# Patient Record
Sex: MALE | Race: WHITE | Employment: OTHER | URBAN - METROPOLITAN AREA
[De-identification: names, ages, dates, MRNs, and addresses within clinical notes are randomized per-mention and may not be internally consistent; named-entity substitution may affect disease eponyms.]

---

## 2017-05-09 ENCOUNTER — HOSPITAL ENCOUNTER (OUTPATIENT)
Dept: VASCULAR SURGERY | Age: 72
Discharge: HOME | End: 2017-05-09
Attending: INTERNAL MEDICINE
Payer: MEDICARE

## 2017-05-09 DIAGNOSIS — R09.89 BRUIT OF LEFT CAROTID ARTERY: ICD-10-CM

## 2017-05-09 DIAGNOSIS — I65.23 STENOSIS OF BOTH INTERNAL CAROTID ARTERIES: ICD-10-CM

## 2017-05-09 DIAGNOSIS — I70.90 ARTERIOSCLEROTIC VASCULAR DISEASE: ICD-10-CM

## 2017-05-09 PROCEDURE — 93880 EXTRACRANIAL BILAT STUDY: CPT

## 2017-05-09 NOTE — PROGRESS NOTES
[facility]  Preliminary Report:  Carotid Duplex Scan    Right:  Mild plaque noted in the right carotid system. Right ICA velocities suggest less than 50% diameter reduction. Right vertebral artery flow is antegrade. Left:  Mild plaque noted in the left carotid system. Left ICA velocities suggest less than 50% diameter reduction. Left vertebral artery flow is antegrade. Final report to follow.

## 2017-05-09 NOTE — PROCEDURES
[facility]  *** FINAL REPORT ***    Name: Sean Kumar  MRN: [id number]    Outpatient  : 10 Nov 1945  HIS Order #: [id number]  [id number] [facility] Visit #: [id number]  Date: 09 May 2017    TYPE OF TEST: Cerebrovascular Duplex    REASON FOR TEST  Atherosclerosis, NOS    Right Carotid:-             Proximal               Mid                 Distal  cm/s  Systolic  Diastolic  Systolic  Diastolic  Systolic  Diastolic  CCA:     05.2                                      85.0  Bulb:  ECA:     86.0  ICA:     64.0                 72.0                 64.0  ICA/CCA:  0.8    ICA Stenosis: <50%    Right Vertebral:-  Finding: Antegrade  Sys:       51.0  Bernice:    Right Subclavian: Normal    Left Carotid:-            Proximal                Mid                 Distal  cm/s  Systolic  Diastolic  Systolic  Diastolic  Systolic  Diastolic  CCA:    377.4      17.0                            87.0  Bulb:  ECA:    121.0  ICA:     36.0                 57.0                 51.0  ICA/CCA:  0.4    ICA Stenosis: <50%    Left Vertebral:-  Finding: Antegrade  Sys:       42.0  Bernice:    Left Subclavian: Normal    INTERPRETATION/FINDINGS  PROCEDURE:  Carotid Duplex Examination using B-mode, color and  spectral Doppler of the extracranial cerebrovascular arteries. 1. Bilateral <50% stenosis of the internal carotid arteries. 2. No significant stenosis in the external carotid arteries  bilaterally. 3. Antegrade flow in both vertebral arteries. 4. Normal flow in both subclavian arteries. Plaque Morphology:  1. Heterogeneous plaque in the bulb and right ICA. 2. Heterogeneous plaque in the bulb and left ICA. ADDITIONAL COMMENTS    I have personally reviewed the data relevant to the interpretation of  this  study. TECHNOLOGIST: Juan Ramon [employer]. DOC Villanueva, ALBINO  Signed: 2017 03:23 PM    PHYSICIAN: Ari Jordan MD  Signed: 2017 09:44 PM

## 2017-08-01 RX ORDER — ATENOLOL 50 MG/1
TABLET ORAL
Qty: 90 TAB | Refills: 3 | Status: SHIPPED | OUTPATIENT
Start: 2017-08-01 | End: 2019-04-22 | Stop reason: ALTCHOICE

## 2017-08-02 ENCOUNTER — OFFICE VISIT (OUTPATIENT)
Dept: INTERNAL MEDICINE CLINIC | Age: 72
End: 2017-08-02

## 2017-08-02 VITALS
HEIGHT: 70 IN | RESPIRATION RATE: 18 BRPM | TEMPERATURE: 98.5 F | DIASTOLIC BLOOD PRESSURE: 84 MMHG | HEART RATE: 66 BPM | BODY MASS INDEX: 34.07 KG/M2 | SYSTOLIC BLOOD PRESSURE: 166 MMHG | WEIGHT: 238 LBS | OXYGEN SATURATION: 96 %

## 2017-08-02 DIAGNOSIS — D48.5 NEOPLASM OF UNCERTAIN BEHAVIOR OF SKIN: Primary | ICD-10-CM

## 2017-08-02 DIAGNOSIS — E11.9 TYPE 2 DIABETES MELLITUS WITHOUT COMPLICATION, WITHOUT LONG-TERM CURRENT USE OF INSULIN (HCC): ICD-10-CM

## 2017-08-02 DIAGNOSIS — I10 ESSENTIAL HYPERTENSION: ICD-10-CM

## 2017-08-02 PROBLEM — M19.90 DJD (DEGENERATIVE JOINT DISEASE): Status: ACTIVE | Noted: 2017-08-02

## 2017-08-02 PROBLEM — Z79.899 ON STATIN THERAPY: Status: ACTIVE | Noted: 2017-08-02

## 2017-08-02 PROBLEM — R09.89 LEFT CAROTID BRUIT: Status: ACTIVE | Noted: 2017-08-02

## 2017-08-02 PROBLEM — L50.9 URTICARIA: Status: ACTIVE | Noted: 2017-08-02

## 2017-08-02 PROBLEM — E78.5 HYPERLIPIDEMIA: Status: ACTIVE | Noted: 2017-08-02

## 2017-08-02 PROBLEM — I70.90 ASVD (ARTERIOSCLEROTIC VASCULAR DISEASE): Status: ACTIVE | Noted: 2017-08-02

## 2017-08-02 PROBLEM — M54.9 BACK PAIN: Status: ACTIVE | Noted: 2017-08-02

## 2017-08-02 PROBLEM — N52.9 ED (ERECTILE DYSFUNCTION): Status: ACTIVE | Noted: 2017-08-02

## 2017-08-02 PROBLEM — N40.0 BPH (BENIGN PROSTATIC HYPERPLASIA): Status: ACTIVE | Noted: 2017-08-02

## 2017-08-02 PROBLEM — G61.0 GUILLAIN-BARRE SYNDROME (HCC): Status: ACTIVE | Noted: 2017-08-02

## 2017-08-02 PROBLEM — N18.9 CKD (CHRONIC KIDNEY DISEASE): Status: ACTIVE | Noted: 2017-08-02

## 2017-08-02 PROBLEM — E66.01 MORBID OBESITY (HCC): Status: ACTIVE | Noted: 2017-08-02

## 2017-08-02 PROBLEM — I35.0 AORTIC STENOSIS: Status: ACTIVE | Noted: 2017-08-02

## 2017-08-02 RX ORDER — PRAVASTATIN SODIUM 80 MG/1
80 TABLET ORAL
COMMUNITY
End: 2017-11-10 | Stop reason: SDUPTHER

## 2017-08-02 RX ORDER — LISINOPRIL 40 MG/1
40 TABLET ORAL DAILY
COMMUNITY
End: 2018-04-09 | Stop reason: SDUPTHER

## 2017-08-02 RX ORDER — TAMSULOSIN HYDROCHLORIDE 0.4 MG/1
0.4 CAPSULE ORAL
COMMUNITY
End: 2018-05-10 | Stop reason: SDUPTHER

## 2017-08-02 RX ORDER — ASPIRIN 81 MG/1
TABLET ORAL DAILY
COMMUNITY
End: 2022-03-11

## 2017-08-02 RX ORDER — METFORMIN HYDROCHLORIDE 500 MG/1
500 TABLET ORAL 2 TIMES DAILY WITH MEALS
COMMUNITY
End: 2017-11-10 | Stop reason: SDUPTHER

## 2017-08-02 RX ORDER — LOSARTAN POTASSIUM 100 MG/1
100 TABLET ORAL DAILY
COMMUNITY
End: 2018-01-31 | Stop reason: SDUPTHER

## 2017-08-02 RX ORDER — GLIPIZIDE 10 MG/1
10 TABLET ORAL 2 TIMES DAILY
COMMUNITY
End: 2018-05-01 | Stop reason: SDUPTHER

## 2017-08-02 RX ORDER — MELOXICAM 15 MG/1
15 TABLET ORAL DAILY
COMMUNITY
End: 2017-08-02 | Stop reason: ALTCHOICE

## 2017-08-02 RX ORDER — AMLODIPINE BESYLATE 10 MG/1
TABLET ORAL DAILY
COMMUNITY
End: 2017-08-08 | Stop reason: SDUPTHER

## 2017-08-02 NOTE — MR AVS SNAPSHOT
Visit Information Date & Time Provider Department Dept. Phone Encounter #  
 8/2/2017  3:10 PM Ethel Corcoran, 1943 Kerri Brennan [id number] Your Appointments 8/25/2017 10:10 AM  
FOLLOW UP 10 with MD SKYLA MercadoGOLDIE SULLIVAN [facility] ([address]) Appt Note: 3 MO FLP; 3 mo flp (r/s from 08/10) [address] [id number] [address] [address] Upcoming Health Maintenance Date Due HEMOGLOBIN A1C Q6M 1945 LIPID PANEL Q1 1945 FOOT EXAM Q1 11/10/1953 MICROALBUMIN Q1 11/10/1953 EYE EXAM RETINAL OR DILATED Q1 11/10/1953 DTaP/Tdap/Td series (1 - Tdap) 11/10/1964 FOBT Q 1 YEAR AGE 50-75 11/10/1993 ZOSTER VACCINE AGE 60> 9/10/2003 GLAUCOMA SCREENING Q2Y 11/10/2008 MEDICARE YEARLY EXAM 11/10/2008 Pneumococcal 65+ Low/Medium Risk (2 of 2 - PPSV23) 9/16/2016 INFLUENZA AGE 9 TO ADULT 8/1/2017 Allergies as of 8/2/2017  Review Complete On: 8/2/2017 By: Ethel Corcoran MD  
  
 Severity Noted Reaction Type Reactions Adhesive Tape-silicones  84/34/5988    Unknown (comments) Chocolate Flavor  08/02/2017    Unknown (comments) Current Immunizations  Never Reviewed Name Date Influenza Vaccine 10/27/2016 Pneumococcal Conjugate (PCV-13) 9/16/2015 Not reviewed this visit You Were Diagnosed With   
  
 Codes Comments Neoplasm of uncertain behavior of skin    -  Primary ICD-10-CM: D48.5 ICD-9-CM: 238.2 Essential hypertension     ICD-10-CM: I10 
ICD-9-CM: 401.9 Type 2 diabetes mellitus without complication, without long-term current use of insulin (HCC)     ICD-10-CM: E11.9 ICD-9-CM: 250.00 Vitals BP Pulse Temp Resp Height(growth percentile) Weight(growth percentile)  186/80 (BP 1 Location: Right arm, BP Patient Position: Sitting) 66 98.5 °F (36.9 °C) (Oral) 18 5' 10\" (1.778 m) 238 lb (108 kg) SpO2 BMI Smoking Status 96% 34.15 kg/m2 Never Smoker BMI and BSA Data Body Mass Index Body Surface Area  
 34.15 kg/m 2 2.31 m 2 Preferred Pharmacy Pharmacy Name Phone [id number] [facility] [address] [phone] Your Updated Medication List  
  
   
This list is accurate as of: 8/2/17  4:25 PM.  Always use your most recent med list.  
  
  
  
  
 aspirin delayed-release 81 mg tablet Take  by mouth daily. atenolol 50 mg tablet Commonly known as:  TENORMIN  
TAKE 1 TABLET BY MOUTH EVERY DAY  
  
 FLOMAX 0.4 mg capsule Generic drug:  tamsulosin Take 0.4 mg by mouth daily. glipiZIDE 10 mg tablet Commonly known as:  [place] [employer] Take 10 mg by mouth two (2) times a day. INVOKANA 300 mg tablet Generic drug:  canagliflozin Take  by mouth Daily (before breakfast). JANUVIA 100 mg tablet Generic drug:  SITagliptin Take 100 mg by mouth daily. lisinopril 40 mg tablet Commonly known as:  Elsa Tavares Take 40 mg by mouth daily. losartan 100 mg tablet Commonly known as:  COZAAR Take 100 mg by mouth daily. metFORMIN 500 mg tablet Commonly known as:  GLUCOPHAGE Take  by mouth two (2) times daily (with meals). NORVASC 10 mg tablet Generic drug:  amLODIPine Take  by mouth daily. OMEGA 3 PO Take  by mouth.  
  
 pravastatin 80 mg tablet Commonly known as:  PRAVACHOL Take 80 mg by mouth nightly. We Performed the Following [address] 0.6-1CM TRUNK,ARM,LEG [id number] CPT(R)] PATHOLOGIST REVIEW SMEARS [EEB4535 Custom] Introducing [facility]! Alejandra Chong introduces [employer] patient portal. Now you can access parts of your medical record, email your doctor's office, and request medication refills online.    
 
1. In your internet browser, go to [email or web address] 2. Click on the First Time User? Click Here link in the Sign In box. You will see the New Member Sign Up page. 3. Enter your [employer] Access Code exactly as it appears below. You will not need to use this code after youve completed the sign-up process. If you do not sign up before the expiration date, you must request a new code. · [employer] Access Code: [email or web address] Expires: 8/7/2017 11:49 AM 
 
4. Enter the last four digits of your Social Security Number (xxxx) and Date of Birth (mm/dd/yyyy) as indicated and click Submit. You will be taken to the next sign-up page. 5. Create a [employer] ID. This will be your [employer] login ID and cannot be changed, so think of one that is secure and easy to remember. 6. Create a [employer] password. You can change your password at any time. 7. Enter your Password Reset Question and Answer. This can be used at a later time if you forget your password. 8. Enter your e-mail address. You will receive e-mail notification when new information is available in [address] 9. Click Sign Up. You can now view and download portions of your medical record. 10. Click the Download Summary menu link to download a portable copy of your medical information. If you have questions, please visit the Frequently Asked Questions section of the [employer] website. Remember, [employer] is NOT to be used for urgent needs. For medical emergencies, dial 911. Now available from your iPhone and Android! Please provide this summary of care documentation to your next provider. Your primary care clinician is listed as Bhargavi. If you have any questions after today's visit, please call [phone].

## 2017-08-02 NOTE — PROGRESS NOTES
Subjective:   Ashwini Ruiz is a 66 y.o. male      Chief Complaint   Patient presents with    Skin Problem     skin lesion, x2 weeks, under left thigh,         History of present illness: He presents today for removal of skin lesion on his left upper posterior thigh right at the gluteal fold. He notes no pain associated with this. It has been present for some time but is gradually become larger. He denies any headaches or neurologic complaints. He denies any chest pain shortness breath cardiovascular complaints. He claims to be taking his medication on a regular basis.     Patient Active Problem List   Diagnosis Code    Stenosis of both internal carotid arteries I65.23    Carotid bruit present R09.89    ED (erectile dysfunction) N52.9    Guillain-Atlas syndrome (HCC) G61.0    BPH (benign prostatic hyperplasia) N40.0    ASVD (arteriosclerotic vascular disease) I70.90    Urticaria L50.9    Aortic stenosis I35.0    DJD (degenerative joint disease) M19.90    Morbid obesity ([facility]) E66.01    Hypertension I10    CKD (chronic kidney disease) N18.9    Left carotid bruit R09.89    Hyperlipidemia E78.5    On statin therapy Z79.899    Diabetes ([address] E11.9    Back pain M54.9    Neoplasm of uncertain behavior of skin D48.5      Past Medical History:   Diagnosis Date    Aortic stenosis 8/2/2017    ASVD (arteriosclerotic vascular disease) 8/2/2017    Back pain 8/2/2017    BPH (benign prostatic hyperplasia) 8/2/2017    CKD (chronic kidney disease) 8/2/2017    Diabetes ([address] 8/2/2017    DJD (degenerative joint disease) 8/2/2017    ED (erectile dysfunction) 8/2/2017    Guillain-Atlas syndrome ([address] 8/2/2017    Hyperlipidemia 8/2/2017    Hypertension 8/2/2017    Left carotid bruit 8/2/2017    Morbid obesity ([address] 8/2/2017    On statin therapy 8/2/2017    Urticaria 8/2/2017      Allergies   Allergen Reactions    Adhesive Tape-Silicones Unknown (comments)    Chocolate Flavor Unknown (comments) Family History   Problem Relation Age of Onset    Heart Disease Mother      murmor    Heart Disease Father       Social History     Social History    Marital status:      Spouse name: N/A    Number of children: N/A    Years of education: N/A     Occupational History    Not on file. Social History Main Topics    Smoking status: Never Smoker    Smokeless tobacco: Never Used    Alcohol use Yes      Comment: social    Drug use: No    Sexual activity: Not on file     Other Topics Concern    Not on file     Social History Narrative     Prior to Admission medications    Medication Sig Start Date End Date Taking? Authorizing Provider   pravastatin (PRAVACHOL) 80 mg tablet Take 80 mg by mouth nightly. Yes Historical Provider   SITagliptin (JANUVIA) 100 mg tablet Take 100 mg by mouth daily. Yes Historical Provider   canagliflozin (INVOKANA) 300 mg tablet Take  by mouth Daily (before breakfast). Yes Historical Provider   losartan (COZAAR) 100 mg tablet Take 100 mg by mouth daily. Yes Historical Provider   lisinopril (PRINIVIL, ZESTRIL) 40 mg tablet Take 40 mg by mouth daily. Yes Historical Provider   glipiZIDE (GLUCOTROL) 10 mg tablet Take 10 mg by mouth two (2) times a day. Yes Historical Provider   tamsulosin (FLOMAX) 0.4 mg capsule Take 0.4 mg by mouth daily. Yes Historical Provider   metFORMIN (GLUCOPHAGE) 500 mg tablet Take  by mouth two (2) times daily (with meals). Yes Historical Provider   amLODIPine (NORVASC) 10 mg tablet Take  by mouth daily. Yes Historical Provider   FLAXSEED OIL (OMEGA 3 PO) Take  by mouth. Yes Historical Provider   aspirin delayed-release 81 mg tablet Take  by mouth daily. Yes Historical Provider   atenolol (TENORMIN) 50 mg tablet TAKE 1 TABLET BY MOUTH EVERY DAY 8/1/17  Yes Lazara Mchugh MD        Review of Systems              Constitutional:  He denies fever, weight loss, sweats or fatigue.               EYES: No blurred or double vision, ENT: no nasal congestion, no headache or dizziness. No difficulty with                         swallowing, taste, speech or smell. Respiratory:  No cough, wheezing or shortness of breath. No sputum production. Cardiac:  Denies chest pain, palpitations, unexplained indigestion, syncope, edema, PND or orthopnea. GI:  No changes in bowel movements, no abdominal pain, no bloating, anorexia, nausea, vomiting or heartburn. :  No frequency or dysuria. Denies incontinence or sexual dysfunction. Extremities:  No joint pain, stiffness or swelling  Back:.no pain or soreness  Skin:  No recent rashes or mole changes. skin lesion raised irregular left upper posterior thigh  Neurological:  No numbness, tingling, burning paresthesias or loss of motor strength. No syncope, dizziness, frequent headaches or memory loss. Hematologic:  No easy bruising  Lymphatic: No lymph node enlargement    Objective:     Vitals:    08/02/17 1532 08/02/17 1802   BP: 186/80 166/84   Pulse: 66    Resp: 18    Temp: 98.5 °F (36.9 °C)    TempSrc: Oral    SpO2: 96%    Weight: 238 lb (108 kg)    Height: 5' 10\" (1.778 m)    PainSc:   0 - No pain        Body mass index is 34.15 kg/(m^2). Physical Examination:              General Appearance:  Well-developed, well-nourished, no acute  distress. HEENT:      Ears:  The TMs and ear canals were clear. Eyes:  The pupillary responses were normal.  Extraocular muscle function intact. Lids and conjunctiva not injected. Funduscopic exam revealed sharp disc margins. Nares: Clear w/o edema or erythema  Pharynx:  Clear with teeth in good repair. No masses were noted. Neck:  Supple without thyromegaly or adenopathy. No JVD noted. No carotid                bruits. Lungs:  Clear to auscultation and percussion. Cardiac:  Regular rate and rhythm without murmur. PMI not displaced. No gallop, rub or click.   Abdominal: Soft, non-tender, no hepata-spleenomegally or masses  Extremities:  No clubbing, cyanosis or edema. Skin:  No rash or unusual mole changes noted. Irregular skin lesion as described left upper posterior thigh at the gluteal fold about 7 mm maximal diameter  Lymph Nodes:  None felt in the cervical, supraclavicular, axillary or inguinal region. Neurological: . DTRs 2+ and symmetric. Station and gait normal.   Hematologic:   No purpura or petechiae        Assessment/Plan:         1. Neoplasm of uncertain behavior of skin    2. Essential hypertension    3. Type 2 diabetes mellitus without complication, without long-term current use of insulin ([facility])        Impressions/Plan:  #1 accelerated hypertension I had a long discussion with him regarding absolute need to work on his morbid obesity weight 238 pounds with diet and exercise and get his weight down  2. Irregular skin lesion as noted above we discussed treatment and decided to excise this. After Betadine scrub and local lidocaine anesthesia the lesion was excised in entirety. Specimen sent for pathology and evaluate evaluation. The base was cauterized and sterilely dressed. He was instructed in local care. We will notify regarding pathology results. Follow-up as previously scheduled for other medical problems. Again strongly stressed diet exercise weight reduction for his morbid obesity    Follow-up Disposition: Not on File    No results found for any visits on 08/02/17. Misael Capps MD    The patient was given after the visit summary the patient verbalized an understanding of the plans and problems as explained.

## 2017-08-02 NOTE — PROGRESS NOTES
Chief Complaint   Patient presents with    Skin Problem     skin lesion, x2 weeks, under left thigh,      1. Have you been to the ER, urgent care clinic since your last visit? Hospitalized since your last visit? No    2. Have you seen or consulted any other health care providers outside of the [address] [place] [address] since your last visit? Include any pap smears or colon screening.  No

## 2017-08-08 DIAGNOSIS — I10 ESSENTIAL HYPERTENSION: Primary | ICD-10-CM

## 2017-08-08 RX ORDER — AMLODIPINE BESYLATE 10 MG/1
10 TABLET ORAL DAILY
Qty: 90 TAB | Refills: 3 | Status: SHIPPED | OUTPATIENT
Start: 2017-08-08 | End: 2018-10-23 | Stop reason: SDUPTHER

## 2017-08-09 LAB
DX ICD CODE: NORMAL
DX ICD CODE: NORMAL
PATH REPORT.FINAL DX SPEC: NORMAL
PATH REPORT.GROSS SPEC: NORMAL
PATH REPORT.SITE OF ORIGIN SPEC: NORMAL
PATHOLOGIST NAME: NORMAL
PAYMENT PROCEDURE: NORMAL

## 2017-08-25 ENCOUNTER — OFFICE VISIT (OUTPATIENT)
Dept: INTERNAL MEDICINE CLINIC | Age: 72
End: 2017-08-25

## 2017-08-25 VITALS
HEIGHT: 70 IN | RESPIRATION RATE: 18 BRPM | BODY MASS INDEX: 33.73 KG/M2 | HEART RATE: 64 BPM | DIASTOLIC BLOOD PRESSURE: 66 MMHG | SYSTOLIC BLOOD PRESSURE: 140 MMHG | WEIGHT: 235.6 LBS | TEMPERATURE: 98.7 F | OXYGEN SATURATION: 94 %

## 2017-08-25 DIAGNOSIS — E78.2 MIXED HYPERLIPIDEMIA: ICD-10-CM

## 2017-08-25 DIAGNOSIS — I10 ESSENTIAL HYPERTENSION: Primary | ICD-10-CM

## 2017-08-25 DIAGNOSIS — E11.9 TYPE 2 DIABETES MELLITUS WITHOUT COMPLICATION, WITHOUT LONG-TERM CURRENT USE OF INSULIN (HCC): ICD-10-CM

## 2017-08-25 DIAGNOSIS — N40.1 BENIGN NON-NODULAR PROSTATIC HYPERPLASIA WITH LOWER URINARY TRACT SYMPTOMS: ICD-10-CM

## 2017-08-25 DIAGNOSIS — Z00.00 MEDICARE ANNUAL WELLNESS VISIT, INITIAL: ICD-10-CM

## 2017-08-25 DIAGNOSIS — E66.01 MORBID OBESITY DUE TO EXCESS CALORIES (HCC): ICD-10-CM

## 2017-08-25 DIAGNOSIS — Z12.11 COLON CANCER SCREENING: ICD-10-CM

## 2017-08-25 DIAGNOSIS — M15.9 PRIMARY OSTEOARTHRITIS INVOLVING MULTIPLE JOINTS: ICD-10-CM

## 2017-08-25 DIAGNOSIS — N18.3 CKD (CHRONIC KIDNEY DISEASE), STAGE 3 (MODERATE): ICD-10-CM

## 2017-08-25 RX ORDER — GLUCOSAM/CHONDRO/HERB 149/HYAL 750-100 MG
1 TABLET ORAL 3 TIMES DAILY
COMMUNITY
End: 2022-06-09

## 2017-08-25 NOTE — PROGRESS NOTES
This is an Initial Medicare Annual Wellness Exam (AWV) (Performed 12 months after IPPE or effective date of Medicare Part B enrollment, Once in a lifetime)    I have reviewed the patient's medical history in detail and updated the computerized patient record. He presents today for his initial annual Medicare wellness examination. He is here also in follow-up of his medical problems include hypertension, diabetes, hyperlipidemia, BPH, ASVD, aortic stenosis, and morbid obesity. He has taken his medication and trying to follow his diet but could probably do better with that. He is not getting a lot of exercise. He denies any chest pain shortness of breath or cardiorespiratory complaints. He denies any GI/ complaints. There are no change in his chronic arthritic complaints. There are no other complaints on complete review of systems. History     Past Medical History:   Diagnosis Date    Aortic stenosis 8/2/2017    ASVD (arteriosclerotic vascular disease) 8/2/2017    Back pain 8/2/2017    BPH (benign prostatic hyperplasia) 8/2/2017    CKD (chronic kidney disease) 8/2/2017    Diabetes ([address] 8/2/2017    DJD (degenerative joint disease) 8/2/2017    ED (erectile dysfunction) 8/2/2017    Guillain-[place] syndrome ([address] 8/2/2017    Hyperlipidemia 8/2/2017    Hypertension 8/2/2017    Left carotid bruit 8/2/2017    Morbid obesity ([address] 8/2/2017    On statin therapy 8/2/2017    Urticaria 8/2/2017      History reviewed. No pertinent surgical history. Current Outpatient Prescriptions   Medication Sig Dispense Refill    Omega-3-DHA-EPA-Fish Oil 1,000 mg (120 mg-180 mg) cap Take 1 Cap by mouth three (3) times daily.  amLODIPine (NORVASC) 10 mg tablet Take 1 Tab by mouth daily. 90 Tab 3    pravastatin (PRAVACHOL) 80 mg tablet Take 80 mg by mouth nightly.  SITagliptin (JANUVIA) 100 mg tablet Take 100 mg by mouth daily.       canagliflozin (INVOKANA) 300 mg tablet Take  by mouth Daily (before breakfast).  losartan (COZAAR) 100 mg tablet Take 100 mg by mouth daily.  lisinopril (PRINIVIL, ZESTRIL) 40 mg tablet Take 40 mg by mouth daily.  glipiZIDE (GLUCOTROL) 10 mg tablet Take 10 mg by mouth two (2) times a day.  tamsulosin (FLOMAX) 0.4 mg capsule Take 0.4 mg by mouth. Take 2 at bedtime      metFORMIN (GLUCOPHAGE) 500 mg tablet Take 500 mg by mouth two (2) times daily (with meals).  aspirin delayed-release 81 mg tablet Take  by mouth daily.       atenolol (TENORMIN) 50 mg tablet TAKE 1 TABLET BY MOUTH EVERY DAY 90 Tab 3     Allergies   Allergen Reactions    Adhesive Tape-Silicones Unknown (comments)    Chocolate Flavor Unknown (comments)     Family History   Problem Relation Age of Onset    Heart Disease Mother      murmor    Heart Disease Father      Social History   Substance Use Topics    Smoking status: Never Smoker    Smokeless tobacco: Never Used    Alcohol use Yes      Comment: social     Patient Active Problem List   Diagnosis Code    Stenosis of both internal carotid arteries I65.23    Carotid bruit present R09.89    ED (erectile dysfunction) N52.9    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    BPH (benign prostatic hyperplasia) N40.0    ASVD (arteriosclerotic vascular disease) I70.90    Urticaria L50.9    Aortic stenosis I35.0    DJD (degenerative joint disease) M19.90    Morbid obesity (HCC) E66.01    Hypertension I10    CKD (chronic kidney disease) N18.9    Left carotid bruit R09.89    Hyperlipidemia E78.5    On statin therapy Z79.899    Diabetes (HCC) E11.9    Back pain M54.9    Neoplasm of uncertain behavior of skin D48.5    Medicare annual wellness visit, initial Z00.00    Colon cancer screening Z12.11       Depression Risk Factor Screening:     PHQ over the last two weeks 8/25/2017   Little interest or pleasure in doing things Not at all   Feeling down, depressed or hopeless Not at all   Total Score PHQ 2 0     Alcohol Risk Factor Screening: On any occasion during the past 3 months, have you had more than 4 drinks containing alcohol? No    Do you average more than 14 drinks per week? No      Functional Ability and Level of Safety:     Hearing Loss  Hearing is good. Activities of Daily Living  The home contains: no safety equipment  Patient does total self care    Fall RiskFall Risk Assessment, last 12 mths 8/25/2017   Able to walk? Yes   Fall in past 12 months? No       Abuse Screen  Patient is not abused    Cognitive Screening   Evaluation of Cognitive Function:  Has your family/caregiver stated any concerns about your memory: no  Normal     ROS:    Constitutional: He denies fevers, weight loss, sweats. Eyes: No blurred or double vision. ENT: No difficulty with swallowing, taste, speech or smell. Neck: no stiffness or swelling  Respiratory: No cough wheezing or shortness of breath. Cardiovascular: Denies chest pain, palpitations, unexplained indigestion or syncope. Gastrointestinal:  No changes in bowel movements, no abdominal pain, no bloating. Genitourinary:  He denies frequency, nocturia or stranguria. Extremities: No joint pain, stiffness or swelling. Neurological:  No numbness, tingling, burring paresthesias or loss of motor strength. No syncope, dizziness or frequent headache  Lymphatic: no adenopathy noted  Hematologic: no easy bruising or bleeding gums  Skin:  No recent rashes or mole changes. Psychiatric/Behavioral:  Negative for depression.       Vitals:    08/25/17 1114   BP: 140/66   Pulse: 64   Resp: 18   Temp: 98.7 °F (37.1 °C)   TempSrc: Oral   SpO2: 94%   Weight: 235 lb 9.6 oz (106.9 kg)   Height: 5' 10\" (1.778 m)   PainSc:   0 - No pain        PHYSICAL EXAM:    General appearance - alert, well appearing, and in no distress  Mental status - alert, oriented to person, place, and time, obese  HEENT:  Ears - bilateral TM's and external ear canals clear  Eyes - pupillary responses were normal.  Extraocular muscle function intact. Lids and conjunctiva not injected. Fundoscopic exam revealed sharp disc margins. eye movements intact  Pharynx- clear with teeth in good repair. No masses were noted  Neck - supple without thyromegaly or burit. No JVD noted  Lungs - clear to auscultation and percussion  Cardiac- normal rate, 2/6 systolic murmur left sternal border. PMI not displaced. No gallop, rub or click  Abdomen - flat, soft, non-tender without palpable organomegaly or mass. No pulsatile mass was felt, and not bruit was heard. Bowel sounds were active  : Circumcised, Testes descended w/o masses  Rectal: Deferred since done in May  Extremities -  no clubbing cyanosis or edema, no diabetic foot changes noted  Lymphatics - no palpable lymphadenopathy, no hepatosplenomegaly  Hematologic: no petechiae or purpura  Peripheral vascular -Femoral, Dorsalis pedis and posterior tibial pulses felt without difficulty  Skin - no rash or unusual mole change noted, no diabetic skin changes on feet. Neurological - Cranial nerves II-XII grossly intact. Motor strength 5/5. DTR's 2+ and symmetric. Station and gait normal.  Normal distal sensation and proprioception in all toes. Back exam - full range of motion, no tenderness, palpable spasm or pain on motion  Musculoskeletal - no joint tenderness, deformity or swelling      Patient Care Team   Patient Care Team:  Lazara Mchugh MD as PCP - General (Internal Medicine)    Advice/Referrals/Counseling   Education and counseling provided:  Are appropriate based on today's review and evaluation  Colorectal cancer screening tests    Assessment/Plan     ASSESSMENT:   1. Essential hypertension    2. Mixed hyperlipidemia    3. Type 2 diabetes mellitus without complication, without long-term current use of insulin ([address]    4. CKD (chronic kidney disease), stage 3 (moderate)    5. Morbid obesity due to excess calories ([address]    6. Primary osteoarthritis involving multiple joints    7.  Benign non-nodular prostatic hyperplasia with lower urinary tract symptoms    8. Medicare annual wellness visit, initial    9. Colon cancer screening      Impression  1. Hypertension that is borderline but adequate we would not make a medicine change with encouraged diet and weight reduction  2. Hyperlipidemia last numbers reviewed and repeat status pending. Will make adjustments if necessary based upon lab  3. Diabetes last numbers reviewed currently numbers pending. Will make adjustments if necessary based upon lab  3. Chronic kidney disease we will see what that status is  5. Morbid obesity is still an issue weight is 363.4 and certainly needs to come down diet and exercise stress  6. DJD seems to be stable  7. BPH stable per his history  Medicare annual wellness examination and questionnaire performed today. Results were reviewed with patient and his questions were answered. Lifestyle recommendations modifications discussed and made. We will call her with results of lab and make further adjustments in medicines if necessary. We will otherwise continue current medicines as they are and recheck him again in 3 months with the understanding I will see him sooner should there be a problem. PLAN:  .  Orders Placed This Encounter    AMB POC HEMOGLOBIN A1C    AMB POC LIPID PROFILE    AMB POC CK (CPK)    AMB POC COMPREHENSIVE METABOLIC PANEL    AMB POC FECAL OCCULT BLOOD QL-3 CARDS    AMB POC URINE, MICROALBUMIN, SEMIQUANT (1 RESULT)    Omega-3-DHA-EPA-Fish Oil 1,000 mg (120 mg-180 mg) cap         ATTENTION:   This medical record was transcribed using an electronic medical records system. Although proofread, it may and can contain electronic and spelling errors. Other human spelling and other errors may be present. Corrections may be executed at a later time. Please feel free to contact us for any clarifications as needed.       Follow-up Disposition: Not on File      Annette Mcmillan MD  Health Maintenance Due Topic Date Due    EYE EXAM RETINAL OR DILATED Q1  11/10/1953    DTaP/Tdap/Td series (1 - Tdap) 11/10/1964    FOBT Q 1 YEAR AGE 50-75  11/10/1993    ZOSTER VACCINE AGE 60>  09/10/2003    GLAUCOMA SCREENING Q2Y  11/10/2008    Pneumococcal 65+ Low/Medium Risk (2 of 2 - PPSV23) 09/16/2016    INFLUENZA AGE 9 TO ADULT  08/01/2017

## 2017-08-25 NOTE — PROGRESS NOTES
1. Have you been to the ER, urgent care clinic since your last visit? Hospitalized since your last visit? No    2. Have you seen or consulted any other health care providers outside of the [address] [place] [address] since your last visit? Include any pap smears or colon screening. No    Does Not have [address] [place] [address] DIrective.

## 2017-08-28 LAB
ALBUMIN SERPL-MCNC: 4.3 G/DL (ref 3.9–5.4)
ALKALINE PHOS POC: 64 U/L (ref 38–126)
ALT SERPL-CCNC: 28 U/L (ref 9–52)
AST SERPL-CCNC: 21 U/L (ref 14–36)
BUN BLD-MCNC: 17 MG/DL (ref 9–20)
CALCIUM BLD-MCNC: 9 MG/DL (ref 8.4–10.2)
CHLORIDE BLD-SCNC: 103 MMOL/L (ref 98–107)
CHOLEST SERPL-MCNC: 166 MG/DL (ref 0–200)
CK (CPK) POC: 74 U/L (ref 30–135)
CO2 POC: 25 MMOL/L (ref 22–32)
CREAT BLD-MCNC: 0.8 MG/DL (ref 0.8–1.5)
EGFR (POC): 88.6
GLUCOSE POC: 148 MG/DL (ref 75–110)
HBA1C MFR BLD HPLC: 8.6 % (ref 4.5–5.7)
HDLC SERPL-MCNC: 45 MG/DL (ref 35–130)
LDL CHOLESTEROL POC: 75 MG/DL (ref 0–130)
MICROALBUMIN UR TEST STR-MCNC: 100 MG/L (ref 0–20)
POTASSIUM SERPL-SCNC: 4.6 MMOL/L (ref 3.6–5)
PROT SERPL-MCNC: 6.7 G/DL (ref 6.3–8.2)
SODIUM SERPL-SCNC: 140 MMOL/L (ref 137–145)
TCHOL/HDL RATIO (POC): 3.7 (ref 0–4)
TOTAL BILIRUBIN POC: 1.7 MG/DL (ref 0.2–1.3)
TRIGL SERPL-MCNC: 230 MG/DL (ref 0–200)
VLDLC SERPL CALC-MCNC: 46 MG/DL

## 2017-08-29 NOTE — PROGRESS NOTES
Extremely poor control of diabetes with hemoglobin A1c of 8.6. He is on a maximum dose of Invokana, Januvia, and glyburide, as well as his maximum tolerated doses of metformin. He already has significant amount of protein in the urine. He needs to start insulin and will start Lantus 20 units daily. If we do not get better control his diabetes he will certainly end up with kidney failure and dialysis.   That is if he does not develop other cardiac complications or problems prior so we need better diabetes control

## 2017-10-24 NOTE — PROGRESS NOTES
Extremely poor control of diabetes with hemoglobin A1c of 8.6.  He is on a maximum dose of Invokana, Januvia, and glyburide, as well as his maximum tolerated doses of metformin.  He already has significant amount of protein in the urine.  He needs to start insulin and will start Lantus 20 units daily.  If we do not get better control his diabetes he will certainly end up with kidney failure and dialysis.  That is if he does not develop other cardiac complications or problems prior so we need better diabetes control. Discussed with him regarding his lab when I was finally able to talk to him. He had not returned my call.   Patient states he has been already working on his diet and exercise and does not want to add anything additional at this time.

## 2017-11-10 DIAGNOSIS — E78.5 HYPERLIPIDEMIA, UNSPECIFIED HYPERLIPIDEMIA TYPE: Primary | ICD-10-CM

## 2017-11-13 RX ORDER — METFORMIN HYDROCHLORIDE 500 MG/1
TABLET ORAL
Qty: 270 TAB | Refills: 3 | Status: SHIPPED | OUTPATIENT
Start: 2017-11-13 | End: 2019-01-09 | Stop reason: SDUPTHER

## 2017-11-13 RX ORDER — PRAVASTATIN SODIUM 80 MG/1
TABLET ORAL
Qty: 90 TAB | Refills: 3 | Status: SHIPPED | OUTPATIENT
Start: 2017-11-13 | End: 2018-12-23 | Stop reason: SDUPTHER

## 2017-12-10 PROBLEM — E78.2 MIXED HYPERLIPIDEMIA: Status: ACTIVE | Noted: 2017-08-02

## 2017-12-10 PROBLEM — E11.9 CONTROLLED TYPE 2 DIABETES MELLITUS WITHOUT COMPLICATION, WITHOUT LONG-TERM CURRENT USE OF INSULIN (HCC): Status: ACTIVE | Noted: 2017-12-10

## 2017-12-10 PROBLEM — M15.9 PRIMARY OSTEOARTHRITIS INVOLVING MULTIPLE JOINTS: Status: ACTIVE | Noted: 2017-08-02

## 2017-12-11 ENCOUNTER — OFFICE VISIT (OUTPATIENT)
Dept: INTERNAL MEDICINE CLINIC | Age: 72
End: 2017-12-11

## 2017-12-11 VITALS
HEART RATE: 78 BPM | BODY MASS INDEX: 33.41 KG/M2 | DIASTOLIC BLOOD PRESSURE: 82 MMHG | RESPIRATION RATE: 20 BRPM | TEMPERATURE: 98.4 F | HEIGHT: 70 IN | OXYGEN SATURATION: 98 % | SYSTOLIC BLOOD PRESSURE: 170 MMHG | WEIGHT: 233.4 LBS

## 2017-12-11 DIAGNOSIS — N18.30 STAGE 3 CHRONIC KIDNEY DISEASE (HCC): ICD-10-CM

## 2017-12-11 DIAGNOSIS — Z23 ENCOUNTER FOR IMMUNIZATION: ICD-10-CM

## 2017-12-11 DIAGNOSIS — I35.0 AORTIC VALVE STENOSIS, ETIOLOGY OF CARDIAC VALVE DISEASE UNSPECIFIED: ICD-10-CM

## 2017-12-11 DIAGNOSIS — M15.9 PRIMARY OSTEOARTHRITIS INVOLVING MULTIPLE JOINTS: ICD-10-CM

## 2017-12-11 DIAGNOSIS — E78.2 MIXED HYPERLIPIDEMIA: ICD-10-CM

## 2017-12-11 DIAGNOSIS — E11.9 CONTROLLED TYPE 2 DIABETES MELLITUS WITHOUT COMPLICATION, WITHOUT LONG-TERM CURRENT USE OF INSULIN (HCC): ICD-10-CM

## 2017-12-11 DIAGNOSIS — I10 ESSENTIAL HYPERTENSION: Primary | ICD-10-CM

## 2017-12-11 DIAGNOSIS — E66.01 MORBID OBESITY (HCC): ICD-10-CM

## 2017-12-11 DIAGNOSIS — R31.9 HEMATURIA, UNSPECIFIED TYPE: ICD-10-CM

## 2017-12-11 RX ORDER — TIMOLOL MALEATE 5 MG/ML
SOLUTION OPHTHALMIC
COMMUNITY
Start: 2017-09-21 | End: 2018-04-02 | Stop reason: ALTCHOICE

## 2017-12-11 RX ORDER — MELOXICAM 15 MG/1
15 TABLET ORAL DAILY
COMMUNITY
End: 2018-08-01 | Stop reason: SDUPTHER

## 2017-12-11 NOTE — PROGRESS NOTES
1. Have you been to the ER, urgent care clinic since your last visit? Hospitalized since your last visit? No    2. Have you seen or consulted any other health care providers outside of the [address] [place] [address] since your last visit? Include any pap smears or colon screening. No     Chief Complaint   Patient presents with    Hypertension     3 mo. f/u    Diabetes     3 mo. f/u     Fasting      Eye exam was done this year with Dr. Luann Davalos. Andrei Graves is a 74 y.o. male who presents for routine immunizations. He denies any symptoms , reactions or allergies that would exclude them from being immunized today. Risks and adverse reactions were discussed and the VIS was given to them. All questions were addressed. He was observed for 15 min post injection. There were no reactions observed.     Shalom Bowser LPN

## 2017-12-11 NOTE — MR AVS SNAPSHOT
[address] [address] [phone] Patient: Veronica Chacko MRN: [id number] Visit Information Date & Time Provider Department Dept. Phone Encounter #  
 12/11/2017 10:30 AM Ivy Siemens, MD [facility] [phone] [id number] Upcoming Health Maintenance Date Due  
 EYE EXAM RETINAL OR DILATED Q1 11/10/1955 DTaP/Tdap/Td series (1 - Tdap) 11/10/1964 FOBT Q 1 YEAR AGE 50-75 11/10/1993 ZOSTER VACCINE AGE 60> 9/10/2003 GLAUCOMA SCREENING Q2Y 11/10/2008 Pneumococcal 65+ Low/Medium Risk (2 of 2 - PPSV23) 9/16/2016 Influenza Age 5 to Adult 8/1/2017 HEMOGLOBIN A1C Q6M 2/25/2018 FOOT EXAM Q1 8/25/2018 MICROALBUMIN Q1 8/25/2018 LIPID PANEL Q1 8/25/2018 MEDICARE YEARLY EXAM 8/26/2018 Allergies as of 12/11/2017  Review Complete On: 12/11/2017 By: Donnie Almanza LPN Severity Noted Reaction Type Reactions Adhesive Tape-silicones  09/88/1199    Unknown (comments) Chocolate Flavor  08/02/2017    Unknown (comments) Current Immunizations  Never Reviewed Name Date Influenza Vaccine 10/27/2016 Influenza Vaccine (Quad) Intradermal PF  Incomplete Pneumococcal Conjugate (PCV-13) 9/16/2015 Not reviewed this visit You Were Diagnosed With   
  
 Codes Comments Essential hypertension    -  Primary ICD-10-CM: I10 
ICD-9-CM: 401.9 Controlled type 2 diabetes mellitus without complication, without long-term current use of insulin ([facility]     ICD-10-CM: E11.9 ICD-9-CM: 250.00 Stage 3 chronic kidney disease     ICD-10-CM: N18.3 ICD-9-CM: 281. 3 Mixed hyperlipidemia     ICD-10-CM: E78.2 ICD-9-CM: 272.2 Morbid obesity ([facility]     ICD-10-CM: E66.01 
ICD-9-CM: 278.01 Primary osteoarthritis involving multiple joints     ICD-10-CM: M15.0 ICD-9-CM: 715.09   
 Aortic valve stenosis, etiology of cardiac valve disease unspecified     ICD-10-CM: I35.0 ICD-9-CM: 424.1 Encounter for immunization     ICD-10-CM: B13 ICD-9-CM: V03.89 Hematuria, unspecified type     ICD-10-CM: R31.9 ICD-9-CM: 599.70 Vitals BP Pulse Temp Resp Height(growth percentile) Weight(growth percentile) 170/82 (BP 1 Location: Left arm, BP Patient Position: Sitting) 78 98.4 °F (36.9 °C) (Oral) 20 5' 10\" (1.778 m) 233 lb 6.4 oz (105.9 kg) SpO2 BMI Smoking Status 98% 33.49 kg/m2 Never Smoker Vitals History BMI and BSA Data Body Mass Index Body Surface Area  
 33.49 kg/m 2 2.29 m 2 Preferred Pharmacy Pharmacy Name Phone [facility] [address] [facility], [address] [place] [address] [phone] Your Updated Medication List  
  
   
This list is accurate as of: 12/11/17 12:15 PM.  Always use your most recent med list. amLODIPine 10 mg tablet Commonly known as:  Arlyss [place] Take 1 Tab by mouth daily. aspirin delayed-release 81 mg tablet Take  by mouth daily. atenolol 50 mg tablet Commonly known as:  TENORMIN  
TAKE 1 TABLET BY MOUTH EVERY DAY  
  
 FLOMAX 0.4 mg capsule Generic drug:  tamsulosin Take 0.4 mg by mouth. Take 2 at bedtime  
  
 glipiZIDE 10 mg tablet Commonly known as:  Janith Ket Take 10 mg by mouth two (2) times a day. INVOKANA 300 mg tablet Generic drug:  canagliflozin Take  by mouth Daily (before breakfast). JANUVIA 100 mg tablet Generic drug:  SITagliptin Take 100 mg by mouth daily. lisinopril 40 mg tablet Commonly known as:  Rogelio Shutters Take 40 mg by mouth daily. losartan 100 mg tablet Commonly known as:  COZAAR Take 100 mg by mouth daily. meloxicam 15 mg tablet Commonly known as:  MOBIC Take 15 mg by mouth daily. metFORMIN 500 mg tablet Commonly known as:  GLUCOPHAGE  
 TAKE ONE TABLET BY MOUTH EVERY MORNING AND TAKE TWO TABLETS BY MOUTH EVERY EVENING WITH DINNER Omega-3-DHA-EPA-Fish Oil 1,000 mg (120 mg-180 mg) Cap Take 1 Cap by mouth three (3) times daily. pravastatin 80 mg tablet Commonly known as:  PRAVACHOL  
TAKE ONE TABLET BY MOUTH DAILY  
  
 timolol 0.5 % ophthalmic gel-forming Commonly known as:  TIMOPTIC-XE We Performed the Following ADMIN INFLUENZA VIRUS VAC [ HCPCS] AMB POC CK (CPK) [60737 CPT(R)] AMB POC COMPREHENSIVE METABOLIC PANEL [57966 CPT(R)] AMB POC HEMOGLOBIN A1C [51985 CPT(R)] AMB POC LIPID PROFILE [04318 CPT(R)] AMB POC URINALYSIS DIP STICK AUTO W/ MICRO  [99414 CPT(R)]  DIABETES FOOT EXAM [HM7 Custom] INFLUENZA VACC IIV4 SPLIT VIRUS PRSRV FREE ID [79984 CPT(R)] REFERRAL TO UROLOGY [[id number] Custom] Comments:  
 Evaluation of  hematuria intermittent Referral Information Referral ID Referred By Referred To  
  
 [id number] Nicol [address] [facility], [address] [facility] Phone: [phone] Fax: [phone] Visits Status Start Date End Date 1 New Request 12/11/17 12/11/18 If your referral has a status of pending review or denied, additional information will be sent to support the outcome of this decision. Introducing [facility]! Gabbie Cm introduces [employer] patient portal. Now you can access parts of your medical record, email your doctor's office, and request medication refills online. 1. In your internet browser, go to [email or web address] 2. Click on the First Time User? Click Here link in the Sign In box. You will see the New Member Sign Up page. 3. Enter your [employer] Access Code exactly as it appears below. You will not need to use this code after youve completed the sign-up process.  If you do not sign up before the expiration date, you must request a new code. · [employer] Access Code: [email or web address] Expires: 3/11/2018 10:19 AM 
 
4. Enter the last four digits of your Social Security Number (xxxx) and Date of Birth (mm/dd/yyyy) as indicated and click Submit. You will be taken to the next sign-up page. 5. Create a [employer] ID. This will be your [employer] login ID and cannot be changed, so think of one that is secure and easy to remember. 6. Create a [employer] password. You can change your password at any time. 7. Enter your Password Reset Question and Answer. This can be used at a later time if you forget your password. 8. Enter your e-mail address. You will receive e-mail notification when new information is available in [address] 9. Click Sign Up. You can now view and download portions of your medical record. 10. Click the Download Summary menu link to download a portable copy of your medical information. If you have questions, please visit the Frequently Asked Questions section of the [employer] website. Remember, [employer] is NOT to be used for urgent needs. For medical emergencies, dial 911. Now available from your iPhone and Android! Please provide this summary of care documentation to your next provider. Your primary care clinician is listed as Bhargavi. If you have any questions after today's visit, please call [phone].

## 2017-12-11 NOTE — PROGRESS NOTES
Chief Complaint   Patient presents with    Hypertension     3 mo. f/u    Diabetes     3 mo. f/u       SUBJECTIVE:    Reuben Blair is a 74 y.o. male who returns in follow-up for his hypertension, diabetes, hyperlipidemia, DJD, CKD, and obesity. He does note that he intermittently sees some blood on his underwear but does not note any blood in his urine or problems passing his urine other than the stream does not seem to be right. He denies back pain abdominal pain or other urologic complaints. He denies any chest pain shortness breath or cardiorespiratory complaints. There are no GI/ complaints except that noted above. He denies any headaches or neurological planes. He has no new arthritic complaints but chronic joint aches and pains that have not changed. He does not really exercise and knows he could do better with his diet but he is taking his medicines regularly according to him although he did not take his blood pressure medicine this morning. Current Outpatient Prescriptions   Medication Sig Dispense Refill    timolol (TIMOPTIC-XE) 0.5 % ophthalmic gel-forming       meloxicam (MOBIC) 15 mg tablet Take 15 mg by mouth daily.  metFORMIN (GLUCOPHAGE) 500 mg tablet TAKE ONE TABLET BY MOUTH EVERY MORNING AND TAKE TWO TABLETS BY MOUTH EVERY EVENING WITH DINNER (Patient taking differently: TAKE ONE TABLET BY MOUTH EVERY MORNING AND TAKE ONE TABLETS BY MOUTH EVERY EVENING WITH DINNER) 270 Tab 3    pravastatin (PRAVACHOL) 80 mg tablet TAKE ONE TABLET BY MOUTH DAILY 90 Tab 3    Omega-3-DHA-EPA-Fish Oil 1,000 mg (120 mg-180 mg) cap Take 1 Cap by mouth three (3) times daily.  amLODIPine (NORVASC) 10 mg tablet Take 1 Tab by mouth daily. 90 Tab 3    SITagliptin (JANUVIA) 100 mg tablet Take 100 mg by mouth daily.  canagliflozin (INVOKANA) 300 mg tablet Take  by mouth Daily (before breakfast).  losartan (COZAAR) 100 mg tablet Take 100 mg by mouth daily.       lisinopril (PRINIVIL, ZESTRIL) 40 mg tablet Take 40 mg by mouth daily.  glipiZIDE (GLUCOTROL) 10 mg tablet Take 10 mg by mouth two (2) times a day.  tamsulosin (FLOMAX) 0.4 mg capsule Take 0.4 mg by mouth. Take 2 at bedtime      aspirin delayed-release 81 mg tablet Take  by mouth daily. 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 No pertinent surgical history.   Allergies   Allergen Reactions    Adhesive Tape-Silicones Unknown (comments)    Chocolate Flavor Unknown (comments)       REVIEW OF SYSTEMS:  General: negative for - chills or fever, or weight loss or gain  ENT: negative for - headaches, nasal congestion or tinnitus  Eyes: no blurred or visual changes  Neck: No stiffness or swollen nodes  Respiratory: negative for - cough, hemoptysis, shortness of breath or wheezing  Cardiovascular : negative for - chest pain, edema, palpitations or shortness of breath  Gastrointestinal: negative for - abdominal pain, blood in stools, heartburn or nausea/vomiting  Genito-Urinary: no dysuria, trouble voiding, or hematuria occasional blood noted on his underwear and his stream does not seem to be correct  Musculoskeletal: negative for - gait disturbance, joint pain, joint stiffness or joint swelling  Neurological: no TIA or stroke symptoms  Hematologic: no bruises, no bleeding  Lymphatic: no swollen glands  Integument: no lumps, mole changes, nail changes or rash  Endocrine:no malaise/lethargy poly uria or polydipsia or unexpected weight changes        Social History     Social History    Marital status:      Spouse name: N/A    Number of children: N/A    Years of education: N/A     Social History Main Topics    Smoking status: Never Smoker    Smokeless tobacco: Never Used    Alcohol use Yes      Comment: social    Drug use: No    Sexual activity: Not Asked     Other Topics Concern    None     Social History Narrative     Family History   Problem Relation Age of Onset    Heart Disease Mother      murmor    Heart Disease Father        OBJECTIVE:     Visit Vitals    /82 (BP 1 Location: Left arm, BP Patient Position: Sitting)    Pulse 78    Temp 98.4 °F (36.9 °C) (Oral)    Resp 20    Ht 5' 10\" (1.778 m)    Wt 233 lb 6.4 oz (105.9 kg)    SpO2 98%    BMI 33.49 kg/m2     CONSTITUTIONAL: Obese white male, appears age appropriate  EYES: sclera anicteric, PERRL, EOMI  ENMT:nars clear, moist mucous membranes, pharynx clear  NECK: supple. Thyroid normal, No JVD or bruits  RESPIRATORY: Chest: clear to ascultation and percussion, normal inspiratory effort  CARDIOVASCULAR: Heart: regular rate and rhythm no murmurs, rubs or gallops, PMI not displaced, No thrills  GASTROINTESTINAL: Abdomen: non distended, soft, non tender, bowel sounds normal  HEMATOLOGIC: no purpura, petechiae or bruising  LYMPHATIC: No lymph node enlargemant  MUSCULOSKELETAL: Extremities: no edema or active synovitis, pulse 1+. No diabetic foot changes noted  INTEGUMENT: No unusual rashes or suspicious skin lesions noted. Nails appear normal.  PERIPHERAL VASCULAR: normal pulses femoral, PT and DP  NEUROLOGIC: non-focal exam, A & O X 3. Normal distal sensation and proprioception all toes both feet  PSYCHIATRIC:, appropriate affect     ASSESSMENT:   1. Essential hypertension    2.  Controlled type 2 diabetes mellitus without complication, without long-term current use of insulin (HCC)    3. Stage 3 chronic kidney disease    4. Mixed hyperlipidemia    5. Morbid obesity ([address]    6. Primary osteoarthritis involving multiple joints    7. Aortic valve stenosis, etiology of cardiac valve disease unspecified    8. Encounter for immunization    9. Hematuria, unspecified type      Impression  1. Hypertension that is poorly controlled he did not take his medicine I stressed the fact he needs to take his medicine but comes in so we can see what his blood pressure truly S  2.  CKD we will see what that status is and make adjustments in treatment if necessary  3. Diabetes reviewed last numbers repeat status pending will make adjustments if necessary  4. Hyperlipidemia repeat status pending will make adjustments if needed  5. Obesity we discussed diet exercise weight reduction once again with him stressed the absolute importance of this was overall health  6. DJD that seems to be stable  7. Aortic stenosis no change in murmur at the present time  8. Hematuria I will set him up for urology appointment to evaluate this and I am checking the urine today  Flu shot given today. Labs are pending as noted will make further recommendation based on those. Follow stable continue same and I will recheck him again in 3 months or sooner should be a problem. PLAN:  .  Orders Placed This Encounter    Influenza virus vaccine (FLUZONE HIGH-DOSE) 65 years and older (68362)    REFERRAL TO UROLOGY    AMB POC LIPID PROFILE    AMB POC HEMOGLOBIN A1C    AMB POC COMPREHENSIVE METABOLIC PANEL    AMB POC CK (CPK)    AMB POC URINALYSIS DIP STICK AUTO W/ MICRO     timolol (TIMOPTIC-XE) 0.5 % ophthalmic gel-forming    meloxicam (MOBIC) 15 mg tablet         ATTENTION:   This medical record was transcribed using an electronic medical records system. Although proofread, it may and can contain electronic and spelling errors.   Other human spelling and other errors may be present. Corrections may be executed at a later time. Please feel free to contact us for any clarifications as needed. Follow-up Disposition:  Return in about 3 months (around 3/11/2018). No results found for any visits on 12/11/17. Latasha Elmore MD    The patient verbalized understanding of the problems and plans as explained.

## 2017-12-12 LAB
ALBUMIN SERPL-MCNC: 4.7 G/DL (ref 3.9–5.4)
ALKALINE PHOS POC: 61 U/L (ref 38–126)
ALT SERPL-CCNC: 27 U/L (ref 9–52)
AST SERPL-CCNC: 22 U/L (ref 14–36)
BACTERIA UA POCT, BACTPOCT: ABNORMAL
BILIRUB UR QL STRIP: NEGATIVE
BUN BLD-MCNC: 20 MG/DL (ref 9–20)
CALCIUM BLD-MCNC: 9.7 MG/DL (ref 8.4–10.2)
CASTS UA POCT: ABNORMAL
CHLORIDE BLD-SCNC: 101 MMOL/L (ref 98–107)
CHOLEST SERPL-MCNC: 185 MG/DL (ref 0–200)
CK (CPK) POC: 67 U/L (ref 30–135)
CLUE CELLS, CLUEPOCT: NEGATIVE
CO2 POC: 26 MMOL/L (ref 22–32)
CREAT BLD-MCNC: 0.9 MG/DL (ref 0.8–1.5)
CRYSTALS UA POCT, CRYSPOCT: ABNORMAL
EGFR (POC): 83.8
EPITHELIAL CELLS POCT: NEGATIVE
GLUCOSE POC: 105 MG/DL (ref 75–110)
GLUCOSE UR-MCNC: ABNORMAL MG/DL
HBA1C MFR BLD HPLC: 6.8 % (ref 4.5–5.7)
HDLC SERPL-MCNC: 50 MG/DL (ref 35–130)
KETONES P FAST UR STRIP-MCNC: ABNORMAL MG/DL
LDL CHOLESTEROL POC: 95.6 MG/DL (ref 0–130)
MUCUS UA POCT, MUCPOCT: ABNORMAL
PH UR STRIP: 5 [PH] (ref 5–7)
POTASSIUM SERPL-SCNC: 4.5 MMOL/L (ref 3.6–5)
PROT SERPL-MCNC: 7.4 G/DL (ref 6.3–8.2)
PROT UR QL STRIP: ABNORMAL
RBC UA POCT, RBCPOCT: ABNORMAL
SODIUM SERPL-SCNC: 141 MMOL/L (ref 137–145)
SP GR UR STRIP: 1.02 (ref 1.01–1.02)
TCHOL/HDL RATIO (POC): 3.7 (ref 0–4)
TOTAL BILIRUBIN POC: 2.3 MG/DL (ref 0.2–1.3)
TRICH UA POCT, TRICHPOC: NEGATIVE
TRIGL SERPL-MCNC: 197 MG/DL (ref 0–200)
UA UROBILINOGEN AMB POC: NORMAL (ref 0.2–1)
URINALYSIS CLARITY POC: CLEAR
URINALYSIS COLOR POC: ABNORMAL
URINE BLOOD POC: ABNORMAL
URINE CULT COMMENT, POCT: ABNORMAL
URINE LEUKOCYTES POC: NEGATIVE
URINE NITRITES POC: NEGATIVE
VLDLC SERPL CALC-MCNC: 39.4 MG/DL
WBC UA POCT, WBCPOCT: 0
YEAST UA POCT, YEASTPOC: NEGATIVE

## 2018-01-31 DIAGNOSIS — I10 ESSENTIAL HYPERTENSION: Primary | ICD-10-CM

## 2018-01-31 NOTE — TELEPHONE ENCOUNTER
Requested Prescriptions     Pending Prescriptions Disp Refills    losartan (COZAAR) 100 mg tablet [Pharmacy Med Name: LOSARTAN POTASSIUM 100 MG TAB] 90 Tab 3     Sig: TAKE ONE TABLET BY MOUTH DAILY

## 2018-02-01 RX ORDER — LOSARTAN POTASSIUM 100 MG/1
TABLET ORAL
Qty: 90 TAB | Refills: 3 | Status: SHIPPED | OUTPATIENT
Start: 2018-02-01 | End: 2019-03-21 | Stop reason: SDUPTHER

## 2018-03-20 PROBLEM — N18.2 STAGE 2 CHRONIC KIDNEY DISEASE: Status: ACTIVE | Noted: 2017-08-02

## 2018-04-01 PROBLEM — E11.22 CONTROLLED TYPE 2 DIABETES MELLITUS WITH STAGE 2 CHRONIC KIDNEY DISEASE, WITHOUT LONG-TERM CURRENT USE OF INSULIN (HCC): Status: ACTIVE | Noted: 2017-12-10

## 2018-04-01 PROBLEM — N18.2 CONTROLLED TYPE 2 DIABETES MELLITUS WITH STAGE 2 CHRONIC KIDNEY DISEASE, WITHOUT LONG-TERM CURRENT USE OF INSULIN (HCC): Status: ACTIVE | Noted: 2017-12-10

## 2018-04-02 ENCOUNTER — OFFICE VISIT (OUTPATIENT)
Dept: INTERNAL MEDICINE CLINIC | Age: 73
End: 2018-04-02

## 2018-04-02 VITALS
HEIGHT: 70 IN | TEMPERATURE: 97.9 F | SYSTOLIC BLOOD PRESSURE: 138 MMHG | BODY MASS INDEX: 34.13 KG/M2 | WEIGHT: 238.4 LBS | DIASTOLIC BLOOD PRESSURE: 76 MMHG | HEART RATE: 69 BPM | OXYGEN SATURATION: 97 % | RESPIRATION RATE: 12 BRPM

## 2018-04-02 DIAGNOSIS — N18.2 CONTROLLED TYPE 2 DIABETES MELLITUS WITH STAGE 2 CHRONIC KIDNEY DISEASE, WITHOUT LONG-TERM CURRENT USE OF INSULIN (HCC): ICD-10-CM

## 2018-04-02 DIAGNOSIS — G61.0 GUILLAIN-BARRE SYNDROME (HCC): ICD-10-CM

## 2018-04-02 DIAGNOSIS — E78.2 MIXED HYPERLIPIDEMIA: ICD-10-CM

## 2018-04-02 DIAGNOSIS — N40.1 BENIGN PROSTATIC HYPERPLASIA WITH LOWER URINARY TRACT SYMPTOMS, SYMPTOM DETAILS UNSPECIFIED: ICD-10-CM

## 2018-04-02 DIAGNOSIS — E66.01 MORBID OBESITY (HCC): ICD-10-CM

## 2018-04-02 DIAGNOSIS — N18.2 STAGE 2 CHRONIC KIDNEY DISEASE: ICD-10-CM

## 2018-04-02 DIAGNOSIS — M15.9 PRIMARY OSTEOARTHRITIS INVOLVING MULTIPLE JOINTS: ICD-10-CM

## 2018-04-02 DIAGNOSIS — I10 ESSENTIAL HYPERTENSION: Primary | ICD-10-CM

## 2018-04-02 DIAGNOSIS — I35.0 AORTIC VALVE STENOSIS, ETIOLOGY OF CARDIAC VALVE DISEASE UNSPECIFIED: ICD-10-CM

## 2018-04-02 DIAGNOSIS — Z12.11 COLON CANCER SCREENING: ICD-10-CM

## 2018-04-02 DIAGNOSIS — E11.22 CONTROLLED TYPE 2 DIABETES MELLITUS WITH STAGE 2 CHRONIC KIDNEY DISEASE, WITHOUT LONG-TERM CURRENT USE OF INSULIN (HCC): ICD-10-CM

## 2018-04-02 LAB
ALBUMIN SERPL-MCNC: 4.4 G/DL (ref 3.9–5.4)
ALKALINE PHOS POC: 49 U/L (ref 38–126)
ALT SERPL-CCNC: 35 U/L (ref 9–52)
AST SERPL-CCNC: 19 U/L (ref 14–36)
BUN BLD-MCNC: 17 MG/DL (ref 9–20)
CALCIUM BLD-MCNC: 9.2 MG/DL (ref 8.4–10.2)
CHLORIDE BLD-SCNC: 101 MMOL/L (ref 98–107)
CHOLEST SERPL-MCNC: 154 MG/DL (ref 0–200)
CK (CPK) POC: 58 U/L (ref 30–135)
CO2 POC: 29 MMOL/L (ref 22–32)
CREAT BLD-MCNC: 0.8 MG/DL (ref 0.8–1.5)
EGFR (POC): 88
GLUCOSE POC: 185 MG/DL (ref 75–110)
HBA1C MFR BLD HPLC: 7.4 % (ref 4.5–5.7)
HDLC SERPL-MCNC: 52 MG/DL (ref 35–130)
LDL CHOLESTEROL POC: 70.2 MG/DL (ref 0–130)
POTASSIUM SERPL-SCNC: 4.7 MMOL/L (ref 3.6–5)
PROT SERPL-MCNC: 6.6 G/DL (ref 6.3–8.2)
SODIUM SERPL-SCNC: 138 MMOL/L (ref 137–145)
TCHOL/HDL RATIO (POC): 3 (ref 0–4)
TOTAL BILIRUBIN POC: 1.8 MG/DL (ref 0.2–1.3)
TRIGL SERPL-MCNC: 159 MG/DL (ref 0–200)
VLDLC SERPL CALC-MCNC: 31.8 MG/DL

## 2018-04-02 NOTE — MR AVS SNAPSHOT
[address] [facility] [address] [id number] [phone] Patient: Collin Awan MRN: [id number] DSV:[id number] Visit Information Date & Time Provider Department Dept. Phone Encounter #  
 4/2/2018  9:40 AM Ozzy Mcmahon MD [address] Brennan [id number] Follow-up Instructions Return in about 3 months (around 7/2/2018). Your Appointments 7/2/2018  9:50 AM  
FOLLOW UP 10 with MD [facility] ([facility] Appt Note: [address] [id number] [address] [facility] [id number] Upcoming Health Maintenance Date Due  
 EYE EXAM RETINAL OR DILATED Q1 11/10/1955 DTaP/Tdap/Td series (1 - Tdap) 11/10/1964 ZOSTER VACCINE AGE 60> 9/10/2003 GLAUCOMA SCREENING Q2Y 11/10/2008 COLONOSCOPY 9/17/2015 Pneumococcal 65+ Low/Medium Risk (2 of 2 - PPSV23) 9/16/2016 HEMOGLOBIN A1C Q6M 6/11/2018 MICROALBUMIN Q1 8/25/2018 MEDICARE YEARLY EXAM 8/26/2018 FOOT EXAM Q1 12/11/2018 LIPID PANEL Q1 12/11/2018 Allergies as of 4/2/2018  Review Complete On: 4/2/2018 By: Ozzy Mcmahon MD  
  
 Severity Noted Reaction Type Reactions Adhesive Tape-silicones  52/38/3282    Unknown (comments) Chocolate Flavor  08/02/2017    Unknown (comments) Current Immunizations  Never Reviewed Name Date Influenza High Dose Vaccine PF 12/11/2017 Influenza Vaccine 10/27/2016 Pneumococcal Conjugate (PCV-13) 9/16/2015 Pneumococcal Polysaccharide (PPSV-23) 10/9/2000 Not reviewed this visit You Were Diagnosed With   
  
 Codes Comments Essential hypertension    -  Primary ICD-10-CM: I10 
ICD-9-CM: 401.9  Controlled type 2 diabetes mellitus with stage 2 chronic kidney disease, without long-term current use of insulin (HCC)     ICD-10-CM: E11.22, N18.2 ICD-9-CM: 250.40, 585.2 Mixed hyperlipidemia     ICD-10-CM: E78.2 ICD-9-CM: 272.2 Morbid obesity ([address]     ICD-10-CM: E66.01 
ICD-9-CM: 278.01 Primary osteoarthritis involving multiple joints     ICD-10-CM: M15.0 ICD-9-CM: 715.09 Stage 2 chronic kidney disease     ICD-10-CM: N18.2 ICD-9-CM: 502. 2 Aortic valve stenosis, etiology of cardiac valve disease unspecified     ICD-10-CM: I35.0 ICD-9-CM: 424.1 Benign prostatic hyperplasia with lower urinary tract symptoms, symptom details unspecified     ICD-10-CM: N40.1 ICD-9-CM: 600.01 Guillain-Brick syndrome (HCC)     ICD-10-CM: G61.0 ICD-9-CM: 357.0 Colon cancer screening     ICD-10-CM: Z12.11 ICD-9-CM: V76.51 Vitals BP Pulse Temp Resp Height(growth percentile) Weight(growth percentile) 138/76 69 97.9 °F (36.6 °C) (Oral) 12 5' 10\" (1.778 m) 238 lb 6.4 oz (108.1 kg) SpO2 BMI Smoking Status 97% 34.21 kg/m2 Never Smoker Vitals History BMI and BSA Data Body Mass Index Body Surface Area  
 34.21 kg/m 2 2.31 m 2 Preferred Pharmacy Pharmacy Name Phone [facility] [address] [place] [address] [phone] Your Updated Medication List  
  
   
This list is accurate as of 4/2/18 10:49 AM.  Always use your most recent med list. amLODIPine 10 mg tablet Commonly known as:  Unknown [place] Take 1 Tab by mouth daily. aspirin delayed-release 81 mg tablet Take  by mouth daily. atenolol 50 mg tablet Commonly known as:  TENORMIN  
TAKE 1 TABLET BY MOUTH EVERY DAY  
  
 FLOMAX 0.4 mg capsule Generic drug:  tamsulosin Take 0.4 mg by mouth. Take 2 at bedtime  
  
 glipiZIDE 10 mg tablet Commonly known as:  Sean Marisabel Take 10 mg by mouth two (2) times a day. INVOKANA 300 mg tablet Generic drug:  canagliflozin Take  by mouth Daily (before breakfast). JANUVIA 100 mg tablet Generic drug:  SITagliptin Take 100 mg by mouth daily. lisinopril 40 mg tablet Commonly known as:  Anant Alyssa Take 40 mg by mouth daily. losartan 100 mg tablet Commonly known as:  COZAAR  
TAKE ONE TABLET BY MOUTH DAILY  
  
 meloxicam 15 mg tablet Commonly known as:  MOBIC Take 15 mg by mouth daily. metFORMIN 500 mg tablet Commonly known as:  GLUCOPHAGE  
TAKE ONE TABLET BY MOUTH EVERY MORNING AND TAKE TWO TABLETS BY MOUTH EVERY EVENING WITH DINNER  
  
 omega 3-DHA-EPA-fish oil 1,000 mg (120 mg-180 mg) capsule Take 1 Cap by mouth three (3) times daily. pravastatin 80 mg tablet Commonly known as:  PRAVACHOL  
TAKE ONE TABLET BY MOUTH DAILY We Performed the Following AMB POC CK (CPK) [13464 CPT(R)] AMB POC COMPREHENSIVE METABOLIC PANEL [46322 CPT(R)] AMB POC HEMOGLOBIN A1C [27291 CPT(R)] AMB POC LIPID PROFILE [44546 CPT(R)]  DIABETES FOOT EXAM [HM7 Custom] REFERRAL FOR COLONOSCOPY [WUY239 Custom] Comments:  
 Needs follow-up colonoscopy my last available colonoscopy done September 2012 by Dr. Ronn Loco polyp found recommended follow-up in 3 years so he is overdue Follow-up Instructions Return in about 3 months (around 7/2/2018). Referral Information Referral ID Referred By Referred To  
  
 [id number] Epi Brady MD   
   [address] [place] [address] Phone: [phone] Fax: [phone] Visits Status Start Date End Date 1 New Request 4/2/18 4/2/19 If your referral has a status of pending review or denied, additional information will be sent to support the outcome of this decision. Patient Instructions Arthritis: Care Instructions Your Care Instructions Arthritis, also called osteoarthritis, is a breakdown of the cartilage that cushions your joints. When the cartilage wears down, your bones rub against each other. This causes pain and stiffness. Many people have some arthritis as they age. Arthritis most often affects the joints of the spine, hands, hips, knees, or feet. You can take simple measures to protect your joints, ease your pain, and help you stay active. Follow-up care is a key part of your treatment and safety. Be sure to make and go to all appointments, and call your doctor if you are having problems. It's also a good idea to know your test results and keep a list of the medicines you take. How can you care for yourself at home? · Stay at a healthy weight. Being overweight puts extra strain on your joints. · Talk to your doctor or physical therapist about exercises that will help ease joint pain. ¨ Stretch. You may enjoy gentle forms of yoga to help keep your joints and muscles flexible. ¨ Walk instead of jog. Other types of exercise that are less stressful on the joints include riding a bicycle, swimming, virgilio chi, or water exercise. ¨ Lift weights. Strong muscles help reduce stress on your joints. Stronger thigh muscles, for example, take some of the stress off of the knees and hips. Learn the right way to lift weights so you do not make joint pain worse. · Take your medicines exactly as prescribed. Call your doctor if you think you are having a problem with your medicine. · Take pain medicines exactly as directed. ¨ If the doctor gave you a prescription medicine for pain, take it as prescribed. ¨ If you are not taking a prescription pain medicine, ask your doctor if you can take an over-the-counter medicine. · Use a cane, crutch, walker, or another device if you need help to get around. These can help rest your joints. You also can use other things to make life easier, such as a higher toilet seat and padded handles on kitchen utensils. · Do not sit in low chairs, which can make it hard to get up. · Put heat or cold on your sore joints as needed. Use whichever helps you most. You also can take turns with hot and cold packs. ¨ Apply heat 2 or 3 times a day for 20 to 30 minutes-using a heating pad, hot shower, or hot pack-to relieve pain and stiffness. ¨ Put ice or a cold pack on your sore joint for 10 to 20 minutes at a time. Put a thin cloth between the ice and your skin. When should you call for help? Call your doctor now or seek immediate medical care if: 
? · You have sudden swelling, warmth, or pain in any joint. ? · You have joint pain and a fever or rash. ? · You have such bad pain that you cannot use a joint. ? Watch closely for changes in your health, and be sure to contact your doctor if: 
? · You have mild joint symptoms that continue even with more than 6 weeks of care at home. ? · You have stomach pain or other problems with your medicine. Where can you learn more? Go to http://matilde-girish.info/. Enter K744 in the search box to learn more about \"Arthritis: Care Instructions. \" Current as of: October 31, 2016 Content Version: 11.4 © 7208-5611 [employer]. Care instructions adapted under license by [employer] (which disclaims liability or warranty for this information). If you have questions about a medical condition or this instruction, always ask your healthcare professional. [place] [address] any warranty or liability for your use of this information. Introducing [facility]! [employer] introduces NeX[employer]ption patient portal. Now you can access parts of your medical record, email your doctor's office, and request medication refills online. 1. In your internet browser, go to [email or web address] 2. Click on the First Time User? Click Here link in the Sign In box. You will see the New Member Sign Up page. 3. Enter your NeXeption Access Code exactly as it appears below.  You will not need to use this code after youve completed the sign-up process. If you do not sign up before the expiration date, you must request a new code. · Gigaom Access Code: [email or web address] Expires: 7/1/2018  8:54 AM 
 
4. Enter the last four digits of your Social Security Number (xxxx) and Date of Birth (mm/dd/yyyy) as indicated and click Submit. You will be taken to the next sign-up page. 5. Create a Gigaom ID. This will be your Gigaom login ID and cannot be changed, so think of one that is secure and easy to remember. 6. Create a Gigaom password. You can change your password at any time. 7. Enter your Password Reset Question and Answer. This can be used at a later time if you forget your password. 8. Enter your e-mail address. You will receive e-mail notification when new information is available in [address] 9. Click Sign Up. You can now view and download portions of your medical record. 10. Click the Download Summary menu link to download a portable copy of your medical information. If you have questions, please visit the Frequently Asked Questions section of the Gigaom website. Remember, Gigaom is NOT to be used for urgent needs. For medical emergencies, dial 911. Now available from your iPhone and Android! Please provide this summary of care documentation to your next provider. Your primary care clinician is listed as Bhargavi. If you have any questions after today's visit, please call [phone].

## 2018-04-02 NOTE — PROGRESS NOTES
Chief Complaint   Patient presents with    Intracranial Stenosis     3 month follow up     Hypertension    Diabetes     1. Have you been to the ER, urgent care clinic since your last visit? Hospitalized since your last visit? No    2. Have you seen or consulted any other health care providers outside of the [address] [place] [address] since your last visit? Include any pap smears or colon screening.  No    Fasting

## 2018-04-02 NOTE — PROGRESS NOTES
Chief Complaint   Patient presents with    Intracranial Stenosis     3 month follow up     Hypertension    Diabetes       SUBJECTIVE:    Lisy Red is a 74 y.o. male who returns in follow-up of his medical problems include hypertension, diabetes, hyperlipidemia, obesity, atherosclerotic vascular disease, aortic stenosis, history of Guillian  Barré syndrome, and obesity. He claims to be taken his medication although needs samples of some of them. He says he is following his diet but knows he could do better job there. He denies any chest pain, shortness of breath, palpitations or cardiorespiratory complaints. He denies any GI or  complaints. He denies any headaches, dizziness, or other neurologic complaints. He denies any current arthritic complaints. He denies any other complaints on complete review of systems. Current Outpatient Prescriptions   Medication Sig Dispense Refill    losartan (COZAAR) 100 mg tablet TAKE ONE TABLET BY MOUTH DAILY 90 Tab 3    meloxicam (MOBIC) 15 mg tablet Take 15 mg by mouth daily.  metFORMIN (GLUCOPHAGE) 500 mg tablet TAKE ONE TABLET BY MOUTH EVERY MORNING AND TAKE TWO TABLETS BY MOUTH EVERY EVENING WITH DINNER (Patient taking differently: TAKE ONE TABLET BY MOUTH EVERY MORNING AND TAKE ONE TABLETS BY MOUTH EVERY EVENING WITH DINNER) 270 Tab 3    pravastatin (PRAVACHOL) 80 mg tablet TAKE ONE TABLET BY MOUTH DAILY 90 Tab 3    Omega-3-DHA-EPA-Fish Oil 1,000 mg (120 mg-180 mg) cap Take 1 Cap by mouth three (3) times daily.  amLODIPine (NORVASC) 10 mg tablet Take 1 Tab by mouth daily. 90 Tab 3    SITagliptin (JANUVIA) 100 mg tablet Take 100 mg by mouth daily.  canagliflozin (INVOKANA) 300 mg tablet Take  by mouth Daily (before breakfast).  lisinopril (PRINIVIL, ZESTRIL) 40 mg tablet Take 40 mg by mouth daily.  glipiZIDE (GLUCOTROL) 10 mg tablet Take 10 mg by mouth two (2) times a day.       tamsulosin (FLOMAX) 0.4 mg capsule Take 0.4 mg by mouth. Take 2 at bedtime      aspirin delayed-release 81 mg tablet Take  by mouth daily.  atenolol (TENORMIN) 50 mg tablet TAKE 1 TABLET BY MOUTH EVERY DAY 90 Tab 3     Past Medical History:   Diagnosis Date    Aortic stenosis 8/2/2017    ASVD (arteriosclerotic vascular disease) 8/2/2017    Back pain 8/2/2017    BPH (benign prostatic hyperplasia) 8/2/2017    CKD (chronic kidney disease) 8/2/2017    Diabetes ([facility] [address] 8/2/2017    DJD (degenerative joint disease) 8/2/2017    ED (erectile dysfunction) 8/2/2017    Guillain-Noxen syndrome ([facility] [address] 8/2/2017    Hyperlipidemia 8/2/2017    Hypertension 8/2/2017    Left carotid bruit 8/2/2017    Morbid obesity ([facility] [address] 8/2/2017    On statin therapy 8/2/2017    Urticaria 8/2/2017     History reviewed. No pertinent surgical history.   Allergies   Allergen Reactions    Adhesive Tape-Silicones Unknown (comments)    Chocolate Flavor Unknown (comments)       REVIEW OF SYSTEMS:  General: negative for - chills or fever, or weight loss or gain  ENT: negative for - headaches, nasal congestion or tinnitus  Eyes: no blurred or visual changes  Neck: No stiffness or swollen nodes  Respiratory: negative for - cough, hemoptysis, shortness of breath or wheezing  Cardiovascular : negative for - chest pain, edema, palpitations or shortness of breath  Gastrointestinal: negative for - abdominal pain, blood in stools, heartburn or nausea/vomiting  Genito-Urinary: no dysuria, trouble voiding, or hematuria  Musculoskeletal: negative for - gait disturbance, joint pain, joint stiffness or joint swelling  Neurological: no TIA or stroke symptoms  Hematologic: no bruises, no bleeding  Lymphatic: no swollen glands  Integument: no lumps, mole changes, nail changes or rash  Endocrine:no malaise/lethargy poly uria or polydipsia or unexpected weight changes        Social History     Social History    Marital status:      Spouse name: N/A    Number of children: N/A    Years of education: N/A     Social History Main Topics    Smoking status: Never Smoker    Smokeless tobacco: Never Used    Alcohol use Yes      Comment: social    Drug use: No    Sexual activity: Not Asked     Other Topics Concern    None     Social History Narrative     Family History   Problem Relation Age of Onset    Heart Disease Mother      murmor    Heart Disease Father        OBJECTIVE:     Visit Vitals    /76    Pulse 69    Temp 97.9 °F (36.6 °C) (Oral)    Resp 12    Ht 5' 10\" (1.778 m)    Wt 238 lb 6.4 oz (108.1 kg)    SpO2 97%    BMI 34.21 kg/m2     CONSTITUTIONAL:   well nourished, appears age appropriate  EYES: sclera anicteric, PERRL, EOMI  ENMT:nares clear, moist mucous membranes, pharynx clear  NECK: supple. Thyroid normal, No JVD or bruits  RESPIRATORY: Chest: clear to ascultation and percussion, normal inspiratory effort  CARDIOVASCULAR: Heart: regular rate and rhythm with 2/6 systolic murmur left sternal border without rubs or gallops, PMI not displaced, No thrills  GASTROINTESTINAL: Abdomen: non distended, soft, non tender, bowel sounds normal  HEMATOLOGIC: no purpura, petechiae or bruising  LYMPHATIC: No lymph node enlargemant  MUSCULOSKELETAL: Extremities: no edema or active synovitis, pulse 1+. No diabetic foot changes noted. INTEGUMENT: No unusual rashes or suspicious skin lesions noted. Nails appear normal.  PERIPHERAL VASCULAR: normal pulses femoral, PT and DP  NEUROLOGIC: non-focal exam, A & O X 3. Normal distal sensation and proprioception all toes both feet. PSYCHIATRIC:, appropriate affect     ASSESSMENT:   1. Essential hypertension    2. Controlled type 2 diabetes mellitus with stage 2 chronic kidney disease, without long-term current use of insulin ([address]    3. Mixed hyperlipidemia    4. Morbid obesity ([address]    5. Primary osteoarthritis involving multiple joints    6. Stage 2 chronic kidney disease    7.  Aortic valve stenosis, etiology of cardiac valve disease unspecified    8. Benign prostatic hyperplasia with lower urinary tract symptoms, symptom details unspecified    9. Guillain-[place] syndrome ([facility] [address]    10. Colon cancer screening      Impression  1. Hypertension that is controlled continue current therapy reviewed with him  2. Diabetes repeat status pending will make adjustments if needed based on today's labs  3. Hyperlipidemia prior labs reviewed repeat status pending will make adjustments if necessary  4. Obesity discussed diet exercise weight reduction  5. DJD that is stable  6. CKD stage II repeat status pending  7. Aortic stenosis we will repeat an echocardiogram see what the status is today  8. BPH that is stable currently followed by urology  9. History of Guillain Barré syndrome  10. History of colonic polypectomy and need a follow-up colonoscopy that will be scheduled  I will call the lab make further recommendations adjustments if necessary. Follow stable continue same and follow-up again in 3 months stressed diet exercise weight reduction. PLAN:  .  Orders Placed This Encounter    REFERRAL FOR COLONOSCOPY    AMB POC LIPID PROFILE    AMB POC HEMOGLOBIN A1C    AMB POC COMPREHENSIVE METABOLIC PANEL    AMB POC CK (CPK)         ATTENTION:   This medical record was transcribed using an electronic medical records system. Although proofread, it may and can contain electronic and spelling errors. Other human spelling and other errors may be present. Corrections may be executed at a later time. Please feel free to contact us for any clarifications as needed. Follow-up Disposition:  Return in about 3 months (around 7/2/2018). No results found for any visits on 04/02/18. Gaurav Schaeffer MD    The patient verbalized understanding of the problems and plans as explained.

## 2018-04-02 NOTE — PATIENT INSTRUCTIONS
Arthritis: Care Instructions  Your Care Instructions  Arthritis, also called osteoarthritis, is a breakdown of the cartilage that cushions your joints. When the cartilage wears down, your bones rub against each other. This causes pain and stiffness. Many people have some arthritis as they age. Arthritis most often affects the joints of the spine, hands, hips, knees, or feet. You can take simple measures to protect your joints, ease your pain, and help you stay active. Follow-up care is a key part of your treatment and safety. Be sure to make and go to all appointments, and call your doctor if you are having problems. It's also a good idea to know your test results and keep a list of the medicines you take. How can you care for yourself at home? · Stay at a healthy weight. Being overweight puts extra strain on your joints. · Talk to your doctor or physical therapist about exercises that will help ease joint pain. ¨ Stretch. You may enjoy gentle forms of yoga to help keep your joints and muscles flexible. ¨ Walk instead of jog. Other types of exercise that are less stressful on the joints include riding a bicycle, swimming, virgilio chi, or water exercise. ¨ Lift weights. Strong muscles help reduce stress on your joints. Stronger thigh muscles, for example, take some of the stress off of the knees and hips. Learn the right way to lift weights so you do not make joint pain worse. · Take your medicines exactly as prescribed. Call your doctor if you think you are having a problem with your medicine. · Take pain medicines exactly as directed. ¨ If the doctor gave you a prescription medicine for pain, take it as prescribed. ¨ If you are not taking a prescription pain medicine, ask your doctor if you can take an over-the-counter medicine. · Use a cane, crutch, walker, or another device if you need help to get around. These can help rest your joints.  You also can use other things to make life easier, such as a higher toilet seat and padded handles on kitchen utensils. · Do not sit in low chairs, which can make it hard to get up. · Put heat or cold on your sore joints as needed. Use whichever helps you most. You also can take turns with hot and cold packs. ¨ Apply heat 2 or 3 times a day for 20 to 30 minutes-using a heating pad, hot shower, or hot pack-to relieve pain and stiffness. ¨ Put ice or a cold pack on your sore joint for 10 to 20 minutes at a time. Put a thin cloth between the ice and your skin. When should you call for help? Call your doctor now or seek immediate medical care if:  ? · You have sudden swelling, warmth, or pain in any joint. ? · You have joint pain and a fever or rash. ? · You have such bad pain that you cannot use a joint. ? Watch closely for changes in your health, and be sure to contact your doctor if:  ? · You have mild joint symptoms that continue even with more than 6 weeks of care at home. ? · You have stomach pain or other problems with your medicine. Where can you learn more? Go to http://matilde-girish.info/. Enter H140 in the search box to learn more about \"Arthritis: Care Instructions. \"  Current as of: October 31, 2016  Content Version: 11.4  © 8328-5070 [employer]. Care instructions adapted under license by [employer] (which disclaims liability or warranty for this information). If you have questions about a medical condition or this instruction, always ask your healthcare professional. [place] [address] any warranty or liability for your use of this information.

## 2018-04-03 NOTE — PROGRESS NOTES
The blood sugar and glycosuria worse. He is already on maximal dose of Invokana as well as maximal dose of Glucotrol and maximal dose of Januvia. He is on 1500 mg of metformin daily which I suspect is the highest he can tolerate which is why we did not go higher. If that is the case unless he does better job with diet exercise and weight reduction next step is insulin.

## 2018-04-04 NOTE — PROGRESS NOTES
The blood sugar and glycosuria worse. He is already on maximal dose of Invokana as well as maximal dose of Glucotrol and maximal dose of Januvia. He is on 1500 mg of metformin daily which I suspect is the highest he can tolerate which is why we did not go higher. If that is the case unless he does better job with diet exercise and weight reduction next step is insulin. Patient informed. States he is taking only one Metformin 500 mg in the am and 1 in the pm.  Was unable to tolerate the 3 tabs daily. Says he will work on his diet and exercise because he does not want to add insulin.

## 2018-04-09 DIAGNOSIS — I10 ESSENTIAL HYPERTENSION: Primary | ICD-10-CM

## 2018-04-10 RX ORDER — LISINOPRIL 40 MG/1
TABLET ORAL
Qty: 90 TAB | Refills: 3 | Status: SHIPPED | OUTPATIENT
Start: 2018-04-10 | End: 2019-07-01 | Stop reason: SDUPTHER

## 2018-04-10 NOTE — TELEPHONE ENCOUNTER
RX refill request from the patient/pharmacy. Patient last seen 04- with labs, and next appt. scheduled for 04-.

## 2018-04-24 ENCOUNTER — HOSPITAL ENCOUNTER (OUTPATIENT)
Dept: NON INVASIVE DIAGNOSTICS | Age: 73
Discharge: HOME | End: 2018-04-24
Attending: INTERNAL MEDICINE
Payer: MEDICARE

## 2018-04-24 DIAGNOSIS — I35.0 AORTIC VALVE STENOSIS, ETIOLOGY OF CARDIAC VALVE DISEASE UNSPECIFIED: ICD-10-CM

## 2018-04-24 PROCEDURE — 93306 TTE W/DOPPLER COMPLETE: CPT

## 2018-04-24 NOTE — PROGRESS NOTES
Echocardiogram reveals mild aortic stenosis and mild aortic insufficiency so we will continue to follow that. There is however severe left ventricular hypertrophy which indicates that he needs to work on his blood pressure by diet and weight reduction and get better overall control of blood pressure.

## 2018-04-25 NOTE — PROGRESS NOTES
Echocardiogram reveals mild aortic stenosis and mild aortic insufficiency so we will continue to follow that. There is however severe left ventricular hypertrophy which indicates that he needs to work on his blood pressure by diet and weight reduction and get better overall control of blood pressure. Patient informed.

## 2018-05-01 RX ORDER — GLIPIZIDE 10 MG/1
TABLET ORAL
Qty: 180 TAB | Refills: 1 | Status: SHIPPED | OUTPATIENT
Start: 2018-05-01 | End: 2018-11-09 | Stop reason: SDUPTHER

## 2018-05-01 NOTE — TELEPHONE ENCOUNTER
Requested Prescriptions     Pending Prescriptions Disp Refills    glipiZIDE (GLUCOTROL) 10 mg tablet [Pharmacy Med Name: glipiZIDE 10 MG TABLET] 180 Tab 1     Sig: TAKE ONE TABLET BY MOUTH TWICE A DAY         Patient Last Seen:  04- with labs      Next appointment:  07-

## 2018-05-10 NOTE — TELEPHONE ENCOUNTER
Requested Prescriptions     Pending Prescriptions Disp Refills    tamsulosin (FLOMAX) 0.4 mg capsule [Pharmacy Med Name: TAMSULOSIN HCL 0.4 MG CAPSULE] 60 Cap 2     Sig: TAKE TWO CAPSULES BY MOUTH DAILY       Patient Last Seen:  04- with labs    Next appointment:  07-

## 2018-05-11 RX ORDER — TAMSULOSIN HYDROCHLORIDE 0.4 MG/1
CAPSULE ORAL
Qty: 60 CAP | Refills: 2 | Status: SHIPPED | OUTPATIENT
Start: 2018-05-11 | End: 2018-08-10 | Stop reason: SDUPTHER

## 2018-06-29 NOTE — TELEPHONE ENCOUNTER
Per verbal order from Dr. Vaishnavi Singh, samples of Invokana 300mg, #10 tablets, Lot # [id number], Exp 12-, [phone], given to patient.

## 2018-07-02 ENCOUNTER — OFFICE VISIT (OUTPATIENT)
Dept: INTERNAL MEDICINE CLINIC | Age: 73
End: 2018-07-02

## 2018-07-02 VITALS
RESPIRATION RATE: 17 BRPM | DIASTOLIC BLOOD PRESSURE: 90 MMHG | OXYGEN SATURATION: 96 % | WEIGHT: 237.6 LBS | HEART RATE: 70 BPM | BODY MASS INDEX: 34.01 KG/M2 | SYSTOLIC BLOOD PRESSURE: 176 MMHG | HEIGHT: 70 IN

## 2018-07-02 DIAGNOSIS — E78.2 MIXED HYPERLIPIDEMIA: ICD-10-CM

## 2018-07-02 DIAGNOSIS — N18.2 CONTROLLED TYPE 2 DIABETES MELLITUS WITH STAGE 2 CHRONIC KIDNEY DISEASE, WITHOUT LONG-TERM CURRENT USE OF INSULIN (HCC): ICD-10-CM

## 2018-07-02 DIAGNOSIS — E66.01 MORBID OBESITY (HCC): ICD-10-CM

## 2018-07-02 DIAGNOSIS — Z23 ENCOUNTER FOR IMMUNIZATION: ICD-10-CM

## 2018-07-02 DIAGNOSIS — E11.22 CONTROLLED TYPE 2 DIABETES MELLITUS WITH STAGE 2 CHRONIC KIDNEY DISEASE, WITHOUT LONG-TERM CURRENT USE OF INSULIN (HCC): ICD-10-CM

## 2018-07-02 DIAGNOSIS — I65.23 STENOSIS OF BOTH INTERNAL CAROTID ARTERIES: ICD-10-CM

## 2018-07-02 DIAGNOSIS — I70.90 ASVD (ARTERIOSCLEROTIC VASCULAR DISEASE): ICD-10-CM

## 2018-07-02 DIAGNOSIS — M15.9 PRIMARY OSTEOARTHRITIS INVOLVING MULTIPLE JOINTS: ICD-10-CM

## 2018-07-02 DIAGNOSIS — I10 ESSENTIAL HYPERTENSION: Primary | ICD-10-CM

## 2018-07-02 DIAGNOSIS — N18.2 STAGE 2 CHRONIC KIDNEY DISEASE: ICD-10-CM

## 2018-07-02 LAB
GRAN# POC: 3.2 K/UL (ref 2–7.8)
GRAN% POC: 69.6 % (ref 37–92)
HCT VFR BLD CALC: 43.3 % (ref 37–51)
HGB BLD-MCNC: 14.2 G/DL (ref 12–18)
LY# POC: 1.2 K/UL (ref 0.6–4.1)
LY% POC: 27.2 % (ref 10–58.5)
MCH RBC QN: 26.6 PG (ref 26–32)
MCHC RBC-ENTMCNC: 32.8 G/DL (ref 30–36)
MCV RBC: 81 FL (ref 80–97)
MID #, POC: 0.1 K/UL (ref 0–1.8)
MID% POC: 3.2 % (ref 0.1–24)
PLATELET # BLD: 135 K/UL (ref 140–440)
RBC # BLD: 5.35 M/UL (ref 4.2–6.3)
WBC # BLD: 4.5 K/UL (ref 4.1–10.9)

## 2018-07-02 RX ORDER — FINASTERIDE 5 MG/1
TABLET, FILM COATED ORAL
COMMUNITY
Start: 2018-06-14 | End: 2019-04-22 | Stop reason: SDUPTHER

## 2018-07-02 RX ORDER — HYDRALAZINE HYDROCHLORIDE 25 MG/1
25 TABLET, FILM COATED ORAL 3 TIMES DAILY
Qty: 90 TAB | Status: SHIPPED | OUTPATIENT
Start: 2018-07-02 | End: 2018-08-03 | Stop reason: SDUPTHER

## 2018-07-02 NOTE — PATIENT INSTRUCTIONS
Body Mass Index: Care Instructions  Your Care Instructions    Body mass index (BMI) can help you see if your weight is raising your risk for health problems. It uses a formula to compare how much you weigh with how tall you are. · A BMI lower than 18.5 is considered underweight. · A BMI between 18.5 and 24.9 is considered healthy. · A BMI between 25 and 29.9 is considered overweight. A BMI of 30 or higher is considered obese. If your BMI is in the normal range, it means that you have a lower risk for weight-related health problems. If your BMI is in the overweight or obese range, you may be at increased risk for weight-related health problems, such as high blood pressure, heart disease, stroke, arthritis or joint pain, and diabetes. If your BMI is in the underweight range, you may be at increased risk for health problems such as fatigue, lower protection (immunity) against illness, muscle loss, bone loss, hair loss, and hormone problems. BMI is just one measure of your risk for weight-related health problems. You may be at higher risk for health problems if you are not active, you eat an unhealthy diet, or you drink too much alcohol or use tobacco products. Follow-up care is a key part of your treatment and safety. Be sure to make and go to all appointments, and call your doctor if you are having problems. It's also a good idea to know your test results and keep a list of the medicines you take. How can you care for yourself at home? · Practice healthy eating habits. This includes eating plenty of fruits, vegetables, whole grains, lean protein, and low-fat dairy. · If your doctor recommends it, get more exercise. Walking is a good choice. Bit by bit, increase the amount you walk every day. Try for at least 30 minutes on most days of the week. · Do not smoke. Smoking can increase your risk for health problems. If you need help quitting, talk to your doctor about stop-smoking programs and medicines. These can increase your chances of quitting for good. · Limit alcohol to 2 drinks a day for men and 1 drink a day for women. Too much alcohol can cause health problems. If you have a BMI higher than 25  · Your doctor may do other tests to check your risk for weight-related health problems. This may include measuring the distance around your waist. A waist measurement of more than 40 inches in men or 35 inches in women can increase the risk of weight-related health problems. · Talk with your doctor about steps you can take to stay healthy or improve your health. You may need to make lifestyle changes to lose weight and stay healthy, such as changing your diet and getting regular exercise. If you have a BMI lower than 18.5  · Your doctor may do other tests to check your risk for health problems. · Talk with your doctor about steps you can take to stay healthy or improve your health. You may need to make lifestyle changes to gain or maintain weight and stay healthy, such as getting more healthy foods in your diet and doing exercises to build muscle. Where can you learn more? Go to http://matilde-girish.info/. Enter S176 in the search box to learn more about \"Body Mass Index: Care Instructions. \"  Current as of: October 13, 2016  Content Version: 11.4  © 4799-7648 [employer]. Care instructions adapted under license by [email or web address] (which disclaims liability or warranty for this information). If you have questions about a medical condition or this instruction, always ask your healthcare professional. [address] any warranty or liability for your use of this information.

## 2018-07-02 NOTE — PROGRESS NOTES
Chief Complaint   Patient presents with    Hypertension     3 month follow up     Diabetes     1. Have you been to the ER, urgent care clinic since your last visit? Hospitalized since your last visit? No    2. Have you seen or consulted any other health care providers outside of the [address] [place] [address] since your last visit? Include any pap smears or colon screening.  No     Fasting

## 2018-07-02 NOTE — PROGRESS NOTES
Chief Complaint   Patient presents with    Hypertension     3 month follow up     Diabetes       SUBJECTIVE:    Pablito Krause is a 74 y.o. male who returns in follow-up of his medical problems include hypertension, diabetes, hyperlipidemia, obesity, atherosclerotic vascular disease, aortic stenosis, history of Guillian  Barré syndrome, and obesity. He claims to be taking his medication although needs samples of some of them. He says he is following his diet but knows he could do a better job there. He denies any chest pain, shortness of breath, palpitations or cardiorespiratory complaints. He denies any GI or  complaints. He denies any headaches, dizziness, or other neurologic complaints. He denies any current arthritic complaints. He denies any other complaints on complete review of systems. Current Outpatient Prescriptions   Medication Sig Dispense Refill    finasteride (PROSCAR) 5 mg tablet       hydrALAZINE (APRESOLINE) 25 mg tablet Take 1 Tab by mouth three (3) times daily. 90 Tab prn    canagliflozin (INVOKANA) 300 mg tablet Take 1 Tab by mouth Daily (before breakfast). 10 Tab 0    tamsulosin (FLOMAX) 0.4 mg capsule TAKE TWO CAPSULES BY MOUTH DAILY 60 Cap 2    glipiZIDE (GLUCOTROL) 10 mg tablet TAKE ONE TABLET BY MOUTH TWICE A  Tab 1    lisinopril (PRINIVIL, ZESTRIL) 40 mg tablet TAKE 1 TABLET BY MOUTH ONCE DAILY 90 Tab 3    losartan (COZAAR) 100 mg tablet TAKE ONE TABLET BY MOUTH DAILY 90 Tab 3    meloxicam (MOBIC) 15 mg tablet Take 15 mg by mouth daily.       metFORMIN (GLUCOPHAGE) 500 mg tablet TAKE ONE TABLET BY MOUTH EVERY MORNING AND TAKE TWO TABLETS BY MOUTH EVERY EVENING WITH DINNER (Patient taking differently: TAKE ONE TABLET BY MOUTH EVERY MORNING AND TAKE ONE TABLETS BY MOUTH EVERY EVENING WITH DINNER) 270 Tab 3    pravastatin (PRAVACHOL) 80 mg tablet TAKE ONE TABLET BY MOUTH DAILY 90 Tab 3    Omega-3-DHA-EPA-Fish Oil 1,000 mg (120 mg-180 mg) cap Take 1 Cap by mouth three (3) times daily.  SITagliptin (JANUVIA) 100 mg tablet Take 100 mg by mouth daily.  aspirin delayed-release 81 mg tablet Take  by mouth daily.  amLODIPine (NORVASC) 10 mg tablet Take 1 Tab by mouth daily. 90 Tab 3    atenolol (TENORMIN) 50 mg tablet TAKE 1 TABLET BY MOUTH EVERY DAY 90 Tab 3     Past Medical History:   Diagnosis Date    Aortic stenosis 8/2/2017    ASVD (arteriosclerotic vascular disease) 8/2/2017    Back pain 8/2/2017    BPH (benign prostatic hyperplasia) 8/2/2017    CKD (chronic kidney disease) 8/2/2017    Diabetes ([facility] [address] 8/2/2017    DJD (degenerative joint disease) 8/2/2017    ED (erectile dysfunction) 8/2/2017    Guillain-Sanford syndrome ([facility] [address] 8/2/2017    Hyperlipidemia 8/2/2017    Hypertension 8/2/2017    Left carotid bruit 8/2/2017    Morbid obesity ([facility] [address] 8/2/2017    On statin therapy 8/2/2017    Urticaria 8/2/2017     History reviewed. No pertinent surgical history.   Allergies   Allergen Reactions    Adhesive Tape-Silicones Unknown (comments)    Chocolate Flavor Unknown (comments)       REVIEW OF SYSTEMS:  General: negative for - chills or fever, or weight loss or gain  ENT: negative for - headaches, nasal congestion or tinnitus  Eyes: no blurred or visual changes  Neck: No stiffness or swollen nodes  Respiratory: negative for - cough, hemoptysis, shortness of breath or wheezing  Cardiovascular : negative for - chest pain, edema, palpitations or shortness of breath  Gastrointestinal: negative for - abdominal pain, blood in stools, heartburn or nausea/vomiting  Genito-Urinary: no dysuria, trouble voiding, or hematuria  Musculoskeletal: negative for - gait disturbance, joint pain, joint stiffness or joint swelling  Neurological: no TIA or stroke symptoms  Hematologic: no bruises, no bleeding  Lymphatic: no swollen glands  Integument: no lumps, mole changes, nail changes or rash  Endocrine:no malaise/lethargy poly uria or polydipsia or unexpected weight changes        Social History     Social History    Marital status:      Spouse name: N/A    Number of children: N/A    Years of education: N/A     Social History Main Topics    Smoking status: Never Smoker    Smokeless tobacco: Never Used    Alcohol use Yes      Comment: social    Drug use: No    Sexual activity: Not Asked     Other Topics Concern    None     Social History Narrative     Family History   Problem Relation Age of Onset    Heart Disease Mother      murmor    Heart Disease Father        OBJECTIVE:     Visit Vitals    /90    Pulse 70    Resp 17    Ht 5' 10\" (1.778 m)    Wt 237 lb 9.6 oz (107.8 kg)    SpO2 96%    BMI 34.09 kg/m2     CONSTITUTIONAL:   well nourished, appears age appropriate  EYES: sclera anicteric, PERRL, EOMI  ENMT:nares clear, moist mucous membranes, pharynx clear  NECK: supple. Thyroid normal, No JVD or bruits  RESPIRATORY: Chest: clear to ascultation and percussion, normal inspiratory effort  CARDIOVASCULAR: Heart: regular rate and rhythm with 2/6 systolic murmur left sternal border without rubs or gallops, PMI not displaced, No thrills  GASTROINTESTINAL: Abdomen: non distended, soft, non tender, bowel sounds normal  HEMATOLOGIC: no purpura, petechiae or bruising  LYMPHATIC: No lymph node enlargemant  MUSCULOSKELETAL: Extremities: no edema or active synovitis, pulse 1+. No diabetic foot changes noted. INTEGUMENT: No unusual rashes or suspicious skin lesions noted. Nails appear normal.  PERIPHERAL VASCULAR: normal pulses femoral, PT and DP  NEUROLOGIC: non-focal exam, A & O X 3. Normal distal sensation and proprioception all toes both feet. PSYCHIATRIC:, appropriate affect     ASSESSMENT:   1. Essential hypertension    2. Controlled type 2 diabetes mellitus with stage 2 chronic kidney disease, without long-term current use of insulin ([address]    3. Mixed hyperlipidemia    4. Morbid obesity ([address]    5.  Primary osteoarthritis involving multiple joints 6. Stage 2 chronic kidney disease    7. ASVD (arteriosclerotic vascular disease)    8. Stenosis of both internal carotid arteries    9. Encounter for immunization      Impression  1. Hypertension that is controlled controlled so add hydralazine 25 mg 3 times daily I did tell him take it twice daily for the first week and he acclimates to it and I will recheck him in 1 month  2. Diabetes repeat status pending, will make adjustments if needed based on today's labs  3. Hyperlipidemia prior labs reviewed, repeat status pending will make adjustments if necessary  4. Obesity discussed diet exercise weight reduction  5. DJD that is stable  6. CKD stage II repeat status pending  7. Aortic stenosis recent echocardiogram revealed no change  8. BPH that is stable currently followed by urology and recently had a Proscar  9. History of Guillain Barré syndrome  10. History of colonic polypectomy and need a follow-up colonoscopy that will be scheduled but he is elected to postpone until later this year  I will call the lab make further recommendations adjustments if necessary. Follow stable continue same and follow-up again in 3 months stressed diet exercise weight reduction. PLAN:  .  Orders Placed This Encounter    PNEUMOCOCCAL POLYSACCHARIDE VACCINE, 23-VALENT, ADULT OR IMMUNOSUPPRESSED PT DOSE,    METABOLIC PANEL, COMPREHENSIVE    LIPID PANEL    HEMOGLOBIN A1C WITH EAG    AMB POC COMPLETE CBC,AUTOMATED ENTER    finasteride (PROSCAR) 5 mg tablet    hydrALAZINE (APRESOLINE) 25 mg tablet         ATTENTION:   This medical record was transcribed using an electronic medical records system. Although proofread, it may and can contain electronic and spelling errors. Other human spelling and other errors may be present. Corrections may be executed at a later time. Please feel free to contact us for any clarifications as needed. Follow-up Disposition:  Return in about 1 month (around 8/2/2018).     Results for orders placed or performed in visit on 07/02/18   AMB POC COMPLETE CBC,AUTOMATED ENTER   Result Value Ref Range    WBC (POC)  4.1 - 10.9 K/uL    RBC (POC)  4.20 - 6.30 M/uL    HGB (POC)  12.0 - 18.0 g/dL    HCT (POC)  37.0 - 51.0 %    MCV (POC)  80.0 - 97.0 fL    MCH (POC)  26.0 - 32.0 pg    MCHC (POC)  30.0 - 36.0 g/dL    PLATELET (POC)  738.0 - 440.0 K/uL    ABS. LYMPHS (POC)  0.6 - 4.1 K/uL    LYMPHOCYTES (POC)  10.0 - 58.5 %    Mid # (POC)  0.0 - 1.8 K/uL    MID% POC  0.1 - 24.0 %    ABS. GRANS (POC)  2.0 - 7.8 K/uL    GRANULOCYTES (POC)  37.0 - 92.0 %       Elvis Calderon MD    The patient verbalized understanding of the problems and plans as explained.

## 2018-07-03 LAB
ALBUMIN SERPL-MCNC: 4.3 G/DL (ref 3.5–4.8)
ALBUMIN/GLOB SERPL: 2.2 {RATIO} (ref 1.2–2.2)
ALP SERPL-CCNC: 53 IU/L (ref 39–117)
ALT SERPL-CCNC: 16 IU/L (ref 0–44)
AST SERPL-CCNC: 16 IU/L (ref 0–40)
BILIRUB SERPL-MCNC: 1.4 MG/DL (ref 0–1.2)
BUN SERPL-MCNC: 16 MG/DL (ref 8–27)
BUN/CREAT SERPL: 17 (ref 10–24)
CALCIUM SERPL-MCNC: 9.1 MG/DL (ref 8.6–10.2)
CHLORIDE SERPL-SCNC: 101 MMOL/L (ref 96–106)
CHOLEST SERPL-MCNC: 178 MG/DL (ref 100–199)
CO2 SERPL-SCNC: 23 MMOL/L (ref 20–29)
CREAT SERPL-MCNC: 0.95 MG/DL (ref 0.76–1.27)
EST. AVERAGE GLUCOSE BLD GHB EST-MCNC: 180 MG/DL
GLOBULIN SER CALC-MCNC: 2 G/DL (ref 1.5–4.5)
GLUCOSE SERPL-MCNC: 139 MG/DL (ref 65–99)
HBA1C MFR BLD: 7.9 % (ref 4.8–5.6)
HDLC SERPL-MCNC: 46 MG/DL
LDLC SERPL CALC-MCNC: 96 MG/DL (ref 0–99)
POTASSIUM SERPL-SCNC: 4.2 MMOL/L (ref 3.5–5.2)
PROT SERPL-MCNC: 6.3 G/DL (ref 6–8.5)
SODIUM SERPL-SCNC: 140 MMOL/L (ref 134–144)
TRIGL SERPL-MCNC: 178 MG/DL (ref 0–149)
VLDLC SERPL CALC-MCNC: 36 MG/DL (ref 5–40)

## 2018-07-03 NOTE — PROGRESS NOTES
Blood sugar and triglycerides as well as glycohemoglobin are all poorly controlled. What exactly is he taking so I will know what to change.   Obviously needs to much better job with diet

## 2018-07-09 ENCOUNTER — TELEPHONE (OUTPATIENT)
Dept: INTERNAL MEDICINE CLINIC | Age: 73
End: 2018-07-09

## 2018-07-09 NOTE — TELEPHONE ENCOUNTER
----- Message from Octavia Dykes MD sent at 7/3/2018  1:09 PM EDT -----  Blood sugar and triglycerides as well as glycohemoglobin are all poorly controlled. What exactly is he taking so I will know what to change.   Obviously needs to much better job with diet

## 2018-08-01 RX ORDER — MELOXICAM 15 MG/1
TABLET ORAL
Qty: 90 TAB | Refills: 3 | Status: SHIPPED | OUTPATIENT
Start: 2018-08-01 | End: 2019-10-17 | Stop reason: SDUPTHER

## 2018-08-01 NOTE — TELEPHONE ENCOUNTER
RX refill request from the patient/pharmacy. Patient last seen 7/2/18 with labs, and next appt. scheduled for 10/16/18.

## 2018-08-03 ENCOUNTER — OFFICE VISIT (OUTPATIENT)
Dept: INTERNAL MEDICINE CLINIC | Age: 73
End: 2018-08-03

## 2018-08-03 VITALS
DIASTOLIC BLOOD PRESSURE: 82 MMHG | WEIGHT: 231 LBS | HEART RATE: 75 BPM | RESPIRATION RATE: 15 BRPM | HEIGHT: 70 IN | SYSTOLIC BLOOD PRESSURE: 150 MMHG | OXYGEN SATURATION: 99 % | BODY MASS INDEX: 33.07 KG/M2

## 2018-08-03 DIAGNOSIS — N18.2 CONTROLLED TYPE 2 DIABETES MELLITUS WITH STAGE 2 CHRONIC KIDNEY DISEASE, WITHOUT LONG-TERM CURRENT USE OF INSULIN (HCC): ICD-10-CM

## 2018-08-03 DIAGNOSIS — N18.2 STAGE 2 CHRONIC KIDNEY DISEASE: ICD-10-CM

## 2018-08-03 DIAGNOSIS — Z00.00 MEDICARE ANNUAL WELLNESS VISIT, SUBSEQUENT: ICD-10-CM

## 2018-08-03 DIAGNOSIS — M15.9 PRIMARY OSTEOARTHRITIS INVOLVING MULTIPLE JOINTS: ICD-10-CM

## 2018-08-03 DIAGNOSIS — I10 ESSENTIAL HYPERTENSION: Primary | ICD-10-CM

## 2018-08-03 DIAGNOSIS — Z12.5 PROSTATE CANCER SCREENING: ICD-10-CM

## 2018-08-03 DIAGNOSIS — E66.01 MORBID OBESITY (HCC): ICD-10-CM

## 2018-08-03 DIAGNOSIS — E78.2 MIXED HYPERLIPIDEMIA: ICD-10-CM

## 2018-08-03 DIAGNOSIS — I35.0 AORTIC VALVE STENOSIS, ETIOLOGY OF CARDIAC VALVE DISEASE UNSPECIFIED: ICD-10-CM

## 2018-08-03 DIAGNOSIS — E11.22 CONTROLLED TYPE 2 DIABETES MELLITUS WITH STAGE 2 CHRONIC KIDNEY DISEASE, WITHOUT LONG-TERM CURRENT USE OF INSULIN (HCC): ICD-10-CM

## 2018-08-03 LAB
GRAN# POC: 4.1 K/UL (ref 2–7.8)
GRAN% POC: 74.5 % (ref 37–92)
HCT VFR BLD CALC: 39.9 % (ref 37–51)
HGB BLD-MCNC: 13.4 G/DL (ref 12–18)
LY# POC: 1.1 K/UL (ref 0.6–4.1)
LY% POC: 21.6 % (ref 10–58.5)
MCH RBC QN: 26.8 PG (ref 26–32)
MCHC RBC-ENTMCNC: 33.4 G/DL (ref 30–36)
MCV RBC: 80 FL (ref 80–97)
MID #, POC: 0.2 K/UL (ref 0–1.8)
MID% POC: 3.9 % (ref 0.1–24)
PLATELET # BLD: 182 K/UL (ref 140–440)
RBC # BLD: 4.97 M/UL (ref 4.2–6.3)
WBC # BLD: 5.4 K/UL (ref 4.1–10.9)

## 2018-08-03 RX ORDER — HYDRALAZINE HYDROCHLORIDE 50 MG/1
50 TABLET, FILM COATED ORAL 3 TIMES DAILY
Qty: 90 TAB | Status: SHIPPED | OUTPATIENT
Start: 2018-08-03 | End: 2019-04-22 | Stop reason: SDUPTHER

## 2018-08-03 NOTE — PROGRESS NOTES
This is a Subsequent Medicare Annual Wellness Visit providing Personalized Prevention Plan Services (PPPS) (Performed 12 months after initial AWV and PPPS ) I have reviewed the patient's medical history in detail and updated the computerized patient record. He returns today for his Medicare subsequent annual examination screening questionnaire. He is also here in follow-up of his medical problems include uncontrolled hypertension, uncontrolled diabetes, hyperlipidemia, morbid obesity, aortic stenosis, chronic kidney disease stage II, DJD, and other medical problems. He currently denies any chest pain, shortness breath, dyspnea on exertion or PND or other cardiorespiratory complaints. He denies any GI or  complaints. He denies any headaches, dizziness or neurologic complaints. He denies any change of his chronic arthritic complaints. He claims to be taken his medications but is not doing very much as far as diet is not getting any exercise. History Past Medical History:  
Diagnosis Date  Aortic stenosis 8/2/2017  ASVD (arteriosclerotic vascular disease) 8/2/2017  Back pain 8/2/2017  BPH (benign prostatic hyperplasia) 8/2/2017  CKD (chronic kidney disease) 8/2/2017  Diabetes ([address] 8/2/2017  DJD (degenerative joint disease) 8/2/2017  ED (erectile dysfunction) 8/2/2017  Guillain-[place] syndrome ([address] 8/2/2017  Hyperlipidemia 8/2/2017  Hypertension 8/2/2017  Left carotid bruit 8/2/2017  Morbid obesity ([address] 8/2/2017  On statin therapy 8/2/2017  Urticaria 8/2/2017 History reviewed. No pertinent surgical history. Social History Substance Use Topics  Smoking status: Never Smoker  Smokeless tobacco: Never Used  Alcohol use Yes Comment: social  
 
Current Outpatient Prescriptions Medication Sig Dispense Refill  hydrALAZINE (APRESOLINE) 50 mg tablet Take 1 Tab by mouth three (3) times daily. 90 Tab prn  meloxicam (MOBIC) 15 mg tablet TAKE ONE TABLET BY MOUTH DAILY 90 Tab 3  
 finasteride (PROSCAR) 5 mg tablet  canagliflozin (INVOKANA) 300 mg tablet Take 1 Tab by mouth Daily (before breakfast). 10 Tab 0  
 tamsulosin (FLOMAX) 0.4 mg capsule TAKE TWO CAPSULES BY MOUTH DAILY 60 Cap 2  
 glipiZIDE (GLUCOTROL) 10 mg tablet TAKE ONE TABLET BY MOUTH TWICE A  Tab 1  
 lisinopril (PRINIVIL, ZESTRIL) 40 mg tablet TAKE 1 TABLET BY MOUTH ONCE DAILY 90 Tab 3  
 losartan (COZAAR) 100 mg tablet TAKE ONE TABLET BY MOUTH DAILY 90 Tab 3  
 metFORMIN (GLUCOPHAGE) 500 mg tablet TAKE ONE TABLET BY MOUTH EVERY MORNING AND TAKE TWO TABLETS BY MOUTH EVERY EVENING WITH DINNER (Patient taking differently: TAKE ONE TABLET BY MOUTH EVERY MORNING AND TAKE ONE TABLETS BY MOUTH EVERY EVENING WITH DINNER) 270 Tab 3  pravastatin (PRAVACHOL) 80 mg tablet TAKE ONE TABLET BY MOUTH DAILY 90 Tab 3  
 Omega-3-DHA-EPA-Fish Oil 1,000 mg (120 mg-180 mg) cap Take 1 Cap by mouth three (3) times daily.  amLODIPine (NORVASC) 10 mg tablet Take 1 Tab by mouth daily. 90 Tab 3  
 SITagliptin (JANUVIA) 100 mg tablet Take 100 mg by mouth daily.  aspirin delayed-release 81 mg tablet Take  by mouth daily.  atenolol (TENORMIN) 50 mg tablet TAKE 1 TABLET BY MOUTH EVERY DAY 90 Tab 3 Allergies Allergen Reactions  Adhesive Tape-Silicones Unknown (comments)  Chocolate Flavor Unknown (comments) Family History Problem Relation Age of Onset  Heart Disease Mother   
  murmor  Heart Disease Father Patient Active Problem List  
 Diagnosis  Controlled type 2 diabetes mellitus with stage 2 chronic kidney disease, without long-term current use of insulin ([address]  Primary osteoarthritis involving multiple joints  Morbid obesity ([address]  Essential hypertension  Stage 2 chronic kidney disease  Mixed hyperlipidemia  BPH (benign prostatic hyperplasia)  ASVD (arteriosclerotic vascular disease)  Aortic stenosis  Prostate cancer screening  Hematuria  Medicare annual wellness visit, subsequent  Colon cancer screening  ED (erectile dysfunction)  Guillain-[place] syndrome ([facility]  Urticaria  Left carotid bruit  Back pain  Neoplasm of uncertain behavior of skin  Stenosis of both internal carotid arteries  Carotid bruit present Patient Care Team: 
Elvis Calderon MD as PCP - General (Internal Medicine) Depression Risk Factor Screening: PHQ over the last two weeks 8/3/2018 Little interest or pleasure in doing things Not at all Feeling down, depressed, irritable, or hopeless Not at all Total Score PHQ 2 0 Trouble falling or staying asleep, or sleeping too much - Feeling tired or having little energy - Poor appetite, weight loss, or overeating - Feeling bad about yourself - or that you are a failure or have let yourself or your family down - Trouble concentrating on things such as school, work, reading, or watching TV - Moving or speaking so slowly that other people could have noticed; or the opposite being so fidgety that others notice - Thoughts of being better off dead, or hurting yourself in some way -  
PHQ 9 Score - Alcohol Risk Factor Screening: You do not drink alcohol or very rarely. Functional Ability and Level of Safety:  
 
Fall Risk Fall Risk Assessment, last 12 mths 8/3/2018 Able to walk? Yes Fall in past 12 months? No  
 
 
Hearing Loss  
mild Activities of Daily Living Self-care. ADL Assessment 4/2/2018 Feeding yourself No Help Needed Getting from bed to chair No Help Needed Getting dressed No Help Needed Bathing or showering No Help Needed Walk across the room (includes cane/walker) No Help Needed Using the telphone No Help Needed Taking your medications No Help Needed Preparing meals No Help Needed Managing money (expenses/bills) No Help Needed Moderately strenuous housework (laundry) No Help Needed Shopping for personal items (toiletries/medicines) No Help Needed Shopping for groceries No Help Needed Driving No Help Needed Climbing a flight of stairs No Help Needed Abuse Screen Patient is not abused Social History Social History Narrative Review of Systems ROS: 
 
Constitutional: He denies fevers, weight loss, sweats. Eyes: No blurred or double vision. ENT: No difficulty with swallowing, taste, speech or smell. Neck: no stiffness or swelling Respiratory: No cough wheezing or shortness of breath. Cardiovascular: Denies chest pain, palpitations, unexplained indigestion or syncope. Gastrointestinal:  No changes in bowel movements, no abdominal pain, no bloating. Genitourinary:  He denies frequency, nocturia or stranguria. Extremities: No joint pain, stiffness or swelling. Neurological:  No numbness, tingling, burring paresthesias or loss of motor strength. No syncope, dizziness or frequent headache Lymphatic: no adenopathy noted Hematologic: no easy bruising or bleeding gums Skin:  No recent rashes or mole changes. Psychiatric/Behavioral:  Negative for depression. Physical Examination Evaluation of Cognitive Function: 
Mood/affect:  happy Appearance: age appropriate Family member/caregiver input: none Visit Vitals  /82 (BP 1 Location: Left arm, BP Patient Position: Sitting)  Pulse 75  Resp 15  Ht 5' 10\" (1.778 m)  Wt 231 lb (104.8 kg)  SpO2 99%  BMI 33.15 kg/m2 Vitals:  
 08/03/18 1320 BP: 150/82 Pulse: 75 Resp: 15 SpO2: 99% Weight: 231 lb (104.8 kg) Height: 5' 10\" (1.778 m) PainSc:   0 - No pain PHYSICAL EXAM: 
 
General appearance - alert, well appearing obese white male, and in no distress Mental status - alert, oriented to person, place, and time HEENT: 
Ears - bilateral TM's and external ear canals clear Eyes - pupillary responses were normal.  Extraocular muscle function intact.   Lids and conjunctiva not injected. Fundoscopic exam revealed sharp disc margins. eye movements intact Pharynx- clear with teeth in good repair. No masses were noted Neck - supple without thyromegaly or burit. No JVD noted Lungs - clear to auscultation and percussion Cardiac- normal rate, regular rhythm with 3/6 systolic murmur radiating to carotids. PMI not displaced. No gallop, rub or click Abdomen - flat, soft, non-tender without palpable organomegaly or mass. No pulsatile mass was felt, and not bruit was heard. Bowel sounds were active : Circumcised, Testes descended w/o masses Rectal: Deferred per patient request 
Extremities -  no clubbing cyanosis or edema Lymphatics - no palpable lymphadenopathy, no hepatosplenomegaly Hematologic: no petechiae or purpura Peripheral vascular -Femoral, Dorsalis pedis and posterior tibial pulses felt without difficulty Skin - no rash or unusual mole change noted Neurological - Cranial nerves II-XII grossly intact. Motor strength 5/5. DTR's 2+ and symmetric. Station and gait normal 
Back exam - full range of motion, no tenderness, palpable spasm or pain on motion Musculoskeletal - no joint tenderness, deformity or swelling Results for orders placed or performed in visit on 07/02/18 METABOLIC PANEL, COMPREHENSIVE Result Value Ref Range Glucose 139 (H) 65 - 99 mg/dL BUN 16 8 - 27 mg/dL Creatinine 0.95 0.76 - 1.27 mg/dL GFR est non-AA 79 >59 mL/min/1.73 GFR est AA 91 >59 mL/min/1.73  
 BUN/Creatinine ratio 17 10 - 24 Sodium 140 134 - 144 mmol/L Potassium 4.2 3.5 - 5.2 mmol/L Chloride 101 96 - 106 mmol/L  
 CO2 23 20 - 29 mmol/L Calcium 9.1 8.6 - 10.2 mg/dL Protein, total 6.3 6.0 - 8.5 g/dL Albumin 4.3 3.5 - 4.8 g/dL GLOBULIN, TOTAL 2.0 1.5 - 4.5 g/dL A-G Ratio 2.2 1.2 - 2.2 Bilirubin, total 1.4 (H) 0.0 - 1.2 mg/dL Alk. phosphatase 53 39 - 117 IU/L  
 AST (SGOT) 16 0 - 40 IU/L  
 ALT (SGPT) 16 0 - 44 IU/L  
LIPID PANEL Result Value Ref Range Cholesterol, total 178 100 - 199 mg/dL Triglyceride 178 (H) 0 - 149 mg/dL HDL Cholesterol 46 >39 mg/dL VLDL, calculated 36 5 - 40 mg/dL LDL, calculated 96 0 - 99 mg/dL HEMOGLOBIN A1C WITH EAG Result Value Ref Range Hemoglobin A1c 7.9 (H) 4.8 - 5.6 % Estimated average glucose 180 mg/dL AMB POC COMPLETE CBC,AUTOMATED ENTER Result Value Ref Range WBC (POC) 4.5 4.1 - 10.9 K/uL  
 RBC (POC) 5.35 4.20 - 6.30 M/uL  
 HGB (POC) 14.2 12.0 - 18.0 g/dL HCT (POC) 43.3 37.0 - 51.0 %  
 MCV (POC) 81.0 80.0 - 97.0 fL  
 MCH (POC) 26.6 26.0 - 32.0 pg  
 MCHC (POC) 32.8 30.0 - 36.0 g/dL PLATELET (POC) 022.1 (A) 140.0 - 440.0 K/uL  
 ABS. LYMPHS (POC) 1.2 0.6 - 4.1 K/uL LYMPHOCYTES (POC) 27.2 10.0 - 58.5 % Mid # (POC) 0.1 0.0 - 1.8 K/uL MID% POC 3.2 0.1 - 24.0 %  
 ABS. GRANS (POC) 3.2 2.0 - 7.8 K/uL GRANULOCYTES (POC) 69.6 37.0 - 92.0 % Advice/Referrals/Counseling Education and counseling provided: 
Are appropriate based on today's review and evaluation End-of-Life planning (with patient's consent) Pneumococcal Vaccine Influenza Vaccine Colorectal cancer screening tests Assessment/Plan ASSESSMENT:  
1. Essential hypertension 2. Controlled type 2 diabetes mellitus with stage 2 chronic kidney disease, without long-term current use of insulin ([address] 3. Morbid obesity ([address] 4. Mixed hyperlipidemia 5. Primary osteoarthritis involving multiple joints 6. Stage 2 chronic kidney disease 7. Aortic valve stenosis, etiology of cardiac valve disease unspecified 8. Medicare annual wellness visit, subsequent 9. Prostate cancer screening Impression 1. Hypertension that is poorly controlled so increase hydralazine to 50 mg 3 times daily. 2.  Diabetes I reviewed his last A1c which was 7.9 and his diabetes thus is poorly controlled he is taking Invokana 300 mg daily. Glipizide 10 mg twice daily.   And Januvia 100 mg daily all of which are at maximal dose. He can tolerate no more than metformin 500 mg twice daily because diarrhea with higher doses. I discussed the fact that if he does not do better job with diet and weight reduction that he definitely needs insulin. 3.  Morbid obesity discussed absolute need for diet, exercise and weight reduction. Hyperlipidemia reviewed his last numbers which were okay except for high triglycerides related to his diabetes 5. DJD that is currently stable 6. CKD stage II repeat status is pending 7. Aortic stenosis I reviewed his echocardiogram done April of this year showing mild aortic stenosis and marked concentric LVH indicating even more need to get his blood pressure controlled. EKG done today shows no acute process Medicare annual wellness examination screening questionnaire completed today. The results were reviewed with him lifestyle recommendations and modifications discussed and made. Follow-up in 1 month regarding his blood pressure. I will call with his lab results make further recommendations of a need to. PLAN: 
. Orders Placed This Encounter  T4, FREE  
 TSH 3RD GENERATION  
 CBC WITH AUTOMATED DIFF  
 PROSTATE SPECIFIC AG  
 AMB POC URINALYSIS DIP STICK AUTO W/ MICRO  AMB POC URINE, MICROALBUMIN, SEMIQUANT (1 RESULT)  AMB POC EKG ROUTINE W/ 12 LEADS, INTER & REP  hydrALAZINE (APRESOLINE) 50 mg tablet ATTENTION:  
This medical record was transcribed using an electronic medical records system. Although proofread, it may and can contain electronic and spelling errors. Other human spelling and other errors may be present. Corrections may be executed at a later time. Please feel free to contact us for any clarifications as needed. Follow-up Disposition: 
Return in about 4 weeks (around 8/31/2018). Elvis Calderon MD 
 
Recommended healthy diet low in carbohydrates, fats, sodium and cholesterol.   Recommended regular cardiovascular exercise 3-6 times per week for 30-60 minutes daily. Current Outpatient Prescriptions Medication Sig Dispense Refill  hydrALAZINE (APRESOLINE) 50 mg tablet Take 1 Tab by mouth three (3) times daily. 90 Tab prn  meloxicam (MOBIC) 15 mg tablet TAKE ONE TABLET BY MOUTH DAILY 90 Tab 3  
 finasteride (PROSCAR) 5 mg tablet  canagliflozin (INVOKANA) 300 mg tablet Take 1 Tab by mouth Daily (before breakfast). 10 Tab 0  
 tamsulosin (FLOMAX) 0.4 mg capsule TAKE TWO CAPSULES BY MOUTH DAILY 60 Cap 2  
 glipiZIDE (GLUCOTROL) 10 mg tablet TAKE ONE TABLET BY MOUTH TWICE A  Tab 1  
 lisinopril (PRINIVIL, ZESTRIL) 40 mg tablet TAKE 1 TABLET BY MOUTH ONCE DAILY 90 Tab 3  
 losartan (COZAAR) 100 mg tablet TAKE ONE TABLET BY MOUTH DAILY 90 Tab 3  
 metFORMIN (GLUCOPHAGE) 500 mg tablet TAKE ONE TABLET BY MOUTH EVERY MORNING AND TAKE TWO TABLETS BY MOUTH EVERY EVENING WITH DINNER (Patient taking differently: TAKE ONE TABLET BY MOUTH EVERY MORNING AND TAKE ONE TABLETS BY MOUTH EVERY EVENING WITH DINNER) 270 Tab 3  pravastatin (PRAVACHOL) 80 mg tablet TAKE ONE TABLET BY MOUTH DAILY 90 Tab 3  
 Omega-3-DHA-EPA-Fish Oil 1,000 mg (120 mg-180 mg) cap Take 1 Cap by mouth three (3) times daily.  amLODIPine (NORVASC) 10 mg tablet Take 1 Tab by mouth daily. 90 Tab 3  
 SITagliptin (JANUVIA) 100 mg tablet Take 100 mg by mouth daily.  aspirin delayed-release 81 mg tablet Take  by mouth daily.  atenolol (TENORMIN) 50 mg tablet TAKE 1 TABLET BY MOUTH EVERY DAY 90 Tab 3 No results found for any visits on 08/03/18. Verbal and written instructions (see AVS) provided. Patient expresses understanding of diagnosis and treatment plan.  
 
Jeri Ashraf MD

## 2018-08-03 NOTE — MR AVS SNAPSHOT
[address] [facility] 
 
 
 [address] [address] [phone] Patient: Bernie Chacko MRN: [id number] VQD:[id number]/[id number] Visit Information Date & Time Provider Department Dept. Phone Encounter #  
 8/3/2018  1:30 PM Princess Mims, 20 [facility] [phone] [id number] Follow-up Instructions Return in about 4 weeks (around 8/31/2018). Follow-up and Disposition History Your Appointments 10/16/2018  9:40 AM  
FOLLOW UP 10 with MD [facility] ([facility]) Appt Note: 3 Mnth F/U  
 [address] [address] [address] [address] Upcoming Health Maintenance Date Due DTaP/Tdap/Td series (1 - Tdap) 11/10/1964 ZOSTER VACCINE AGE 60> 9/10/2003 Influenza Age 5 to Adult 8/1/2018 COLONOSCOPY 11/1/2018* EYE EXAM RETINAL OR DILATED Q1 9/21/2018 HEMOGLOBIN A1C Q6M 1/2/2019 FOOT EXAM Q1 4/2/2019 LIPID PANEL Q1 7/2/2019 MICROALBUMIN Q1 8/3/2019 MEDICARE YEARLY EXAM 8/4/2019 GLAUCOMA SCREENING Q2Y 9/21/2019 *Topic was postponed. The date shown is not the original due date. Allergies as of 8/3/2018  Review Complete On: 8/3/2018 By: Princess Prasanna MD  
  
 Severity Noted Reaction Type Reactions Adhesive Tape-silicones  49/20/7495    Unknown (comments) Chocolate Flavor  08/02/2017    Unknown (comments) Current Immunizations  Never Reviewed Name Date Influenza High Dose Vaccine PF 12/11/2017 Influenza Vaccine 10/27/2016 Pneumococcal Conjugate (PCV-13) 9/16/2015 Pneumococcal Polysaccharide (PPSV-23) 7/2/2018, 10/9/2000 Not reviewed this visit You Were Diagnosed With   
  
 Codes Comments Essential hypertension    -  Primary ICD-10-CM: I10 
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 
ICD-9-CM: 278.01 Mixed hyperlipidemia     ICD-10-CM: E78.2 ICD-9-CM: 272.2 Primary osteoarthritis involving multiple joints     ICD-10-CM: M15.0 ICD-9-CM: 715.09 Stage 2 chronic kidney disease     ICD-10-CM: N18.2 ICD-9-CM: 914. 2 Aortic valve stenosis, etiology of cardiac valve disease unspecified     ICD-10-CM: I35.0 ICD-9-CM: 424.1 Medicare annual wellness visit, subsequent     ICD-10-CM: Z00.00 ICD-9-CM: V70.0 Prostate cancer screening     ICD-10-CM: Z12.5 ICD-9-CM: V76.44 Vitals BP Pulse Resp Height(growth percentile) Weight(growth percentile) SpO2  
 150/82 (BP 1 Location: Left arm, BP Patient Position: Sitting) 75 15 5' 10\" (1.778 m) 231 lb (104.8 kg) 99% BMI Smoking Status 33.15 kg/m2 Never Smoker Vitals History BMI and BSA Data Body Mass Index Body Surface Area  
 33.15 kg/m 2 2.28 m 2 Preferred Pharmacy Pharmacy Name Phone [phone] [facility] [phone] Your Updated Medication List  
  
   
This list is accurate as of 8/3/18  2:07 PM.  Always use your most recent med list. amLODIPine 10 mg tablet Commonly known as:  Hakeem Nakita Take 1 Tab by mouth daily. aspirin delayed-release 81 mg tablet Take  by mouth daily. atenolol 50 mg tablet Commonly known as:  TENORMIN  
TAKE 1 TABLET BY MOUTH EVERY DAY  
  
 canagliflozin 300 mg tablet Commonly known as:  Wyvonne Beech Take 1 Tab by mouth Daily (before breakfast). finasteride 5 mg tablet Commonly known as:  PROSCAR  
  
 glipiZIDE 10 mg tablet Commonly known as:  GLUCOTROL  
TAKE ONE TABLET BY MOUTH TWICE A DAY  
  
 hydrALAZINE 50 mg tablet Commonly known as:  APRESOLINE Take 1 Tab by mouth three (3) times daily. JANUVIA 100 mg tablet Generic drug:  SITagliptin Take 100 mg by mouth daily. lisinopril 40 mg tablet Commonly known as:  PRINIVIL, ZESTRIL  
TAKE 1 TABLET BY MOUTH ONCE DAILY losartan 100 mg tablet Commonly known as:  COZAAR  
TAKE ONE TABLET BY MOUTH DAILY  
  
 meloxicam 15 mg tablet Commonly known as:  MOBIC  
TAKE ONE TABLET BY MOUTH DAILY  
  
 metFORMIN 500 mg tablet Commonly known as:  GLUCOPHAGE  
TAKE ONE TABLET BY MOUTH EVERY MORNING AND TAKE TWO TABLETS BY MOUTH EVERY EVENING WITH DINNER  
  
 omega 3-DHA-EPA-fish oil 1,000 mg (120 mg-180 mg) capsule Take 1 Cap by mouth three (3) times daily. pravastatin 80 mg tablet Commonly known as:  PRAVACHOL  
TAKE ONE TABLET BY MOUTH DAILY  
  
 tamsulosin 0.4 mg capsule Commonly known as:  FLOMAX TAKE TWO CAPSULES BY MOUTH DAILY Prescriptions Sent to Pharmacy Refills  
 hydrALAZINE (APRESOLINE) 50 mg tablet prn Sig: Take 1 Tab by mouth three (3) times daily. Class: Normal  
 Pharmacy: [facility] [address] [place] [address] Ph #: [phone] Route: Oral  
  
We Performed the Following AMB POC EKG ROUTINE W/ 12 LEADS, INTER & REP [10339 CPT(R)] AMB POC URINALYSIS DIP STICK AUTO W/ MICRO  [98632 CPT(R)] AMB POC URINE, MICROALBUMIN, SEMIQUANT (1 RESULT) [06323 CPT(R)] CBC WITH AUTOMATED DIFF [54528 CPT(R)] PSA, DIAGNOSTIC (PROSTATE SPECIFIC AG) [id number] CPT(R)] T4, FREE [id number] CPT(R)] TSH 3RD GENERATION [71267 CPT(R)] Follow-up Instructions Return in about 4 weeks (around 8/31/2018). Patient Instructions Arthritis: Care Instructions Your Care Instructions Arthritis, also called osteoarthritis, is a breakdown of the cartilage that cushions your joints.  When the cartilage wears down, your bones rub against each other. This causes pain and stiffness. Many people have some arthritis as they age. Arthritis most often affects the joints of the spine, hands, hips, knees, or feet. You can take simple measures to protect your joints, ease your pain, and help you stay active. Follow-up care is a key part of your treatment and safety. Be sure to make and go to all appointments, and call your doctor if you are having problems. It's also a good idea to know your test results and keep a list of the medicines you take. How can you care for yourself at home? · Stay at a healthy weight. Being overweight puts extra strain on your joints. · Talk to your doctor or physical therapist about exercises that will help ease joint pain. ¨ Stretch. You may enjoy gentle forms of yoga to help keep your joints and muscles flexible. ¨ Walk instead of jog. Other types of exercise that are less stressful on the joints include riding a bicycle, swimming, virgilio chi, or water exercise. ¨ Lift weights. Strong muscles help reduce stress on your joints. Stronger thigh muscles, for example, take some of the stress off of the knees and hips. Learn the right way to lift weights so you do not make joint pain worse. · Take your medicines exactly as prescribed. Call your doctor if you think you are having a problem with your medicine. · Take pain medicines exactly as directed. ¨ If the doctor gave you a prescription medicine for pain, take it as prescribed. ¨ If you are not taking a prescription pain medicine, ask your doctor if you can take an over-the-counter medicine. · Use a cane, crutch, walker, or another device if you need help to get around. These can help rest your joints. You also can use other things to make life easier, such as a higher toilet seat and padded handles on kitchen utensils. · Do not sit in low chairs, which can make it hard to get up. · Put heat or cold on your sore joints as needed.  Use whichever helps you most. You also can take turns with hot and cold packs. ¨ Apply heat 2 or 3 times a day for 20 to 30 minutes-using a heating pad, hot shower, or hot pack-to relieve pain and stiffness. ¨ Put ice or a cold pack on your sore joint for 10 to 20 minutes at a time. Put a thin cloth between the ice and your skin. When should you call for help? Call your doctor now or seek immediate medical care if: 
  · You have sudden swelling, warmth, or pain in any joint.  
  · You have joint pain and a fever or rash.  
  · You have such bad pain that you cannot use a joint.  
 Watch closely for changes in your health, and be sure to contact your doctor if: 
  · You have mild joint symptoms that continue even with more than 6 weeks of care at home.  
  · You have stomach pain or other problems with your medicine. Where can you learn more? Go to http://matildeCellBiosciencesgirish.info/. Enter U342 in the search box to learn more about \"Arthritis: Care Instructions. \" Current as of: October 10, 2017 Content Version: 11.7 © 1631-9849 Call Loop. Care instructions adapted under license by [employer] (which disclaims liability or warranty for this information). If you have questions about a medical condition or this instruction, always ask your healthcare professional. [address] any warranty or liability for your use of this information. Patient Instructions History Introducing [facility]! Emily Camacho introduces [facility] patient portal. Now you can access parts of your medical record, email your doctor's office, and request medication refills online. 1. In your internet browser, go to [email or web address] 2. Click on the First Time User? Click Here link in the Sign In box. You will see the New Member Sign Up page. 3. Enter your [facility] Access Code exactly as it appears below.  You will not need to use this code after youve completed the sign-up process. If you do not sign up before the expiration date, you must request a new code. · Ads Click Access Code: [email or web address] Expires: 11/1/2018  2:07 PM 
 
4. Enter the last four digits of your Social Security Number (xxxx) and Date of Birth (mm/dd/yyyy) as indicated and click Submit. You will be taken to the next sign-up page. 5. Create a Ads Click ID. This will be your Ads Click login ID and cannot be changed, so think of one that is secure and easy to remember. 6. Create a Ads Click password. You can change your password at any time. 7. Enter your Password Reset Question and Answer. This can be used at a later time if you forget your password. 8. Enter your e-mail address. You will receive e-mail notification when new information is available in [address] 9. Click Sign Up. You can now view and download portions of your medical record. 10. Click the Download Summary menu link to download a portable copy of your medical information. If you have questions, please visit the Frequently Asked Questions section of the Ads Click website. Remember, Ads Click is NOT to be used for urgent needs. For medical emergencies, dial 911. Now available from your iPhone and Android! Please provide this summary of care documentation to your next provider. Your primary care clinician is listed as Bhargavi. If you have any questions after today's visit, please call [phone].

## 2018-08-03 NOTE — PATIENT INSTRUCTIONS
Arthritis: Care Instructions Your Care Instructions Arthritis, also called osteoarthritis, is a breakdown of the cartilage that cushions your joints. When the cartilage wears down, your bones rub against each other. This causes pain and stiffness. Many people have some arthritis as they age. Arthritis most often affects the joints of the spine, hands, hips, knees, or feet. You can take simple measures to protect your joints, ease your pain, and help you stay active. Follow-up care is a key part of your treatment and safety. Be sure to make and go to all appointments, and call your doctor if you are having problems. It's also a good idea to know your test results and keep a list of the medicines you take. How can you care for yourself at home? · Stay at a healthy weight. Being overweight puts extra strain on your joints. · Talk to your doctor or physical therapist about exercises that will help ease joint pain. ¨ Stretch. You may enjoy gentle forms of yoga to help keep your joints and muscles flexible. ¨ Walk instead of jog. Other types of exercise that are less stressful on the joints include riding a bicycle, swimming, virgilio chi, or water exercise. ¨ Lift weights. Strong muscles help reduce stress on your joints. Stronger thigh muscles, for example, take some of the stress off of the knees and hips. Learn the right way to lift weights so you do not make joint pain worse. · Take your medicines exactly as prescribed. Call your doctor if you think you are having a problem with your medicine. · Take pain medicines exactly as directed. ¨ If the doctor gave you a prescription medicine for pain, take it as prescribed. ¨ If you are not taking a prescription pain medicine, ask your doctor if you can take an over-the-counter medicine. · Use a cane, crutch, walker, or another device if you need help to get around. These can help rest your joints.  You also can use other things to make life easier, such as a higher toilet seat and padded handles on kitchen utensils. · Do not sit in low chairs, which can make it hard to get up. · Put heat or cold on your sore joints as needed. Use whichever helps you most. You also can take turns with hot and cold packs. ¨ Apply heat 2 or 3 times a day for 20 to 30 minutes-using a heating pad, hot shower, or hot pack-to relieve pain and stiffness. ¨ Put ice or a cold pack on your sore joint for 10 to 20 minutes at a time. Put a thin cloth between the ice and your skin. When should you call for help? Call your doctor now or seek immediate medical care if: 
  · You have sudden swelling, warmth, or pain in any joint.  
  · You have joint pain and a fever or rash.  
  · You have such bad pain that you cannot use a joint.  
 Watch closely for changes in your health, and be sure to contact your doctor if: 
  · You have mild joint symptoms that continue even with more than 6 weeks of care at home.  
  · You have stomach pain or other problems with your medicine. Where can you learn more? Go to http://matilde-girish.info/. Enter V207 in the search box to learn more about \"Arthritis: Care Instructions. \" Current as of: October 10, 2017 Content Version: 11.7 © 8005-4936 [employer] Software. Care instructions adapted under license by [employer] (which disclaims liability or warranty for this information). If you have questions about a medical condition or this instruction, always ask your healthcare professional. [place] [address] any warranty or liability for your use of this information.

## 2018-08-03 NOTE — PROGRESS NOTES
Chief Complaint Patient presents with  Blood Pressure Check MR Wellness visit 1. Have you been to the ER, urgent care clinic since your last visit? Hospitalized since your last visit? No 
 
2. Have you seen or consulted any other health care providers outside of the [address] [place] [address] since your last visit? Include any pap smears or colon screening.  No

## 2018-08-03 NOTE — PROGRESS NOTES
Blood sugar and triglycerides as well as glycohemoglobin are all poorly controlled. What exactly is he taking so I will know what to change. Obviously needs to much better job with diet.   Discuss at visit 8-3-2018

## 2018-08-04 LAB
PSA SERPL-MCNC: 5.1 NG/ML (ref 0–4)
T4 FREE SERPL-MCNC: 1.25 NG/DL (ref 0.82–1.77)
TSH SERPL DL<=0.005 MIU/L-ACNC: 1.58 UIU/ML (ref 0.45–4.5)

## 2018-08-10 LAB
BACTERIA UA POCT, BACTPOCT: NORMAL
BILIRUB UR QL STRIP: NEGATIVE
CASTS UA POCT: 0
CLUE CELLS, CLUEPOCT: NEGATIVE
CRYSTALS UA POCT, CRYSPOCT: NEGATIVE
EPITHELIAL CELLS POCT: NEGATIVE
GLUCOSE UR-MCNC: NORMAL MG/DL
KETONES P FAST UR STRIP-MCNC: NEGATIVE MG/DL
MICROALBUMIN UR TEST STR-MCNC: 50 MG/L (ref 0–20)
MUCUS UA POCT, MUCPOCT: NORMAL
PH UR STRIP: 5 [PH] (ref 5–7)
PROT UR QL STRIP: NORMAL
RBC UA POCT, RBCPOCT: 0
SP GR UR STRIP: 1.02 (ref 1.01–1.02)
TRICH UA POCT, TRICHPOC: NEGATIVE
UA UROBILINOGEN AMB POC: NORMAL (ref 0.2–1)
URINALYSIS CLARITY POC: CLEAR
URINALYSIS COLOR POC: NORMAL
URINE BLOOD POC: NEGATIVE
URINE CULT COMMENT, POCT: NORMAL
URINE LEUKOCYTES POC: NEGATIVE
URINE NITRITES POC: NEGATIVE
WBC UA POCT, WBCPOCT: 0
YEAST UA POCT, YEASTPOC: NEGATIVE

## 2018-08-10 RX ORDER — TAMSULOSIN HYDROCHLORIDE 0.4 MG/1
CAPSULE ORAL
Qty: 60 CAP | Refills: 3 | Status: SHIPPED | OUTPATIENT
Start: 2018-08-10 | End: 2018-12-24 | Stop reason: SDUPTHER

## 2018-08-23 ENCOUNTER — TELEPHONE (OUTPATIENT)
Dept: INTERNAL MEDICINE CLINIC | Age: 73
End: 2018-08-23

## 2018-08-23 NOTE — TELEPHONE ENCOUNTER
----- Message from Ale Qiu MD sent at 8/7/2018  6:10 PM EDT -----  Labs are good except PSA is elevated so urology appointment

## 2018-08-28 NOTE — TELEPHONE ENCOUNTER
----- Message from Daniel Cevallos MD sent at 8/7/2018  6:10 PM EDT -----  Labs are good except PSA is elevated so urology appointment

## 2018-08-28 NOTE — TELEPHONE ENCOUNTER
Patient informed of lab result and recommendation made by Dr. Roman Flaherty. Patient states he already sees a urologist, Dr. Chandra Swan. He was last seen in June, 2018 and placed on Proscar. He states he was told to stay on that medication until he goes back for return visit in April, 2019. Dr. Roman Flaherty notifed.

## 2018-10-16 ENCOUNTER — OFFICE VISIT (OUTPATIENT)
Dept: INTERNAL MEDICINE CLINIC | Age: 73
End: 2018-10-16

## 2018-10-16 VITALS
HEART RATE: 72 BPM | SYSTOLIC BLOOD PRESSURE: 136 MMHG | BODY MASS INDEX: 33.21 KG/M2 | DIASTOLIC BLOOD PRESSURE: 64 MMHG | WEIGHT: 232 LBS | OXYGEN SATURATION: 96 % | HEIGHT: 70 IN | RESPIRATION RATE: 16 BRPM | TEMPERATURE: 98.3 F

## 2018-10-16 DIAGNOSIS — I35.0 AORTIC VALVE STENOSIS, ETIOLOGY OF CARDIAC VALVE DISEASE UNSPECIFIED: ICD-10-CM

## 2018-10-16 DIAGNOSIS — E66.01 MORBID OBESITY (HCC): ICD-10-CM

## 2018-10-16 DIAGNOSIS — E78.2 MIXED HYPERLIPIDEMIA: ICD-10-CM

## 2018-10-16 DIAGNOSIS — N18.2 STAGE 2 CHRONIC KIDNEY DISEASE: ICD-10-CM

## 2018-10-16 DIAGNOSIS — I10 ESSENTIAL HYPERTENSION: ICD-10-CM

## 2018-10-16 DIAGNOSIS — E11.22 CONTROLLED TYPE 2 DIABETES MELLITUS WITH STAGE 2 CHRONIC KIDNEY DISEASE, WITHOUT LONG-TERM CURRENT USE OF INSULIN (HCC): Primary | ICD-10-CM

## 2018-10-16 DIAGNOSIS — Z23 ENCOUNTER FOR IMMUNIZATION: ICD-10-CM

## 2018-10-16 DIAGNOSIS — M15.9 PRIMARY OSTEOARTHRITIS INVOLVING MULTIPLE JOINTS: ICD-10-CM

## 2018-10-16 DIAGNOSIS — N18.2 CONTROLLED TYPE 2 DIABETES MELLITUS WITH STAGE 2 CHRONIC KIDNEY DISEASE, WITHOUT LONG-TERM CURRENT USE OF INSULIN (HCC): Primary | ICD-10-CM

## 2018-10-16 RX ORDER — TIMOLOL MALEATE 5 MG/ML
SOLUTION OPHTHALMIC
COMMUNITY
Start: 2018-09-19 | End: 2022-03-11

## 2018-10-16 NOTE — PROGRESS NOTES
Chief Complaint Patient presents with  Diabetes SUBJECTIVE: 
 
Audra Sebastian is a 74 y.o. male who returns in follow-up of his medical problems include hypertension, diabetes, hyperlipidemia, aortic stenosis, morbid obesity, DJD, and other medical problems. He is taking his medications and trying to follow his diet but knows he could do a lot better with diet and exercise. He currently denies any chest pain, shortness breath, palpitations or cardiorespiratory complaints. There are no GI or  complaints. He has no headaches, dizziness or neurological planes. There are no current change of his chronic arthritic complaints and then no other complaints on complete review of systems. Current Outpatient Prescriptions Medication Sig Dispense Refill  timolol (TIMOPTIC-XE) 0.5 % ophthalmic gel-forming  SITagliptin (JANUVIA) 100 mg tablet Take 1 Tab by mouth daily. 30 Tab prn  canagliflozin (INVOKANA) 300 mg tablet Take 1 Tab by mouth Daily (before breakfast). 10 Tab 0  
 tamsulosin (FLOMAX) 0.4 mg capsule TAKE TWO CAPSULES BY MOUTH DAILY 60 Cap 3  
 hydrALAZINE (APRESOLINE) 50 mg tablet Take 1 Tab by mouth three (3) times daily. 90 Tab prn  meloxicam (MOBIC) 15 mg tablet TAKE ONE TABLET BY MOUTH DAILY 90 Tab 3  
 finasteride (PROSCAR) 5 mg tablet  glipiZIDE (GLUCOTROL) 10 mg tablet TAKE ONE TABLET BY MOUTH TWICE A  Tab 1  
 lisinopril (PRINIVIL, ZESTRIL) 40 mg tablet TAKE 1 TABLET BY MOUTH ONCE DAILY 90 Tab 3  
 losartan (COZAAR) 100 mg tablet TAKE ONE TABLET BY MOUTH DAILY 90 Tab 3  
 metFORMIN (GLUCOPHAGE) 500 mg tablet TAKE ONE TABLET BY MOUTH EVERY MORNING AND TAKE TWO TABLETS BY MOUTH EVERY EVENING WITH DINNER (Patient taking differently: TAKE ONE TABLET BY MOUTH EVERY MORNING AND TAKE ONE TABLETS BY MOUTH EVERY EVENING WITH DINNER) 270 Tab 3  pravastatin (PRAVACHOL) 80 mg tablet TAKE ONE TABLET BY MOUTH DAILY 90 Tab 3  
  Omega-3-DHA-EPA-Fish Oil 1,000 mg (120 mg-180 mg) cap Take 1 Cap by mouth three (3) times daily.  amLODIPine (NORVASC) 10 mg tablet Take 1 Tab by mouth daily. 90 Tab 3  
 aspirin delayed-release 81 mg tablet Take  by mouth daily.  atenolol (TENORMIN) 50 mg tablet TAKE 1 TABLET BY MOUTH EVERY DAY 90 Tab 3 Past Medical History:  
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 No pertinent surgical history. Allergies Allergen Reactions  Adhesive Tape-Silicones Unknown (comments)  Chocolate Flavor Unknown (comments) REVIEW OF SYSTEMS: 
General: negative for - chills or fever, or weight loss or gain ENT: negative for - headaches, nasal congestion or tinnitus Eyes: no blurred or visual changes Neck: No stiffness or swollen nodes Respiratory: negative for - cough, hemoptysis, shortness of breath or wheezing Cardiovascular : negative for - chest pain, edema, palpitations or shortness of breath Gastrointestinal: negative for - abdominal pain, blood in stools, heartburn or nausea/vomiting Genito-Urinary: no dysuria, trouble voiding, or hematuria Musculoskeletal: negative for - gait disturbance, joint pain, joint stiffness or joint swelling Neurological: no TIA or stroke symptoms Hematologic: no bruises, no bleeding Lymphatic: no swollen glands Integument: no lumps, mole changes, nail changes or rash Endocrine:no malaise/lethargy poly uria or polydipsia or unexpected weight changes Social History Social History  Marital status:  Spouse name: N/A  
 Number of children: N/A  
 Years of education: N/A Social History Main Topics  Smoking status: Never Smoker  Smokeless tobacco: Never Used  Alcohol use Yes Comment: social  
 Drug use: No  
 Sexual activity: Not Asked Other Topics Concern  None Social History Narrative Family History Problem Relation Age of Onset  Heart Disease Mother   
  murmor  Heart Disease Father OBJECTIVE:  
 
Visit Vitals  /64  Pulse 72  Temp 98.3 °F (36.8 °C) (Oral)  Resp 16  
 Ht 5' 10\" (1.778 m)  Wt 232 lb (105.2 kg)  SpO2 96%  BMI 33.29 kg/m2 CONSTITUTIONAL:   Obese white male, appears age appropriate EYES: sclera anicteric, PERRL, EOMI 
ENMT:nares clear, moist mucous membranes, pharynx clear NECK: supple. Thyroid normal, No JVD or bruits RESPIRATORY: Chest: clear to ascultation and percussion, normal inspiratory effort CARDIOVASCULAR: Heart: regular rate and rhythm with 368 holosystolic murmur without rubs or gallops, PMI not displaced, No thrills GASTROINTESTINAL: Abdomen: non distended, soft, non tender, bowel sounds normal 
HEMATOLOGIC: no purpura, petechiae or bruising LYMPHATIC: No lymph node enlargemant MUSCULOSKELETAL: Extremities: no edema or active synovitis, pulse 1+ INTEGUMENT: No unusual rashes or suspicious skin lesions noted. Nails appear normal. 
PERIPHERAL VASCULAR: normal pulses femoral, PT and DP NEUROLOGIC: non-focal exam, A & O X 3 PSYCHIATRIC:, appropriate affect ASSESSMENT:  
1. 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 Essential hypertension 3. Mixed hyperlipidemia 4. Morbid obesity ([address] 5. Primary osteoarthritis involving multiple joints 6. Stage 2 chronic kidney disease 7. Aortic valve stenosis, etiology of cardiac valve disease unspecified 8. Encounter for immunization Impression 1. Hypertension a little up by the nurse but repeat by me was good so no changes in treatment needed 2. Diabetes repeat status pending and prior labs reviewed on make adjustments if necessary. 3.  Hyperlipidemia prior labs reviewed repeat status pending I will adjust if needed. 4.  Obesity we discussed diet, exercise and weight reduction for overall health benefit. 5.  DJD that is stable 6. CKD stage II repeat status pending 7. Aortic stenosis murmurs without change Flu shot given today. I will call with lab and make further recommendations or adjustments if necessary. Follow-up as scheduled for 3 months or sooner should there be a problem. PLAN: 
. Orders Placed This Encounter  Influenza Vaccine Inactivated (IIV)(FLUAD), Subunit, Adjuvanted, IM, (93146)  METABOLIC PANEL, COMPREHENSIVE  LIPID PANEL  
 HEMOGLOBIN A1C WITH EAG  
 CK  timolol (TIMOPTIC-XE) 0.5 % ophthalmic gel-forming  SITagliptin (JANUVIA) 100 mg tablet  canagliflozin (INVOKANA) 300 mg tablet ATTENTION:  
This medical record was transcribed using an electronic medical records system. Although proofread, it may and can contain electronic and spelling errors. Other human spelling and other errors may be present. Corrections may be executed at a later time. Please feel free to contact us for any clarifications as needed. Follow-up Disposition: 
Return in about 3 months (around 1/16/2019). No results found for any visits on 10/16/18. Nick Rossi MD 
 
The patient verbalized understanding of the problems and plans as explained.

## 2018-10-16 NOTE — PROGRESS NOTES
Chief Complaint Patient presents with  Diabetes 1. Have you been to the ER, urgent care clinic since your last visit? Hospitalized since your last visit? no 
 
2. Have you seen or consulted any other health care providers outside of the [address] [place] [address] since your last visit? Include any pap smears or colon screening.   no

## 2018-10-16 NOTE — PROGRESS NOTES
After obtaining consent, and per verbal order from Dr. Francisco Womack, patient received influenza vaccine given by Juancho Boswell RN  in left Deltoid. Influenza Vaccine 0.5 mL IM now. Patient was observed for 15 minutes post injection. Patient tolerated injection well with no adverse effects. VIS given.

## 2018-10-16 NOTE — MR AVS SNAPSHOT
[address] [place] [address] [id number] [phone] Patient: Argenis Dodson MRN: [id number] QAR:[id number]/71/[id number] Visit Information Date & Time Provider Department Dept. Phone Encounter #  
 10/16/2018  9:40 AM Kelin Ley 84 [phone] [id number] Follow-up Instructions Return in about 3 months (around 1/16/2019). Upcoming Health Maintenance Date Due DTaP/Tdap/Td series (1 - Tdap) 11/10/1964 Shingrix Vaccine Age 50> (1 of 2) 11/10/1993 Influenza Age 5 to Adult 8/1/2018 COLONOSCOPY 11/1/2018* HEMOGLOBIN A1C Q6M 1/2/2019 FOOT EXAM Q1 4/2/2019 EYE EXAM RETINAL OR DILATED Q1 6/20/2019 LIPID PANEL Q1 7/2/2019 MEDICARE YEARLY EXAM 8/4/2019 MICROALBUMIN Q1 8/9/2019 GLAUCOMA SCREENING Q2Y 6/20/2020 *Topic was postponed. The date shown is not the original due date. Allergies as of 10/16/2018  Review Complete On: 10/16/2018 By: Anna Tilley MD  
  
 Severity Noted Reaction Type Reactions Adhesive Tape-silicones  09/76/1203    Unknown (comments) Chocolate Flavor  08/02/2017    Unknown (comments) Current Immunizations  Never Reviewed Name Date Influenza High Dose Vaccine PF 12/11/2017 Influenza Vaccine 10/27/2016 Influenza Vaccine (Tri) Adjuvanted  Incomplete Pneumococcal Conjugate (PCV-13) 9/16/2015 Pneumococcal Polysaccharide (PPSV-23) 7/2/2018, 10/9/2000 Not reviewed this visit You Were Diagnosed With   
  
 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 
ICD-9-CM: 401.9 Mixed hyperlipidemia     ICD-10-CM: E78.2 ICD-9-CM: 272.2  Morbid obesity ([facility] [address]     ICD-10-CM: E66.01 
ICD-9-CM: 278.01   
 Primary osteoarthritis involving multiple joints     ICD-10-CM: M15.0 ICD-9-CM: 715.09 Stage 2 chronic kidney disease     ICD-10-CM: N18.2 ICD-9-CM: 628. 2 Aortic valve stenosis, etiology of cardiac valve disease unspecified     ICD-10-CM: I35.0 ICD-9-CM: 424.1 Encounter for immunization     ICD-10-CM: G58 ICD-9-CM: V03.89 Vitals BP Pulse Temp Resp Height(growth percentile) Weight(growth percentile) 136/64 72 98.3 °F (36.8 °C) (Oral) 16 5' 10\" (1.778 m) 232 lb (105.2 kg) SpO2 BMI Smoking Status 96% 33.29 kg/m2 Never Smoker Vitals History BMI and BSA Data Body Mass Index Body Surface Area  
 33.29 kg/m 2 2.28 m 2 Preferred Pharmacy Pharmacy Name Phone [facility] [phone] [facility] [phone] Your Updated Medication List  
  
   
This list is accurate as of 10/16/18 10:51 AM.  Always use your most recent med list. amLODIPine 10 mg tablet Commonly known as:  Arti Pilsner Take 1 Tab by mouth daily. aspirin delayed-release 81 mg tablet Take  by mouth daily. atenolol 50 mg tablet Commonly known as:  TENORMIN  
TAKE 1 TABLET BY MOUTH EVERY DAY  
  
 canagliflozin 300 mg tablet Commonly known as:  Esther Olden Take 1 Tab by mouth Daily (before breakfast). finasteride 5 mg tablet Commonly known as:  PROSCAR  
  
 glipiZIDE 10 mg tablet Commonly known as:  GLUCOTROL  
TAKE ONE TABLET BY MOUTH TWICE A DAY  
  
 hydrALAZINE 50 mg tablet Commonly known as:  APRESOLINE Take 1 Tab by mouth three (3) times daily. lisinopril 40 mg tablet Commonly known as:  PRINIVIL, ZESTRIL  
TAKE 1 TABLET BY MOUTH ONCE DAILY losartan 100 mg tablet Commonly known as:  COZAAR  
TAKE ONE TABLET BY MOUTH DAILY  
  
 meloxicam 15 mg tablet Commonly known as:  MOBIC  
TAKE ONE TABLET BY MOUTH DAILY  
  
 metFORMIN 500 mg tablet Commonly known as:  GLUCOPHAGE  
 TAKE ONE TABLET BY MOUTH EVERY MORNING AND TAKE TWO TABLETS BY MOUTH EVERY EVENING WITH DINNER  
  
 omega 3-DHA-EPA-fish oil 1,000 mg (120 mg-180 mg) capsule Take 1 Cap by mouth three (3) times daily. pravastatin 80 mg tablet Commonly known as:  PRAVACHOL  
TAKE ONE TABLET BY MOUTH DAILY SITagliptin 100 mg tablet Commonly known as:  Catrachito Kelso Take 1 Tab by mouth daily. tamsulosin 0.4 mg capsule Commonly known as:  FLOMAX TAKE TWO CAPSULES BY MOUTH DAILY  
  
 timolol 0.5 % ophthalmic gel-forming Commonly known as:  TIMOPTIC-XE  
  
  
  
  
Prescriptions Sent to Pharmacy Refills SITagliptin (JANUVIA) 100 mg tablet prn Sig: Take 1 Tab by mouth daily. Class: Normal  
 Pharmacy: [address], [address] [place] [address] Ph #: [phone] Route: Oral  
 canagliflozin (INVOKANA) 300 mg tablet 0 Sig: Take 1 Tab by mouth Daily (before breakfast). Class: Normal  
 Pharmacy: [address], [address] [place] [address] Ph #: [phone] Route: Oral  
  
We Performed the Following ADMIN INFLUENZA VIRUS VAC [ HCP] CK [id number] CPT(R)] HEMOGLOBIN A1C WITH EAG [98228 CPT(R)] INFLUENZA VACCINE INACTIVATED (IIV), SUBUNIT, ADJUVANTED, IM [id number] CPT(R)] LIPID PANEL [22200 CPT(R)] METABOLIC PANEL, COMPREHENSIVE [67092 CPT(R)] Follow-up Instructions Return in about 3 months (around 1/16/2019). Patient Instructions Arthritis: Care Instructions Your Care Instructions Arthritis, also called osteoarthritis, is a breakdown of the cartilage that cushions your joints. When the cartilage wears down, your bones rub against each other. This causes pain and stiffness. Many people have some arthritis as they age. Arthritis most often affects the joints of the spine, hands, hips, knees, or feet. You can take simple measures to protect your joints, ease your pain, and help you stay active. Follow-up care is a key part of your treatment and safety. Be sure to make and go to all appointments, and call your doctor if you are having problems. It's also a good idea to know your test results and keep a list of the medicines you take. How can you care for yourself at home? · Stay at a healthy weight. Being overweight puts extra strain on your joints. · Talk to your doctor or physical therapist about exercises that will help ease joint pain. ¨ Stretch. You may enjoy gentle forms of yoga to help keep your joints and muscles flexible. ¨ Walk instead of jog. Other types of exercise that are less stressful on the joints include riding a bicycle, swimming, virgilio chi, or water exercise. ¨ Lift weights. Strong muscles help reduce stress on your joints. Stronger thigh muscles, for example, take some of the stress off of the knees and hips. Learn the right way to lift weights so you do not make joint pain worse. · Take your medicines exactly as prescribed. Call your doctor if you think you are having a problem with your medicine. · Take pain medicines exactly as directed. ¨ If the doctor gave you a prescription medicine for pain, take it as prescribed. ¨ If you are not taking a prescription pain medicine, ask your doctor if you can take an over-the-counter medicine. · Use a cane, crutch, walker, or another device if you need help to get around. These can help rest your joints. You also can use other things to make life easier, such as a higher toilet seat and padded handles on kitchen utensils. · Do not sit in low chairs, which can make it hard to get up. · Put heat or cold on your sore joints as needed. Use whichever helps you most. You also can take turns with hot and cold packs. ¨ Apply heat 2 or 3 times a day for 20 to 30 minutesusing a heating pad, hot shower, or hot packto relieve pain and stiffness.  
¨ Put ice or a cold pack on your sore joint for 10 to 20 minutes at a time. Put a thin cloth between the ice and your skin. When should you call for help? Call your doctor now or seek immediate medical care if: 
  · You have sudden swelling, warmth, or pain in any joint.  
  · You have joint pain and a fever or rash.  
  · You have such bad pain that you cannot use a joint.  
 Watch closely for changes in your health, and be sure to contact your doctor if: 
  · You have mild joint symptoms that continue even with more than 6 weeks of care at home.  
  · You have stomach pain or other problems with your medicine. Where can you learn more? Go to http://matilde-girish.info/. Enter Q152 in the search box to learn more about \"Arthritis: Care Instructions. \" Current as of: June 11, 2018 Content Version: 11.8 © 4628-9722 [employer]. Care instructions adapted under license by [employer] (which disclaims liability or warranty for this information). If you have questions about a medical condition or this instruction, always ask your healthcare professional. [place] [address] any warranty or liability for your use of this information. Introducing [facility]! Haydee Bermudez introduces Switchboard patient portal. Now you can access parts of your medical record, email your doctor's office, and request medication refills online. 1. In your internet browser, go to [email or web address] 2. Click on the First Time User? Click Here link in the Sign In box. You will see the New Member Sign Up page. 3. Enter your Switchboard Access Code exactly as it appears below. You will not need to use this code after youve completed the sign-up process. If you do not sign up before the expiration date, you must request a new code. · Switchboard Access Code: [email or web address] Expires: 11/1/2018  2:07 PM 
 
4.  Enter the last four digits of your Social Security Number (xxxx) and Date of Birth (mm/dd/yyyy) as indicated and click Submit. You will be taken to the next sign-up page. 5. Create a Best Apps Market ID. This will be your [employer] Apps [employer] login ID and cannot be changed, so think of one that is secure and easy to remember. 6. Create a [employer] password. You can change your password at any time. 7. Enter your Password Reset Question and Answer. This can be used at a later time if you forget your password. 8. Enter your e-mail address. You will receive e-mail notification when new information is available in [address] 9. Click Sign Up. You can now view and download portions of your medical record. 10. Click the Download Summary menu link to download a portable copy of your medical information. If you have questions, please visit the Frequently Asked Questions section of the [employer] website. Remember, [employer] is NOT to be used for urgent needs. For medical emergencies, dial 911. Now available from your iPhone and Android! Please provide this summary of care documentation to your next provider. Your primary care clinician is listed as Bhargavi. If you have any questions after today's visit, please call [phone].

## 2018-10-16 NOTE — PATIENT INSTRUCTIONS
Arthritis: Care Instructions Your Care Instructions Arthritis, also called osteoarthritis, is a breakdown of the cartilage that cushions your joints. When the cartilage wears down, your bones rub against each other. This causes pain and stiffness. Many people have some arthritis as they age. Arthritis most often affects the joints of the spine, hands, hips, knees, or feet. You can take simple measures to protect your joints, ease your pain, and help you stay active. Follow-up care is a key part of your treatment and safety. Be sure to make and go to all appointments, and call your doctor if you are having problems. It's also a good idea to know your test results and keep a list of the medicines you take. How can you care for yourself at home? · Stay at a healthy weight. Being overweight puts extra strain on your joints. · Talk to your doctor or physical therapist about exercises that will help ease joint pain. ¨ Stretch. You may enjoy gentle forms of yoga to help keep your joints and muscles flexible. ¨ Walk instead of jog. Other types of exercise that are less stressful on the joints include riding a bicycle, swimming, virgilio chi, or water exercise. ¨ Lift weights. Strong muscles help reduce stress on your joints. Stronger thigh muscles, for example, take some of the stress off of the knees and hips. Learn the right way to lift weights so you do not make joint pain worse. · Take your medicines exactly as prescribed. Call your doctor if you think you are having a problem with your medicine. · Take pain medicines exactly as directed. ¨ If the doctor gave you a prescription medicine for pain, take it as prescribed. ¨ If you are not taking a prescription pain medicine, ask your doctor if you can take an over-the-counter medicine. · Use a cane, crutch, walker, or another device if you need help to get around. These can help rest your joints.  You also can use other things to make life easier, such as a higher toilet seat and padded handles on kitchen utensils. · Do not sit in low chairs, which can make it hard to get up. · Put heat or cold on your sore joints as needed. Use whichever helps you most. You also can take turns with hot and cold packs. ¨ Apply heat 2 or 3 times a day for 20 to 30 minutesusing a heating pad, hot shower, or hot packto relieve pain and stiffness. ¨ Put ice or a cold pack on your sore joint for 10 to 20 minutes at a time. Put a thin cloth between the ice and your skin. When should you call for help? Call your doctor now or seek immediate medical care if: 
  · You have sudden swelling, warmth, or pain in any joint.  
  · You have joint pain and a fever or rash.  
  · You have such bad pain that you cannot use a joint.  
 Watch closely for changes in your health, and be sure to contact your doctor if: 
  · You have mild joint symptoms that continue even with more than 6 weeks of care at home.  
  · You have stomach pain or other problems with your medicine. Where can you learn more? Go to http://matilde-girish.info/. Enter G444 in the search box to learn more about \"Arthritis: Care Instructions. \" Current as of: June 11, 2018 Content Version: 11.8 © 4277-7866 [employer]. Care instructions adapted under license by [employer] (which disclaims liability or warranty for this information). If you have questions about a medical condition or this instruction, always ask your healthcare professional. [place] [address] any warranty or liability for your use of this information.

## 2018-10-17 LAB
ALBUMIN SERPL-MCNC: 4.3 G/DL (ref 3.5–4.8)
ALBUMIN/GLOB SERPL: 2 {RATIO} (ref 1.2–2.2)
ALP SERPL-CCNC: 50 IU/L (ref 39–117)
ALT SERPL-CCNC: 12 IU/L (ref 0–44)
AST SERPL-CCNC: 17 IU/L (ref 0–40)
BILIRUB SERPL-MCNC: 1.1 MG/DL (ref 0–1.2)
BUN SERPL-MCNC: 15 MG/DL (ref 8–27)
BUN/CREAT SERPL: 16 (ref 10–24)
CALCIUM SERPL-MCNC: 9.1 MG/DL (ref 8.6–10.2)
CHLORIDE SERPL-SCNC: 103 MMOL/L (ref 96–106)
CHOLEST SERPL-MCNC: 157 MG/DL (ref 100–199)
CK SERPL-CCNC: 125 U/L (ref 24–204)
CO2 SERPL-SCNC: 21 MMOL/L (ref 20–29)
CREAT SERPL-MCNC: 0.93 MG/DL (ref 0.76–1.27)
EST. AVERAGE GLUCOSE BLD GHB EST-MCNC: 177 MG/DL
GLOBULIN SER CALC-MCNC: 2.2 G/DL (ref 1.5–4.5)
GLUCOSE SERPL-MCNC: 121 MG/DL (ref 65–99)
HBA1C MFR BLD: 7.8 % (ref 4.8–5.6)
HDLC SERPL-MCNC: 45 MG/DL
LDLC SERPL CALC-MCNC: 81 MG/DL (ref 0–99)
POTASSIUM SERPL-SCNC: 4.4 MMOL/L (ref 3.5–5.2)
PROT SERPL-MCNC: 6.5 G/DL (ref 6–8.5)
SODIUM SERPL-SCNC: 139 MMOL/L (ref 134–144)
TRIGL SERPL-MCNC: 153 MG/DL (ref 0–149)
VLDLC SERPL CALC-MCNC: 31 MG/DL (ref 5–40)

## 2018-10-19 NOTE — PROGRESS NOTES
Lab is remarkable for still markedly elevated glycohemoglobin. He really needs to go on insulin. Obviously he is not doing a good job of following diet but working on exercise and weight reduction which would take care of the problem but he has failed to do that I would recommend starting Lantus 20 units daily.

## 2018-10-23 DIAGNOSIS — I10 ESSENTIAL HYPERTENSION: ICD-10-CM

## 2018-10-23 RX ORDER — AMLODIPINE BESYLATE 10 MG/1
TABLET ORAL
Qty: 90 TAB | Refills: 3 | Status: SHIPPED | OUTPATIENT
Start: 2018-10-23 | End: 2019-11-04 | Stop reason: SDUPTHER

## 2018-10-23 NOTE — TELEPHONE ENCOUNTER
RX refill request from the patient/pharmacy. Patient last seen 10- with labs, and next appt. scheduled for 01-  Requested Prescriptions     Pending Prescriptions Disp Refills    amLODIPine (NORVASC) 10 mg tablet [Pharmacy Med Name: amLODIPine BESYLATE 10 MG TAB] 90 Tab 3     Sig: TAKE ONE TABLET BY MOUTH DAILY   .

## 2018-10-26 ENCOUNTER — TELEPHONE (OUTPATIENT)
Dept: INTERNAL MEDICINE CLINIC | Age: 73
End: 2018-10-26

## 2018-10-26 NOTE — TELEPHONE ENCOUNTER
----- Message from Kenya Corrales MD sent at 10/19/2018  6:14 PM EDT -----  Lab is remarkable for still markedly elevated glycohemoglobin. He really needs to go on insulin. Obviously he is not doing a good job of following diet but working on exercise and weight reduction which would take care of the problem but he has failed to do that I would recommend starting Lantus 20 units daily.

## 2018-10-26 NOTE — TELEPHONE ENCOUNTER
Discussed lab with patient. Refuses to start insulin. Discuss issues regarding risks of diabetes out of control. Patient states he is aware. Still does not want to start the Insulin.

## 2018-11-09 RX ORDER — GLIPIZIDE 10 MG/1
TABLET ORAL
Qty: 180 TAB | Refills: 1 | Status: SHIPPED | OUTPATIENT
Start: 2018-11-09 | End: 2019-05-17 | Stop reason: SDUPTHER

## 2018-11-09 NOTE — TELEPHONE ENCOUNTER
Patient Last Seen:  10- with labs    Last labs done: NA    Next appointment:  01-      Requested Prescriptions     Pending Prescriptions Disp Refills    glipiZIDE (GLUCOTROL) 10 mg tablet [Pharmacy Med Name: glipiZIDE 10 MG TABLET] 180 Tab 1     Sig: TAKE ONE TABLET BY MOUTH TWICE A DAY

## 2018-12-23 DIAGNOSIS — E78.5 HYPERLIPIDEMIA, UNSPECIFIED HYPERLIPIDEMIA TYPE: ICD-10-CM

## 2018-12-24 RX ORDER — TAMSULOSIN HYDROCHLORIDE 0.4 MG/1
CAPSULE ORAL
Qty: 60 CAP | Refills: 2 | Status: SHIPPED | OUTPATIENT
Start: 2018-12-24 | End: 2019-04-05 | Stop reason: SDUPTHER

## 2018-12-24 RX ORDER — PRAVASTATIN SODIUM 80 MG/1
TABLET ORAL
Qty: 90 TAB | Refills: 2 | Status: SHIPPED | OUTPATIENT
Start: 2018-12-24 | End: 2019-11-01 | Stop reason: SDUPTHER

## 2019-01-09 RX ORDER — METFORMIN HYDROCHLORIDE 500 MG/1
TABLET ORAL
Qty: 270 TAB | Refills: 2 | Status: SHIPPED | OUTPATIENT
Start: 2019-01-09 | End: 2020-03-20

## 2019-01-16 NOTE — PROGRESS NOTES
Follow-up for his hypertension, diabetes, hyperlipidemia and other medical problems SUBJECTIVE: 
 
Edgardo Price is a 74 y.o. male who returns in follow-up for his medical problems include hypertension, diabetes, hyperlipidemia, DJD, morbid obesity, BPH, history of Guillain-Barré syndrome, and other medical problems. He currently denies any chest pain, shortness of breath, palpitations, PND, orthopnea or cardiorespiratory complaints. He denies any GI or  complaints. He denies any headaches, dizziness or neurologic complaints. He has some chronic arthritic complaints and without change. There are no other complaints on complete review of systems. He is trying to do better with his diet but knows he could do better with diet and exercise although he does claim medical compliance with his medications. Current Outpatient Medications Medication Sig Dispense Refill  metFORMIN (GLUCOPHAGE) 500 mg tablet TAKE ONE TABLET BY MOUTH EVERY MORNING AND TAKE TWO TABLETS BY MOUTH EVERY EVENING WITH DINNER 270 Tab 2  pravastatin (PRAVACHOL) 80 mg tablet TAKE ONE TABLET BY MOUTH DAILY 90 Tab 2  
 tamsulosin (FLOMAX) 0.4 mg capsule TAKE TWO CAPSULES BY MOUTH DAILY 60 Cap 2  
 glipiZIDE (GLUCOTROL) 10 mg tablet TAKE ONE TABLET BY MOUTH TWICE A  Tab 1  
 amLODIPine (NORVASC) 10 mg tablet TAKE ONE TABLET BY MOUTH DAILY 90 Tab 3  
 timolol (TIMOPTIC-XE) 0.5 % ophthalmic gel-forming  SITagliptin (JANUVIA) 100 mg tablet Take 1 Tab by mouth daily. 30 Tab prn  canagliflozin (INVOKANA) 300 mg tablet Take 1 Tab by mouth Daily (before breakfast). 10 Tab 0  
 hydrALAZINE (APRESOLINE) 50 mg tablet Take 1 Tab by mouth three (3) times daily. 90 Tab prn  meloxicam (MOBIC) 15 mg tablet TAKE ONE TABLET BY MOUTH DAILY 90 Tab 3  
 finasteride (PROSCAR) 5 mg tablet  lisinopril (PRINIVIL, ZESTRIL) 40 mg tablet TAKE 1 TABLET BY MOUTH ONCE DAILY 90 Tab 3  
  losartan (COZAAR) 100 mg tablet TAKE ONE TABLET BY MOUTH DAILY 90 Tab 3  
 Omega-3-DHA-EPA-Fish Oil 1,000 mg (120 mg-180 mg) cap Take 1 Cap by mouth three (3) times daily.  aspirin delayed-release 81 mg tablet Take  by mouth daily.  atenolol (TENORMIN) 50 mg tablet TAKE 1 TABLET BY MOUTH EVERY DAY 90 Tab 3 Past Medical History:  
Diagnosis Date  Aortic stenosis 8/2/2017  ASVD (arteriosclerotic vascular disease) 8/2/2017  Back pain 8/2/2017  BPH (benign prostatic hyperplasia) 8/2/2017  CKD (chronic kidney disease) 8/2/2017  Diabetes ([facility] [address] 8/2/2017  DJD (degenerative joint disease) 8/2/2017  ED (erectile dysfunction) 8/2/2017  Guillain-[place] syndrome ([facility] [address] 8/2/2017  Hyperlipidemia 8/2/2017  Hypertension 8/2/2017  Left carotid bruit 8/2/2017  Morbid obesity ([facility] [address] 8/2/2017  On statin therapy 8/2/2017  Urticaria 8/2/2017 History reviewed. No pertinent surgical history. Allergies Allergen Reactions  Adhesive Tape-Silicones Unknown (comments)  Chocolate Flavor Unknown (comments) REVIEW OF SYSTEMS: 
General: negative for - chills or fever, or weight loss or gain ENT: negative for - headaches, nasal congestion or tinnitus Eyes: no blurred or visual changes Neck: No stiffness or swollen nodes Respiratory: negative for - cough, hemoptysis, shortness of breath or wheezing Cardiovascular : negative for - chest pain, edema, palpitations or shortness of breath Gastrointestinal: negative for - abdominal pain, blood in stools, heartburn or nausea/vomiting Genito-Urinary: no dysuria, trouble voiding, or hematuria Musculoskeletal: negative for - gait disturbance, joint pain, joint stiffness or joint swelling Neurological: no TIA or stroke symptoms Hematologic: no bruises, no bleeding Lymphatic: no swollen glands Integument: no lumps, mole changes, nail changes or rash Endocrine:no malaise/lethargy poly uria or polydipsia or unexpected weight changes Social History Socioeconomic History  Marital status:  Spouse name: Not on file  Number of children: Not on file  Years of education: Not on file  Highest education level: Not on file Tobacco Use  Smoking status: Never Smoker  Smokeless tobacco: Never Used Substance and Sexual Activity  Alcohol use: Yes Comment: social  
 Drug use: No  
 
Family History Problem Relation Age of Onset  Heart Disease Mother   
     murmor  Heart Disease Father OBJECTIVE:  
 
Visit Vitals /74 Pulse 78 Temp 97.7 °F (36.5 °C) (Oral) Resp 16 Ht 5' 10\" (1.778 m) Wt 234 lb 3.2 oz (106.2 kg) SpO2 96% BMI 33.60 kg/m² CONSTITUTIONAL:   Obese white male, appears age appropriate EYES: sclera anicteric, PERRL, EOMI 
ENMT:nares clear, moist mucous membranes, pharynx clear NECK: supple. Thyroid normal, No JVD or bruits RESPIRATORY: Chest: clear to ascultation and percussion, normal inspiratory effort CARDIOVASCULAR: Heart: regular rate and rhythm no murmurs, rubs or gallops, PMI not displaced, No thrills GASTROINTESTINAL: Abdomen: non distended, soft, non tender, bowel sounds normal 
HEMATOLOGIC: no purpura, petechiae or bruising LYMPHATIC: No lymph node enlargemant MUSCULOSKELETAL: Extremities: no edema or active synovitis, pulse 1+ INTEGUMENT: No unusual rashes or suspicious skin lesions noted. Nails appear normal.  No diabetic foot changes noted. PERIPHERAL VASCULAR: normal pulses femoral, PT and DP NEUROLOGIC: non-focal exam, A & O X 3. Normal distal sensation and proprioception all toes both feet. PSYCHIATRIC:, appropriate affect ASSESSMENT:  
1. Essential hypertension 2. Controlled type 2 diabetes mellitus with stage 2 chronic kidney disease, without long-term current use of insulin ([address] 3. Mixed hyperlipidemia 4. Primary osteoarthritis involving multiple joints 5. Stage 2 chronic kidney disease 6. Morbid obesity ([facility] [address] 7. Guillain-[place] syndrome ([facility] [address] 8. Polyp of colon, unspecified part of colon, unspecified type Impression 1. Hypertension that is controlled to continue current therapy reviewed with him. 2.  Diabetes repeat status is pending and prior labs reviewed no make adjustments if necessary. 3.  Hyperlipidemia prior lab reviewed and repeat status pending I will adjust if needed. 4. DJD that is currently stable 5. CKD stage II repeat status pending 6. Morbid obesity weight is up 2 pounds I strongly encourage diet, exercise and weight reduction for overall health benefit. 7.  [place]on Barré syndrome stable without residual from his prior disease years ago 8. History of colon polyp overdue for colonoscopy I have arranged for him to have that set up again. I will call with lab results and make further recommendations or adjustments if necessary. Follow-up as scheduled for 3 months or sooner if there is a problem. PLAN: 
. Orders Placed This Encounter  METABOLIC PANEL, COMPREHENSIVE  LIPID PANEL  
 CK  
 HEMOGLOBIN A1C WITH EAG  
 REFERRAL TO COLON AND RECTAL SURGERY  
 
 
 
ATTENTION:  
This medical record was transcribed using an electronic medical records system. Although proofread, it may and can contain electronic and spelling errors. Other human spelling and other errors may be present. Corrections may be executed at a later time. Please feel free to contact us for any clarifications as needed. Follow-up Disposition: 
Return in about 3 months (around 4/17/2019). No results found for any visits on 01/17/19. Nabeel Padilla MD 
 
The patient verbalized understanding of the problems and plans as explained.

## 2019-01-17 ENCOUNTER — OFFICE VISIT (OUTPATIENT)
Dept: INTERNAL MEDICINE CLINIC | Age: 74
End: 2019-01-17

## 2019-01-17 VITALS
BODY MASS INDEX: 33.53 KG/M2 | RESPIRATION RATE: 16 BRPM | SYSTOLIC BLOOD PRESSURE: 138 MMHG | HEART RATE: 78 BPM | HEIGHT: 70 IN | DIASTOLIC BLOOD PRESSURE: 74 MMHG | TEMPERATURE: 97.7 F | OXYGEN SATURATION: 96 % | WEIGHT: 234.2 LBS

## 2019-01-17 DIAGNOSIS — M15.9 PRIMARY OSTEOARTHRITIS INVOLVING MULTIPLE JOINTS: ICD-10-CM

## 2019-01-17 DIAGNOSIS — E78.2 MIXED HYPERLIPIDEMIA: ICD-10-CM

## 2019-01-17 DIAGNOSIS — N18.2 CONTROLLED TYPE 2 DIABETES MELLITUS WITH STAGE 2 CHRONIC KIDNEY DISEASE, WITHOUT LONG-TERM CURRENT USE OF INSULIN (HCC): ICD-10-CM

## 2019-01-17 DIAGNOSIS — E11.22 CONTROLLED TYPE 2 DIABETES MELLITUS WITH STAGE 2 CHRONIC KIDNEY DISEASE, WITHOUT LONG-TERM CURRENT USE OF INSULIN (HCC): ICD-10-CM

## 2019-01-17 DIAGNOSIS — I10 ESSENTIAL HYPERTENSION: Primary | ICD-10-CM

## 2019-01-17 DIAGNOSIS — E66.01 MORBID OBESITY (HCC): ICD-10-CM

## 2019-01-17 DIAGNOSIS — G61.0 GUILLAIN-BARRE SYNDROME (HCC): ICD-10-CM

## 2019-01-17 DIAGNOSIS — N18.2 STAGE 2 CHRONIC KIDNEY DISEASE: ICD-10-CM

## 2019-01-17 DIAGNOSIS — K63.5 POLYP OF COLON, UNSPECIFIED PART OF COLON, UNSPECIFIED TYPE: ICD-10-CM

## 2019-01-17 NOTE — PATIENT INSTRUCTIONS
Arthritis: Care Instructions Your Care Instructions Arthritis, also called osteoarthritis, is a breakdown of the cartilage that cushions your joints. When the cartilage wears down, your bones rub against each other. This causes pain and stiffness. Many people have some arthritis as they age. Arthritis most often affects the joints of the spine, hands, hips, knees, or feet. You can take simple measures to protect your joints, ease your pain, and help you stay active. Follow-up care is a key part of your treatment and safety. Be sure to make and go to all appointments, and call your doctor if you are having problems. It's also a good idea to know your test results and keep a list of the medicines you take. How can you care for yourself at home? · Stay at a healthy weight. Being overweight puts extra strain on your joints. · Talk to your doctor or physical therapist about exercises that will help ease joint pain. ? Stretch. You may enjoy gentle forms of yoga to help keep your joints and muscles flexible. ? Walk instead of jog. Other types of exercise that are less stressful on the joints include riding a bicycle, swimming, vrigilio chi, or water exercise. ? Lift weights. Strong muscles help reduce stress on your joints. Stronger thigh muscles, for example, take some of the stress off of the knees and hips. Learn the right way to lift weights so you do not make joint pain worse. · Take your medicines exactly as prescribed. Call your doctor if you think you are having a problem with your medicine. · Take pain medicines exactly as directed. ? If the doctor gave you a prescription medicine for pain, take it as prescribed. ? If you are not taking a prescription pain medicine, ask your doctor if you can take an over-the-counter medicine. · Use a cane, crutch, walker, or another device if you need help to get around. These can help rest your joints.  You also can use other things to make life easier, such as a higher toilet seat and padded handles on kitchen utensils. · Do not sit in low chairs, which can make it hard to get up. · Put heat or cold on your sore joints as needed. Use whichever helps you most. You also can take turns with hot and cold packs. ? Apply heat 2 or 3 times a day for 20 to 30 minutesusing a heating pad, hot shower, or hot packto relieve pain and stiffness. ? Put ice or a cold pack on your sore joint for 10 to 20 minutes at a time. Put a thin cloth between the ice and your skin. When should you call for help? Call your doctor now or seek immediate medical care if: 
  · You have sudden swelling, warmth, or pain in any joint.  
  · You have joint pain and a fever or rash.  
  · You have such bad pain that you cannot use a joint.  
 Watch closely for changes in your health, and be sure to contact your doctor if: 
  · You have mild joint symptoms that continue even with more than 6 weeks of care at home.  
  · You have stomach pain or other problems with your medicine. Where can you learn more? Go to http://matilde-girish.info/. Enter K914 in the search box to learn more about \"Arthritis: Care Instructions. \" Current as of: June 11, 2018 Content Version: 11.8 © 4718-3993 [employer]. Care instructions adapted under license by [employer] (which disclaims liability or warranty for this information). If you have questions about a medical condition or this instruction, always ask your healthcare professional. [place] [address] any warranty or liability for your use of this information.

## 2019-01-18 LAB
ALBUMIN SERPL-MCNC: 4.5 G/DL (ref 3.5–4.8)
ALBUMIN/GLOB SERPL: 2.3 {RATIO} (ref 1.2–2.2)
ALP SERPL-CCNC: 50 IU/L (ref 39–117)
ALT SERPL-CCNC: 16 IU/L (ref 0–44)
AST SERPL-CCNC: 15 IU/L (ref 0–40)
BILIRUB SERPL-MCNC: 1.2 MG/DL (ref 0–1.2)
BUN SERPL-MCNC: 16 MG/DL (ref 8–27)
BUN/CREAT SERPL: 18 (ref 10–24)
CALCIUM SERPL-MCNC: 9.2 MG/DL (ref 8.6–10.2)
CHLORIDE SERPL-SCNC: 101 MMOL/L (ref 96–106)
CHOLEST SERPL-MCNC: 190 MG/DL (ref 100–199)
CK SERPL-CCNC: 73 U/L (ref 24–204)
CO2 SERPL-SCNC: 21 MMOL/L (ref 20–29)
CREAT SERPL-MCNC: 0.89 MG/DL (ref 0.76–1.27)
EST. AVERAGE GLUCOSE BLD GHB EST-MCNC: 180 MG/DL
GLOBULIN SER CALC-MCNC: 2 G/DL (ref 1.5–4.5)
GLUCOSE SERPL-MCNC: 156 MG/DL (ref 65–99)
HBA1C MFR BLD: 7.9 % (ref 4.8–5.6)
HDLC SERPL-MCNC: 46 MG/DL
LDLC SERPL CALC-MCNC: 100 MG/DL (ref 0–99)
POTASSIUM SERPL-SCNC: 4 MMOL/L (ref 3.5–5.2)
PROT SERPL-MCNC: 6.5 G/DL (ref 6–8.5)
SODIUM SERPL-SCNC: 138 MMOL/L (ref 134–144)
TRIGL SERPL-MCNC: 220 MG/DL (ref 0–149)
VLDLC SERPL CALC-MCNC: 44 MG/DL (ref 5–40)

## 2019-01-18 NOTE — PROGRESS NOTES
If he has been indeed taking his medications as prescribed including all the diabetic medicines then we need to start him on insulin and I would recommend starting Lantus 20 units daily. Diet, exercise and weight reduction absolutely needed.

## 2019-01-22 ENCOUNTER — TELEPHONE (OUTPATIENT)
Dept: INTERNAL MEDICINE CLINIC | Age: 74
End: 2019-01-22

## 2019-01-22 NOTE — TELEPHONE ENCOUNTER
----- Message from Mela Leung MD sent at 1/18/2019  6:36 PM EST -----  If he has been indeed taking his medications as prescribed including all the diabetic medicines then we need to start him on insulin and I would recommend starting Lantus 20 units daily. Diet, exercise and weight reduction absolutely needed.

## 2019-03-21 DIAGNOSIS — I10 ESSENTIAL HYPERTENSION: ICD-10-CM

## 2019-03-22 RX ORDER — LOSARTAN POTASSIUM 100 MG/1
TABLET ORAL
Qty: 90 TAB | Refills: 3 | Status: SHIPPED | OUTPATIENT
Start: 2019-03-22 | End: 2020-04-06

## 2019-03-22 NOTE — TELEPHONE ENCOUNTER
RX refill request from the patient/pharmacy. Patient last seen 01- with labs, and next appt. scheduled for 04-  Requested Prescriptions     Pending Prescriptions Disp Refills    losartan (COZAAR) 100 mg tablet [Pharmacy Med Name: LOSARTAN POTASSIUM 100 MG TAB] 90 Tab 3     Sig: TAKE ONE TABLET BY MOUTH DAILY   .

## 2019-04-19 NOTE — PROGRESS NOTES
Chief Complaint   Patient presents with    Diabetes     3 month follow up-needs to discuss medications     Chronic Kidney Disease    Hypertension       SUBJECTIVE:    Royal Romeo is a 74 y.o. male who returns in follow-up of his medical problems to include hypertension, diabetes, hyperlipidemia, obesity, CKD stage II, DJD, BPH and other medical problems. He is taking his medications and trying to follow his diet and trying to get some exercise but he notes he could do better with diet and exercise. His one exception regarding medications as he is not taking Invokana because of the cost but he is going to try to get it through [facility]). He currently denies any chest pain, shortness of breath, palpitations, PND, orthopnea or other cardiorespiratory complaints. He denies any GI or  complaints. He denies any headaches, dizziness or neurologic complaints. There is no change of his chronic arthritic complaints and he has no other complaints on complete review of systems. Current Outpatient Medications   Medication Sig Dispense Refill    canagliflozin (INVOKANA) 300 mg tablet Take 1 Tab by mouth Daily (before breakfast). 30 Tab prn    tamsulosin (FLOMAX) 0.4 mg capsule TAKE TWO CAPSULES BY MOUTH DAILY 180 Cap prn    SITagliptin (JANUVIA) 100 mg tablet Take 1 Tab by mouth daily. 30 Tab prn    hydrALAZINE (APRESOLINE) 50 mg tablet Take 0.5 Tabs by mouth three (3) times daily. 270 Tab prn    finasteride (PROSCAR) 5 mg tablet Take 1 Tab by mouth daily.  90 Tab prn    losartan (COZAAR) 100 mg tablet TAKE ONE TABLET BY MOUTH DAILY 90 Tab 3    metFORMIN (GLUCOPHAGE) 500 mg tablet TAKE ONE TABLET BY MOUTH EVERY MORNING AND TAKE TWO TABLETS BY MOUTH EVERY EVENING WITH DINNER 270 Tab 2    pravastatin (PRAVACHOL) 80 mg tablet TAKE ONE TABLET BY MOUTH DAILY 90 Tab 2    glipiZIDE (GLUCOTROL) 10 mg tablet TAKE ONE TABLET BY MOUTH TWICE A  Tab 1    amLODIPine (NORVASC) 10 mg tablet TAKE ONE TABLET BY MOUTH DAILY 90 Tab 3    timolol (TIMOPTIC-XE) 0.5 % ophthalmic gel-forming       meloxicam (MOBIC) 15 mg tablet TAKE ONE TABLET BY MOUTH DAILY 90 Tab 3    lisinopril (PRINIVIL, ZESTRIL) 40 mg tablet TAKE 1 TABLET BY MOUTH ONCE DAILY 90 Tab 3    Omega-3-DHA-EPA-Fish Oil 1,000 mg (120 mg-180 mg) cap Take 1 Cap by mouth three (3) times daily.  aspirin delayed-release 81 mg tablet Take  by mouth daily. Past Medical History:   Diagnosis Date    Aortic stenosis 8/2/2017    ASVD (arteriosclerotic vascular disease) 8/2/2017    Back pain 8/2/2017    BPH (benign prostatic hyperplasia) 8/2/2017    CKD (chronic kidney disease) 8/2/2017    Diabetes ([facility] 8/2/2017    DJD (degenerative joint disease) 8/2/2017    ED (erectile dysfunction) 8/2/2017    Guillain-Delta syndrome ([facility] 8/2/2017    Hyperlipidemia 8/2/2017    Hypertension 8/2/2017    Left carotid bruit 8/2/2017    Morbid obesity ([facility] 8/2/2017    On statin therapy 8/2/2017    Urticaria 8/2/2017     History reviewed. No pertinent surgical history.   Allergies   Allergen Reactions    Adhesive Tape-Silicones Unknown (comments)    Chocolate Flavor Unknown (comments)       REVIEW OF SYSTEMS:  General: negative for - chills or fever, or weight loss or gain  ENT: negative for - headaches, nasal congestion or tinnitus  Eyes: no blurred or visual changes  Neck: No stiffness or swollen nodes  Respiratory: negative for - cough, hemoptysis, shortness of breath or wheezing  Cardiovascular : negative for - chest pain, edema, palpitations or shortness of breath  Gastrointestinal: negative for - abdominal pain, blood in stools, heartburn or nausea/vomiting  Genito-Urinary: no dysuria, trouble voiding, or hematuria  Musculoskeletal: negative for - gait disturbance, change of his chronic joint pain, joint stiffness or joint swelling  Neurological: no TIA or stroke symptoms  Hematologic: no bruises, no bleeding  Lymphatic: no swollen glands  Integument: no lumps, mole changes, nail changes or rash  Endocrine:no malaise/lethargy poly uria or polydipsia or unexpected weight changes        Social History     Socioeconomic History    Marital status:      Spouse name: Not on file    Number of children: Not on file    Years of education: Not on file    Highest education level: Not on file   Tobacco Use    Smoking status: Never Smoker    Smokeless tobacco: Never Used   Substance and Sexual Activity    Alcohol use: Yes     Comment: social    Drug use: No     Family History   Problem Relation Age of Onset    Heart Disease Mother         murmor    Heart Disease Father        OBJECTIVE:     Visit Vitals  /74 (BP 1 Location: Left arm, BP Patient Position: Sitting)   Pulse 67   Temp 98.5 °F (36.9 °C) (Oral)   Resp 18   Ht 5' 10\" (1.778 m)   Wt 234 lb 6.4 oz (106.3 kg)   SpO2 95%   BMI 33.63 kg/m²     CONSTITUTIONAL:   Morbidly obese white male, appears age appropriate  EYES: sclera anicteric, PERRL, EOMI  ENMT:nares clear, moist mucous membranes, pharynx clear  NECK: supple. Thyroid normal, No JVD or bruits  RESPIRATORY: Chest: clear to ascultation and percussion, normal inspiratory effort  CARDIOVASCULAR: Heart: regular rate and rhythm 2/6 holosystolic murmur w/o rubs or gallops, PMI not displaced, No thrills  GASTROINTESTINAL: Abdomen: non distended, soft, non tender, bowel sounds normal  HEMATOLOGIC: no purpura, petechiae or bruising  LYMPHATIC: No lymph node enlargemant  MUSCULOSKELETAL: Extremities: no edema or active synovitis, pulse 1+   INTEGUMENT: No unusual rashes or suspicious skin lesions noted. Nails appear normal.  PERIPHERAL VASCULAR: normal pulses femoral, PT and DP  NEUROLOGIC: non-focal exam, A & O X 3  PSYCHIATRIC:, appropriate affect     ASSESSMENT:   1. Essential hypertension    2. Controlled type 2 diabetes mellitus with stage 2 chronic kidney disease, without long-term current use of insulin ([address]    3. Mixed hyperlipidemia    4. Primary osteoarthritis involving multiple joints    5. Morbid obesity ([address]    6. Stage 2 chronic kidney disease    7. Aortic valve stenosis, etiology of cardiac valve disease unspecified      Impression  1. Hypertension is controlled so continue current therapy reviewed with him. 2.  Diabetes repeat status pending a prior lab reviewed and I will make adjustments if necessary. 3.  Hyperlipidemia prior lab reviewed and repeat status pending and I will adjust if needed. 4. DJD that is stable next #5 obesity we discussed diet, exercise and weight reduction for overall health benefit. 6.  CKD stage II repeat status pending  7. Aortic stenosis murmur without change  I will call with lab and make further recommendations or adjustments if necessary. Follow stable continue same and I will recheck him again myself in 3 months or sooner should to be a problem. PLAN:  .  Orders Placed This Encounter    HEMOGLOBIN A1C WITH EAG    METABOLIC PANEL, COMPREHENSIVE ([facility])    LIPID PANEL ([facility])    CK ([facility])    canagliflozin (INVOKANA) 300 mg tablet    tamsulosin (FLOMAX) 0.4 mg capsule    SITagliptin (JANUVIA) 100 mg tablet    hydrALAZINE (APRESOLINE) 50 mg tablet    finasteride (PROSCAR) 5 mg tablet         ATTENTION:   This medical record was transcribed using an electronic medical records system. Although proofread, it may and can contain electronic and spelling errors. Other human spelling and other errors may be present. Corrections may be executed at a later time. Please feel free to contact us for any clarifications as needed. Follow-up and Dispositions    · Return in about 3 months (around 7/22/2019). No results found for any visits on 04/22/19. Annie Guerra MD    The patient verbalized understanding of the problems and plans as explained.

## 2019-04-22 ENCOUNTER — OFFICE VISIT (OUTPATIENT)
Dept: INTERNAL MEDICINE CLINIC | Age: 74
End: 2019-04-22

## 2019-04-22 VITALS
OXYGEN SATURATION: 95 % | HEIGHT: 70 IN | DIASTOLIC BLOOD PRESSURE: 74 MMHG | SYSTOLIC BLOOD PRESSURE: 128 MMHG | RESPIRATION RATE: 18 BRPM | WEIGHT: 234.4 LBS | TEMPERATURE: 98.5 F | BODY MASS INDEX: 33.56 KG/M2 | HEART RATE: 67 BPM

## 2019-04-22 DIAGNOSIS — I35.0 AORTIC VALVE STENOSIS, ETIOLOGY OF CARDIAC VALVE DISEASE UNSPECIFIED: ICD-10-CM

## 2019-04-22 DIAGNOSIS — N18.2 STAGE 2 CHRONIC KIDNEY DISEASE: ICD-10-CM

## 2019-04-22 DIAGNOSIS — N18.2 CONTROLLED TYPE 2 DIABETES MELLITUS WITH STAGE 2 CHRONIC KIDNEY DISEASE, WITHOUT LONG-TERM CURRENT USE OF INSULIN (HCC): ICD-10-CM

## 2019-04-22 DIAGNOSIS — E78.2 MIXED HYPERLIPIDEMIA: ICD-10-CM

## 2019-04-22 DIAGNOSIS — E66.01 MORBID OBESITY (HCC): ICD-10-CM

## 2019-04-22 DIAGNOSIS — I10 ESSENTIAL HYPERTENSION: Primary | ICD-10-CM

## 2019-04-22 DIAGNOSIS — E11.22 CONTROLLED TYPE 2 DIABETES MELLITUS WITH STAGE 2 CHRONIC KIDNEY DISEASE, WITHOUT LONG-TERM CURRENT USE OF INSULIN (HCC): ICD-10-CM

## 2019-04-22 DIAGNOSIS — M15.9 PRIMARY OSTEOARTHRITIS INVOLVING MULTIPLE JOINTS: ICD-10-CM

## 2019-04-22 LAB
A-G RATIO,AGRAT: 1.9 RATIO
ALBUMIN SERPL-MCNC: 4.3 G/DL (ref 3.9–5.4)
ALP SERPL-CCNC: 51 U/L (ref 38–126)
ALT SERPL-CCNC: 21 U/L (ref 9–52)
ANION GAP SERPL CALC-SCNC: 13 MMOL/L
AST SERPL W P-5'-P-CCNC: 18 U/L (ref 14–36)
BILIRUB SERPL-MCNC: 1.3 MG/DL (ref 0.2–1.3)
BUN SERPL-MCNC: 18 MG/DL (ref 9–20)
BUN/CREATININE RATIO,BUCR: 26 RATIO
CALCIUM SERPL-MCNC: 9.2 MG/DL (ref 8.4–10.2)
CHLORIDE SERPL-SCNC: 100 MMOL/L (ref 98–107)
CHOL/HDL RATIO,CHHD: 4 RATIO (ref 0–4)
CHOLEST SERPL-MCNC: 179 MG/DL (ref 0–200)
CK SERPL-CCNC: 66 U/L (ref 30–135)
CO2 SERPL-SCNC: 26 MMOL/L (ref 22–32)
CREAT SERPL-MCNC: 0.7 MG/DL (ref 0.8–1.5)
GLOBULIN,GLOB: 2.3
GLUCOSE SERPL-MCNC: 168 MG/DL (ref 75–110)
HDLC SERPL-MCNC: 50 MG/DL (ref 35–130)
LDL/HDL RATIO,LDHD: 2 RATIO
LDLC SERPL CALC-MCNC: 86 MG/DL (ref 0–130)
POTASSIUM SERPL-SCNC: 4.1 MMOL/L (ref 3.6–5)
PROT SERPL-MCNC: 6.6 G/DL (ref 6.3–8.2)
SODIUM SERPL-SCNC: 139 MMOL/L (ref 137–145)
TRIGL SERPL-MCNC: 213 MG/DL (ref 0–200)
VLDLC SERPL CALC-MCNC: 43 MG/DL

## 2019-04-22 RX ORDER — HYDRALAZINE HYDROCHLORIDE 50 MG/1
25 TABLET, FILM COATED ORAL 3 TIMES DAILY
Qty: 270 TAB | Status: SHIPPED | OUTPATIENT
Start: 2019-04-22 | End: 2019-09-26

## 2019-04-22 RX ORDER — TAMSULOSIN HYDROCHLORIDE 0.4 MG/1
CAPSULE ORAL
Qty: 180 CAP | Status: SHIPPED | OUTPATIENT
Start: 2019-04-22 | End: 2019-11-12 | Stop reason: SDUPTHER

## 2019-04-22 RX ORDER — FINASTERIDE 5 MG/1
5 TABLET, FILM COATED ORAL DAILY
Qty: 90 TAB | Status: SHIPPED | OUTPATIENT
Start: 2019-04-22 | End: 2020-07-22

## 2019-04-22 NOTE — PROGRESS NOTES
Quirinomelissa Vadim  Identified pt with two pt identifiers(name and ). Chief Complaint   Patient presents with    Diabetes     3 month follow up-needs to discuss medications     Chronic Kidney Disease    Hypertension       1. Have you been to the ER, urgent care clinic since your last visit? Hospitalized since your last visit? No    2. Have you seen or consulted any other health care providers outside of the [address] [place] [address] since your last visit? Include any pap smears or colon screening. Yes, Urologist two weeks ago this month. Dr. Hope Whalen that everything looked good and continue taking his medications. When going through patients medications he had several things he needs to discuss with the provider. He stopped taking his Invokana because he can't afford them without samples. His hydralazine on the bottle says 25mg but we have in the chard 50mg, he is not sure what dosage he should be taking. Health Maintenance Topics with due status: Overdue       Topic Date Due    DTaP/Tdap/Td series 11/10/1966    Shingrix Vaccine Age 50> 11/10/1993    COLONOSCOPY 2015     Health Maintenance Topics with due status: Not Due       Topic Last Completion Date    GLAUCOMA SCREENING Q2Y 2018    EYE EXAM RETINAL OR DILATED 2018    MEDICARE YEARLY EXAM 2018    MICROALBUMIN Q1 2018    Influenza Age 9 to Adult 10/16/2018    FOOT EXAM Q1 2019    HEMOGLOBIN A1C Q6M 2019    LIPID PANEL Q1 2019     Health Maintenance Topics with due status: Completed       Topic Last Completion Date    Pneumococcal 65+ years 2018           Medication reconciliation up to date and corrected with patient at this time. Today's provider has been notified of reason for visit, vitals and flowsheets obtained on patients. Reviewed record in preparation for visit, huddled with provider and have obtained necessary documentation.         Wt Readings from Last 3 Encounters:   19 234 lb 6.4 oz (106.3 kg)   01/17/19 234 lb 3.2 oz (106.2 kg)   10/16/18 232 lb (105.2 kg)     Temp Readings from Last 3 Encounters:   04/22/19 98.5 °F (36.9 °C) (Oral)   01/17/19 97.7 °F (36.5 °C) (Oral)   10/16/18 98.3 °F (36.8 °C) (Oral)     BP Readings from Last 3 Encounters:   04/22/19 128/74   01/17/19 138/74   10/16/18 136/64     Pulse Readings from Last 3 Encounters:   04/22/19 67   01/17/19 78   10/16/18 72     Vitals:    04/22/19 0905   BP: 128/74   Pulse: 67   Resp: 18   Temp: 98.5 °F (36.9 °C)   TempSrc: Oral   SpO2: 95%   Weight: 234 lb 6.4 oz (106.3 kg)   Height: 5' 10\" (1.778 m)   PainSc:   0 - No pain         Learning Assessment:  :     Learning Assessment 1/17/2019 8/25/2017 8/2/2017   PRIMARY LEARNER Patient Patient Patient   HIGHEST LEVEL OF EDUCATION - PRIMARY LEARNER  - 2 YEARS OF COLLEGE -   CO-LEARNER CAREGIVER - - No   PRIMARY LANGUAGE ENGLISH ENGLISH ENGLISH   LEARNER PREFERENCE PRIMARY DEMONSTRATION LISTENING READING   ANSWERED BY patient patient patient   RELATIONSHIP SELF SELF SELF       Depression Screening:  :     3 most recent PHQ Screens 1/17/2019   Little interest or pleasure in doing things Not at all   Feeling down, depressed, irritable, or hopeless Not at all   Total Score PHQ 2 0   Trouble falling or staying asleep, or sleeping too much -   Feeling tired or having little energy -   Poor appetite, weight loss, or overeating -   Feeling bad about yourself - or that you are a failure or have let yourself or your family down -   Trouble concentrating on things such as school, work, reading, or watching TV -   Moving or speaking so slowly that other people could have noticed; or the opposite being so fidgety that others notice -   Thoughts of being better off dead, or hurting yourself in some way -   PHQ 9 Score -       No flowsheet data found. Fall Risk Assessment:  :     Fall Risk Assessment, last 12 mths 1/17/2019   Able to walk? Yes   Fall in past 12 months?  No       Abuse Screening:  :     Abuse Screening Questionnaire 1/17/2019 4/2/2018 8/25/2017   Do you ever feel afraid of your partner? N N N   Are you in a relationship with someone who physically or mentally threatens you? N N N   Is it safe for you to go home?  Y Y Y       ADL Screening:  :     ADL Assessment 4/22/2019   Feeding yourself No Help Needed   Getting from bed to chair No Help Needed   Getting dressed No Help Needed   Bathing or showering No Help Needed   Walk across the room (includes cane/walker) No Help Needed   Using the telphone No Help Needed   Taking your medications No Help Needed   Preparing meals No Help Needed   Managing money (expenses/bills) No Help Needed   Moderately strenuous housework (laundry) No Help Needed   Shopping for personal items (toiletries/medicines) No Help Needed   Shopping for groceries No Help Needed   Driving No Help Needed   Climbing a flight of stairs No Help Needed   Getting to places beyond walking distances No Help Needed

## 2019-04-22 NOTE — PATIENT INSTRUCTIONS
Arthritis: Care Instructions  Your Care Instructions  Arthritis, also called osteoarthritis, is a breakdown of the cartilage that cushions your joints. When the cartilage wears down, your bones rub against each other. This causes pain and stiffness. Many people have some arthritis as they age. Arthritis most often affects the joints of the spine, hands, hips, knees, or feet. You can take simple measures to protect your joints, ease your pain, and help you stay active. Follow-up care is a key part of your treatment and safety. Be sure to make and go to all appointments, and call your doctor if you are having problems. It's also a good idea to know your test results and keep a list of the medicines you take. How can you care for yourself at home? · Stay at a healthy weight. Being overweight puts extra strain on your joints. · Talk to your doctor or physical therapist about exercises that will help ease joint pain. ? Stretch. You may enjoy gentle forms of yoga to help keep your joints and muscles flexible. ? Walk instead of jog. Other types of exercise that are less stressful on the joints include riding a bicycle, swimming, virgilio chi, or water exercise. ? Lift weights. Strong muscles help reduce stress on your joints. Stronger thigh muscles, for example, take some of the stress off of the knees and hips. Learn the right way to lift weights so you do not make joint pain worse. · Take your medicines exactly as prescribed. Call your doctor if you think you are having a problem with your medicine. · Take pain medicines exactly as directed. ? If the doctor gave you a prescription medicine for pain, take it as prescribed. ? If you are not taking a prescription pain medicine, ask your doctor if you can take an over-the-counter medicine. · Use a cane, crutch, walker, or another device if you need help to get around. These can help rest your joints.  You also can use other things to make life easier, such as a higher toilet seat and padded handles on kitchen utensils. · Do not sit in low chairs, which can make it hard to get up. · Put heat or cold on your sore joints as needed. Use whichever helps you most. You also can take turns with hot and cold packs. ? Apply heat 2 or 3 times a day for 20 to 30 minutes--using a heating pad, hot shower, or hot pack--to relieve pain and stiffness. ? Put ice or a cold pack on your sore joint for 10 to 20 minutes at a time. Put a thin cloth between the ice and your skin. When should you call for help? Call your doctor now or seek immediate medical care if:    · You have sudden swelling, warmth, or pain in any joint.     · You have joint pain and a fever or rash.     · You have such bad pain that you cannot use a joint.    Watch closely for changes in your health, and be sure to contact your doctor if:    · You have mild joint symptoms that continue even with more than 6 weeks of care at home.     · You have stomach pain or other problems with your medicine. Where can you learn more? Go to http://matilde-girish.info/. Enter U925 in the search box to learn more about \"Arthritis: Care Instructions. \"  Current as of: Marizol 10, 2018  Content Version: 11.9  © 8192-7190 [employer]. Care instructions adapted under license by [employer] (which disclaims liability or warranty for this information). If you have questions about a medical condition or this instruction, always ask your healthcare professional. [place] [address] any warranty or liability for your use of this information.

## 2019-04-23 LAB
EST. AVERAGE GLUCOSE BLD GHB EST-MCNC: 174 MG/DL
HBA1C MFR BLD: 7.7 % (ref 4.8–5.6)

## 2019-04-23 NOTE — PROGRESS NOTES
Blood sugar, triglycerides and glycohemoglobin are all elevated so get back on the diabetic medications as discussed and prescriptions were given at the visit. Diet and exercise.

## 2019-05-17 DIAGNOSIS — N18.2 CONTROLLED TYPE 2 DIABETES MELLITUS WITH STAGE 2 CHRONIC KIDNEY DISEASE, WITHOUT LONG-TERM CURRENT USE OF INSULIN (HCC): Primary | ICD-10-CM

## 2019-05-17 DIAGNOSIS — E11.22 CONTROLLED TYPE 2 DIABETES MELLITUS WITH STAGE 2 CHRONIC KIDNEY DISEASE, WITHOUT LONG-TERM CURRENT USE OF INSULIN (HCC): Primary | ICD-10-CM

## 2019-05-17 RX ORDER — GLIPIZIDE 10 MG/1
TABLET ORAL
Qty: 180 TAB | Refills: 0 | Status: SHIPPED | OUTPATIENT
Start: 2019-05-17 | End: 2019-08-25 | Stop reason: SDUPTHER

## 2019-05-17 NOTE — TELEPHONE ENCOUNTER
PCP: Maureen Charles MD    Last appt: 4/22/2019  Future Appointments   Date Time Provider Martín Trujillo   8/5/2019  8:50 AM Maureen Charles MD [facility] JEFF JENNIFER       Requested Prescriptions     Pending Prescriptions Disp Refills    glipiZIDE (GLUCOTROL) 10 mg tablet [Pharmacy Med Name: glipiZIDE 10 MG TABLET] 180 Tab 0     Sig: TAKE ONE TABLET BY MOUTH TWICE A DAY

## 2019-07-01 DIAGNOSIS — I10 ESSENTIAL HYPERTENSION: ICD-10-CM

## 2019-07-01 RX ORDER — LISINOPRIL 40 MG/1
TABLET ORAL
Qty: 90 TAB | Refills: 3 | Status: SHIPPED | OUTPATIENT
Start: 2019-07-01 | End: 2020-08-06

## 2019-07-01 NOTE — TELEPHONE ENCOUNTER
RX refill request from the patient/pharmacy. Patient last seen 04- with labs, and next appt. scheduled for 08-  Requested Prescriptions     Pending Prescriptions Disp Refills    lisinopril (PRINIVIL, ZESTRIL) 40 mg tablet [Pharmacy Med Name: LISINOPRIL 40 MG TABLET] 90 Tab 3     Sig: TAKE 1 TABLET BY MOUTH ONCE DAILY   .

## 2019-08-02 NOTE — PROGRESS NOTES
This is a Subsequent Medicare Annual Wellness Visit providing Personalized Prevention Plan Services (PPPS) (Performed 12 months after initial AWV and PPPS )    I have reviewed the patient's medical history in detail and updated the computerized patient record. He presents today for his Medicare subsequent annual wellness examination and screening questionnaire. He is also here in follow-up of his multiple medical problems include hypertension, diabetes, hyperlipidemia, bilateral carotid noncritical stenosis, BPH, CKD stage II, DJD, obesity and other medical problems. He is taking his medications and trying to follow his diet however he is not getting a lot of exercise. He currently denies any chest pain, shortness of breath, palpitations, PND, orthopnea or other cardiorespiratory complaints. He notes no GI or  complaints. He notes no headaches, dizziness or neurologic complaints. He has no current arthritic complaints and there are no other complaints on complete review of systems. History     Past Medical History:   Diagnosis Date    Aortic stenosis 8/2/2017    ASVD (arteriosclerotic vascular disease) 8/2/2017    Back pain 8/2/2017    BPH (benign prostatic hyperplasia) 8/2/2017    CKD (chronic kidney disease) 8/2/2017    Diabetes ([address] 8/2/2017    DJD (degenerative joint disease) 8/2/2017    ED (erectile dysfunction) 8/2/2017    Guillain-[place] syndrome ([address] 8/2/2017    Hyperlipidemia 8/2/2017    Hypertension 8/2/2017    Left carotid bruit 8/2/2017    Morbid obesity ([address] 8/2/2017    On statin therapy 8/2/2017    Urticaria 8/2/2017      History reviewed. No pertinent surgical history.   Social History     Tobacco Use    Smoking status: Never Smoker    Smokeless tobacco: Never Used   Substance Use Topics    Alcohol use: Yes     Comment: social    Drug use: No     Current Outpatient Medications   Medication Sig Dispense Refill    lisinopril (PRINIVIL, ZESTRIL) 40 mg tablet TAKE 1 TABLET BY MOUTH ONCE DAILY 90 Tab 3    glipiZIDE (GLUCOTROL) 10 mg tablet TAKE ONE TABLET BY MOUTH TWICE A  Tab 0    tamsulosin (FLOMAX) 0.4 mg capsule TAKE TWO CAPSULES BY MOUTH DAILY 180 Cap prn    SITagliptin (JANUVIA) 100 mg tablet Take 1 Tab by mouth daily. 30 Tab prn    hydrALAZINE (APRESOLINE) 50 mg tablet Take 0.5 Tabs by mouth three (3) times daily. 270 Tab prn    finasteride (PROSCAR) 5 mg tablet Take 1 Tab by mouth daily. 90 Tab prn    losartan (COZAAR) 100 mg tablet TAKE ONE TABLET BY MOUTH DAILY 90 Tab 3    metFORMIN (GLUCOPHAGE) 500 mg tablet TAKE ONE TABLET BY MOUTH EVERY MORNING AND TAKE TWO TABLETS BY MOUTH EVERY EVENING WITH DINNER 270 Tab 2    pravastatin (PRAVACHOL) 80 mg tablet TAKE ONE TABLET BY MOUTH DAILY 90 Tab 2    amLODIPine (NORVASC) 10 mg tablet TAKE ONE TABLET BY MOUTH DAILY 90 Tab 3    timolol (TIMOPTIC-XE) 0.5 % ophthalmic gel-forming       meloxicam (MOBIC) 15 mg tablet TAKE ONE TABLET BY MOUTH DAILY 90 Tab 3    Omega-3-DHA-EPA-Fish Oil 1,000 mg (120 mg-180 mg) cap Take 1 Cap by mouth three (3) times daily.  aspirin delayed-release 81 mg tablet Take  by mouth daily.        Allergies   Allergen Reactions    Adhesive Tape-Silicones Unknown (comments)    Chocolate Flavor Unknown (comments)     Family History   Problem Relation Age of Onset    Heart Disease Mother         murmor    Heart Disease Father        Patient Active Problem List    Diagnosis    Controlled type 2 diabetes mellitus with stage 2 chronic kidney disease, without long-term current use of insulin (HCC)    Primary osteoarthritis involving multiple joints    Morbid obesity ([address]    Essential hypertension    Stage 2 chronic kidney disease    Mixed hyperlipidemia    BPH (benign prostatic hyperplasia)    ASVD (arteriosclerotic vascular disease)    Aortic stenosis    Prostate cancer screening    Hematuria    Medicare annual wellness visit, subsequent    Colon cancer screening    ED (erectile dysfunction)    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)    Urticaria    Left carotid bruit    Back pain    Neoplasm of uncertain behavior of skin    Stenosis of both internal carotid arteries       Patient Care Team:  Ministerio Brown MD as PCP - General (Internal Medicine)    Depression Risk Factor Screening:     3 most recent PHQ Screens 8/5/2019   Little interest or pleasure in doing things Not at all   Feeling down, depressed, irritable, or hopeless Not at all   Total Score PHQ 2 0   Trouble falling or staying asleep, or sleeping too much -   Feeling tired or having little energy -   Poor appetite, weight loss, or overeating -   Feeling bad about yourself - or that you are a failure or have let yourself or your family down -   Trouble concentrating on things such as school, work, reading, or watching TV -   Moving or speaking so slowly that other people could have noticed; or the opposite being so fidgety that others notice -   Thoughts of being better off dead, or hurting yourself in some way -   PHQ 9 Score -     Alcohol Risk Factor Screening: You do not drink alcohol or very rarely. Functional Ability and Level of Safety:     Fall Risk     Fall Risk Assessment, last 12 mths 8/5/2019   Able to walk? Yes   Fall in past 12 months? No       Hearing Loss   mild    Activities of Daily Living   Self-care.    ADL Assessment 4/22/2019   Feeding yourself No Help Needed   Getting from bed to chair No Help Needed   Getting dressed No Help Needed   Bathing or showering No Help Needed   Walk across the room (includes cane/walker) No Help Needed   Using the telphone No Help Needed   Taking your medications No Help Needed   Preparing meals No Help Needed   Managing money (expenses/bills) No Help Needed   Moderately strenuous housework (laundry) No Help Needed   Shopping for personal items (toiletries/medicines) No Help Needed   Shopping for groceries No Help Needed   Driving No Help Needed   Climbing a flight of stairs No Help Needed   Getting to places beyond walking distances No Help Needed       Abuse Screen   Patient is not abused    Social History     Social History Narrative    Not on file       Review of Systems      ROS:    Constitutional: He denies fevers, weight loss, sweats. Eyes: No blurred or double vision. ENT: No difficulty with swallowing, taste, speech or smell. Neck: no stiffness or swelling  Respiratory: No cough wheezing or shortness of breath. Cardiovascular: Denies chest pain, palpitations, unexplained indigestion or syncope. Gastrointestinal:  No changes in bowel movements, no abdominal pain, no bloating. Genitourinary:  He denies frequency, nocturia or stranguria. Extremities: No joint pain, stiffness or swelling. Neurological:  No numbness, tingling, burring paresthesias or loss of motor strength. No syncope, dizziness or frequent headache  Lymphatic: no adenopathy noted  Hematologic: no easy bruising or bleeding gums  Skin:  No recent rashes or mole changes. Psychiatric/Behavioral:  Negative for depression. Physical Examination     Evaluation of Cognitive Function:  Mood/affect:  happy  Appearance: age appropriate  Family member/caregiver input: none    Visit Vitals  /78   Pulse 76   Temp 98.5 °F (36.9 °C) (Oral)   Resp 18   Ht 5' 10\" (1.778 m)   Wt 232 lb 9.6 oz (105.5 kg)   SpO2 97%   BMI 33.37 kg/m²     Vitals:    08/05/19 0859 08/05/19 0926   BP: 140/76 138/78   Pulse: 76    Resp: 18    Temp: 98.5 °F (36.9 °C)    TempSrc: Oral    SpO2: 97%    Weight: 232 lb 9.6 oz (105.5 kg)    Height: 5' 10\" (1.778 m)    PainSc:   0 - No pain         PHYSICAL EXAM:    General appearance - alert, well appearing, and in no distress  Mental status - alert, oriented to person, place, and time  HEENT:  Ears - bilateral TM's and external ear canals clear  Eyes - pupillary responses were normal.  Extraocular muscle function intact. Lids and conjunctiva not injected.   Fundoscopic exam revealed sharp disc margins. eye movements intact  Pharynx- clear with teeth in good repair. No masses were noted  Neck - supple without thyromegaly. No JVD noted. Positive for bilateral carotid bruits  Lungs - clear to auscultation and percussion  Cardiac- normal rate, regular rhythm without murmurs. PMI not displaced. No gallop, rub or click  Abdomen - flat, soft, non-tender without palpable organomegaly or mass. No pulsatile mass was felt, and not bruit was heard. Bowel sounds were active  : Circumcised, Testes descended w/o masses  Rectal: normal sphincter tone, prostate normal, no masses, stool brown and hemacult negative  Extremities -  no clubbing cyanosis or edema  Lymphatics - no palpable lymphadenopathy, no hepatosplenomegaly  Hematologic: no petechiae or purpura  Peripheral vascular -Femoral, Dorsalis pedis and posterior tibial pulses felt without difficulty  Skin - no rash or unusual mole change noted  Neurological - Cranial nerves II-XII grossly intact. Motor strength 5/5. DTR's 2+ and symmetric. Station and gait normal  Back exam - full range of motion, no tenderness, palpable spasm or pain on motion  Musculoskeletal - no joint tenderness, deformity or swelling        Results for orders placed or performed in visit on 04/22/19   HEMOGLOBIN A1C WITH EAG   Result Value Ref Range    Hemoglobin A1c 7.7 (H) 4.8 - 5.6 %    Estimated average glucose 204 mg/dL   METABOLIC PANEL, COMPREHENSIVE   Result Value Ref Range    Glucose 168 (H) 75 - 110 mg/dL    BUN 18.0 9.0 - 20.0 mg/dL    Creatinine 0.7 (L) 0.8 - 1.5 mg/dL    Sodium 139 137 - 145 mmol/L    Potassium 4.1 3.6 - 5.0 mmol/L    Chloride 100 98 - 107 mmol/L    CO2 26.0 22.0 - 32.0 mmol/L    Calcium 9.2 8.4 - 10.2 mg/dl    Protein, total 6.6 6.3 - 8.2 g/dL    Albumin 4.3 3.9 - 5.4 g/dL    AST (SGOT) 18.0 14.0 - 36.0 U/L    ALT (SGPT) 21 9 - 52 U/L    Alk.  phosphatase 51 38 - 126 U/L    Bilirubin, total 1.3 0.2 - 1.3 mg/dL    BUN/Creatinine ratio 26 Ratio    GFR est AA >60 mL/min/1.73m2    GFR est non-AA >60 mL/min/1.73m2    Globulin 2.30     A-G Ratio 1.9 Ratio    Anion gap 13 mmol/L   LIPID PANEL   Result Value Ref Range    Cholesterol, total 179 0 - 200 mg/dL    Triglyceride 213 (H) 0 - 200 mg/dL    HDL Cholesterol 50 35 - 130 mg/dL    VLDL 43 mg/dL    LDL, calculated 86 0 - 130 mg/dL    CHOL/HDL Ratio 4 0 - 4 Ratio    LDL/HDL Ratio 2 Ratio   CK   Result Value Ref Range    CK 66.00 30.00 - 135.00 U/L       Advice/Referrals/Counseling   Education and counseling provided:  Are appropriate based on today's review and evaluation  End-of-Life planning (with patient's consent)  Pneumococcal Vaccine  Influenza Vaccine  Colorectal cancer screening tests      Assessment/Plan     ASSESSMENT:   1. Essential hypertension    2. Controlled type 2 diabetes mellitus with stage 2 chronic kidney disease, without long-term current use of insulin ([address]    3. Mixed hyperlipidemia    4. Morbid obesity ([address]    5. Primary osteoarthritis involving multiple joints    6. Stage 2 chronic kidney disease    7. Benign prostatic hyperplasia with lower urinary tract symptoms, symptom details unspecified    8. ASVD (arteriosclerotic vascular disease)    9. Aortic valve stenosis, etiology of cardiac valve disease unspecified    10. Prostate cancer screening    11. Stenosis of both internal carotid arteries    12. Medicare annual wellness visit, subsequent      Impression  1. Hypertension that is controlled so continue current therapy reviewed with him. 2.  Diabetes mellitus repeat status pending a prior lab review not make adjustments if necessary. 3.  Hyperlipidemia prior lab reviewed and repeat status pending and I will adjust if needed. 4.  Obesity we did discuss diet, exercise and weight reduction for overall health benefit. 5.  DJD that is stable  6. CKD stage II repeat status pending  7. BPH currently asymptomatic  8. ASVD with bilateral carotid stenosis repeat Doppler test scheduled.   Continue aspirin daily. 9.  Aortic stenosis currently asymptomatic and EKG obtained today no acute process. Medicare subsequent annual wellness examination screening questionnaire is completed today. The results were reviewed with him and his questions were answered. Lifestyle recommendations modifications discussed and made. I will call with lab results and make further recommendations or adjustments if necessary. Follow-up scheduled with 3 months or sooner if there is a problem. PLAN:  .  Orders Placed This Encounter    CBC WITH AUTOMATED DIFF ([facility])    METABOLIC PANEL, COMPREHENSIVE ([facility])    LIPID PANEL ([facility])    CK ([facility])    HEMOGLOBIN A1C W/O EAG ([facility])    T4, FREE ([facility])    TSH 3RD GENERATION ([facility])    PSA SCREENING (SCREENING) ([facility])    URINALYSIS W/O MICRO ([facility])    URINE, MICROALBUMIN, SEMIQUANTITATIVE ([facility])    AMB POC EKG ROUTINE W/ 12 LEADS, INTER & REP         ATTENTION:   This medical record was transcribed using an electronic medical records system. Although proofread, it may and can contain electronic and spelling errors. Other human spelling and other errors may be present. Corrections may be executed at a later time. Please feel free to contact us for any clarifications as needed. Follow-up and Dispositions    · Return in about 3 months (around 11/5/2019). Gaye Hunt MD    Recommended healthy diet low in carbohydrates, fats, sodium and cholesterol. Recommended regular cardiovascular exercise 3-6 times per week for 30-60 minutes daily.       Current Outpatient Medications   Medication Sig Dispense Refill    lisinopril (PRINIVIL, ZESTRIL) 40 mg tablet TAKE 1 TABLET BY MOUTH ONCE DAILY 90 Tab 3    glipiZIDE (GLUCOTROL) 10 mg tablet TAKE ONE TABLET BY MOUTH TWICE A  Tab 0    tamsulosin (FLOMAX) 0.4 mg capsule TAKE TWO CAPSULES BY MOUTH DAILY 180 Cap prn    SITagliptin (JANUVIA) 100 mg tablet Take 1 Tab by mouth daily. 30 Tab prn    hydrALAZINE (APRESOLINE) 50 mg tablet Take 0.5 Tabs by mouth three (3) times daily. 270 Tab prn    finasteride (PROSCAR) 5 mg tablet Take 1 Tab by mouth daily. 90 Tab prn    losartan (COZAAR) 100 mg tablet TAKE ONE TABLET BY MOUTH DAILY 90 Tab 3    metFORMIN (GLUCOPHAGE) 500 mg tablet TAKE ONE TABLET BY MOUTH EVERY MORNING AND TAKE TWO TABLETS BY MOUTH EVERY EVENING WITH DINNER 270 Tab 2    pravastatin (PRAVACHOL) 80 mg tablet TAKE ONE TABLET BY MOUTH DAILY 90 Tab 2    amLODIPine (NORVASC) 10 mg tablet TAKE ONE TABLET BY MOUTH DAILY 90 Tab 3    timolol (TIMOPTIC-XE) 0.5 % ophthalmic gel-forming       meloxicam (MOBIC) 15 mg tablet TAKE ONE TABLET BY MOUTH DAILY 90 Tab 3    Omega-3-DHA-EPA-Fish Oil 1,000 mg (120 mg-180 mg) cap Take 1 Cap by mouth three (3) times daily.  aspirin delayed-release 81 mg tablet Take  by mouth daily. No results found for any visits on 08/05/19. Verbal and written instructions (see AVS) provided. Patient expresses understanding of diagnosis and treatment plan.     Tameka Barrera MD

## 2019-08-05 ENCOUNTER — OFFICE VISIT (OUTPATIENT)
Dept: INTERNAL MEDICINE CLINIC | Age: 74
End: 2019-08-05

## 2019-08-05 VITALS
OXYGEN SATURATION: 97 % | TEMPERATURE: 98.5 F | SYSTOLIC BLOOD PRESSURE: 138 MMHG | RESPIRATION RATE: 18 BRPM | HEART RATE: 76 BPM | DIASTOLIC BLOOD PRESSURE: 78 MMHG | BODY MASS INDEX: 33.3 KG/M2 | HEIGHT: 70 IN | WEIGHT: 232.6 LBS

## 2019-08-05 DIAGNOSIS — N18.2 CONTROLLED TYPE 2 DIABETES MELLITUS WITH STAGE 2 CHRONIC KIDNEY DISEASE, WITHOUT LONG-TERM CURRENT USE OF INSULIN (HCC): ICD-10-CM

## 2019-08-05 DIAGNOSIS — I10 ESSENTIAL HYPERTENSION: Primary | ICD-10-CM

## 2019-08-05 DIAGNOSIS — E11.22 CONTROLLED TYPE 2 DIABETES MELLITUS WITH STAGE 2 CHRONIC KIDNEY DISEASE, WITHOUT LONG-TERM CURRENT USE OF INSULIN (HCC): ICD-10-CM

## 2019-08-05 DIAGNOSIS — E78.2 MIXED HYPERLIPIDEMIA: ICD-10-CM

## 2019-08-05 DIAGNOSIS — M15.9 PRIMARY OSTEOARTHRITIS INVOLVING MULTIPLE JOINTS: ICD-10-CM

## 2019-08-05 DIAGNOSIS — N40.1 BENIGN PROSTATIC HYPERPLASIA WITH LOWER URINARY TRACT SYMPTOMS, SYMPTOM DETAILS UNSPECIFIED: ICD-10-CM

## 2019-08-05 DIAGNOSIS — I70.90 ASVD (ARTERIOSCLEROTIC VASCULAR DISEASE): ICD-10-CM

## 2019-08-05 DIAGNOSIS — Z00.00 MEDICARE ANNUAL WELLNESS VISIT, SUBSEQUENT: ICD-10-CM

## 2019-08-05 DIAGNOSIS — E66.01 MORBID OBESITY (HCC): ICD-10-CM

## 2019-08-05 DIAGNOSIS — N18.2 STAGE 2 CHRONIC KIDNEY DISEASE: ICD-10-CM

## 2019-08-05 DIAGNOSIS — I35.0 AORTIC VALVE STENOSIS, ETIOLOGY OF CARDIAC VALVE DISEASE UNSPECIFIED: ICD-10-CM

## 2019-08-05 DIAGNOSIS — Z12.5 PROSTATE CANCER SCREENING: ICD-10-CM

## 2019-08-05 DIAGNOSIS — I65.23 STENOSIS OF BOTH INTERNAL CAROTID ARTERIES: ICD-10-CM

## 2019-08-05 LAB
BILIRUB UR QL: NEGATIVE
CLARITY: CLEAR
COLOR UR: ABNORMAL
ERYTHROCYTE [DISTWIDTH] IN BLOOD BY AUTOMATED COUNT: 12.9 %
GLUCOSE 24H UR-MRATE: ABNORMAL G/(24.H)
HBA1C MFR BLD HPLC: 8.7 % (ref 4–5.7)
HCT VFR BLD AUTO: 44.3 % (ref 37–51)
HGB BLD-MCNC: 14.5 G/DL (ref 12–18)
HGB UR QL STRIP: ABNORMAL
KETONES UR QL STRIP.AUTO: NEGATIVE
LEUKOCYTE ESTERASE: NEGATIVE
LYMPHOCYTES ABSOLUTE: 1.9 K/UL (ref 0.6–4.1)
LYMPHOCYTES NFR BLD: 30 % (ref 10–58.5)
MCH RBC QN AUTO: 26.9 PG (ref 26–32)
MCHC RBC AUTO-ENTMCNC: 32.7 G/DL (ref 30–36)
MCV RBC AUTO: 82.2 FL (ref 80–97)
MICROALBUMIN, URINE: 50 MG/L (ref 0–20)
MONOCYTES ABS-DIF,2141: 0.5 K/UL (ref 0–1.8)
MONOCYTES NFR BLD: 7.4 % (ref 0.1–24)
MUCUS,MUCUS: ABNORMAL
NEUTROPHILS # BLD: 62.6 % (ref 37–92)
NEUTROPHILS ABS,2156: 3.9 K/UL (ref 2–7.8)
NITRITE UR QL STRIP.AUTO: NEGATIVE
PH UR STRIP: 6 [PH] (ref 5–7)
PLATELET # BLD AUTO: 150 K/UL (ref 140–440)
PMV BLD AUTO: 8 FL
PROT UR STRIP-MCNC: ABNORMAL MG/DL
RBC # BLD AUTO: 5.39 M/UL (ref 4.2–6.3)
RBC #/AREA URNS HPF: ABNORMAL #/HPF
SP GR UR REFRACTOMETRY: 1.02 (ref 1–1.03)
UROBILINOGEN UR QL STRIP.AUTO: NEGATIVE
WBC # BLD AUTO: 6.2 K/UL (ref 4.1–10.9)
WBC URNS QL MICRO: 0 #/HPF

## 2019-08-05 NOTE — PATIENT INSTRUCTIONS
Arthritis: Care Instructions Your Care Instructions Arthritis, also called osteoarthritis, is a breakdown of the cartilage that cushions your joints. When the cartilage wears down, your bones rub against each other. This causes pain and stiffness. Many people have some arthritis as they age. Arthritis most often affects the joints of the spine, hands, hips, knees, or feet. You can take simple measures to protect your joints, ease your pain, and help you stay active. Follow-up care is a key part of your treatment and safety. Be sure to make and go to all appointments, and call your doctor if you are having problems. It's also a good idea to know your test results and keep a list of the medicines you take. How can you care for yourself at home? · Stay at a healthy weight. Being overweight puts extra strain on your joints. · Talk to your doctor or physical therapist about exercises that will help ease joint pain. ? Stretch. You may enjoy gentle forms of yoga to help keep your joints and muscles flexible. ? Walk instead of jog. Other types of exercise that are less stressful on the joints include riding a bicycle, swimming, virgilio chi, or water exercise. ? Lift weights. Strong muscles help reduce stress on your joints. Stronger thigh muscles, for example, take some of the stress off of the knees and hips. Learn the right way to lift weights so you do not make joint pain worse. · Take your medicines exactly as prescribed. Call your doctor if you think you are having a problem with your medicine. · Take pain medicines exactly as directed. ? If the doctor gave you a prescription medicine for pain, take it as prescribed. ? If you are not taking a prescription pain medicine, ask your doctor if you can take an over-the-counter medicine. · Use a cane, crutch, walker, or another device if you need help to get around. These can help rest your joints.  You also can use other things to make life easier, such as a higher toilet seat and padded handles on kitchen utensils. · Do not sit in low chairs, which can make it hard to get up. · Put heat or cold on your sore joints as needed. Use whichever helps you most. You also can take turns with hot and cold packs. ? Apply heat 2 or 3 times a day for 20 to 30 minutesusing a heating pad, hot shower, or hot packto relieve pain and stiffness. ? Put ice or a cold pack on your sore joint for 10 to 20 minutes at a time. Put a thin cloth between the ice and your skin. When should you call for help? Call your doctor now or seek immediate medical care if: 
  · You have sudden swelling, warmth, or pain in any joint.  
  · You have joint pain and a fever or rash.  
  · You have such bad pain that you cannot use a joint.  
 Watch closely for changes in your health, and be sure to contact your doctor if: 
  · You have mild joint symptoms that continue even with more than 6 weeks of care at home.  
  · You have stomach pain or other problems with your medicine. Where can you learn more? Go to http://matilde-girish.info/. Enter U399 in the search box to learn more about \"Arthritis: Care Instructions. \" Current as of: April 1, 2019 Content Version: 12.1 © 0111-5748 [employer]. Care instructions adapted under license by [employer] (which disclaims liability or warranty for this information). If you have questions about a medical condition or this instruction, always ask your healthcare professional. [place] [address] any warranty or liability for your use of this information.

## 2019-08-05 NOTE — PROGRESS NOTES
Joelle Meadows  Identified pt with two pt identifiers(name and ). Chief Complaint   Patient presents with   [facility] Annual Wellness Visit       1. Have you been to the ER, urgent care clinic since your last visit? Hospitalized since your last visit? No     2. Have you seen or consulted any other health care providers outside of the [address] [place] [address] since your last visit? Include any pap smears or colon screening. No      Health Maintenance Topics with due status: Overdue       Topic Date Due    DTaP/Tdap/Td series 11/10/1964    Shingrix Vaccine Age 50> 11/10/1993    COLONOSCOPY 2015    Influenza Age 5 to Adult 2019     Health Maintenance Topics with due status: Due On       Topic Date Due    MEDICARE YEARLY EXAM 2019     Health Maintenance Topics with due status: Due Soon       Topic Date Due    MICROALBUMIN Q1 2019     Health Maintenance Topics with due status: Not Due       Topic Last Completion Date    GLAUCOMA SCREENING Q2Y 2018    EYE EXAM RETINAL OR DILATED 2018    FOOT EXAM Q1 2019    HEMOGLOBIN A1C Q6M 2019    LIPID PANEL Q1 2019     Health Maintenance Topics with due status: Completed       Topic Last Completion Date    Pneumococcal 65+ years 2018           Medication reconciliation up to date and corrected with patient at this time. Today's provider has been notified of reason for visit, vitals and flowsheets obtained on patients. Reviewed record in preparation for visit, huddled with provider and have obtained necessary documentation.         Wt Readings from Last 3 Encounters:   19 232 lb 9.6 oz (105.5 kg)   19 234 lb 6.4 oz (106.3 kg)   19 234 lb 3.2 oz (106.2 kg)     Temp Readings from Last 3 Encounters:   19 98.5 °F (36.9 °C) (Oral)   19 98.5 °F (36.9 °C) (Oral)   19 97.7 °F (36.5 °C) (Oral)     BP Readings from Last 3 Encounters:   19 140/76   19 128/74   19 138/74 Pulse Readings from Last 3 Encounters:   08/05/19 76   04/22/19 67   01/17/19 78     Vitals:    08/05/19 0859   BP: 140/76   Pulse: 76   Resp: 18   Temp: 98.5 °F (36.9 °C)   TempSrc: Oral   SpO2: 97%   Weight: 232 lb 9.6 oz (105.5 kg)   Height: 5' 10\" (1.778 m)   PainSc:   0 - No pain         Learning Assessment:  :     Learning Assessment 1/17/2019 8/25/2017 8/2/2017   PRIMARY LEARNER Patient Patient Patient   HIGHEST LEVEL OF EDUCATION - PRIMARY LEARNER  - 2 YEARS OF COLLEGE -   CO-LEARNER CAREGIVER - - No   PRIMARY LANGUAGE ENGLISH ENGLISH ENGLISH   LEARNER PREFERENCE PRIMARY DEMONSTRATION LISTENING READING   ANSWERED BY patient patient patient   RELATIONSHIP SELF SELF SELF       Depression Screening:  :     3 most recent PHQ Screens 4/22/2019   Little interest or pleasure in doing things Not at all   Feeling down, depressed, irritable, or hopeless -   Total Score PHQ 2 -   Trouble falling or staying asleep, or sleeping too much -   Feeling tired or having little energy -   Poor appetite, weight loss, or overeating -   Feeling bad about yourself - or that you are a failure or have let yourself or your family down -   Trouble concentrating on things such as school, work, reading, or watching TV -   Moving or speaking so slowly that other people could have noticed; or the opposite being so fidgety that others notice -   Thoughts of being better off dead, or hurting yourself in some way -   PHQ 9 Score -       No flowsheet data found. Fall Risk Assessment:  :     Fall Risk Assessment, last 12 mths 1/17/2019   Able to walk? Yes   Fall in past 12 months? No       Abuse Screening:  :     Abuse Screening Questionnaire 1/17/2019 4/2/2018 8/25/2017   Do you ever feel afraid of your partner? N N N   Are you in a relationship with someone who physically or mentally threatens you? N N N   Is it safe for you to go home?  Jojo Mendes       ADL Screening:  :     ADL Assessment 4/22/2019   Feeding yourself No Help Needed Getting from bed to chair No Help Needed   Getting dressed No Help Needed   Bathing or showering No Help Needed   Walk across the room (includes cane/walker) No Help Needed   Using the telphone No Help Needed   Taking your medications No Help Needed   Preparing meals No Help Needed   Managing money (expenses/bills) No Help Needed   Moderately strenuous housework (laundry) No Help Needed   Shopping for personal items (toiletries/medicines) No Help Needed   Shopping for groceries No Help Needed   Driving No Help Needed   Climbing a flight of stairs No Help Needed   Getting to places beyond walking distances No Help Needed

## 2019-08-06 LAB
A-G RATIO,AGRAT: 1.7 RATIO
ALBUMIN SERPL-MCNC: 4.3 G/DL (ref 3.9–5.4)
ALP SERPL-CCNC: 75 U/L (ref 38–126)
ALT SERPL-CCNC: 18 U/L (ref 0–50)
ANION GAP SERPL CALC-SCNC: 13 MMOL/L
AST SERPL W P-5'-P-CCNC: 20 U/L (ref 14–36)
BILIRUB SERPL-MCNC: 1.4 MG/DL (ref 0.2–1.3)
BUN SERPL-MCNC: 18 MG/DL (ref 9–20)
BUN/CREATININE RATIO,BUCR: 23 RATIO
CALCIUM SERPL-MCNC: 9.4 MG/DL (ref 8.4–10.2)
CHLORIDE SERPL-SCNC: 98 MMOL/L (ref 98–107)
CHOL/HDL RATIO,CHHD: 4 RATIO (ref 0–4)
CHOLEST SERPL-MCNC: 194 MG/DL (ref 0–200)
CK SERPL-CCNC: 50 U/L (ref 30–135)
CO2 SERPL-SCNC: 27 MMOL/L (ref 22–32)
CREAT SERPL-MCNC: 0.8 MG/DL (ref 0.8–1.5)
GLOBULIN,GLOB: 2.5
GLUCOSE SERPL-MCNC: 198 MG/DL (ref 75–110)
HDLC SERPL-MCNC: 47 MG/DL (ref 35–130)
LDL/HDL RATIO,LDHD: 2 RATIO
LDLC SERPL CALC-MCNC: 92 MG/DL (ref 0–130)
POTASSIUM SERPL-SCNC: 4.5 MMOL/L (ref 3.6–5)
PROT SERPL-MCNC: 6.8 G/DL (ref 6.3–8.2)
PSA, TEST22: 1.1 NG/ML (ref 0–4)
SODIUM SERPL-SCNC: 138 MMOL/L (ref 137–145)
T4 FREE SERPL-MCNC: 1.15 NG/DL (ref 0.58–2.3)
TRIGL SERPL-MCNC: 274 MG/DL (ref 0–200)
TSH SERPL DL<=0.05 MIU/L-ACNC: 2.32 UIU/ML (ref 0.34–5.6)
VLDLC SERPL CALC-MCNC: 55 MG/DL

## 2019-08-07 NOTE — PROGRESS NOTES
The lab results were remarkable for a markedly increased glycohemoglobin at 8.7 which is the highest he has had an increased blood sugar and triglycerides. He is on the maximum tolerable dose of 3 oral medications so at this point we need to start insulin. I will start with Lantus 20 units daily. He will need insulin teaching. After he starts insulin I want to see him back in 2 to 3 weeks with a fasting blood sugar.

## 2019-08-08 NOTE — PROGRESS NOTES
Called and spoke to patient  Two pt identifiers confirmed  Informed patient per Dr. Abigail Samuel that A1Cis 8.7 and since he is on the maximum of 3 oral medications he needs to start insulin. Pt stated he doesn't want to take insulin shots. Dr. Abigail Samuel will be consulted.

## 2019-08-09 ENCOUNTER — DOCUMENTATION ONLY (OUTPATIENT)
Dept: INTERNAL MEDICINE CLINIC | Age: 74
End: 2019-08-09

## 2019-08-09 NOTE — PROGRESS NOTES
Called and spoke to patient  Two pt identifiers confirmed  Informed patient Dr. Marquita Zhou stated that if he doesn't to  try insulin he may try Invokana but his insurance may not cover it. Pt stated he tried to take Invokana before but it was too expensive. Pt will try Goodrx and will call us back if he decides to try it.

## 2019-08-12 ENCOUNTER — HOSPITAL ENCOUNTER (OUTPATIENT)
Dept: VASCULAR SURGERY | Age: 74
Discharge: HOME | End: 2019-08-12
Attending: INTERNAL MEDICINE
Payer: MEDICARE

## 2019-08-12 DIAGNOSIS — I65.23 STENOSIS OF BOTH INTERNAL CAROTID ARTERIES: ICD-10-CM

## 2019-08-12 LAB
LEFT CCA DIST DIAS: 10.9 CM/S
LEFT CCA DIST SYS: 65 CM/S
LEFT CCA PROX DIAS: 14.4 CM/S
LEFT CCA PROX SYS: 96.5 CM/S
LEFT ECA DIAS: 0 CM/S
LEFT ECA SYS: 99.5 CM/S
LEFT ICA DIST DIAS: 12.5 CM/S
LEFT ICA DIST SYS: 48.6 CM/S
LEFT ICA MID DIAS: 15 CM/S
LEFT ICA MID SYS: 65.4 CM/S
LEFT ICA PROX DIAS: 9 CM/S
LEFT ICA PROX SYS: 40.6 CM/S
LEFT ICA/CCA SYS: 1
LEFT SUBCLAVIAN DIAS: 0 CM/S
LEFT SUBCLAVIAN SYS: 87.4 CM/S
LEFT VERTEBRAL DIAS: 11.7 CM/S
LEFT VERTEBRAL SYS: 53.4 CM/S
RIGHT CCA DIST DIAS: 11.8 CM/S
RIGHT CCA DIST SYS: 67.5 CM/S
RIGHT CCA PROX DIAS: 11.3 CM/S
RIGHT CCA PROX SYS: 83.4 CM/S
RIGHT ECA DIAS: 0 CM/S
RIGHT ECA SYS: 82 CM/S
RIGHT ICA DIST DIAS: 14.2 CM/S
RIGHT ICA DIST SYS: 48.1 CM/S
RIGHT ICA MID DIAS: 12.9 CM/S
RIGHT ICA MID SYS: 80.7 CM/S
RIGHT ICA PROX DIAS: 8.1 CM/S
RIGHT ICA PROX SYS: 42.5 CM/S
RIGHT ICA/CCA SYS: 1.2
RIGHT SUBCLAVIAN DIAS: 0 CM/S
RIGHT SUBCLAVIAN SYS: 58.5 CM/S
RIGHT VERTEBRAL DIAS: 11.8 CM/S
RIGHT VERTEBRAL SYS: 46.5 CM/S

## 2019-08-12 PROCEDURE — 93880 EXTRACRANIAL BILAT STUDY: CPT

## 2019-08-25 DIAGNOSIS — N18.2 CONTROLLED TYPE 2 DIABETES MELLITUS WITH STAGE 2 CHRONIC KIDNEY DISEASE, WITHOUT LONG-TERM CURRENT USE OF INSULIN (HCC): ICD-10-CM

## 2019-08-25 DIAGNOSIS — E11.22 CONTROLLED TYPE 2 DIABETES MELLITUS WITH STAGE 2 CHRONIC KIDNEY DISEASE, WITHOUT LONG-TERM CURRENT USE OF INSULIN (HCC): ICD-10-CM

## 2019-08-26 RX ORDER — GLIPIZIDE 10 MG/1
TABLET ORAL
Qty: 180 TAB | Refills: 3 | Status: SHIPPED | OUTPATIENT
Start: 2019-08-26 | End: 2020-09-02

## 2019-09-21 ENCOUNTER — APPOINTMENT (OUTPATIENT)
Dept: GENERAL RADIOLOGY | Age: 74
DRG: 246 | End: 2019-09-21
Attending: EMERGENCY MEDICINE
Payer: MEDICARE

## 2019-09-21 ENCOUNTER — HOSPITAL ENCOUNTER (INPATIENT)
Age: 74
LOS: 5 days | Discharge: HOME | DRG: 246 | End: 2019-09-26
Attending: EMERGENCY MEDICINE | Admitting: INTERNAL MEDICINE
Payer: MEDICARE

## 2019-09-21 DIAGNOSIS — R07.9 CHEST PAIN, UNSPECIFIED TYPE: ICD-10-CM

## 2019-09-21 DIAGNOSIS — J96.01 ACUTE RESPIRATORY FAILURE WITH HYPOXIA (HCC): Primary | ICD-10-CM

## 2019-09-21 DIAGNOSIS — I50.1 PULMONARY EDEMA WITH CONGESTIVE HEART FAILURE (HCC): ICD-10-CM

## 2019-09-21 DIAGNOSIS — I16.1 HYPERTENSIVE EMERGENCY: ICD-10-CM

## 2019-09-21 DIAGNOSIS — I24.9 ACS (ACUTE CORONARY SYNDROME) (HCC): ICD-10-CM

## 2019-09-21 PROBLEM — I21.4 NSTEMI (NON-ST ELEVATED MYOCARDIAL INFARCTION) (HCC): Status: ACTIVE | Noted: 2019-09-21

## 2019-09-21 LAB
ALBUMIN SERPL-MCNC: 3.6 G/DL (ref 3.5–5)
ALBUMIN/GLOB SERPL: 1 {RATIO} (ref 1.1–2.2)
ALP SERPL-CCNC: 79 U/L (ref 45–117)
ALT SERPL-CCNC: 19 U/L (ref 12–78)
ANION GAP SERPL CALC-SCNC: 4 MMOL/L (ref 5–15)
APPEARANCE UR: CLEAR
ARTERIAL PATENCY WRIST A: YES
ARTERIAL PATENCY WRIST A: YES
AST SERPL-CCNC: 18 U/L (ref 15–37)
BACTERIA URNS QL MICRO: NEGATIVE /HPF
BASE EXCESS BLD CALC-SCNC: 1 MMOL/L
BASE EXCESS BLD CALC-SCNC: 3 MMOL/L
BASOPHILS # BLD: 0 K/UL (ref 0–0.1)
BASOPHILS NFR BLD: 0 % (ref 0–1)
BDY SITE: ABNORMAL
BDY SITE: ABNORMAL
BILIRUB SERPL-MCNC: 2.2 MG/DL (ref 0.2–1)
BILIRUB UR QL: NEGATIVE
BNP SERPL-MCNC: 1725 PG/ML
BUN SERPL-MCNC: 15 MG/DL (ref 6–20)
BUN/CREAT SERPL: 14 (ref 12–20)
CALCIUM SERPL-MCNC: 8.3 MG/DL (ref 8.5–10.1)
CHLORIDE SERPL-SCNC: 100 MMOL/L (ref 97–108)
CK SERPL-CCNC: 99 U/L (ref 39–308)
CO2 SERPL-SCNC: 31 MMOL/L (ref 21–32)
COLOR UR: ABNORMAL
CREAT SERPL-MCNC: 1.08 MG/DL (ref 0.7–1.3)
DIFFERENTIAL METHOD BLD: ABNORMAL
EOSINOPHIL # BLD: 0 K/UL (ref 0–0.4)
EOSINOPHIL NFR BLD: 0 % (ref 0–7)
EPITH CASTS URNS QL MICRO: ABNORMAL /LPF
ERYTHROCYTE [DISTWIDTH] IN BLOOD BY AUTOMATED COUNT: 13.9 % (ref 11.5–14.5)
ERYTHROCYTE [SEDIMENTATION RATE] IN BLOOD: 4 MM/HR (ref 0–20)
GAS FLOW.O2 O2 DELIVERY SYS: ABNORMAL L/MIN
GAS FLOW.O2 O2 DELIVERY SYS: ABNORMAL L/MIN
GLOBULIN SER CALC-MCNC: 3.6 G/DL (ref 2–4)
GLUCOSE BLD STRIP.AUTO-MCNC: 244 MG/DL (ref 65–100)
GLUCOSE BLD STRIP.AUTO-MCNC: 278 MG/DL (ref 65–100)
GLUCOSE SERPL-MCNC: 372 MG/DL (ref 65–100)
GLUCOSE UR STRIP.AUTO-MCNC: >1000 MG/DL
HCO3 BLD-SCNC: 27.6 MMOL/L (ref 22–26)
HCO3 BLD-SCNC: 28.9 MMOL/L (ref 22–26)
HCT VFR BLD AUTO: 48.1 % (ref 36.6–50.3)
HGB BLD-MCNC: 15.4 G/DL (ref 12.1–17)
HGB UR QL STRIP: ABNORMAL
IMM GRANULOCYTES # BLD AUTO: 0.1 K/UL (ref 0–0.04)
IMM GRANULOCYTES NFR BLD AUTO: 1 % (ref 0–0.5)
KETONES UR QL STRIP.AUTO: 15 MG/DL
LACTATE BLD-SCNC: 1.27 MMOL/L (ref 0.4–2)
LEUKOCYTE ESTERASE UR QL STRIP.AUTO: NEGATIVE
LYMPHOCYTES # BLD: 2.8 K/UL (ref 0.8–3.5)
LYMPHOCYTES NFR BLD: 22 % (ref 12–49)
MCH RBC QN AUTO: 26.6 PG (ref 26–34)
MCHC RBC AUTO-ENTMCNC: 32 G/DL (ref 30–36.5)
MCV RBC AUTO: 82.9 FL (ref 80–99)
MONOCYTES # BLD: 0.4 K/UL (ref 0–1)
MONOCYTES NFR BLD: 3 % (ref 5–13)
NEUTS SEG # BLD: 9.5 K/UL (ref 1.8–8)
NEUTS SEG NFR BLD: 74 % (ref 32–75)
NITRITE UR QL STRIP.AUTO: NEGATIVE
NRBC # BLD: 0 K/UL (ref 0–0.01)
NRBC BLD-RTO: 0 PER 100 WBC
PCO2 BLD: 52.5 MMHG (ref 35–45)
PCO2 BLD: 60.5 MMHG (ref 35–45)
PH BLD: 7.27 [PH] (ref 7.35–7.45)
PH BLD: 7.35 [PH] (ref 7.35–7.45)
PH UR STRIP: 6 [PH] (ref 5–8)
PLATELET # BLD AUTO: 225 K/UL (ref 150–400)
PMV BLD AUTO: 10.5 FL (ref 8.9–12.9)
PO2 BLD: 178 MMHG (ref 80–100)
PO2 BLD: 82 MMHG (ref 80–100)
POTASSIUM SERPL-SCNC: 3.6 MMOL/L (ref 3.5–5.1)
PROT SERPL-MCNC: 7.2 G/DL (ref 6.4–8.2)
PROT UR STRIP-MCNC: >300 MG/DL
RBC # BLD AUTO: 5.8 M/UL (ref 4.1–5.7)
RBC #/AREA URNS HPF: ABNORMAL /HPF (ref 0–5)
SAO2 % BLD: 94 % (ref 92–97)
SAO2 % BLD: 99 % (ref 92–97)
SERVICE CMNT-IMP: ABNORMAL
SERVICE CMNT-IMP: ABNORMAL
SODIUM SERPL-SCNC: 135 MMOL/L (ref 136–145)
SP GR UR REFRACTOMETRY: 1.02 (ref 1–1.03)
SPECIMEN TYPE: ABNORMAL
SPECIMEN TYPE: ABNORMAL
TROPONIN I BLD-MCNC: 0.1 NG/ML (ref 0–0.08)
TROPONIN I SERPL-MCNC: 0.19 NG/ML
TROPONIN I SERPL-MCNC: 1.56 NG/ML
TSH SERPL DL<=0.05 MIU/L-ACNC: 0.95 UIU/ML (ref 0.36–3.74)
UA: UC IF INDICATED,UAUC: ABNORMAL
UROBILINOGEN UR QL STRIP.AUTO: 0.2 EU/DL (ref 0.2–1)
WBC # BLD AUTO: 12.8 K/UL (ref 4.1–11.1)
WBC URNS QL MICRO: ABNORMAL /HPF (ref 0–4)

## 2019-09-21 PROCEDURE — 74011000250 HC RX REV CODE- 250: Performed by: INTERNAL MEDICINE

## 2019-09-21 PROCEDURE — 82962 GLUCOSE BLOOD TEST: CPT

## 2019-09-21 PROCEDURE — 82803 BLOOD GASES ANY COMBINATION: CPT

## 2019-09-21 PROCEDURE — 81001 URINALYSIS AUTO W/SCOPE: CPT

## 2019-09-21 PROCEDURE — 84443 ASSAY THYROID STIM HORMONE: CPT

## 2019-09-21 PROCEDURE — 99285 EMERGENCY DEPT VISIT HI MDM: CPT

## 2019-09-21 PROCEDURE — 85025 COMPLETE CBC W/AUTO DIFF WBC: CPT

## 2019-09-21 PROCEDURE — 87040 BLOOD CULTURE FOR BACTERIA: CPT

## 2019-09-21 PROCEDURE — 74011636637 HC RX REV CODE- 636/637: Performed by: INTERNAL MEDICINE

## 2019-09-21 PROCEDURE — 74011250637 HC RX REV CODE- 250/637: Performed by: INTERNAL MEDICINE

## 2019-09-21 PROCEDURE — 96374 THER/PROPH/DIAG INJ IV PUSH: CPT

## 2019-09-21 PROCEDURE — 83880 ASSAY OF NATRIURETIC PEPTIDE: CPT

## 2019-09-21 PROCEDURE — 65660000000 HC RM CCU STEPDOWN

## 2019-09-21 PROCEDURE — 80053 COMPREHEN METABOLIC PANEL: CPT

## 2019-09-21 PROCEDURE — 94660 CPAP INITIATION&MGMT: CPT

## 2019-09-21 PROCEDURE — 5A09357 ASSISTANCE WITH RESPIRATORY VENTILATION, LESS THAN 24 CONSECUTIVE HOURS, CONTINUOUS POSITIVE AIRWAY PRESSURE: ICD-10-PCS | Performed by: EMERGENCY MEDICINE

## 2019-09-21 PROCEDURE — 74011000250 HC RX REV CODE- 250: Performed by: EMERGENCY MEDICINE

## 2019-09-21 PROCEDURE — 85652 RBC SED RATE AUTOMATED: CPT

## 2019-09-21 PROCEDURE — 71045 X-RAY EXAM CHEST 1 VIEW: CPT

## 2019-09-21 PROCEDURE — 36415 COLL VENOUS BLD VENIPUNCTURE: CPT

## 2019-09-21 PROCEDURE — 77030012879 HC MSK CPAP FLL FAC PHIL -B

## 2019-09-21 PROCEDURE — 84484 ASSAY OF TROPONIN QUANT: CPT

## 2019-09-21 PROCEDURE — 74011250636 HC RX REV CODE- 250/636: Performed by: INTERNAL MEDICINE

## 2019-09-21 PROCEDURE — 94762 N-INVAS EAR/PLS OXIMTRY CONT: CPT

## 2019-09-21 PROCEDURE — 36600 WITHDRAWAL OF ARTERIAL BLOOD: CPT

## 2019-09-21 PROCEDURE — 84439 ASSAY OF FREE THYROXINE: CPT

## 2019-09-21 PROCEDURE — 93005 ELECTROCARDIOGRAM TRACING: CPT

## 2019-09-21 PROCEDURE — 83605 ASSAY OF LACTIC ACID: CPT

## 2019-09-21 PROCEDURE — 82550 ASSAY OF CK (CPK): CPT

## 2019-09-21 RX ORDER — DEXTROSE 50 % IN WATER (D50W) INTRAVENOUS SYRINGE
12.5-25 AS NEEDED
Status: DISCONTINUED | OUTPATIENT
Start: 2019-09-21 | End: 2019-09-26 | Stop reason: HOSPADM

## 2019-09-21 RX ORDER — MAGNESIUM SULFATE 100 %
4 CRYSTALS MISCELLANEOUS AS NEEDED
Status: DISCONTINUED | OUTPATIENT
Start: 2019-09-21 | End: 2019-09-26 | Stop reason: HOSPADM

## 2019-09-21 RX ORDER — NICOTINE 7MG/24HR
1 PATCH, TRANSDERMAL 24 HOURS TRANSDERMAL
Status: DISCONTINUED | OUTPATIENT
Start: 2019-09-21 | End: 2019-09-26 | Stop reason: HOSPADM

## 2019-09-21 RX ORDER — HYDROCODONE BITARTRATE AND ACETAMINOPHEN 5; 325 MG/1; MG/1
1 TABLET ORAL
Status: DISCONTINUED | OUTPATIENT
Start: 2019-09-21 | End: 2019-09-26 | Stop reason: HOSPADM

## 2019-09-21 RX ORDER — SODIUM CHLORIDE 0.9 % (FLUSH) 0.9 %
5-40 SYRINGE (ML) INJECTION EVERY 8 HOURS
Status: DISCONTINUED | OUTPATIENT
Start: 2019-09-21 | End: 2019-09-23

## 2019-09-21 RX ORDER — HYDRALAZINE HYDROCHLORIDE 25 MG/1
25 TABLET, FILM COATED ORAL 3 TIMES DAILY
Status: DISCONTINUED | OUTPATIENT
Start: 2019-09-21 | End: 2019-09-21

## 2019-09-21 RX ORDER — GLIPIZIDE 5 MG/1
10 TABLET ORAL
Status: DISCONTINUED | OUTPATIENT
Start: 2019-09-22 | End: 2019-09-26 | Stop reason: HOSPADM

## 2019-09-21 RX ORDER — AMLODIPINE BESYLATE 5 MG/1
5 TABLET ORAL DAILY
Status: DISCONTINUED | OUTPATIENT
Start: 2019-09-21 | End: 2019-09-21

## 2019-09-21 RX ORDER — METOPROLOL TARTRATE 25 MG/1
25 TABLET, FILM COATED ORAL EVERY 12 HOURS
Status: DISCONTINUED | OUTPATIENT
Start: 2019-09-21 | End: 2019-09-26 | Stop reason: HOSPADM

## 2019-09-21 RX ORDER — BUMETANIDE 0.25 MG/ML
2 INJECTION INTRAMUSCULAR; INTRAVENOUS
Status: COMPLETED | OUTPATIENT
Start: 2019-09-21 | End: 2019-09-21

## 2019-09-21 RX ORDER — ASPIRIN 81 MG/1
81 TABLET ORAL DAILY
Status: DISCONTINUED | OUTPATIENT
Start: 2019-09-22 | End: 2019-09-26 | Stop reason: HOSPADM

## 2019-09-21 RX ORDER — NITROGLYCERIN 20 MG/100ML
0-200 INJECTION INTRAVENOUS
Status: DISCONTINUED | OUTPATIENT
Start: 2019-09-21 | End: 2019-09-26 | Stop reason: HOSPADM

## 2019-09-21 RX ORDER — ACETAMINOPHEN 500 MG
500 TABLET ORAL
Status: DISCONTINUED | OUTPATIENT
Start: 2019-09-21 | End: 2019-09-26 | Stop reason: HOSPADM

## 2019-09-21 RX ORDER — LOSARTAN POTASSIUM 100 MG/1
100 TABLET ORAL DAILY
Status: DISCONTINUED | OUTPATIENT
Start: 2019-09-21 | End: 2019-09-26 | Stop reason: HOSPADM

## 2019-09-21 RX ORDER — PRAVASTATIN SODIUM 40 MG/1
80 TABLET ORAL
Status: DISCONTINUED | OUTPATIENT
Start: 2019-09-21 | End: 2019-09-26 | Stop reason: HOSPADM

## 2019-09-21 RX ORDER — ONDANSETRON 2 MG/ML
4 INJECTION INTRAMUSCULAR; INTRAVENOUS
Status: DISCONTINUED | OUTPATIENT
Start: 2019-09-21 | End: 2019-09-21

## 2019-09-21 RX ORDER — INSULIN LISPRO 100 [IU]/ML
INJECTION, SOLUTION INTRAVENOUS; SUBCUTANEOUS
Status: DISCONTINUED | OUTPATIENT
Start: 2019-09-21 | End: 2019-09-26 | Stop reason: HOSPADM

## 2019-09-21 RX ORDER — AMLODIPINE BESYLATE 5 MG/1
10 TABLET ORAL DAILY
Status: DISCONTINUED | OUTPATIENT
Start: 2019-09-22 | End: 2019-09-26 | Stop reason: HOSPADM

## 2019-09-21 RX ORDER — NALOXONE HYDROCHLORIDE 0.4 MG/ML
0.4 INJECTION, SOLUTION INTRAMUSCULAR; INTRAVENOUS; SUBCUTANEOUS AS NEEDED
Status: DISCONTINUED | OUTPATIENT
Start: 2019-09-21 | End: 2019-09-26 | Stop reason: HOSPADM

## 2019-09-21 RX ORDER — BISACODYL 5 MG
5 TABLET, DELAYED RELEASE (ENTERIC COATED) ORAL DAILY PRN
Status: DISCONTINUED | OUTPATIENT
Start: 2019-09-21 | End: 2019-09-26 | Stop reason: HOSPADM

## 2019-09-21 RX ORDER — FINASTERIDE 5 MG/1
5 TABLET, FILM COATED ORAL DAILY
Status: DISCONTINUED | OUTPATIENT
Start: 2019-09-22 | End: 2019-09-26 | Stop reason: HOSPADM

## 2019-09-21 RX ORDER — ENALAPRILAT 1.25 MG/ML
1.25 INJECTION INTRAVENOUS
Status: COMPLETED | OUTPATIENT
Start: 2019-09-21 | End: 2019-09-21

## 2019-09-21 RX ORDER — ACETAMINOPHEN 325 MG/1
650 TABLET ORAL
Status: DISCONTINUED | OUTPATIENT
Start: 2019-09-21 | End: 2019-09-21

## 2019-09-21 RX ORDER — TAMSULOSIN HYDROCHLORIDE 0.4 MG/1
0.4 CAPSULE ORAL DAILY
Status: DISCONTINUED | OUTPATIENT
Start: 2019-09-22 | End: 2019-09-26 | Stop reason: HOSPADM

## 2019-09-21 RX ORDER — SODIUM CHLORIDE 0.9 % (FLUSH) 0.9 %
5-40 SYRINGE (ML) INJECTION AS NEEDED
Status: DISCONTINUED | OUTPATIENT
Start: 2019-09-21 | End: 2019-09-23

## 2019-09-21 RX ORDER — ONDANSETRON 2 MG/ML
2 INJECTION INTRAMUSCULAR; INTRAVENOUS
Status: DISCONTINUED | OUTPATIENT
Start: 2019-09-21 | End: 2019-09-26 | Stop reason: HOSPADM

## 2019-09-21 RX ORDER — GLUCOSAM/CHONDRO/HERB 149/HYAL 750-100 MG
1 TABLET ORAL 3 TIMES DAILY
Status: DISCONTINUED | OUTPATIENT
Start: 2019-09-21 | End: 2019-09-26 | Stop reason: HOSPADM

## 2019-09-21 RX ORDER — TIMOLOL MALEATE 2.5 MG/ML
1 SOLUTION/ DROPS OPHTHALMIC 2 TIMES DAILY
Status: DISCONTINUED | OUTPATIENT
Start: 2019-09-21 | End: 2019-09-25

## 2019-09-21 RX ORDER — HYDRALAZINE HYDROCHLORIDE 50 MG/1
50 TABLET, FILM COATED ORAL 3 TIMES DAILY
Status: DISCONTINUED | OUTPATIENT
Start: 2019-09-21 | End: 2019-09-26 | Stop reason: HOSPADM

## 2019-09-21 RX ORDER — HEPARIN SODIUM 5000 [USP'U]/ML
5000 INJECTION, SOLUTION INTRAVENOUS; SUBCUTANEOUS EVERY 8 HOURS
Status: DISCONTINUED | OUTPATIENT
Start: 2019-09-21 | End: 2019-09-26 | Stop reason: HOSPADM

## 2019-09-21 RX ADMIN — OMEGA-3 FATTY ACIDS CAP 1000 MG 1 CAPSULE: 1000 CAP at 18:43

## 2019-09-21 RX ADMIN — NITROGLYCERIN 85 MCG/MIN: 20 INJECTION INTRAVENOUS at 19:22

## 2019-09-21 RX ADMIN — ENALAPRILAT 1.25 MG: 2.5 INJECTION INTRAVENOUS at 12:02

## 2019-09-21 RX ADMIN — BUMETANIDE 2 MG: 0.25 INJECTION INTRAMUSCULAR; INTRAVENOUS at 12:09

## 2019-09-21 RX ADMIN — AMLODIPINE BESYLATE 5 MG: 5 TABLET ORAL at 17:08

## 2019-09-21 RX ADMIN — HYDRALAZINE HYDROCHLORIDE 25 MG: 25 TABLET, FILM COATED ORAL at 17:08

## 2019-09-21 RX ADMIN — OMEGA-3 FATTY ACIDS CAP 1000 MG 1 CAPSULE: 1000 CAP at 21:38

## 2019-09-21 RX ADMIN — NITROGLYCERIN 10 MCG/MIN: 20 INJECTION INTRAVENOUS at 12:35

## 2019-09-21 RX ADMIN — Medication 10 ML: at 17:09

## 2019-09-21 RX ADMIN — HEPARIN SODIUM 5000 UNITS: 5000 INJECTION INTRAVENOUS; SUBCUTANEOUS at 21:38

## 2019-09-21 RX ADMIN — PRAVASTATIN SODIUM 80 MG: 40 TABLET ORAL at 21:38

## 2019-09-21 RX ADMIN — Medication 10 ML: at 21:41

## 2019-09-21 RX ADMIN — HYDRALAZINE HYDROCHLORIDE 50 MG: 50 TABLET, FILM COATED ORAL at 21:38

## 2019-09-21 RX ADMIN — TIMOLOL MALEATE 1 DROP: 2.5 SOLUTION OPHTHALMIC at 23:14

## 2019-09-21 RX ADMIN — LOSARTAN POTASSIUM 100 MG: 100 TABLET ORAL at 17:08

## 2019-09-21 RX ADMIN — INSULIN LISPRO 6 UNITS: 100 INJECTION, SOLUTION INTRAVENOUS; SUBCUTANEOUS at 17:09

## 2019-09-21 RX ADMIN — INSULIN LISPRO 2 UNITS: 100 INJECTION, SOLUTION INTRAVENOUS; SUBCUTANEOUS at 23:07

## 2019-09-21 RX ADMIN — METOPROLOL TARTRATE 25 MG: 25 TABLET ORAL at 21:38

## 2019-09-21 NOTE — ED NOTES
Repeat ABG results given to Dr. Duke Singh. Verbal orders received that patient may come off BiPAP if tolerates and maintains O2 saturation. Pt taken off BiPAP by respiratory.

## 2019-09-21 NOTE — Clinical Note
Lesion: Located in the Proximal OM 1. Stent deployed. Single technique used. First inflation pressure = 15 brian; inflation time: 15 sec.

## 2019-09-21 NOTE — H&P
History and Physical          Pt Name  Manuel Sherwood   Date of Birth 1945   Medical Record Number  [id number]      Age  74 y.o. PCP Janae Nolan MD   Admit date:  9/21/2019    Room Number  2264/01  @ [facility]   Date of Service  9/21/2019     Admission Diagnoses:  NSTEMI (non-ST elevated myocardial infarction) [facility])      Certification: We are admitting Manuel Sherwood 74 y.o. male with a principle diagnosis of NSTEMI (non-ST elevated myocardial infarction) [facility])  This patient also suffers from other comorbidities listed below. I have a high level of concern for RAPID DECLINE IN HEMODYNAMICS  leading to life threatening complications      Assessment and plan:  Present on Admission:   NSTEMI (non-ST elevated myocardial infarction) ([address]   Aortic stenosis   ASVD (arteriosclerotic vascular disease)   Back pain   BPH (benign prostatic hyperplasia)   Controlled type 2 diabetes mellitus with stage 2 chronic kidney disease, without long-term current use of insulin (HCC)   Essential hypertension   Primary osteoarthritis involving multiple joints      · Admit to remote tele   · Cardiologist consultation  · IV NTG   · Resume home HTN meds   · Plan for possible cardiac intervention noted. · Watch chemistry closely   · Continue other home meds   · Continue Glipizide and hold metformin   · Start SSI       Body mass index is 31.06 kg/m². -  ·        CODE STATUS  Full         Functional Status  Pt is  and lives with his wife      Surrogate decision maker: Pt's wife    Prophylaxis  Hep SQ   Discharge Plan: Home w/Family,     There are currently no Active Isolations Payor: [place] MED[place]ARE / Plan: [place] MEDICARE PART A & B / Product Type: Medicare /    Social issues  Date Comment    09/21/19  6:29 PM   I met with pt's wife  at bedside. I explained above issues in simple language and answered all questions.          Prognosis  Fair      Subjective Data         History of Present Illness :    The pt reports that he went to a state fair or similar festival and returned home in good health. Since then about two days now, he has had increased shortness of breath, lightheadedness. EMS were called and he was found in uncontrolled HTN. Since then workup in the ER showed elevated troponin with hypertensive emergency for which IV NTG was started. Since then the pt has felt significantly improved. Review of Systems - History obtained from spouse and the patient  General ROS: positive for  - fatigue  negative for - chills or fever  Psychological ROS: negative  Ophthalmic ROS: negative  Respiratory ROS: positive for - shortness of breath and tachypnea  negative for - cough or hemoptysis  Cardiovascular ROS: positive for - dyspnea on exertion, shortness of breath and near syncope  negative for - chest pain  Gastrointestinal ROS: positive for - One episode of diarrhea   Genito-Urinary ROS: no dysuria, trouble voiding, or hematuria  Musculoskeletal ROS: negative  Neurological ROS: no TIA or stroke symptoms  ROS       Past Medical History:   Diagnosis Date    Aortic stenosis 8/2/2017    ASVD (arteriosclerotic vascular disease) 8/2/2017    Back pain 8/2/2017    BPH (benign prostatic hyperplasia) 8/2/2017    CKD (chronic kidney disease) 8/2/2017    Diabetes ([address] 8/2/2017    DJD (degenerative joint disease) 8/2/2017    ED (erectile dysfunction) 8/2/2017    Guillain-Bronx syndrome ([address] 8/2/2017    Hyperlipidemia 8/2/2017    Hypertension 8/2/2017    Left carotid bruit 8/2/2017    Morbid obesity ([address] 8/2/2017    On statin therapy 8/2/2017    Urticaria 8/2/2017       History reviewed. No pertinent surgical history.       Social History     Tobacco Use    Smoking status: Never Smoker    Smokeless tobacco: Never Used   Substance Use Topics    Alcohol use: Yes     Comment: social         Family History   Problem Relation Age of Onset    Heart Disease Mother         murmor    Heart Disease Father               Objective data     Comment:  Pleasant gentleman lying in bed in no distress   His wife is in the ER room      Patient Vitals for the past 24 hrs:   Temp   09/21/19 1607 98.1 °F (36.7 °C)   09/21/19 1458 97.9 °F (36.6 °C)   09/21/19 1132 98 °F (36.7 °C)   09/21/19 1130 96.3 °F (35.7 °C)      Patient Vitals for the past 24 hrs:   Pulse   09/21/19 1621 83   09/21/19 1607 76   09/21/19 1535 79   09/21/19 1510 85   09/21/19 1458 85   09/21/19 1455 87   09/21/19 1450 83   09/21/19 1445 83   09/21/19 1439 89   09/21/19 1425 91   09/21/19 1420 85   09/21/19 1416 86   09/21/19 1406 92   09/21/19 1401 93   09/21/19 1335 100   09/21/19 1328 98   09/21/19 1319 99   09/21/19 1315 100   09/21/19 1309 (!) 105   09/21/19 1300 (!) 102   09/21/19 1255 100   09/21/19 1254 99   09/21/19 1250 96   09/21/19 1235 93   09/21/19 1230 95   09/21/19 1224 92   09/21/19 1210 92   09/21/19 1202 98   09/21/19 1200 (!) 101   09/21/19 1145 85   09/21/19 1130 85      Patient Vitals for the past 24 hrs:   Resp   09/21/19 1607 16   09/21/19 1535 19   09/21/19 1510 20   09/21/19 1458 19   09/21/19 1455 19   09/21/19 1450 19   09/21/19 1445 19   09/21/19 1439 19   09/21/19 1425 15   09/21/19 1420 18   09/21/19 1416 17   09/21/19 1335 18   09/21/19 1315 25   09/21/19 1309 23   09/21/19 1300 20   09/21/19 1255 21   09/21/19 1250 20   09/21/19 1230 19   09/21/19 1210 19   09/21/19 1200 23   09/21/19 1145 23   09/21/19 1130 26      Patient Vitals for the past 24 hrs:   BP   09/21/19 1621 177/84   09/21/19 1607 189/81   09/21/19 1535 159/67   09/21/19 1510 159/71   09/21/19 1458 182/80   09/21/19 1455 185/82   09/21/19 1450 178/71   09/21/19 1445 178/70   09/21/19 1439 159/83   09/21/19 1425 155/84   09/21/19 1420 152/79   09/21/19 1416 169/79   09/21/19 1406 190/84   09/21/19 1401 179/89   09/21/19 1335 166/83   09/21/19 1328 176/86   09/21/19 1319 (!) 204/97   09/21/19 1315 (!) 195/98   09/21/19 1309 (!) 213/94   09/21/19 1300 197/87 09/21/19 1255 186/75   09/21/19 1254 186/75   09/21/19 1250 178/75   09/21/19 1235 171/70   09/21/19 1230 171/70   09/21/19 1224 160/67   09/21/19 1213 168/69   09/21/19 1210 171/66   09/21/19 1202 183/79   09/21/19 1200 183/79   09/21/19 1145 (!) 245/94   09/21/19 1130 (!) 226/84        SpO2 Readings from Last 6 Encounters:   09/21/19 98%   08/05/19 97%   04/22/19 95%   01/17/19 96%   10/16/18 96%   08/03/18 99%      O2 Flow Rate (L/min): 2 l/min  O2 Device: Nasal cannula   Body mass index is 31.06 kg/m². - Wt Readings from Last 10 Encounters:   09/21/19 98.2 kg (216 lb 7.9 oz)   08/05/19 105.5 kg (232 lb 9.6 oz)   04/22/19 106.3 kg (234 lb 6.4 oz)   01/17/19 106.2 kg (234 lb 3.2 oz)   10/16/18 105.2 kg (232 lb)   08/03/18 104.8 kg (231 lb)   07/02/18 107.8 kg (237 lb 9.6 oz)   04/02/18 108.1 kg (238 lb 6.4 oz)   12/11/17 105.9 kg (233 lb 6.4 oz)   08/25/17 106.9 kg (235 lb 9.6 oz)          Physical Exam:             General:  Alert, cooperative,   well noursished,   well developed,   appears stated age    Ears/Eyes:  Hearing intact  Sclera anicteric. Pupils equal   Mouth/Throat:  Mucous membranes normal pink and moist  Oral pharynx clear    Neck:     Lungs:  Trachea midline  Chest excursion symmetrical   Auscultation B/L Symmetrical with   Vesicular breath sounds     No Crepitations noted   Percussion note resonant on mid Clavicular line; no sign of pneumothorax        CVS:  Regular rate and rhythm   AS  murmur,   No click, rub or gallop  S1 normal   S2 normal   Pedal pulses  b/l symmetrical   Abdomen:  Obese  Soft, non-tender  Bowel sounds normal  No distension   Percussion note tympanitic   Extremities:    No cyanosis, jaundice  No edema noted   No sign of DVT/cord like lesion on palpation  No sign of acute trauma   Age appropriate OA changes noted.     Skin:    Skin color, texture, turgor normal. no acute rash or lesions    Lymph nodes:     Musculoskeletal Muscle bulk B/L symmetrical   Neuro Cranial nerves are intact,   motor movement b/l symmetrical,   Sensory evaluation b/l symmetrical    Psych:  Alert and oriented, normal mood & affect given the clinical scenario          Medications reviewed     Current Facility-Administered Medications   Medication Dose Route Frequency    nitroglycerin (Tridil) 200 mcg/ml infusion  0-200 mcg/min IntraVENous TITRATE    acetaminophen (TYLENOL) tablet 650 mg  650 mg Oral Q6H PRN    ondansetron (ZOFRAN) injection 4 mg  4 mg IntraVENous Q4H PRN       Relevant other informations: Other medical conditions listed in this following active hospital problem list section; all of these and other pertinent data were taken into consideration when treatment plan is developed and customized to this patient's unique overall circumstances and needs. We have reviewed available old medical records within the constraints of this admission process.    Present on Admission:   [facility]I (non-ST elevated [facility] infarction) [facility]   Aortic stenosis   ASVD (arteriosclerotic vascular disease)   Back pain   BPH (benign prostatic hyperplasia)   Controlled type 2 diabetes mellitus with stage 2 chronic kidney disease, without long-term current use of insulin ([facility])   Essential hypertension   Primary osteoarthritis involving multiple joints       Data Review:   Recent Days:  All Micro Results     Procedure Component Value Units Date/Time    CULTURE, BLOOD, PAIRED [[id number]] Collected:  09/21/19 1156    Order Status:  Completed Specimen:  Blood Updated:  09/21/19 1202          Recent Labs     09/21/19  1143   WBC 12.8*   HGB 15.4   HCT 48.1        Recent Labs     09/21/19  1143   *   K 3.6      CO2 31   *   BUN 15   CREA 1.08   CA 8.3*   ALB 3.6   TBILI 2.2*   SGOT 18   ALT 19      Lab Results   Component Value Date/Time    TSH 1.580 08/03/2018 02:39 PM         Care Plan discussed with: Patient/Family and Nurse   Other medical conditions are listed in the active hospital problem list section; these and other pertinent data were taken into consideration when the treatment plan was developed and customized to this patient's unique overall circumstances and needs. High complexity decision making was performed for this patient who is at high risk for decompensation with multiple organ involvement. Today total floor/unit time was 65 minutes while caring for this patient and greater than 50% of that time was spent with patient (and/or family) coordinating patients clinical issues.      Eri Warner MD MPH  FACP                               9/21/2019       __________________________________________________________   FOR SOUND PHYSICIAN ADMINISTRATIVE USE ONLY   MDM       INPT 223      Muriel Ocampo MD MPH FACP   4:27 PM  9/21/2019

## 2019-09-21 NOTE — Clinical Note
Lesion located in the Proximal LAD. Balloon inflated using single inflation technique. Pressure = 12 brian; Duration = 15 sec.

## 2019-09-21 NOTE — Clinical Note
Lesion: Located in the Proximal Cx. Stent deployed. Single technique used. First inflation pressure = 14 brian; inflation time: 20 sec.

## 2019-09-21 NOTE — Clinical Note
TRANSFER - OUT REPORT:  
 
Verbal report given to: Frankie King. Report consisted of patient's Situation, Background, Assessment and  
Recommendations(SBAR). Opportunity for questions and clarification was provided. Patient transported with a Registered Nurse and [address] [place] [address] / [facility]. Patient transported to: IVCU.

## 2019-09-21 NOTE — Clinical Note
Lesion located in the Proximal OM 1. Balloon inflated using single inflation technique. Pressure = 10 brian; Duration = 26 sec.

## 2019-09-21 NOTE — ED NOTES
Dr. Shankar Hogan notified of pt's BP of 183/79. Verbal orders received to administer ordered bumex and vasotec and re-assess before starting nitro drip.

## 2019-09-21 NOTE — ED NOTES
Verbal orders received by Dr. Juan Carlos Samuels to initiate nitro drip to reach goal of SBP below 160. Nitro paste removed.

## 2019-09-21 NOTE — PROGRESS NOTES
TRANSFER - IN REPORT:    Verbal report received from Charlette(name) on Kylie Fearing  being received from ED(unit) for routine progression of care      Report consisted of patients Situation, Background, Assessment and   Recommendations(SBAR). Information from the following report(s) SBAR, Kardex, Intake/Output, MAR and Recent Results was reveiwed with the receiving nurse. Opportunity for questions and clarification was provided. Assessment completed upon patients arrival to unit and care assumed.      1607 MEWS 1  Pt arrived on unit in no acute distress  No complaints of pain  Wife at bedside  2L O2 NC  NSR on monitor  Cardiac diet  Primary Nurse Katarina West, RN and Chiqui Molina, RN performed a dual skin assessment on this patient  Scattered scratches BLE  [place] blanchable sacrum    1616  Titrated Nitro gtt to 60mcg/min  1621  Titrated Nitro gtt to 65mcg/min  1627  Titrated Nitro gtt to 70mcg/min  1633  Titrated Nitro gtt to 75mcg/min  1639  Titrated Nitro gtt to 80mcg/min  1  Spoke with Dr Yenifer Meadows and advised of pt BP  New orders rec'd   1704  Titrated Nitro gtt to 85mcg/min  1742  Titrated Nitro gtt to 90mcg/min  1750  Troponin sent   [id number]  Nitro gtt at 85mcg/min  1834  Nitro gtt at 80mcg/min

## 2019-09-21 NOTE — Clinical Note
TRANSFER - OUT REPORT:  
 
Verbal report given to: Marya MAHER. Report consisted of patient's Situation, Background, Assessment and  
Recommendations(SBAR). Opportunity for questions and clarification was provided. Patient transported with a Registered Nurse. Patient transported to: IVCU.

## 2019-09-21 NOTE — Clinical Note
Lesion: Located in the Proximal RCA. Stent deployed. Single technique and multiple inflations used. First inflation pressure = 15 brian; inflation time: 16 sec. Second inflation pressure: 18 brian; inflation time: 20 sec.

## 2019-09-21 NOTE — Clinical Note
Lesion located in the Proximal RCA. Balloon inflated using multiple inflations inflation technique. Pressure = 10 brian; Duration = 8 sec. Inflation 2: Pressure: 12 brian; Duration: 10 sec.

## 2019-09-21 NOTE — Clinical Note
Lesion located in the Proximal OM 1. Balloon inflated using multiple inflations inflation technique. Pressure = 10 brian; Duration = 15 sec. Inflation 2: Pressure: 10 brian; Duration: 15 sec. Inflation 3: Pressure: 10 brian; Duration: 15 sec. Inflation 4: Pressure: 10 brian; Duration: 15 sec. Inflation 5: Pressure: 10 brian; Duration: 15 sec.

## 2019-09-21 NOTE — Clinical Note
Lesion: Located in the Proximal LAD. Stent deployed. Single technique used. First inflation pressure = 18 brian; inflation time: 18 sec.

## 2019-09-21 NOTE — Clinical Note
Lesion located in the Proximal OM 1. Balloon inflated using single inflation technique. Pressure = 12 brian; Duration = 12 sec.

## 2019-09-21 NOTE — ED PROVIDER NOTES
EMERGENCY DEPARTMENT HISTORY AND PHYSICAL EXAM      Date: 9/21/2019  Patient Name: Maida Ulrich    History of Presenting Illness     Chief Complaint   Patient presents with    Shortness of Breath     per EMS x 2 days, pt diaphoretic and pale on scene,  per EMS    Diarrhea     x 2 days    Hypertension     per EMS pt systolic BP in the 689G, pt given 324mg ASA and 1inch of nitro to L chest, per EMS pt oxygen saturation 78% on RA, pt not on home O2       History Provided By: Patient, Patient's Wife and EMS    HPI: Maida Ulrich, 74 y.o. male  presents to the ED with cc of shortness of breath and respiratory distress. EMS was called to the house for shortness of breath. Patient states he has been having shortness of breath for the past 2 days. Progressively worse this morning. When he woke up he was feeling better than the past couple days and his wife left the house. When she returned he was in severe distress. EMS states he was very diaphoretic and pale on scene. His oxygen saturations were 78% on room air. He has been hypertensive with systolics above 016. He does not have any chest pain. He has had diarrhea for 2 days. No nausea or vomiting. No fevers or chills. He has had swelling in his abdomen. No history of congestive heart failure. Does appear that he has a history of mild aortic stenosis from an echocardiogram in 2018. There are no other complaints, changes, or physical findings at this time. PCP: Bishop Zambrano MD    No current facility-administered medications on file prior to encounter.       Current Outpatient Medications on File Prior to Encounter   Medication Sig Dispense Refill    glipiZIDE (GLUCOTROL) 10 mg tablet TAKE ONE TABLET BY MOUTH TWICE A  Tab 3    lisinopril (PRINIVIL, ZESTRIL) 40 mg tablet TAKE 1 TABLET BY MOUTH ONCE DAILY 90 Tab 3    tamsulosin (FLOMAX) 0.4 mg capsule TAKE TWO CAPSULES BY MOUTH DAILY 180 Cap prn    SITagliptin (JANUVIA) 100 mg tablet Take 1 Tab by mouth daily. 30 Tab prn    hydrALAZINE (APRESOLINE) 50 mg tablet Take 0.5 Tabs by mouth three (3) times daily. 270 Tab prn    finasteride (PROSCAR) 5 mg tablet Take 1 Tab by mouth daily. 90 Tab prn    losartan (COZAAR) 100 mg tablet TAKE ONE TABLET BY MOUTH DAILY 90 Tab 3    metFORMIN (GLUCOPHAGE) 500 mg tablet TAKE ONE TABLET BY MOUTH EVERY MORNING AND TAKE TWO TABLETS BY MOUTH EVERY EVENING WITH DINNER 270 Tab 2    pravastatin (PRAVACHOL) 80 mg tablet TAKE ONE TABLET BY MOUTH DAILY 90 Tab 2    amLODIPine (NORVASC) 10 mg tablet TAKE ONE TABLET BY MOUTH DAILY 90 Tab 3    timolol (TIMOPTIC-XE) 0.5 % ophthalmic gel-forming       meloxicam (MOBIC) 15 mg tablet TAKE ONE TABLET BY MOUTH DAILY 90 Tab 3    Omega-3-DHA-EPA-Fish Oil 1,000 mg (120 mg-180 mg) cap Take 1 Cap by mouth three (3) times daily.  aspirin delayed-release 81 mg tablet Take  by mouth daily. Past History     Past Medical History:  Past Medical History:   Diagnosis Date    Aortic stenosis 8/2/2017    ASVD (arteriosclerotic vascular disease) 8/2/2017    Back pain 8/2/2017    BPH (benign prostatic hyperplasia) 8/2/2017    CKD (chronic kidney disease) 8/2/2017    Diabetes ([facility] [address] 8/2/2017    DJD (degenerative joint disease) 8/2/2017    ED (erectile dysfunction) 8/2/2017    Guillain-[place] syndrome ([facility] [address] 8/2/2017    Hyperlipidemia 8/2/2017    Hypertension 8/2/2017    Left carotid bruit 8/2/2017    Morbid obesity ([facility] [address] 8/2/2017    On statin therapy 8/2/2017    Urticaria 8/2/2017       Past Surgical History:  History reviewed. No pertinent surgical history. Family History:  Family History   Problem Relation Age of Onset    Heart Disease Mother         murmor    Heart Disease Father        Social History:  Social History     Tobacco Use    Smoking status: Never Smoker    Smokeless tobacco: Never Used   Substance Use Topics    Alcohol use: Yes     Comment: social    Drug use:  No Allergies: Allergies   Allergen Reactions    Adhesive Tape-Silicones Unknown (comments)    Chocolate Flavor Unknown (comments)         Review of Systems   Review of Systems   Constitutional: Negative for chills and fever. HENT: Negative for congestion, ear pain, rhinorrhea, sore throat and trouble swallowing. Eyes: Negative for visual disturbance. Respiratory: Positive for shortness of breath. Negative for cough and chest tightness. Cardiovascular: Negative for chest pain, palpitations and leg swelling. Gastrointestinal: Positive for abdominal distention and diarrhea. Negative for abdominal pain, blood in stool, constipation, nausea and vomiting. Genitourinary: Negative for decreased urine volume, difficulty urinating, dysuria and frequency. Musculoskeletal: Negative for back pain and neck pain. Skin: Negative for color change and rash. Neurological: Negative for dizziness, weakness, light-headedness and headaches. Physical Exam   Physical Exam   Constitutional: He is oriented to person, place, and time. He appears well-developed and well-nourished. He appears listless. He appears ill. He appears distressed. Nasal cannula in place. Obese   HENT:   Mouth/Throat: Oropharynx is clear and moist.   Eyes: Conjunctivae are normal.   Neck: Neck supple. Cardiovascular: Normal rate and regular rhythm. Pulmonary/Chest: Accessory muscle usage present. Tachypnea noted. He is in respiratory distress. He has decreased breath sounds. Abdominal: Soft. He exhibits distension. There is no tenderness. Lymphadenopathy:     He has no cervical adenopathy. Neurological: He is oriented to person, place, and time. He has normal strength. He appears listless. No cranial nerve deficit or sensory deficit. Skin: Skin is warm and dry. Nursing note and vitals reviewed.       Diagnostic Study Results     Labs -     Recent Results (from the past 24 hour(s))   EKG, 12 LEAD, INITIAL    Collection Time: 09/21/19 11:35 AM   Result Value Ref Range    Ventricular Rate 88 BPM    Atrial Rate 87 BPM    QRS Duration 148 ms    Q-T Interval 438 ms    QTC Calculation (Bezet) 529 ms    Calculated R Axis -41 degrees    Calculated T Axis 123 degrees    Diagnosis       Atrial fibrillation with premature ventricular or aberrantly conducted   complexes  Left axis deviation  Left bundle branch block  No previous ECGs available     CBC WITH AUTOMATED DIFF    Collection Time: 09/21/19 11:43 AM   Result Value Ref Range    WBC 12.8 (H) 4.1 - 11.1 K/uL    RBC 5.80 (H) 4.10 - 5.70 M/uL    HGB 15.4 12.1 - 17.0 g/dL    HCT 48.1 36.6 - 50.3 %    MCV 82.9 80.0 - 99.0 FL    MCH 26.6 26.0 - 34.0 PG    MCHC 32.0 30.0 - 36.5 g/dL    RDW 13.9 11.5 - 14.5 %    PLATELET 896 136 - 651 K/uL    MPV 10.5 8.9 - 12.9 FL    NRBC 0.0 0  WBC    ABSOLUTE NRBC 0.00 0.00 - 0.01 K/uL    NEUTROPHILS 74 32 - 75 %    LYMPHOCYTES 22 12 - 49 %    MONOCYTES 3 (L) 5 - 13 %    EOSINOPHILS 0 0 - 7 %    BASOPHILS 0 0 - 1 %    IMMATURE GRANULOCYTES 1 (H) 0.0 - 0.5 %    ABS. NEUTROPHILS 9.5 (H) 1.8 - 8.0 K/UL    ABS. LYMPHOCYTES 2.8 0.8 - 3.5 K/UL    ABS. MONOCYTES 0.4 0.0 - 1.0 K/UL    ABS. EOSINOPHILS 0.0 0.0 - 0.4 K/UL    ABS. BASOPHILS 0.0 0.0 - 0.1 K/UL    ABS. IMM. GRANS. 0.1 (H) 0.00 - 0.04 K/UL    DF AUTOMATED     METABOLIC PANEL, COMPREHENSIVE    Collection Time: 09/21/19 11:43 AM   Result Value Ref Range    Sodium 135 (L) 136 - 145 mmol/L    Potassium 3.6 3.5 - 5.1 mmol/L    Chloride 100 97 - 108 mmol/L    CO2 31 21 - 32 mmol/L    Anion gap 4 (L) 5 - 15 mmol/L    Glucose 372 (H) 65 - 100 mg/dL    BUN 15 6 - 20 MG/DL    Creatinine 1.08 0.70 - 1.30 MG/DL    BUN/Creatinine ratio 14 12 - 20      GFR est AA >60 >60 ml/min/1.73m2    GFR est non-AA >60 >60 ml/min/1.73m2    Calcium 8.3 (L) 8.5 - 10.1 MG/DL    Bilirubin, total 2.2 (H) 0.2 - 1.0 MG/DL    ALT (SGPT) 19 12 - 78 U/L    AST (SGOT) 18 15 - 37 U/L    Alk.  phosphatase 79 45 - 117 U/L    Protein, total 7.2 6.4 - 8.2 g/dL    Albumin 3.6 3.5 - 5.0 g/dL    Globulin 3.6 2.0 - 4.0 g/dL    A-G Ratio 1.0 (L) 1.1 - 2.2     CK W/ REFLX CKMB    Collection Time: 09/21/19 11:43 AM   Result Value Ref Range    CK 99 39 - 308 U/L   NT-PRO BNP    Collection Time: 09/21/19 11:43 AM   Result Value Ref Range    NT pro-BNP 1,725 (H) <450 PG/ML   TROPONIN I    Collection Time: 09/21/19 11:43 AM   Result Value Ref Range    Troponin-I, Qt. 0.19 (H) <0.05 ng/mL   URINALYSIS W/ REFLEX CULTURE    Collection Time: 09/21/19 11:43 AM   Result Value Ref Range    Color YELLOW/STRAW      Appearance CLEAR CLEAR      Specific gravity 1.025 1.003 - 1.030      pH (UA) 6.0 5.0 - 8.0      Protein >300 (A) NEG mg/dL    Glucose >1,000 (A) NEG mg/dL    Ketone 15 (A) NEG mg/dL    Bilirubin NEGATIVE  NEG      Blood TRACE (A) NEG      Urobilinogen 0.2 0.2 - 1.0 EU/dL    Nitrites NEGATIVE  NEG      Leukocyte Esterase NEGATIVE  NEG      WBC 0-4 0 - 4 /hpf    RBC 0-5 0 - 5 /hpf    Epithelial cells FEW FEW /lpf    Bacteria NEGATIVE  NEG /hpf    UA:UC IF INDICATED CULTURE NOT INDICATED BY UA RESULT CNI     POC TROPONIN-I    Collection Time: 09/21/19 11:52 AM   Result Value Ref Range    Troponin-I (POC) 0.10 (H) 0.00 - 0.08 ng/mL   POC LACTIC ACID    Collection Time: 09/21/19 11:54 AM   Result Value Ref Range    Lactic Acid (POC) 1.27 0.40 - 2.00 mmol/L   POC G3 - PUL    Collection Time: 09/21/19 12:03 PM   Result Value Ref Range    pH (POC) 7.268 (L) 7.35 - 7.45      pCO2 (POC) 60.5 (H) 35.0 - 45.0 MMHG    pO2 (POC) 82 80 - 100 MMHG    HCO3 (POC) 27.6 (H) 22 - 26 MMOL/L    sO2 (POC) 94 92 - 97 %    Base excess (POC) 1 mmol/L    Site RIGHT RADIAL      Device: BIPAP      Allens test (POC) YES      Specimen type (POC) ARTERIAL         Radiologic Studies -   XR CHEST PORT   Final Result   IMPRESSION: Pulmonary edema.         CT Results  (Last 48 hours)    None        CXR Results  (Last 48 hours)               09/21/19 1159  XR CHEST PORT Final result    Impression: IMPRESSION: Pulmonary edema. Narrative:  EXAM: Portable CXR. 1137 hours. INDICATION: sob       FINDINGS:   There is prominent pulmonary edema. Heart size is normal. There is no   pneumothorax or midline shift. No sizable pleural effusion is seen. Medical Decision Making   I am the first provider for this patient. I reviewed the vital signs, available nursing notes, past medical history, past surgical history, family history and social history. Vital Signs-Reviewed the patient's vital signs. Patient Vitals for the past 24 hrs:   Temp Pulse Resp BP SpO2   09/21/19 1254  99  186/75    09/21/19 1250  96 20 178/75 98 %   09/21/19 1235  93  171/70    09/21/19 1230  95 19 171/70 96 %   09/21/19 1224  92  160/67 95 %   09/21/19 1213    168/69    09/21/19 1210  92 19 171/66 94 %   09/21/19 1202  98  183/79    09/21/19 1200  (!) 101 23 183/79 94 %   09/21/19 1148     94 %   09/21/19 1145  85 23 (!) 245/94 93 %   09/21/19 1139     92 %   09/21/19 1136     (!) 78 %   09/21/19 1132 98 °F (36.7 °C)    (!) 67 %   09/21/19 1130 96.3 °F (35.7 °C) 85 26 (!) 226/84 (!) 51 %       Pulse Oximetry Analysis - 53% on RA    Cardiac Monitor:   Rate: 85 bpm  Rhythm: Atrial Fibrillation      EKG interpretation: (Preliminary)  Rhythm: atrial fib and PVC's; and irregular. Rate (approx.): 88; Axis: left axis deviation; QRS interval: prolonged; ST/T wave: non-specific changes; Other findings: Left bundle branch block. Records Reviewed: Nursing Notes, Old Medical Records and Ambulance Run Sheet    Provider Notes (Medical Decision Making): This patient with a history of hypertension and mild aortic stenosis presents with acute respiratory failure with hypoxia. Chest x-ray shows diffuse pulmonary edema. He has been having some distention of his abdomen likely fluid retention. EKG is nonischemic I do not see any acute ischemic changes or infarct.   His troponin is minimally elevated. He had emergently elevated blood pressure readings. Medially placed him on NIPPV. Is given Vasotec and nitroglycerin infusion to help bring down his blood pressures with a goal I would say of 160. Given diuretic. He had great improvement with these interventions. We will consult with cardiology and have him admitted to the hospitalist service. He does have mild aortic stenosis. He will likely need an echocardiogram to see if this is worsened. ED Course:   Initial assessment performed. The patients presenting problems have been discussed, and they are in agreement with the care plan formulated and outlined with them. I have encouraged them to ask questions as they arise throughout their visit. Orders Placed This Encounter    CULTURE, BLOOD, PAIRED    XR CHEST PORT    CBC WITH AUTOMATED DIFF    METABOLIC PANEL, COMPREHENSIVE    CK W/REFLEX CKMB    NT-PRO BNP    BLOOD GAS, ARTERIAL    TROPONIN I    URINALYSIS W/ REFLEX CULTURE    POC G3 - PUL    BLOOD GAS, ARTERIAL    DIET CARDIAC Regular    POC  LACTIC ACID    NOTIFY PROVIDER: SPECIFY Notify provider on pt's arrival to floor ONE TIME STAT    BEDREST W/ BEDSIDE COMMODE    VITAL SIGNS PER UNIT ROUTINE    FULL CODE    NON-INVASIVE POSITIVE PRESSURE VENTILATION    POC TROPONIN-I    POC LACTIC ACID    POC TROPONIN-I    EKG, 12 LEAD, INITIAL    nitroglycerin (Tridil) 200 mcg/ml infusion    bumetanide (BUMEX) injection 2 mg    enalaprilat (VASOTEC) injection 1.25 mg    acetaminophen (TYLENOL) tablet 650 mg    ondansetron (ZOFRAN) injection 4 mg    IP CONSULT TO HOSPITALIST    IP CONSULT TO CARDIOLOGY         Critical Care Time:   I have spent 45 minutes of critical care time in evaluating and treating this patient. This includes time spent at bedside, time with family and decision makers, documentation, review of labs and imaging, and/or consultation with specialists.   It does not include time spent on separately billed procedures. This patient presents with a critical illness or injury that acutely impairs one or more vital organ systems such that there is a high probability of imminent or life threatening deterioration in the patient's condition. This case involved decision making of high complexity to assess, manipulate, and support vital organ system failure and/or to prevent further life threatening deterioration of the patient's condition. Failure to initiate these interventions on an urgent basis would likely result in sudden, clinically significant or life threatening deterioration in the patient's condition. Abnormal findings supporting critical care: Respiratory failure with hypoxia, acute pulmonary edema  Interventions to support critical care: NIPPV, antihypertensives and nitrates, diuretics  Failure to intervene may result in: Worsening respiratory failure, cardiac failure, death      Disposition:  Admit      Diagnosis     Clinical Impression:   1. Acute respiratory failure with hypoxia (HCC)    2. Pulmonary edema with congestive heart failure ([address]    3. Hypertensive emergency            This note will not be viewable in [address]

## 2019-09-21 NOTE — Clinical Note
Lesion located in the Proximal LAD. Balloon inflated using multiple inflations inflation technique. Pressure = 8 brian; Duration = 9 sec. Inflation 2: Pressure: 8 brian; Duration: 18 sec.

## 2019-09-21 NOTE — ROUTINE PROCESS
TRANSFER - OUT REPORT: 
 
Verbal report given to GUNNAR Baer(name) on Abhinav Murray  being transferred to PCU(unit) for routine progression of care Report consisted of patients Situation, Background, Assessment and  
Recommendations(SBAR). Information from the following report(s) SBAR, ED Summary, [facility] and Recent Results was reviewed with the receiving nurse. Lines:  
Peripheral IV 09/21/19 Right Antecubital (Active) Site Assessment Clean, dry, & intact 9/21/2019 11:37 AM  
Phlebitis Assessment 0 9/21/2019 11:37 AM  
Infiltration Assessment 0 9/21/2019 11:37 AM  
Dressing Status Clean, dry, & intact 9/21/2019 11:37 AM  
Dressing Type Transparent 9/21/2019 11:37 AM  
Hub Color/Line Status Pink 9/21/2019 11:37 AM  
   
Peripheral IV 09/21/19 Left Antecubital (Active) Site Assessment Clean, dry, & intact 9/21/2019 11:37 AM  
Phlebitis Assessment 0 9/21/2019 11:37 AM  
Infiltration Assessment 0 9/21/2019 11:37 AM  
Dressing Status Clean, dry, & intact 9/21/2019 11:37 AM  
Dressing Type Transparent 9/21/2019 11:37 AM  
Hub Color/Line Status Pink 9/21/2019 11:37 AM  
  
 
Opportunity for questions and clarification was provided. Patient transported with: 
 Monitor Registered Nurse

## 2019-09-21 NOTE — CONSULTS
Consult/Admission    NAME: Adrianna Currie   :  1945   MRN:  [id number]     Date/Time:  2019 7:32 PM    Patient PCP: Kaila Abdi MD  ________________________________________________________________________     Assessment:     Acute pulmonary edema   ASCVD   Hx mild aortic stenosis  Atrial Fibrillation. Diabetes type 2 requiring Insulin ,  Which he refuses to take   CKD stage 3   HTN   Hyperlipidemia. Mild Carotid disease by duplex. Morbid obesity           Plan:     Diuresis  Review echo when available , assess LV function. I will plan to cath him on Monday. [x]           High complexity decision making was performed        Subjective:   CHIEF COMPLAINT: SOB     HISTORY OF PRESENT ILLNESS:         HPI: Adrianna Currie, 74 y.o. male  presents to the ED with cc of shortness of breath and respiratory distress. EMS was called to the house for shortness of breath. Patient states he has been having shortness of breath for the past 2 days. Progressively worse this morning. When he woke up he was feeling better than the past couple days and his wife left the house. When she returned he was in severe distress. EMS states he was very diaphoretic and pale on scene. His oxygen saturations were 78% on room air. He has been hypertensive with systolics above 222. He does not have any chest pain. He has had diarrhea for 2 days. No nausea or vomiting. No fevers or chills. He has had swelling in his abdomen. No history of congestive heart failure.   Does appear that he has a history of mild aortic stenosis from an echocardiogram in 2018.           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 No pertinent surgical history. Allergies   Allergen Reactions    Adhesive Tape-Silicones Unknown (comments)    Chocolate Flavor Unknown (comments)      Meds:  See below  Social History     Tobacco Use    Smoking status: Never Smoker    Smokeless tobacco: Never Used   Substance Use Topics    Alcohol use: Yes     Comment: social      Family History   Problem Relation Age of Onset    Heart Disease Mother         murmor    Heart Disease Father        REVIEW OF SYSTEMS:     []            Unable to obtain  ROS due to ---   [x]            Total of 12 systems reviewed as follows:    Constitutional: negative fever, negative chills, negative weight loss  Eyes:   negative visual changes  ENT:   negative sore throat, tongue or lip swelling  Respiratory:  negative cough, negative dyspnea  Cards:  negative for chest pain, palpitations, lower extremity edema  GI:   negative for nausea, vomiting, diarrhea, and abdominal pain  Genitourinary: negative for frequency, dysuria  Integument:  negative for rash   Hematologic:  negative for easy bruising and gum/nose bleeding  Musculoskel: negative for myalgias,  back pain  Neurological:  negative for headaches, dizziness, vertigo, weakness  Behavl/Psych: negative for feelings of anxiety, depression     Pertinent Positives include :    Objective:      Physical Exam:    Last 24hrs VS reviewed since prior progress note.  Most recent are:    Visit Vitals  /73   Pulse 81   Temp 98.1 °F (36.7 °C)   Resp 16   Ht 5' 10\" (1.778 m)   Wt 98.2 kg (216 lb 7.9 oz)   SpO2 97%   BMI 31.06 kg/m²       Intake/Output Summary (Last 24 hours) at 9/21/2019 1932  Last data filed at 9/21/2019 1457  Gross per 24 hour   Intake    Output 1700 ml Net -1700 ml        General Appearance: Well developed, well nourished, alert & oriented x 3,    no acute distress. Ears/Nose/Mouth/Throat: Pupils equal and round, Hearing grossly normal.  Neck: Supple. JVP within normal limits. Carotids good upstrokes, with no bruit. Chest: Lungs clear to auscultation bilaterally. Cardiovascular: Regular rate and rhythm, S1S2 normal, 2/6 CINDY across the chest.   Abdomen: Soft, non-tender, bowel sounds are active. No organomegaly. Extremities: No edema bilaterally. Femoral pulses +2, Distal Pulses +1. Skin: Warm and dry. Neuro: CN II-XII grossly intact, Strength and sensation grossly intact. Data:      Prior to Admission medications    Medication Sig Start Date End Date Taking? Authorizing Provider   glipiZIDE (GLUCOTROL) 10 mg tablet TAKE ONE TABLET BY MOUTH TWICE A DAY 8/26/19   Endy Flores MD   lisinopril (PRINIVIL, ZESTRIL) 40 mg tablet TAKE 1 TABLET BY MOUTH ONCE DAILY 7/1/19   Endy Flores MD   tamsulosin (FLOMAX) 0.4 mg capsule TAKE TWO CAPSULES BY MOUTH DAILY 4/22/19   Endy Flores MD   SITagliptin (JANUVIA) 100 mg tablet Take 1 Tab by mouth daily. 4/22/19   Endy Flores MD   hydrALAZINE (APRESOLINE) 50 mg tablet Take 0.5 Tabs by mouth three (3) times daily. 4/22/19   Endy Flores MD   finasteride (PROSCAR) 5 mg tablet Take 1 Tab by mouth daily.  4/22/19   Endy Flores MD   losartan (COZAAR) 100 mg tablet TAKE ONE TABLET BY MOUTH DAILY 3/22/19   Endy Flores MD   metFORMIN (GLUCOPHAGE) 500 mg tablet TAKE ONE TABLET BY MOUTH EVERY MORNING AND TAKE TWO TABLETS BY MOUTH EVERY EVENING WITH DINNER 1/9/19   Endy Flores MD   pravastatin (PRAVACHOL) 80 mg tablet TAKE ONE TABLET BY MOUTH DAILY 12/24/18   Endy Flores MD   amLODIPine (NORVASC) 10 mg tablet TAKE ONE TABLET BY MOUTH DAILY 10/23/18   Endy Flores MD   timolol (TIMOPTIC-XE) 0.5 % ophthalmic gel-forming  9/19/18   Provider, Historical meloxicam (MOBIC) 15 mg tablet TAKE ONE TABLET BY MOUTH DAILY 8/1/18   Isabelle Coley MD   Cfpap-0-IRV-EPA-Fish Oil 1,000 mg (120 mg-180 mg) cap Take 1 Cap by mouth three (3) times daily. Provider, Historical   aspirin delayed-release 81 mg tablet Take  by mouth daily. Provider, Historical       Recent Results (from the past 24 hour(s))   EKG, 12 LEAD, INITIAL    Collection Time: 09/21/19 11:35 AM   Result Value Ref Range    Ventricular Rate 88 BPM    Atrial Rate 87 BPM    QRS Duration 148 ms    Q-T Interval 438 ms    QTC Calculation (Bezet) 529 ms    Calculated R Axis -41 degrees    Calculated T Axis 123 degrees    Diagnosis       Atrial fibrillation with premature ventricular or aberrantly conducted   complexes  Left axis deviation  Left bundle branch block  No previous ECGs available     TSH 3RD GENERATION    Collection Time: 09/21/19 11:41 AM   Result Value Ref Range    TSH 0.95 0.36 - 3.74 uIU/mL   CBC WITH AUTOMATED DIFF    Collection Time: 09/21/19 11:43 AM   Result Value Ref Range    WBC 12.8 (H) 4.1 - 11.1 K/uL    RBC 5.80 (H) 4.10 - 5.70 M/uL    HGB 15.4 12.1 - 17.0 g/dL    HCT 48.1 36.6 - 50.3 %    MCV 82.9 80.0 - 99.0 FL    MCH 26.6 26.0 - 34.0 PG    MCHC 32.0 30.0 - 36.5 g/dL    RDW 13.9 11.5 - 14.5 %    PLATELET 325 206 - 211 K/uL    MPV 10.5 8.9 - 12.9 FL    NRBC 0.0 0  WBC    ABSOLUTE NRBC 0.00 0.00 - 0.01 K/uL    NEUTROPHILS 74 32 - 75 %    LYMPHOCYTES 22 12 - 49 %    MONOCYTES 3 (L) 5 - 13 %    EOSINOPHILS 0 0 - 7 %    BASOPHILS 0 0 - 1 %    IMMATURE GRANULOCYTES 1 (H) 0.0 - 0.5 %    ABS. NEUTROPHILS 9.5 (H) 1.8 - 8.0 K/UL    ABS. LYMPHOCYTES 2.8 0.8 - 3.5 K/UL    ABS. MONOCYTES 0.4 0.0 - 1.0 K/UL    ABS. EOSINOPHILS 0.0 0.0 - 0.4 K/UL    ABS. BASOPHILS 0.0 0.0 - 0.1 K/UL    ABS. IMM.  GRANS. 0.1 (H) 0.00 - 0.04 K/UL    DF AUTOMATED     METABOLIC PANEL, COMPREHENSIVE    Collection Time: 09/21/19 11:43 AM   Result Value Ref Range    Sodium 135 (L) 136 - 145 mmol/L    Potassium 3.6 3.5 - 5.1 mmol/L    Chloride 100 97 - 108 mmol/L    CO2 31 21 - 32 mmol/L    Anion gap 4 (L) 5 - 15 mmol/L    Glucose 372 (H) 65 - 100 mg/dL    BUN 15 6 - 20 MG/DL    Creatinine 1.08 0.70 - 1.30 MG/DL    BUN/Creatinine ratio 14 12 - 20      GFR est AA >60 >60 ml/min/1.73m2    GFR est non-AA >60 >60 ml/min/1.73m2    Calcium 8.3 (L) 8.5 - 10.1 MG/DL    Bilirubin, total 2.2 (H) 0.2 - 1.0 MG/DL    ALT (SGPT) 19 12 - 78 U/L    AST (SGOT) 18 15 - 37 U/L    Alk.  phosphatase 79 45 - 117 U/L    Protein, total 7.2 6.4 - 8.2 g/dL    Albumin 3.6 3.5 - 5.0 g/dL    Globulin 3.6 2.0 - 4.0 g/dL    A-G Ratio 1.0 (L) 1.1 - 2.2     CK W/ REFLX CKMB    Collection Time: 09/21/19 11:43 AM   Result Value Ref Range    CK 99 39 - 308 U/L   NT-PRO BNP    Collection Time: 09/21/19 11:43 AM   Result Value Ref Range    NT pro-BNP 1,725 (H) <450 PG/ML   TROPONIN I    Collection Time: 09/21/19 11:43 AM   Result Value Ref Range    Troponin-I, Qt. 0.19 (H) <0.05 ng/mL   URINALYSIS W/ REFLEX CULTURE    Collection Time: 09/21/19 11:43 AM   Result Value Ref Range    Color YELLOW/STRAW      Appearance CLEAR CLEAR      Specific gravity 1.025 1.003 - 1.030      pH (UA) 6.0 5.0 - 8.0      Protein >300 (A) NEG mg/dL    Glucose >1,000 (A) NEG mg/dL    Ketone 15 (A) NEG mg/dL    Bilirubin NEGATIVE  NEG      Blood TRACE (A) NEG      Urobilinogen 0.2 0.2 - 1.0 EU/dL    Nitrites NEGATIVE  NEG      Leukocyte Esterase NEGATIVE  NEG      WBC 0-4 0 - 4 /hpf    RBC 0-5 0 - 5 /hpf    Epithelial cells FEW FEW /lpf    Bacteria NEGATIVE  NEG /hpf    UA:UC IF INDICATED CULTURE NOT INDICATED BY UA RESULT CNI     POC TROPONIN-I    Collection Time: 09/21/19 11:52 AM   Result Value Ref Range    Troponin-I (POC) 0.10 (H) 0.00 - 0.08 ng/mL   POC LACTIC ACID    Collection Time: 09/21/19 11:54 AM   Result Value Ref Range    Lactic Acid (POC) 1.27 0.40 - 2.00 mmol/L   POC G3 - PUL    Collection Time: 09/21/19 12:03 PM   Result Value Ref Range    pH (POC) 7.268 (L) 7.35 - 7.45 pCO2 (POC) 60.5 (H) 35.0 - 45.0 MMHG    pO2 (POC) 82 80 - 100 MMHG    HCO3 (POC) 27.6 (H) 22 - 26 MMOL/L    sO2 (POC) 94 92 - 97 %    Base excess (POC) 1 mmol/L    Site RIGHT RADIAL      Device: BIPAP      Allens test (POC) YES      Specimen type (POC) ARTERIAL     POC G3 - PUL    Collection Time: 09/21/19  2:15 PM   Result Value Ref Range    pH (POC) 7.349 (L) 7.35 - 7.45      pCO2 (POC) 52.5 (H) 35.0 - 45.0 MMHG    pO2 (POC) 178 (H) 80 - 100 MMHG    HCO3 (POC) 28.9 (H) 22 - 26 MMOL/L    sO2 (POC) 99 (H) 92 - 97 %    Base excess (POC) 3 mmol/L    Site RIGHT RADIAL      Device: BIPAP      Allens test (POC) YES      Specimen type (POC) ARTERIAL     GLUCOSE, POC    Collection Time: 09/21/19  4:54 PM   Result Value Ref Range    Glucose (POC) 278 (H) 65 - 100 mg/dL    Performed by Junior Price(CON)    TROPONIN I    Collection Time: 09/21/19  5:54 PM   Result Value Ref Range    Troponin-I, Qt. 1.56 (H) <0.05 ng/mL

## 2019-09-21 NOTE — Clinical Note
Lesion located in the Proximal Cx. Balloon inflated using multiple inflations inflation technique. Pressure = 8 brian; Duration = 12 sec. Inflation 2: Pressure: 10 brian; Duration: 15 sec. Inflation 3: Pressure: 10 brian; Duration: 15 sec. Inflation 4: Pressure: 10 brian; Duration: 15 sec.

## 2019-09-21 NOTE — ED NOTES
Patient presents via EMS stretcher with cc of dizziness and shortness of breath that started yesterday. Wife reports pt regularly follows up with PCP Dr. Shameka Corley. Wife also reports pt with diarrhea today. EMS states pt diaphoretic and pale on scene. Also per EMS, pt hypertensive with systolic BP in the 063Z. Patient given 324mg ASA and 1 inch of nitro paste en route. EMS reports pt room air oxygen saturation was 78% on scene. Patient oxygen saturation noted to be in the 50s on RA upon ED arrival. Pt placed on NC @6L and quickly transitioned to NRB @15L. Dr. Wes Garza at bedside and verbal orders received to place pt on BiPAP.

## 2019-09-22 ENCOUNTER — APPOINTMENT (OUTPATIENT)
Dept: NON INVASIVE DIAGNOSTICS | Age: 74
DRG: 246 | End: 2019-09-22
Attending: INTERNAL MEDICINE
Payer: MEDICARE

## 2019-09-22 LAB
ANION GAP SERPL CALC-SCNC: 6 MMOL/L (ref 5–15)
ATRIAL RATE: 87 BPM
BASOPHILS # BLD: 0 K/UL (ref 0–0.1)
BASOPHILS NFR BLD: 0 % (ref 0–1)
BUN SERPL-MCNC: 17 MG/DL (ref 6–20)
BUN/CREAT SERPL: 18 (ref 12–20)
CALCIUM SERPL-MCNC: 8.2 MG/DL (ref 8.5–10.1)
CALCULATED R AXIS, ECG10: -41 DEGREES
CALCULATED T AXIS, ECG11: 123 DEGREES
CHLORIDE SERPL-SCNC: 100 MMOL/L (ref 97–108)
CO2 SERPL-SCNC: 32 MMOL/L (ref 21–32)
CREAT SERPL-MCNC: 0.97 MG/DL (ref 0.7–1.3)
DIAGNOSIS, 93000: NORMAL
DIFFERENTIAL METHOD BLD: NORMAL
ECHO AO ROOT DIAM: 3.11 CM
ECHO AV AREA PEAK VELOCITY: 1.1 CM2
ECHO AV AREA VTI: 1 CM2
ECHO AV AREA/BSA PEAK VELOCITY: 0.5 CM2/M2
ECHO AV AREA/BSA VTI: 0.4 CM2/M2
ECHO AV MEAN GRADIENT: 16.2 MMHG
ECHO AV PEAK GRADIENT: 17.6 MMHG
ECHO AV PEAK VELOCITY: 209.82 CM/S
ECHO AV VTI: 56.37 CM
ECHO EST RA PRESSURE: 10 MMHG
ECHO LA AREA 4C: 16.9 CM2
ECHO LA MAJOR AXIS: 3.77 CM
ECHO LA TO AORTIC ROOT RATIO: 1.21
ECHO LA VOL 4C: 56.83 ML (ref 18–58)
ECHO LA VOLUME INDEX A4C: 26.32 ML/M2 (ref 16–28)
ECHO LV E' LATERAL VELOCITY: 7.8 CM/S
ECHO LV E' SEPTAL VELOCITY: 6.67 CM/S
ECHO LV INTERNAL DIMENSION DIASTOLIC: 5.38 CM (ref 4.2–5.9)
ECHO LV INTERNAL DIMENSION SYSTOLIC: 4.16 CM
ECHO LV IVSD: 1.75 CM (ref 0.6–1)
ECHO LV MASS 2D: 374.5 G (ref 88–224)
ECHO LV MASS INDEX 2D: 173.5 G/M2 (ref 49–115)
ECHO LV POSTERIOR WALL DIASTOLIC: 0.96 CM (ref 0.6–1)
ECHO LV POSTERIOR WALL SYSTOLIC: 0.99 CM
ECHO LVOT CARDIAC OUTPUT: 4.3 L/MIN
ECHO LVOT DIAM: 1.59 CM
ECHO LVOT PEAK GRADIENT: 5.6 MMHG
ECHO LVOT PEAK VELOCITY: 118.83 CM/S
ECHO LVOT SV: 53.7 ML
ECHO LVOT VTI: 26.9 CM
ECHO MV A VELOCITY: 120.63 CM/S
ECHO MV AREA VTI: 1.5 CM2
ECHO MV E DECELERATION TIME (DT): 268.1 MS
ECHO MV E VELOCITY: 96.46 CM/S
ECHO MV E/A RATIO: 0.8
ECHO MV E/E' LATERAL: 12.37
ECHO MV E/E' RATIO (AVERAGED): 13.41
ECHO MV E/E' SEPTAL: 14.46
ECHO MV MAX VELOCITY: 106.86 CM/S
ECHO MV MEAN GRADIENT: 2.6 MMHG
ECHO MV PEAK GRADIENT: 4.6 MMHG
ECHO MV REGURGITANT PEAK GRADIENT: 4.5 MMHG
ECHO MV REGURGITANT PEAK VELOCITY: 106.54 CM/S
ECHO MV VTI: 35.18 CM
ECHO PULMONARY ARTERY SYSTOLIC PRESSURE (PASP): 31.4 MMHG
ECHO RA AREA 4C: 16.7 CM2
ECHO RIGHT VENTRICULAR SYSTOLIC PRESSURE (RVSP): 31.4 MMHG
ECHO TV REGURGITANT MAX VELOCITY: 231.05 CM/S
ECHO TV REGURGITANT PEAK GRADIENT: 21.4 MMHG
EOSINOPHIL # BLD: 0 K/UL (ref 0–0.4)
EOSINOPHIL NFR BLD: 0 % (ref 0–7)
ERYTHROCYTE [DISTWIDTH] IN BLOOD BY AUTOMATED COUNT: 13.6 % (ref 11.5–14.5)
GLUCOSE BLD STRIP.AUTO-MCNC: 209 MG/DL (ref 65–100)
GLUCOSE BLD STRIP.AUTO-MCNC: 226 MG/DL (ref 65–100)
GLUCOSE BLD STRIP.AUTO-MCNC: 232 MG/DL (ref 65–100)
GLUCOSE BLD STRIP.AUTO-MCNC: 281 MG/DL (ref 65–100)
GLUCOSE SERPL-MCNC: 185 MG/DL (ref 65–100)
HCT VFR BLD AUTO: 38.5 % (ref 36.6–50.3)
HGB BLD-MCNC: 12.9 G/DL (ref 12.1–17)
IMM GRANULOCYTES # BLD AUTO: 0 K/UL (ref 0–0.04)
IMM GRANULOCYTES NFR BLD AUTO: 0 % (ref 0–0.5)
LYMPHOCYTES # BLD: 1.6 K/UL (ref 0.8–3.5)
LYMPHOCYTES NFR BLD: 17 % (ref 12–49)
MCH RBC QN AUTO: 27.2 PG (ref 26–34)
MCHC RBC AUTO-ENTMCNC: 33.5 G/DL (ref 30–36.5)
MCV RBC AUTO: 81.1 FL (ref 80–99)
MONOCYTES # BLD: 0.8 K/UL (ref 0–1)
MONOCYTES NFR BLD: 8 % (ref 5–13)
NEUTS SEG # BLD: 7 K/UL (ref 1.8–8)
NEUTS SEG NFR BLD: 75 % (ref 32–75)
NRBC # BLD: 0 K/UL (ref 0–0.01)
NRBC BLD-RTO: 0 PER 100 WBC
PLATELET # BLD AUTO: 174 K/UL (ref 150–400)
PMV BLD AUTO: 10.5 FL (ref 8.9–12.9)
POTASSIUM SERPL-SCNC: 3.2 MMOL/L (ref 3.5–5.1)
Q-T INTERVAL, ECG07: 438 MS
QRS DURATION, ECG06: 148 MS
QTC CALCULATION (BEZET), ECG08: 529 MS
RBC # BLD AUTO: 4.75 M/UL (ref 4.1–5.7)
SERVICE CMNT-IMP: ABNORMAL
SODIUM SERPL-SCNC: 138 MMOL/L (ref 136–145)
T4 FREE SERPL-MCNC: 1.1 NG/DL (ref 0.8–1.5)
VENTRICULAR RATE, ECG03: 88 BPM
WBC # BLD AUTO: 9.5 K/UL (ref 4.1–11.1)

## 2019-09-22 PROCEDURE — 82962 GLUCOSE BLOOD TEST: CPT

## 2019-09-22 PROCEDURE — 85025 COMPLETE CBC W/AUTO DIFF WBC: CPT

## 2019-09-22 PROCEDURE — 74011636637 HC RX REV CODE- 636/637: Performed by: INTERNAL MEDICINE

## 2019-09-22 PROCEDURE — 74011250637 HC RX REV CODE- 250/637: Performed by: INTERNAL MEDICINE

## 2019-09-22 PROCEDURE — 74011000250 HC RX REV CODE- 250: Performed by: INTERNAL MEDICINE

## 2019-09-22 PROCEDURE — 93306 TTE W/DOPPLER COMPLETE: CPT

## 2019-09-22 PROCEDURE — 36415 COLL VENOUS BLD VENIPUNCTURE: CPT

## 2019-09-22 PROCEDURE — 65660000000 HC RM CCU STEPDOWN

## 2019-09-22 PROCEDURE — 77010033678 HC OXYGEN DAILY

## 2019-09-22 PROCEDURE — 74011250637 HC RX REV CODE- 250/637: Performed by: FAMILY MEDICINE

## 2019-09-22 PROCEDURE — 74011250636 HC RX REV CODE- 250/636: Performed by: INTERNAL MEDICINE

## 2019-09-22 PROCEDURE — 80048 BASIC METABOLIC PNL TOTAL CA: CPT

## 2019-09-22 RX ORDER — CLONIDINE HYDROCHLORIDE 0.1 MG/1
0.2 TABLET ORAL 2 TIMES DAILY
Status: DISCONTINUED | OUTPATIENT
Start: 2019-09-22 | End: 2019-09-26 | Stop reason: HOSPADM

## 2019-09-22 RX ORDER — POTASSIUM CHLORIDE 750 MG/1
10 TABLET, FILM COATED, EXTENDED RELEASE ORAL 2 TIMES DAILY
Status: DISCONTINUED | OUTPATIENT
Start: 2019-09-22 | End: 2019-09-23

## 2019-09-22 RX ORDER — INSULIN GLARGINE 100 [IU]/ML
10 INJECTION, SOLUTION SUBCUTANEOUS DAILY
Status: DISCONTINUED | OUTPATIENT
Start: 2019-09-23 | End: 2019-09-23

## 2019-09-22 RX ADMIN — AMLODIPINE BESYLATE 10 MG: 5 TABLET ORAL at 08:15

## 2019-09-22 RX ADMIN — GLIPIZIDE 10 MG: 5 TABLET ORAL at 08:14

## 2019-09-22 RX ADMIN — NITROGLYCERIN 75 MCG/MIN: 20 INJECTION INTRAVENOUS at 00:58

## 2019-09-22 RX ADMIN — NITROGLYCERIN 85 MCG/MIN: 20 INJECTION INTRAVENOUS at 06:03

## 2019-09-22 RX ADMIN — PRAVASTATIN SODIUM 80 MG: 40 TABLET ORAL at 21:54

## 2019-09-22 RX ADMIN — NITROGLYCERIN 90 MCG/MIN: 20 INJECTION INTRAVENOUS at 07:17

## 2019-09-22 RX ADMIN — METOPROLOL TARTRATE 25 MG: 25 TABLET ORAL at 21:55

## 2019-09-22 RX ADMIN — TIMOLOL MALEATE 1 DROP: 2.5 SOLUTION OPHTHALMIC at 08:19

## 2019-09-22 RX ADMIN — POTASSIUM CHLORIDE 10 MEQ: 750 TABLET, FILM COATED, EXTENDED RELEASE ORAL at 17:28

## 2019-09-22 RX ADMIN — Medication 10 ML: at 13:24

## 2019-09-22 RX ADMIN — INSULIN LISPRO 6 UNITS: 100 INJECTION, SOLUTION INTRAVENOUS; SUBCUTANEOUS at 11:41

## 2019-09-22 RX ADMIN — INSULIN LISPRO 4 UNITS: 100 INJECTION, SOLUTION INTRAVENOUS; SUBCUTANEOUS at 17:00

## 2019-09-22 RX ADMIN — HEPARIN SODIUM 5000 UNITS: 5000 INJECTION INTRAVENOUS; SUBCUTANEOUS at 13:23

## 2019-09-22 RX ADMIN — ASPIRIN 81 MG: 81 TABLET, COATED ORAL at 08:14

## 2019-09-22 RX ADMIN — FINASTERIDE 5 MG: 5 TABLET, FILM COATED ORAL at 09:00

## 2019-09-22 RX ADMIN — OMEGA-3 FATTY ACIDS CAP 1000 MG 1 CAPSULE: 1000 CAP at 15:35

## 2019-09-22 RX ADMIN — NITROGLYCERIN 100 MCG/MIN: 20 INJECTION INTRAVENOUS at 20:48

## 2019-09-22 RX ADMIN — HYDRALAZINE HYDROCHLORIDE 50 MG: 50 TABLET, FILM COATED ORAL at 21:54

## 2019-09-22 RX ADMIN — INSULIN LISPRO 4 UNITS: 100 INJECTION, SOLUTION INTRAVENOUS; SUBCUTANEOUS at 08:15

## 2019-09-22 RX ADMIN — OMEGA-3 FATTY ACIDS CAP 1000 MG 1 CAPSULE: 1000 CAP at 21:54

## 2019-09-22 RX ADMIN — NITROGLYCERIN 90 MCG/MIN: 20 INJECTION INTRAVENOUS at 11:34

## 2019-09-22 RX ADMIN — TIMOLOL MALEATE 1 DROP: 2.5 SOLUTION OPHTHALMIC at 17:29

## 2019-09-22 RX ADMIN — TAMSULOSIN HYDROCHLORIDE 0.4 MG: 0.4 CAPSULE ORAL at 08:15

## 2019-09-22 RX ADMIN — METOPROLOL TARTRATE 25 MG: 25 TABLET ORAL at 08:15

## 2019-09-22 RX ADMIN — INSULIN LISPRO 2 UNITS: 100 INJECTION, SOLUTION INTRAVENOUS; SUBCUTANEOUS at 21:54

## 2019-09-22 RX ADMIN — HYDRALAZINE HYDROCHLORIDE 50 MG: 50 TABLET, FILM COATED ORAL at 08:15

## 2019-09-22 RX ADMIN — HYDRALAZINE HYDROCHLORIDE 50 MG: 50 TABLET, FILM COATED ORAL at 15:35

## 2019-09-22 RX ADMIN — HEPARIN SODIUM 5000 UNITS: 5000 INJECTION INTRAVENOUS; SUBCUTANEOUS at 21:55

## 2019-09-22 RX ADMIN — HEPARIN SODIUM 5000 UNITS: 5000 INJECTION INTRAVENOUS; SUBCUTANEOUS at 04:47

## 2019-09-22 RX ADMIN — LOSARTAN POTASSIUM 100 MG: 100 TABLET ORAL at 08:14

## 2019-09-22 RX ADMIN — Medication 10 ML: at 05:40

## 2019-09-22 RX ADMIN — CLONIDINE HYDROCHLORIDE 0.2 MG: 0.1 TABLET ORAL at 18:21

## 2019-09-22 RX ADMIN — OMEGA-3 FATTY ACIDS CAP 1000 MG 1 CAPSULE: 1000 CAP at 08:14

## 2019-09-22 RX ADMIN — Medication 10 ML: at 21:55

## 2019-09-22 NOTE — PROGRESS NOTES
0700: Report given by Arnulfo Mcmahon (offgoing nurse) to Georgia Mo and Gordon (oncoming nurses). Report included Situation, background, Assessment and Recommendations. 7176: Patient in bed resting quietly. Call bell in reach. Will monitor. 1021: NTG drip rate decreased to 85mcg/min    1119: Blood pressure 163/65. NTG drip rate increased to 90mcg/min    1326: Blood pressure 151/63. NTG drip rate changed to 85 mcg/min    1405: Blood pressure 170/76 NTG drip rate changed to 90 mcg/min    1640: Blood pressure 177/76. NTG drip rate increased 95 mcg/min. 1654: Blood pressure 149/61. NTG rate decreased to 90 mcg/min. 1736: Blood pressure 175/70. NTG drip increased to 95 mcg/min     1900: Report given to Cortney Lujan (oncoming nurse) by Georgia Mo and Gordon (offgoing nurses). Report included Situation, Background, Assessment and Recommendations     1750: Blood pressure 181/68. NTG drip increased to 100 mcg/min    1752: Dr. Mendoza Salvage at bedside. MD made aware of blood pressure. He is going to look at his medications.  Dr. Collette Clifton states he will add clonidine to see if it helps BP.

## 2019-09-22 NOTE — PROGRESS NOTES
1925 - Shift report received from Miriam Arroyo RN using SBAR. Pt nitroglycerin gtt running at 85 mcg/min. Pt /73. Will continue to monitor. 0 - Dr. Terrance Adams at bedside. 1940 - Pt /71. Will continue to monitor. 2010 - Pt /71. Will titrate gtt to 90 mcg/min. 2025 - Dr. Terrance Adams aware of gtt rate. Dr. Terrance Adams input orders for metoprolol 25 mg and hydralazine 50 mg. Will administer and continue to monitor. 2140 - Pt administered metoprolol and hydralazine. Pt /67. Nitro gtt titrated to 85 mcg/min. 2145 - Pt /70. Nitro gtt titrated to 80 mcg/min. 2310 - Pt . Pt administered 2 units per order for 50% of corrective dose. 0005 - Pt /49. Pt Nitro gtt titrated to 75 mcg/min.  0208 - Pt /52. Nitro gtt titrated to 70 mcg/min. 0419 - Pt /77. Nitro gtt titrated to 75 mcg/min. 0540 - Pt /70. Nitro gtt titrated to 80 mcg/min.  0603 - Pt /76. Nitro gtt titrated to 85 mcg/min. 0717 - Pt /77. Nitro gtt titrated to 90 mcg/min. Bedside shift report given to GUNNAR Bravo and Colten Martinez RN using SBAR.

## 2019-09-22 NOTE — PROGRESS NOTES
Physical Therapy  Referral received and chart reviewed. Spoke with OT who reported nursing requesting therapy to hold today. Patient currently on nitroglycerin drip and limited to [facility] transfers only. Will plan to follow up tomorrow.    Arnoldo Shane, PT, DPT

## 2019-09-22 NOTE — PROGRESS NOTES
Bedside and Verbal shift change report given to Bismark Concepcion (oncoming nurse) by Christy Abraham RN (offgoing nurse). Report included the following information SBAR, Kardex, Intake/Output, MAR, Recent Results and Cardiac Rhythm NSR. Nitro gtt @100mcg    2048: gtt-100mcg /69  2109: gtt-95mcg /38  2200: gtt-90mcg /48  2254: gtt- 85mcg /46    0230: pt disconnected himself from IV trying to go to the restroom and did not rememeber. Pt stated, \" I must be doing it in my sleep. \" He is in bed now and resting comfortably. Reconnected him to the gtt.     0257- gtt- 80mcg /60

## 2019-09-22 NOTE — PROGRESS NOTES
Problem: Falls - Risk of 
Goal: *Absence of Falls Description Document Geoffrey Louis Fall Risk and appropriate interventions in the flowsheet. 9/22/2019 0733 by Ronal Lassiter Outcome: Progressing Towards Goal 
Note:  
Fall Risk Interventions: 
Mobility Interventions: Communicate number of staff needed for ambulation/transfer, Patient to call before getting OOB Medication Interventions: Patient to call before getting OOB, Teach patient to arise slowly Elimination Interventions: Call light in reach, Patient to call for help with toileting needs, Toileting schedule/hourly rounds 9/22/2019 0727 by Ronal Lassiter Outcome: Progressing Towards Goal 
Note:  
Fall Risk Interventions: 
Mobility Interventions: Communicate number of staff needed for ambulation/transfer, Patient to call before getting OOB Medication Interventions: Patient to call before getting OOB, Teach patient to arise slowly Elimination Interventions: Call light in reach, Patient to call for help with toileting needs, Toileting schedule/hourly rounds Problem: Breathing Pattern - Ineffective Goal: *Absence of hypoxia Outcome: Progressing Towards Goal

## 2019-09-22 NOTE — PROGRESS NOTES
Reason for Admission:  NSTEMI                RRAT Score: 31                 Resources/supports as identified by patient/family:     The patient lives in a 1 level home with 3 steps to enter with his wife and his grandson. He has 2 sons: 1 lives up the street from the patient and his wife and his other son lives in [place], [place]. Top Challenges facing patient (as identified by patient/family and CM): Finances/Medication cost?  The patient is retired and he reports no financial concerns. The [facility] [employer]                  Transportation? The patient is able to drive, his wife will assist with d/c transportation               Support system or lack thereof? The patient lives with his wife and 1 of his grandsons. He has 2 sons that are very supportive. He reports his family is extremely supportive                       Living arrangements? The patient lives in a 1 level home with 3 steps to enter with his wife and one of his grandsons              Self-care/ADLs/Cognition? The patient is completely independent in his ADLs and IADLs. He uses no assistive device when ambulating. Current Advanced Directive/Advance Care Plan: The patient is a full code and he does not have a MPOA/AD on-file                           Plan for utilizing home health: Unknown at this time, PT/OT consults are pending. The patient will likely have no needs upon d/c as he is completely independent in his ADLs and IADLs. Transition of Care Plan:   CM met with the patient at bedside to discuss dispo plan. The patient lives in a 1 level home with 3 steps to enter with his wife and 1 of his grandsons. The patient has 2 sons that are very supportive. He is completely independent in his ADLs and IADLs. He uses no assistive device when ambulating. He is able to drive and his wife will assist with d/c transportation.  The patient uses the [employer] and  [facility] for his medications. The patient reports no CM needs at this time. CM will continue to assist with dispo planning. Care Management Interventions  PCP Verified by CM: Yes(The patient saw his PCP about 2 weeks ago )  Mode of Transport at Discharge:  Other (see comment)(The patient's wife will assist with d/c transportation )  Transition of Care Consult (CM Consult): Discharge Planning  MyChart Signup: No  Discharge Durable Medical Equipment: No  Physical Therapy Consult: Yes  Occupational Therapy Consult: Yes  Speech Therapy Consult: No  Current Support Network: Lives with Spouse(One of the patient's grandsons lives with the patient and his wife )  Confirm Follow Up Transport: Self  Plan discussed with Pt/Family/Caregiver: Yes  Freedom of Choice Offered: Yes  [place] Resource Information Provided?: No  Discharge Location  Discharge Placement: Home    Alexus deng LCSW

## 2019-09-22 NOTE — PROGRESS NOTES
Problem: Falls - Risk of  Goal: *Absence of Falls  Description  Document Ijeomasaul Ruanoe Fall Risk and appropriate interventions in the flowsheet.   Outcome: Progressing Towards Goal  Note:   Fall Risk Interventions:  Mobility Interventions: Communicate number of staff needed for ambulation/transfer, Patient to call before getting OOB         Medication Interventions: Patient to call before getting OOB, Teach patient to arise slowly    Elimination Interventions: Call light in reach, Patient to call for help with toileting needs, Toileting schedule/hourly rounds              Problem: Breathing Pattern - Ineffective  Goal: *Absence of hypoxia  Outcome: Progressing Towards Goal

## 2019-09-22 NOTE — PROGRESS NOTES
Discussed with nurse. Patient currently on bedrest except BSC for nitroglycerin drip need for BP management. Requested OT defer and try back once patient able to be up moving around.

## 2019-09-22 NOTE — CONSULTS
Consult/Admission    NAME: Jojo Shields   :  1945   MRN:  [id number]     Date/Time:  2019 7:32 PM    Patient PCP: Real Joshi MD  ________________________________________________________________________     Assessment:     Acute pulmonary edema   ASCVD   Hx mild aortic stenosis  Atrial Fibrillation. Diabetes type 2 requiring Insulin ,  Which he refuses to take   CKD stage 3   HTN   Hyperlipidemia. Mild Carotid disease by duplex. Morbid obesity       Feeling better. Rhythm has converted back to NSR>     He continues to be quite hypertensive. He's on high dose IV NTG in addition to po meds   Will add Clonidine today and see if that helps. Echo shows LVH with normal systolic fx. EF 55 - 60% . Mild aortic valve stenosis. Mean gradient 15 mmHg. Plan:     Diuresis  Review echo when available , assess LV function. I will plan to cath him on Monday. [x]           High complexity decision making was performed        Subjective:   CHIEF COMPLAINT: SOB     HISTORY OF PRESENT ILLNESS:         HPI: Jojo Shields, 74 y.o. male  presents to the ED with cc of shortness of breath and respiratory distress. EMS was called to the house for shortness of breath. Patient states he has been having shortness of breath for the past 2 days. Progressively worse this morning. When he woke up he was feeling better than the past couple days and his wife left the house. When she returned he was in severe distress. EMS states he was very diaphoretic and pale on scene. His oxygen saturations were 78% on room air. He has been hypertensive with systolics above 013. He does not have any chest pain. He has had diarrhea for 2 days. No nausea or vomiting. No fevers or chills. He has had swelling in his abdomen. No history of congestive heart failure.   Does appear that he has a history of mild aortic stenosis from an echocardiogram in 2018.           Past Medical History:   Diagnosis Date    Aortic stenosis 8/2/2017    ASVD (arteriosclerotic vascular disease) 8/2/2017    Back pain 8/2/2017    BPH (benign prostatic hyperplasia) 8/2/2017    CKD (chronic kidney disease) 8/2/2017    Diabetes ([facility] 8/2/2017    DJD (degenerative joint disease) 8/2/2017    ED (erectile dysfunction) 8/2/2017    Guillain-[place] syndrome ([facility] 8/2/2017    Hyperlipidemia 8/2/2017    Hypertension 8/2/2017    Left carotid bruit 8/2/2017    Morbid obesity ([facility] 8/2/2017    On statin therapy 8/2/2017    Urticaria 8/2/2017      History reviewed. No pertinent surgical history.   Allergies   Allergen Reactions    Adhesive Tape-Silicones Unknown (comments)    Chocolate Flavor Unknown (comments)      Meds:  See below  Social History     Tobacco Use    Smoking status: Never Smoker    Smokeless tobacco: Never Used   Substance Use Topics    Alcohol use: Yes     Comment: social      Family History   Problem Relation Age of Onset    Heart Disease Mother         murmor    Heart Disease Father        REVIEW OF SYSTEMS:     []            Unable to obtain  ROS due to ---   [x]            Total of 12 systems reviewed as follows:    Constitutional: negative fever, negative chills, negative weight loss  Eyes:   negative visual changes  ENT:   negative sore throat, tongue or lip swelling  Respiratory:  negative cough, negative dyspnea  Cards:  negative for chest pain, palpitations, lower extremity edema  GI:   negative for nausea, vomiting, diarrhea, and abdominal pain  Genitourinary: negative for frequency, dysuria  Integument:  negative for rash   Hematologic:  negative for easy bruising and gum/nose bleeding  Musculoskel: negative for myalgias,  back pain  Neurological:  negative for headaches, dizziness, vertigo, weakness  Behavl/Psych: negative for feelings of anxiety, depression     Pertinent Positives include :    Objective:      Physical Exam:    Last 24hrs VS reviewed since prior progress note. Most recent are:    Visit Vitals  /70   Pulse 74   Temp 98.1 °F (36.7 °C)   Resp 16   Ht 5' 10\" (1.778 m)   Wt 98.2 kg (216 lb 7.9 oz)   SpO2 93%   BMI 31.06 kg/m²       Intake/Output Summary (Last 24 hours) at 9/22/2019 1741  Last data filed at 9/22/2019 1643  Gross per 24 hour   Intake 1219.08 ml   Output 2060 ml   Net -840.92 ml        General Appearance: Well developed, well nourished, alert & oriented x 3,    no acute distress. Ears/Nose/Mouth/Throat: Pupils equal and round, Hearing grossly normal.  Neck: Supple. JVP within normal limits. Carotids good upstrokes, with no bruit. Chest: Lungs clear to auscultation bilaterally. Cardiovascular: Regular rate and rhythm, S1S2 normal, 2/6 CINDY across the chest.   Abdomen: Soft, non-tender, bowel sounds are active. No organomegaly. Extremities: No edema bilaterally. Femoral pulses +2, Distal Pulses +1. Skin: Warm and dry. Neuro: CN II-XII grossly intact, Strength and sensation grossly intact. Data:      Prior to Admission medications    Medication Sig Start Date End Date Taking? Authorizing Provider   glipiZIDE (GLUCOTROL) 10 mg tablet TAKE ONE TABLET BY MOUTH TWICE A DAY 8/26/19   Florence Gill MD   lisinopril (PRINIVIL, ZESTRIL) 40 mg tablet TAKE 1 TABLET BY MOUTH ONCE DAILY 7/1/19   Florence Gill MD   tamsulosin (FLOMAX) 0.4 mg capsule TAKE TWO CAPSULES BY MOUTH DAILY 4/22/19   Florence Gill MD   SITagliptin (JANUVIA) 100 mg tablet Take 1 Tab by mouth daily. 4/22/19   Florence Glil MD   hydrALAZINE (APRESOLINE) 50 mg tablet Take 0.5 Tabs by mouth three (3) times daily. 4/22/19   Florence Gill MD   finasteride (PROSCAR) 5 mg tablet Take 1 Tab by mouth daily.  4/22/19   Florence Gill MD   losartan (COZAAR) 100 mg tablet TAKE ONE TABLET BY MOUTH DAILY 3/22/19   Florence Gill MD   metFORMIN (GLUCOPHAGE) 500 mg tablet TAKE ONE TABLET BY MOUTH EVERY MORNING AND TAKE TWO TABLETS BY MOUTH EVERY EVENING WITH DINNER 1/9/19   Bishop Zambrano MD   pravastatin (PRAVACHOL) 80 mg tablet TAKE ONE TABLET BY MOUTH DAILY 12/24/18   Bishop Zambrano MD   amLODIPine (NORVASC) 10 mg tablet TAKE ONE TABLET BY MOUTH DAILY 10/23/18   Bishop Zambrano MD   timolol (TIMOPTIC-XE) 0.5 % ophthalmic gel-forming  9/19/18   Provider, Historical   meloxicam (MOBIC) 15 mg tablet TAKE ONE TABLET BY MOUTH DAILY 8/1/18   Bishop Zambrano MD   Fefww-1-WQV-EPA-Fish Oil 1,000 mg (120 mg-180 mg) cap Take 1 Cap by mouth three (3) times daily. Provider, Historical   aspirin delayed-release 81 mg tablet Take  by mouth daily.     Provider, Historical       Recent Results (from the past 24 hour(s))   TROPONIN I    Collection Time: 09/21/19  5:54 PM   Result Value Ref Range    Troponin-I, Qt. 1.56 (H) <0.05 ng/mL   GLUCOSE, POC    Collection Time: 09/21/19 10:40 PM   Result Value Ref Range    Glucose (POC) 244 (H) 65 - 100 mg/dL    Performed by [employer] [facility], BASIC    Collection Time: 09/22/19  4:44 AM   Result Value Ref Range    Sodium 138 136 - 145 mmol/L    Potassium 3.2 (L) 3.5 - 5.1 mmol/L    Chloride 100 97 - 108 mmol/L    CO2 32 21 - 32 mmol/L    Anion gap 6 5 - 15 mmol/L    Glucose 185 (H) 65 - 100 mg/dL    BUN 17 6 - 20 MG/DL    Creatinine 0.97 0.70 - 1.30 MG/DL    BUN/Creatinine ratio 18 12 - 20      GFR est AA >60 >60 ml/min/1.73m2    GFR est non-AA >60 >60 ml/min/1.73m2    Calcium 8.2 (L) 8.5 - 10.1 MG/DL   CBC WITH AUTOMATED DIFF    Collection Time: 09/22/19  4:44 AM   Result Value Ref Range    WBC 9.5 4.1 - 11.1 K/uL    RBC 4.75 4.10 - 5.70 M/uL    HGB 12.9 12.1 - 17.0 g/dL    HCT 38.5 36.6 - 50.3 %    MCV 81.1 80.0 - 99.0 FL    MCH 27.2 26.0 - 34.0 PG    MCHC 33.5 30.0 - 36.5 g/dL    RDW 13.6 11.5 - 14.5 %    PLATELET 592 189 - 295 K/uL    MPV 10.5 8.9 - 12.9 FL    NRBC 0.0 0  WBC    ABSOLUTE NRBC 0.00 0.00 - 0.01 K/uL    NEUTROPHILS 75 32 - 75 %    LYMPHOCYTES 17 12 - 49 %    MONOCYTES 8 5 - 13 % EOSINOPHILS 0 0 - 7 %    BASOPHILS 0 0 - 1 %    IMMATURE GRANULOCYTES 0 0.0 - 0.5 %    ABS. NEUTROPHILS 7.0 1.8 - 8.0 K/UL    ABS. LYMPHOCYTES 1.6 0.8 - 3.5 K/UL    ABS. MONOCYTES 0.8 0.0 - 1.0 K/UL    ABS. EOSINOPHILS 0.0 0.0 - 0.4 K/UL    ABS. BASOPHILS 0.0 0.0 - 0.1 K/UL    ABS. IMM.  GRANS. 0.0 0.00 - 0.04 K/UL    DF AUTOMATED     GLUCOSE, POC    Collection Time: 09/22/19  8:02 AM   Result Value Ref Range    Glucose (POC) 226 (H) 65 - 100 mg/dL    Performed by Loni Parks (PCT)    GLUCOSE, POC    Collection Time: 09/22/19 11:37 AM   Result Value Ref Range    Glucose (POC) 281 (H) 65 - 100 mg/dL    Performed by Joan Varela    ECHO ADULT COMPLETE    Collection Time: 09/22/19  2:24 PM   Result Value Ref Range    Right Atrial Area 4C 16.70 cm2    LV E' Lateral Velocity 7.80 cm/s    LV E' Septal Velocity 6.67 cm/s    Ao Root D 3.11 cm    Aortic Valve Systolic Peak Velocity 670.21 cm/s    AoV VTI 56.37 cm    Aortic Valve Area by Continuity of Peak Velocity 1.1 cm2    Aortic Valve Area by Continuity of VTI 1.0 cm2    AoV PG 17.6 mmHg    LVIDd 5.38 4.2 - 5.9 cm    LVPWd 0.96 0.6 - 1.0 cm    LVIDs 4.16 cm    IVSd 1.75 (A) 0.6 - 1.0 cm    LVOT d 1.59 cm    LVOT Peak Velocity 118.83 cm/s    LVOT Peak Gradient 5.6 mmHg    LVOT VTI 26.90 cm    MV A Colt 120.63 cm/s    MV E Colt 96.46 cm/s    MV E/A 0.80     MV Mean Gradient 2.6 mmHg    Mitral Valve Annulus Velocity Time Integral 35.18 cm    Left Atrium to Aortic Root Ratio 1.21     Aortic Valve Systolic Mean Gradient 56.8 mmHg    LVOT Cardiac Output 4.3 L/min    LA Vol 4C 56.83 18 - 58 mL    LA Area 4C 16.9 cm2    LV Mass .5 (A) 88 - 224 g    LV Mass AL Index 141.4 (A) 49 - 115 g/m2    LVPWs 0.99 cm    E/E' lateral 12.37     E/E' septal 14.46     RVSP 31.4 mmHg    MV Peak Gradient 4.6 mmHg    E/E' ratio (averaged) 13.41     Est. RA Pressure 10.0 mmHg    Mitral Regurgitant Peak Velocity 106.54 cm/s    Mitral Valve E Wave Deceleration Time 268.1 ms    Left Atrium Major Axis 3.77 cm    Triscuspid Valve Regurgitation Peak Gradient 21.4 mmHg    Mitral Valve Max Velocity 106.86 cm/s    MVA VTI 1.5 cm2    LVOT SV 53.7 ml    TR Max Velocity 231.05 cm/s    PASP 31.4 mmHg    LA Vol Index 26.32 16 - 28 ml/m2    MR Peak Gradient 4.5 mmHg    DANNY/BSA Pk Colt 0.5 cm2/m2    DANNY/BSA VTI 0.4 cm2/m2   GLUCOSE, POC    Collection Time: 09/22/19  4:10 PM   Result Value Ref Range    Glucose (POC) 232 (H) 65 - 100 mg/dL    Performed by Blaise Robles (PCT)

## 2019-09-22 NOTE — PROGRESS NOTES
General Daily Progress Note    Admit Date: 9/21/2019  Hospital day 2    Subjective:     Patient has no complaint of chest pain or shortness of breath. Overall feels much better than yesterday. Voiding frequently. no prior cardiac history.  ..   Medication side effects: none    Current Facility-Administered Medications   Medication Dose Route Frequency    nitroglycerin (Tridil) 200 mcg/ml infusion  0-200 mcg/min IntraVENous TITRATE    aspirin delayed-release tablet 81 mg  81 mg Oral DAILY    finasteride (PROSCAR) tablet 5 mg  5 mg Oral DAILY    glipiZIDE (GLUCOTROL) tablet 10 mg  10 mg Oral ACB    losartan (COZAAR) tablet 100 mg  100 mg Oral DAILY    omega 3-DHA-EPA-fish oil 1,000 mg (120 mg-180 mg) capsule 1 Cap  1 Cap Oral TID    pravastatin (PRAVACHOL) tablet 80 mg  80 mg Oral QHS    tamsulosin (FLOMAX) capsule 0.4 mg  0.4 mg Oral DAILY    timolol (TIMOPTIC) 0.25% ophthalmic solution  1 Drop Both Eyes BID    insulin lispro (HUMALOG) injection   SubCUTAneous AC&HS    glucose chewable tablet 16 g  4 Tab Oral PRN    dextrose (D50W) injection syrg 12.5-25 g  12.5-25 g IntraVENous PRN    glucagon (GLUCAGEN) injection 1 mg  1 mg IntraMUSCular PRN    sodium chloride (NS) flush 5-40 mL  5-40 mL IntraVENous Q8H    sodium chloride (NS) flush 5-40 mL  5-40 mL IntraVENous PRN    acetaminophen (TYLENOL) tablet 500 mg  500 mg Oral Q4H PRN    HYDROcodone-acetaminophen (NORCO) 5-325 mg per tablet 1 Tab  1 Tab Oral Q6H PRN    naloxone (NARCAN) injection 0.4 mg  0.4 mg IntraVENous PRN    ondansetron (ZOFRAN) injection 2 mg  2 mg IntraVENous Q6H PRN    bisacodyl (DULCOLAX) tablet 5 mg  5 mg Oral DAILY PRN    nicotine (NICODERM CQ) 7 mg/24 hr patch 1 Patch  1 Patch TransDERmal DAILY PRN    heparin (porcine) injection 5,000 Units  5,000 Units SubCUTAneous Q8H    amLODIPine (NORVASC) tablet 10 mg  10 mg Oral DAILY    hydrALAZINE (APRESOLINE) tablet 50 mg  50 mg Oral TID    metoprolol tartrate (LOPRESSOR) tablet 25 mg  25 mg Oral Q12H        Review of Systems  Pertinent items are noted in HPI. Objective:     Patient Vitals for the past 8 hrs:   BP Temp Pulse Resp SpO2   09/22/19 1118 163/65       09/22/19 1100 156/66 98.1 °F (36.7 °C) 73 18 92 %   09/22/19 1021 137/57       09/22/19 0854     92 %   09/22/19 0800   65     09/22/19 0715 177/59 98.6 °F (37 °C) 73 18 94 %   09/22/19 0700 179/77  71  93 %   09/22/19 0645 179/77  75  95 %   09/22/19 0603 178/76  70     09/22/19 0540 188/70  70     09/22/19 0500 169/74       09/22/19 0430 175/73 97.9 °F (36.6 °C) 67 16 96 %   09/22/19 0419 179/77  68     09/22/19 0417 179/77  68       09/22 0701 - 09/22 1900  In: 480 [P.O.:480]  Out: 540 [Urine:540]  09/20 1901 - 09/22 0700  In: 454.1 [P.O.:120; I.V.:334.1]  Out: 2200 [Urine:2200]    Physical Exam:   Visit Vitals  /65   Pulse 73   Temp 98.1 °F (36.7 °C)   Resp 18   Ht 5' 10\" (1.778 m)   Wt 216 lb 7.9 oz (98.2 kg)   SpO2 92%   BMI 31.06 kg/m²     Lungs: rales R base  Heart: regular rate and rhythm, S1, S2 normal, no murmur, click, rub or gallop  Abdomen: soft, non-tender.  Bowel sounds normal. No masses,  no organomegaly  Extremities: edema , tr. 2+ pulses      ECG: atrial fibrillation, rate 90     Data Review   Recent Results (from the past 24 hour(s))   POC G3 - PUL    Collection Time: 09/21/19  2:15 PM   Result Value Ref Range    pH (POC) 7.349 (L) 7.35 - 7.45      pCO2 (POC) 52.5 (H) 35.0 - 45.0 MMHG    pO2 (POC) 178 (H) 80 - 100 MMHG    HCO3 (POC) 28.9 (H) 22 - 26 MMOL/L    sO2 (POC) 99 (H) 92 - 97 %    Base excess (POC) 3 mmol/L    Site RIGHT RADIAL      Device: BIPAP      Allens test (POC) YES      Specimen type (POC) ARTERIAL     GLUCOSE, POC    Collection Time: 09/21/19  4:54 PM   Result Value Ref Range    Glucose (POC) 278 (H) 65 - 100 mg/dL    Performed by Namrata Price(CON)    TROPONIN I    Collection Time: 09/21/19  5:54 PM   Result Value Ref Range    Troponin-I, Qt. 1.56 (H) <0.05 ng/mL   GLUCOSE, POC    Collection Time: 09/21/19 10:40 PM   Result Value Ref Range    Glucose (POC) 244 (H) 65 - 100 mg/dL    Performed by [address] [facility]    Collection Time: 09/22/19  4:44 AM   Result Value Ref Range    Sodium 138 136 - 145 mmol/L    Potassium 3.2 (L) 3.5 - 5.1 mmol/L    Chloride 100 97 - 108 mmol/L    CO2 32 21 - 32 mmol/L    Anion gap 6 5 - 15 mmol/L    Glucose 185 (H) 65 - 100 mg/dL    BUN 17 6 - 20 MG/DL    Creatinine 0.97 0.70 - 1.30 MG/DL    BUN/Creatinine ratio 18 12 - 20      GFR est AA >60 >60 ml/min/1.73m2    GFR est non-AA >60 >60 ml/min/1.73m2    Calcium 8.2 (L) 8.5 - 10.1 MG/DL   CBC WITH AUTOMATED DIFF    Collection Time: 09/22/19  4:44 AM   Result Value Ref Range    WBC 9.5 4.1 - 11.1 K/uL    RBC 4.75 4.10 - 5.70 M/uL    HGB 12.9 12.1 - 17.0 g/dL    HCT 38.5 36.6 - 50.3 %    MCV 81.1 80.0 - 99.0 FL    MCH 27.2 26.0 - 34.0 PG    MCHC 33.5 30.0 - 36.5 g/dL    RDW 13.6 11.5 - 14.5 %    PLATELET 122 843 - 085 K/uL    MPV 10.5 8.9 - 12.9 FL    NRBC 0.0 0  WBC    ABSOLUTE NRBC 0.00 0.00 - 0.01 K/uL    NEUTROPHILS 75 32 - 75 %    LYMPHOCYTES 17 12 - 49 %    MONOCYTES 8 5 - 13 %    EOSINOPHILS 0 0 - 7 %    BASOPHILS 0 0 - 1 %    IMMATURE GRANULOCYTES 0 0.0 - 0.5 %    ABS. NEUTROPHILS 7.0 1.8 - 8.0 K/UL    ABS. LYMPHOCYTES 1.6 0.8 - 3.5 K/UL    ABS. MONOCYTES 0.8 0.0 - 1.0 K/UL    ABS. EOSINOPHILS 0.0 0.0 - 0.4 K/UL    ABS. BASOPHILS 0.0 0.0 - 0.1 K/UL    ABS. IMM.  GRANS. 0.0 0.00 - 0.04 K/UL    DF AUTOMATED     GLUCOSE, POC    Collection Time: 09/22/19  8:02 AM   Result Value Ref Range    Glucose (POC) 226 (H) 65 - 100 mg/dL    Performed by Casi De La Cruz (PCT)    GLUCOSE, POC    Collection Time: 09/22/19 11:37 AM   Result Value Ref Range    Glucose (POC) 281 (H) 65 - 100 mg/dL    Performed by Kasuhik Reaves            Assessment:     Principal Problem:    NSTEMI (non-ST elevated myocardial infarction) ([facility] (9/21/2019)    Active Problems:    BPH (benign prostatic hyperplasia) (8/2/2017)      ASVD (arteriosclerotic vascular disease) (8/2/2017)      Aortic stenosis (8/2/2017)      Primary osteoarthritis involving multiple joints (8/2/2017)      Essential hypertension (8/2/2017)      Back pain (8/2/2017)      Controlled type 2 diabetes mellitus with stage 2 chronic kidney disease, without long-term current use of insulin ([facility] (12/10/2017)        Plan:     1. Pulmonary edema on admission- symptomatically much better. I/O 464/2200 so far. 2. Elevated troponin/ NSTEMI- for cardiac cath in am  3. Hx mild aortic stenosis- for repeat echo. 4. A fib on admission EKG, denies prior hx  5. Hx difficult to control hypertension  6. Diabetes- on glipizide and sliding scale. Will add low dose Lantus as sugars still in the 200s.    7. Hypokalemia- will replace

## 2019-09-23 LAB
ANION GAP SERPL CALC-SCNC: 6 MMOL/L (ref 5–15)
ATRIAL RATE: 63 BPM
BASOPHILS # BLD: 0 K/UL (ref 0–0.1)
BASOPHILS NFR BLD: 0 % (ref 0–1)
BUN SERPL-MCNC: 15 MG/DL (ref 6–20)
BUN/CREAT SERPL: 15 (ref 12–20)
CALCIUM SERPL-MCNC: 8 MG/DL (ref 8.5–10.1)
CALCULATED P AXIS, ECG09: 5 DEGREES
CALCULATED R AXIS, ECG10: -58 DEGREES
CALCULATED T AXIS, ECG11: 141 DEGREES
CHLORIDE SERPL-SCNC: 100 MMOL/L (ref 97–108)
CO2 SERPL-SCNC: 30 MMOL/L (ref 21–32)
CREAT SERPL-MCNC: 0.97 MG/DL (ref 0.7–1.3)
DIAGNOSIS, 93000: NORMAL
DIFFERENTIAL METHOD BLD: ABNORMAL
EOSINOPHIL # BLD: 0 K/UL (ref 0–0.4)
EOSINOPHIL NFR BLD: 0 % (ref 0–7)
ERYTHROCYTE [DISTWIDTH] IN BLOOD BY AUTOMATED COUNT: 13.6 % (ref 11.5–14.5)
GLUCOSE BLD STRIP.AUTO-MCNC: 182 MG/DL (ref 65–100)
GLUCOSE BLD STRIP.AUTO-MCNC: 215 MG/DL (ref 65–100)
GLUCOSE BLD STRIP.AUTO-MCNC: 260 MG/DL (ref 65–100)
GLUCOSE BLD STRIP.AUTO-MCNC: 319 MG/DL (ref 65–100)
GLUCOSE SERPL-MCNC: 212 MG/DL (ref 65–100)
HCT VFR BLD AUTO: 36.1 % (ref 36.6–50.3)
HGB BLD-MCNC: 11.9 G/DL (ref 12.1–17)
IMM GRANULOCYTES # BLD AUTO: 0 K/UL (ref 0–0.04)
IMM GRANULOCYTES NFR BLD AUTO: 0 % (ref 0–0.5)
LYMPHOCYTES # BLD: 1.3 K/UL (ref 0.8–3.5)
LYMPHOCYTES NFR BLD: 20 % (ref 12–49)
MCH RBC QN AUTO: 26.5 PG (ref 26–34)
MCHC RBC AUTO-ENTMCNC: 33 G/DL (ref 30–36.5)
MCV RBC AUTO: 80.4 FL (ref 80–99)
MONOCYTES # BLD: 0.7 K/UL (ref 0–1)
MONOCYTES NFR BLD: 10 % (ref 5–13)
NEUTS SEG # BLD: 4.6 K/UL (ref 1.8–8)
NEUTS SEG NFR BLD: 70 % (ref 32–75)
NRBC # BLD: 0 K/UL (ref 0–0.01)
NRBC BLD-RTO: 0 PER 100 WBC
P-R INTERVAL, ECG05: 204 MS
PLATELET # BLD AUTO: 154 K/UL (ref 150–400)
PMV BLD AUTO: 10.2 FL (ref 8.9–12.9)
POTASSIUM SERPL-SCNC: 3.2 MMOL/L (ref 3.5–5.1)
Q-T INTERVAL, ECG07: 530 MS
QRS DURATION, ECG06: 146 MS
QTC CALCULATION (BEZET), ECG08: 542 MS
RBC # BLD AUTO: 4.49 M/UL (ref 4.1–5.7)
SERVICE CMNT-IMP: ABNORMAL
SODIUM SERPL-SCNC: 136 MMOL/L (ref 136–145)
VENTRICULAR RATE, ECG03: 63 BPM
WBC # BLD AUTO: 6.7 K/UL (ref 4.1–11.1)

## 2019-09-23 PROCEDURE — 74011250636 HC RX REV CODE- 250/636

## 2019-09-23 PROCEDURE — 74011000250 HC RX REV CODE- 250: Performed by: INTERNAL MEDICINE

## 2019-09-23 PROCEDURE — 99152 MOD SED SAME PHYS/QHP 5/>YRS: CPT | Performed by: INTERNAL MEDICINE

## 2019-09-23 PROCEDURE — C1725 CATH, TRANSLUMIN NON-LASER: HCPCS | Performed by: INTERNAL MEDICINE

## 2019-09-23 PROCEDURE — C1894 INTRO/SHEATH, NON-LASER: HCPCS | Performed by: INTERNAL MEDICINE

## 2019-09-23 PROCEDURE — C1769 GUIDE WIRE: HCPCS | Performed by: INTERNAL MEDICINE

## 2019-09-23 PROCEDURE — 85025 COMPLETE CBC W/AUTO DIFF WBC: CPT

## 2019-09-23 PROCEDURE — B2111ZZ FLUOROSCOPY OF MULTIPLE CORONARY ARTERIES USING LOW OSMOLAR CONTRAST: ICD-10-PCS | Performed by: INTERNAL MEDICINE

## 2019-09-23 PROCEDURE — 85347 COAGULATION TIME ACTIVATED: CPT

## 2019-09-23 PROCEDURE — 02703ZZ DILATION OF CORONARY ARTERY, ONE ARTERY, PERCUTANEOUS APPROACH: ICD-10-PCS | Performed by: INTERNAL MEDICINE

## 2019-09-23 PROCEDURE — 027136Z DILATION OF CORONARY ARTERY, TWO ARTERIES WITH THREE DRUG-ELUTING INTRALUMINAL DEVICES, PERCUTANEOUS APPROACH: ICD-10-PCS | Performed by: INTERNAL MEDICINE

## 2019-09-23 PROCEDURE — 74011250636 HC RX REV CODE- 250/636: Performed by: INTERNAL MEDICINE

## 2019-09-23 PROCEDURE — 82962 GLUCOSE BLOOD TEST: CPT

## 2019-09-23 PROCEDURE — 80048 BASIC METABOLIC PNL TOTAL CA: CPT

## 2019-09-23 PROCEDURE — 92928 PRQ TCAT PLMT NTRAC ST 1 LES: CPT | Performed by: INTERNAL MEDICINE

## 2019-09-23 PROCEDURE — 4A023N7 MEASUREMENT OF CARDIAC SAMPLING AND PRESSURE, LEFT HEART, PERCUTANEOUS APPROACH: ICD-10-PCS | Performed by: INTERNAL MEDICINE

## 2019-09-23 PROCEDURE — 74011250637 HC RX REV CODE- 250/637: Performed by: INTERNAL MEDICINE

## 2019-09-23 PROCEDURE — 77030029065 HC DRSG HEMO QCLOT ZMED -B: Performed by: INTERNAL MEDICINE

## 2019-09-23 PROCEDURE — 77030028837 HC SYR ANGI PWR INJ COEU -A: Performed by: INTERNAL MEDICINE

## 2019-09-23 PROCEDURE — 36415 COLL VENOUS BLD VENIPUNCTURE: CPT

## 2019-09-23 PROCEDURE — 77030019697 HC SYR ANGI INFL MRTM -B: Performed by: INTERNAL MEDICINE

## 2019-09-23 PROCEDURE — 74011636320 HC RX REV CODE- 636/320: Performed by: INTERNAL MEDICINE

## 2019-09-23 PROCEDURE — B4181ZZ FLUOROSCOPY OF BILATERAL RENAL ARTERIES USING LOW OSMOLAR CONTRAST: ICD-10-PCS | Performed by: INTERNAL MEDICINE

## 2019-09-23 PROCEDURE — 93458 L HRT ARTERY/VENTRICLE ANGIO: CPT | Performed by: INTERNAL MEDICINE

## 2019-09-23 PROCEDURE — C1887 CATHETER, GUIDING: HCPCS | Performed by: INTERNAL MEDICINE

## 2019-09-23 PROCEDURE — 93005 ELECTROCARDIOGRAM TRACING: CPT

## 2019-09-23 PROCEDURE — 36252 INS CATH REN ART 1ST BILAT: CPT | Performed by: INTERNAL MEDICINE

## 2019-09-23 PROCEDURE — 65660000000 HC RM CCU STEPDOWN

## 2019-09-23 PROCEDURE — 99153 MOD SED SAME PHYS/QHP EA: CPT | Performed by: INTERNAL MEDICINE

## 2019-09-23 PROCEDURE — 74011636637 HC RX REV CODE- 636/637: Performed by: INTERNAL MEDICINE

## 2019-09-23 PROCEDURE — 74011250637 HC RX REV CODE- 250/637: Performed by: FAMILY MEDICINE

## 2019-09-23 PROCEDURE — C1760 CLOSURE DEV, VASC: HCPCS | Performed by: INTERNAL MEDICINE

## 2019-09-23 PROCEDURE — C1874 STENT, COATED/COV W/DEL SYS: HCPCS | Performed by: INTERNAL MEDICINE

## 2019-09-23 DEVICE — STENT RONYX22538UX RESOLUTE ONYX 2.25X38
Type: IMPLANTABLE DEVICE | Status: FUNCTIONAL
Brand: RESOLUTE ONYX™

## 2019-09-23 DEVICE — STENT RONYX25026UX RESOLUTE ONYX 2.50X26
Type: IMPLANTABLE DEVICE | Status: FUNCTIONAL
Brand: RESOLUTE ONYX™

## 2019-09-23 DEVICE — STENT RONYX27518UX RESOLUTE ONYX 2.75X18
Type: IMPLANTABLE DEVICE | Status: FUNCTIONAL
Brand: RESOLUTE ONYX™

## 2019-09-23 RX ORDER — MIDAZOLAM HYDROCHLORIDE 1 MG/ML
INJECTION, SOLUTION INTRAMUSCULAR; INTRAVENOUS AS NEEDED
Status: DISCONTINUED | OUTPATIENT
Start: 2019-09-23 | End: 2019-09-23

## 2019-09-23 RX ORDER — SODIUM CHLORIDE 9 MG/ML
125 INJECTION, SOLUTION INTRAVENOUS CONTINUOUS
Status: DISPENSED | OUTPATIENT
Start: 2019-09-23 | End: 2019-09-23

## 2019-09-23 RX ORDER — SODIUM CHLORIDE 0.9 % (FLUSH) 0.9 %
5-40 SYRINGE (ML) INJECTION AS NEEDED
Status: DISCONTINUED | OUTPATIENT
Start: 2019-09-23 | End: 2019-09-26 | Stop reason: HOSPADM

## 2019-09-23 RX ORDER — INSULIN GLARGINE 100 [IU]/ML
16 INJECTION, SOLUTION SUBCUTANEOUS DAILY
Status: DISCONTINUED | OUTPATIENT
Start: 2019-09-23 | End: 2019-09-24

## 2019-09-23 RX ORDER — CLOPIDOGREL BISULFATE 75 MG/1
75 TABLET ORAL DAILY
Status: DISCONTINUED | OUTPATIENT
Start: 2019-09-24 | End: 2019-09-26 | Stop reason: HOSPADM

## 2019-09-23 RX ORDER — FENTANYL CITRATE 50 UG/ML
INJECTION, SOLUTION INTRAMUSCULAR; INTRAVENOUS AS NEEDED
Status: DISCONTINUED | OUTPATIENT
Start: 2019-09-23 | End: 2019-09-23

## 2019-09-23 RX ORDER — ZOLPIDEM TARTRATE 5 MG/1
5 TABLET ORAL
Status: DISCONTINUED | OUTPATIENT
Start: 2019-09-23 | End: 2019-09-26 | Stop reason: HOSPADM

## 2019-09-23 RX ORDER — HEPARIN SODIUM 1000 [USP'U]/ML
INJECTION, SOLUTION INTRAVENOUS; SUBCUTANEOUS AS NEEDED
Status: DISCONTINUED | OUTPATIENT
Start: 2019-09-23 | End: 2019-09-23

## 2019-09-23 RX ORDER — SODIUM CHLORIDE 0.9 % (FLUSH) 0.9 %
5-40 SYRINGE (ML) INJECTION EVERY 8 HOURS
Status: DISCONTINUED | OUTPATIENT
Start: 2019-09-23 | End: 2019-09-26 | Stop reason: HOSPADM

## 2019-09-23 RX ORDER — HEPARIN SODIUM 200 [USP'U]/100ML
INJECTION, SOLUTION INTRAVENOUS
Status: DISCONTINUED | OUTPATIENT
Start: 2019-09-23 | End: 2019-09-23

## 2019-09-23 RX ORDER — POTASSIUM CHLORIDE 750 MG/1
20 TABLET, FILM COATED, EXTENDED RELEASE ORAL 2 TIMES DAILY
Status: DISCONTINUED | OUTPATIENT
Start: 2019-09-23 | End: 2019-09-26 | Stop reason: HOSPADM

## 2019-09-23 RX ORDER — LIDOCAINE HYDROCHLORIDE 10 MG/ML
INJECTION, SOLUTION EPIDURAL; INFILTRATION; INTRACAUDAL; PERINEURAL AS NEEDED
Status: DISCONTINUED | OUTPATIENT
Start: 2019-09-23 | End: 2019-09-23

## 2019-09-23 RX ORDER — CLOPIDOGREL BISULFATE 75 MG/1
TABLET ORAL AS NEEDED
Status: DISCONTINUED | OUTPATIENT
Start: 2019-09-23 | End: 2019-09-23

## 2019-09-23 RX ADMIN — SODIUM CHLORIDE 125 ML/HR: 900 INJECTION, SOLUTION INTRAVENOUS at 13:49

## 2019-09-23 RX ADMIN — NITROGLYCERIN 70 MCG/MIN: 20 INJECTION INTRAVENOUS at 19:58

## 2019-09-23 RX ADMIN — INSULIN LISPRO 4 UNITS: 100 INJECTION, SOLUTION INTRAVENOUS; SUBCUTANEOUS at 07:48

## 2019-09-23 RX ADMIN — OMEGA-3 FATTY ACIDS CAP 1000 MG 1 CAPSULE: 1000 CAP at 09:35

## 2019-09-23 RX ADMIN — FINASTERIDE 5 MG: 5 TABLET, FILM COATED ORAL at 09:34

## 2019-09-23 RX ADMIN — OMEGA-3 FATTY ACIDS CAP 1000 MG 1 CAPSULE: 1000 CAP at 21:27

## 2019-09-23 RX ADMIN — LOSARTAN POTASSIUM 100 MG: 100 TABLET ORAL at 09:35

## 2019-09-23 RX ADMIN — Medication 10 ML: at 21:28

## 2019-09-23 RX ADMIN — PRAVASTATIN SODIUM 80 MG: 40 TABLET ORAL at 21:27

## 2019-09-23 RX ADMIN — TIMOLOL MALEATE 1 DROP: 2.5 SOLUTION OPHTHALMIC at 17:20

## 2019-09-23 RX ADMIN — POTASSIUM CHLORIDE 20 MEQ: 750 TABLET, FILM COATED, EXTENDED RELEASE ORAL at 09:34

## 2019-09-23 RX ADMIN — OMEGA-3 FATTY ACIDS CAP 1000 MG 1 CAPSULE: 1000 CAP at 17:20

## 2019-09-23 RX ADMIN — HYDRALAZINE HYDROCHLORIDE 50 MG: 50 TABLET, FILM COATED ORAL at 09:35

## 2019-09-23 RX ADMIN — CLONIDINE HYDROCHLORIDE 0.2 MG: 0.1 TABLET ORAL at 09:35

## 2019-09-23 RX ADMIN — TAMSULOSIN HYDROCHLORIDE 0.4 MG: 0.4 CAPSULE ORAL at 09:34

## 2019-09-23 RX ADMIN — Medication 10 ML: at 06:00

## 2019-09-23 RX ADMIN — HEPARIN SODIUM 5000 UNITS: 5000 INJECTION INTRAVENOUS; SUBCUTANEOUS at 05:42

## 2019-09-23 RX ADMIN — METOPROLOL TARTRATE 25 MG: 25 TABLET ORAL at 21:27

## 2019-09-23 RX ADMIN — HEPARIN SODIUM 5000 UNITS: 5000 INJECTION INTRAVENOUS; SUBCUTANEOUS at 21:28

## 2019-09-23 RX ADMIN — POTASSIUM CHLORIDE 20 MEQ: 750 TABLET, FILM COATED, EXTENDED RELEASE ORAL at 17:20

## 2019-09-23 RX ADMIN — ASPIRIN 81 MG: 81 TABLET, COATED ORAL at 09:35

## 2019-09-23 RX ADMIN — GLIPIZIDE 10 MG: 5 TABLET ORAL at 09:34

## 2019-09-23 RX ADMIN — INSULIN LISPRO 6 UNITS: 100 INJECTION, SOLUTION INTRAVENOUS; SUBCUTANEOUS at 22:05

## 2019-09-23 RX ADMIN — CLONIDINE HYDROCHLORIDE 0.2 MG: 0.1 TABLET ORAL at 17:20

## 2019-09-23 RX ADMIN — INSULIN LISPRO 9 UNITS: 100 INJECTION, SOLUTION INTRAVENOUS; SUBCUTANEOUS at 17:19

## 2019-09-23 RX ADMIN — HYDRALAZINE HYDROCHLORIDE 50 MG: 50 TABLET, FILM COATED ORAL at 21:27

## 2019-09-23 RX ADMIN — NITROGLYCERIN 80 MCG/MIN: 20 INJECTION INTRAVENOUS at 06:51

## 2019-09-23 RX ADMIN — METOPROLOL TARTRATE 25 MG: 25 TABLET ORAL at 09:34

## 2019-09-23 RX ADMIN — AMLODIPINE BESYLATE 10 MG: 5 TABLET ORAL at 09:34

## 2019-09-23 RX ADMIN — HYDRALAZINE HYDROCHLORIDE 50 MG: 50 TABLET, FILM COATED ORAL at 17:20

## 2019-09-23 RX ADMIN — NITROGLYCERIN 10 MCG/MIN: 20 INJECTION INTRAVENOUS at 23:40

## 2019-09-23 NOTE — PROGRESS NOTES
General Daily Progress Note    Admit Date: 9/21/2019  Hospital day 3    Subjective:     Patient has no complaint of shortness of breath at rest or chest pain.  ..   Medication side effects: none    Current Facility-Administered Medications   Medication Dose Route Frequency    insulin glargine (LANTUS) injection 10 Units  10 Units SubCUTAneous DAILY    potassium chloride SR (KLOR-CON 10) tablet 10 mEq  10 mEq Oral BID    cloNIDine HCl (CATAPRES) tablet 0.2 mg  0.2 mg Oral BID    nitroglycerin (Tridil) 200 mcg/ml infusion  0-200 mcg/min IntraVENous TITRATE    aspirin delayed-release tablet 81 mg  81 mg Oral DAILY    finasteride (PROSCAR) tablet 5 mg  5 mg Oral DAILY    glipiZIDE (GLUCOTROL) tablet 10 mg  10 mg Oral ACB    losartan (COZAAR) tablet 100 mg  100 mg Oral DAILY    omega 3-DHA-EPA-fish oil 1,000 mg (120 mg-180 mg) capsule 1 Cap  1 Cap Oral TID    pravastatin (PRAVACHOL) tablet 80 mg  80 mg Oral QHS    tamsulosin (FLOMAX) capsule 0.4 mg  0.4 mg Oral DAILY    timolol (TIMOPTIC) 0.25% ophthalmic solution  1 Drop Both Eyes BID    insulin lispro (HUMALOG) injection   SubCUTAneous AC&HS    glucose chewable tablet 16 g  4 Tab Oral PRN    dextrose (D50W) injection syrg 12.5-25 g  12.5-25 g IntraVENous PRN    glucagon (GLUCAGEN) injection 1 mg  1 mg IntraMUSCular PRN    sodium chloride (NS) flush 5-40 mL  5-40 mL IntraVENous Q8H    sodium chloride (NS) flush 5-40 mL  5-40 mL IntraVENous PRN    acetaminophen (TYLENOL) tablet 500 mg  500 mg Oral Q4H PRN    HYDROcodone-acetaminophen (NORCO) 5-325 mg per tablet 1 Tab  1 Tab Oral Q6H PRN    naloxone (NARCAN) injection 0.4 mg  0.4 mg IntraVENous PRN    ondansetron (ZOFRAN) injection 2 mg  2 mg IntraVENous Q6H PRN    bisacodyl (DULCOLAX) tablet 5 mg  5 mg Oral DAILY PRN    nicotine (NICODERM CQ) 7 mg/24 hr patch 1 Patch  1 Patch TransDERmal DAILY PRN    heparin (porcine) injection 5,000 Units  5,000 Units SubCUTAneous Q8H    amLODIPine (NORVASC) tablet 10 mg  10 mg Oral DAILY    hydrALAZINE (APRESOLINE) tablet 50 mg  50 mg Oral TID    metoprolol tartrate (LOPRESSOR) tablet 25 mg  25 mg Oral Q12H        Review of Systems  Pertinent items are noted in HPI. Objective:     Patient Vitals for the past 8 hrs:   BP Temp Pulse Resp SpO2   09/23/19 0630 144/63       09/23/19 0600 150/61  65     09/23/19 0530 176/62  64  95 %   09/23/19 0500 147/68  65  97 %   09/23/19 0430 159/65  68  96 %   09/23/19 0400 160/62       09/23/19 0330 143/54       09/23/19 0315 137/57 98.3 °F (36.8 °C) 66 16 94 %   09/23/19 0300 142/54       09/23/19 0255 132/53  65     09/23/19 0245 135/60       09/23/19 0242  98.2 °F (36.8 °C)      09/23/19 0230 159/72       09/23/19 0145 156/62       09/23/19 0130 157/60       09/23/19 0115 146/67       09/23/19 0100 148/61  62  94 %   09/23/19 0045 126/43       09/23/19 0030 127/49       09/23/19 0015 128/50  64  96 %   09/23/19 0000 157/63       09/22/19 2345 151/64       09/22/19 2330 139/72       09/22/19 2315 144/60         09/22 1901 - 09/23 0700  In: 480 [P.O.:480]  Out: 725 [Urine:725]  09/21 0701 - 09/22 1900  In: 1552.7 [P.O.:840; I.V.:712.7]  Out: 4060 [Urine:4060]    Physical Exam:   Visit Vitals  /63   Pulse 65   Temp 98.3 °F (36.8 °C)   Resp 16   Ht 5' 10\" (1.778 m)   Wt 216 lb 7.9 oz (98.2 kg)   SpO2 95%   BMI 31.06 kg/m²     Lungs: clear to auscultation bilaterally  Heart: systolic murmur: early systolic 3/6, rumbling at 2nd left intercostal space  Abdomen: soft, non-tender.  Bowel sounds normal. No masses,  no organomegaly  Extremities: extremities normal, atraumatic, no cyanosis or edema      ECG: normal sinus rhythm     Data Review   Recent Results (from the past 24 hour(s))   GLUCOSE, POC    Collection Time: 09/22/19  8:02 AM   Result Value Ref Range    Glucose (POC) 226 (H) 65 - 100 mg/dL    Performed by Arthur Robles (PCT)    GLUCOSE, POC Collection Time: 09/22/19 11:37 AM   Result Value Ref Range    Glucose (POC) 281 (H) 65 - 100 mg/dL    Performed by Freya Wyatt    ECHO ADULT COMPLETE    Collection Time: 09/22/19  2:24 PM   Result Value Ref Range    Right Atrial Area 4C 16.70 cm2    LV E' Lateral Velocity 7.80 cm/s    LV E' Septal Velocity 6.67 cm/s    Ao Root D 3.11 cm    Aortic Valve Systolic Peak Velocity 951.56 cm/s    AoV VTI 56.37 cm    Aortic Valve Area by Continuity of Peak Velocity 1.1 cm2    Aortic Valve Area by Continuity of VTI 1.0 cm2    AoV PG 17.6 mmHg    LVIDd 5.38 4.2 - 5.9 cm    LVPWd 0.96 0.6 - 1.0 cm    LVIDs 4.16 cm    IVSd 1.75 (A) 0.6 - 1.0 cm    LVOT d 1.59 cm    LVOT Peak Velocity 118.83 cm/s    LVOT Peak Gradient 5.6 mmHg    LVOT VTI 26.90 cm    MV A Colt 120.63 cm/s    MV E Colt 96.46 cm/s    MV E/A 0.80     MV Mean Gradient 2.6 mmHg    Mitral Valve Annulus Velocity Time Integral 35.18 cm    Left Atrium to Aortic Root Ratio 1.21     Aortic Valve Systolic Mean Gradient 55.8 mmHg    LVOT Cardiac Output 4.3 L/min    LA Vol 4C 56.83 18 - 58 mL    LA Area 4C 16.9 cm2    LV Mass .5 (A) 88 - 224 g    LV Mass AL Index 173.5 (A) 49 - 115 g/m2    LVPWs 0.99 cm    E/E' lateral 12.37     E/E' septal 14.46     RVSP 31.4 mmHg    MV Peak Gradient 4.6 mmHg    E/E' ratio (averaged) 13.41     Est. RA Pressure 10.0 mmHg    Mitral Regurgitant Peak Velocity 106.54 cm/s    Mitral Valve E Wave Deceleration Time 268.1 ms    Left Atrium Major Axis 3.77 cm    Triscuspid Valve Regurgitation Peak Gradient 21.4 mmHg    Mitral Valve Max Velocity 106.86 cm/s    MVA VTI 1.5 cm2    LVOT SV 53.7 ml    TR Max Velocity 231.05 cm/s    PASP 31.4 mmHg    LA Vol Index 26.32 16 - 28 ml/m2    MR Peak Gradient 4.5 mmHg    DANNY/BSA Pk Colt 0.5 cm2/m2    DANNY/BSA VTI 0.4 cm2/m2   GLUCOSE, POC    Collection Time: 09/22/19  4:10 PM   Result Value Ref Range    Glucose (POC) 232 (H) 65 - 100 mg/dL    Performed by Lottie Price (PCT)    GLUCOSE, POC Collection Time: 09/22/19  9:43 PM   Result Value Ref Range    Glucose (POC) 209 (H) 65 - 100 mg/dL    Performed by Aidan Ching (PCT)    METABOLIC PANEL, BASIC    Collection Time: 09/23/19  2:47 AM   Result Value Ref Range    Sodium 136 136 - 145 mmol/L    Potassium 3.2 (L) 3.5 - 5.1 mmol/L    Chloride 100 97 - 108 mmol/L    CO2 30 21 - 32 mmol/L    Anion gap 6 5 - 15 mmol/L    Glucose 212 (H) 65 - 100 mg/dL    BUN 15 6 - 20 MG/DL    Creatinine 0.97 0.70 - 1.30 MG/DL    BUN/Creatinine ratio 15 12 - 20      GFR est AA >60 >60 ml/min/1.73m2    GFR est non-AA >60 >60 ml/min/1.73m2    Calcium 8.0 (L) 8.5 - 10.1 MG/DL   CBC WITH AUTOMATED DIFF    Collection Time: 09/23/19  2:47 AM   Result Value Ref Range    WBC 6.7 4.1 - 11.1 K/uL    RBC 4.49 4. 10 - 5.70 M/uL    HGB 11.9 (L) 12.1 - 17.0 g/dL    HCT 36.1 (L) 36.6 - 50.3 %    MCV 80.4 80.0 - 99.0 FL    MCH 26.5 26.0 - 34.0 PG    MCHC 33.0 30.0 - 36.5 g/dL    RDW 13.6 11.5 - 14.5 %    PLATELET 414 918 - 678 K/uL    MPV 10.2 8.9 - 12.9 FL    NRBC 0.0 0  WBC    ABSOLUTE NRBC 0.00 0.00 - 0.01 K/uL    NEUTROPHILS 70 32 - 75 %    LYMPHOCYTES 20 12 - 49 %    MONOCYTES 10 5 - 13 %    EOSINOPHILS 0 0 - 7 %    BASOPHILS 0 0 - 1 %    IMMATURE GRANULOCYTES 0 0.0 - 0.5 %    ABS. NEUTROPHILS 4.6 1.8 - 8.0 K/UL    ABS. LYMPHOCYTES 1.3 0.8 - 3.5 K/UL    ABS. MONOCYTES 0.7 0.0 - 1.0 K/UL    ABS. EOSINOPHILS 0.0 0.0 - 0.4 K/UL    ABS. BASOPHILS 0.0 0.0 - 0.1 K/UL    ABS. IMM.  GRANS. 0.0 0.00 - 0.04 K/UL    DF AUTOMATED             Assessment:     Principal Problem:    NSTEMI (non-ST elevated myocardial infarction) ([facility] (9/21/2019)    Active Problems:    BPH (benign prostatic hyperplasia) (8/2/2017)      ASVD (arteriosclerotic vascular disease) (8/2/2017)      Aortic stenosis (8/2/2017)      Primary osteoarthritis involving multiple joints (8/2/2017)      Essential hypertension (8/2/2017)      Back pain (8/2/2017)      Controlled type 2 diabetes mellitus with stage 2 chronic kidney disease, without long-term current use of insulin ([facility] (12/10/2017)        Plan:     1. Pulmonary edema on admission- symptomatically much better. I/O 5501/9960 yesterday. 2. Elevated troponin/ NSTEMI- for cardiac cath today  3. mild aortic stenosis on echo  4. A fib on admission EKG, denies prior hx.nl sinus rhythmn since then. 5. Hx difficult to control hypertension. No won clonidine. 6. Diabetes- on glipizide and sliding scale. Will increase Lantus as sugars still in the 200s.    7. Hypokalemia- will replace

## 2019-09-23 NOTE — PROGRESS NOTES
Bedside and Verbal shift change report given to Herbert Burleson (oncoming nurse) by Jacki Rodrigues (offgoing nurse). Report included the following information SBAR, Kardex, Intake/Output, MAR, Accordion and Recent Results.

## 2019-09-23 NOTE — PROGRESS NOTES
Problem: Falls - Risk of  Goal: *Absence of Falls  Description  Document Zulma Jorgensenhussain Fall Risk and appropriate interventions in the flowsheet.   Outcome: Progressing Towards Goal  Note:   Fall Risk Interventions:  Mobility Interventions: Bed/chair exit alarm, Patient to call before getting OOB, PT Consult for mobility concerns, PT Consult for assist device competence, Strengthening exercises (ROM-active/passive)         Medication Interventions: Bed/chair exit alarm, Patient to call before getting OOB, Teach patient to arise slowly    Elimination Interventions: Bed/chair exit alarm, Call light in reach, Patient to call for help with toileting needs

## 2019-09-23 NOTE — PROGRESS NOTES
Physical Therapy  Patient scheduled for cardiac cath and has continued elevated troponin. Will follow up post procedure for mobility assessment.   Quan Chris, PT, DPT

## 2019-09-23 NOTE — PROGRESS NOTES
FESTUS:    TBD: SNF or HHC  2nd IM Medicare Letter  Follow up appointment     CM: Alok Cardozo is currently working with pt in the PCU unit. Pt is known to be independent with ADLs, and has support at home. Pt is currently waiting to be evaluated by therapy to determine his baseline. CM will evaluate pt's needs and discuss with pt.     DIANA Ochoa, [address] [place] [address]

## 2019-09-23 NOTE — PROCEDURES
L Cath     PCI of LAD   PCI of Circumflex . LAD has 80% stenosis in mid LAD . PCI with Rich MARIPOSA  Post dilated with 3.5 mm NC balloon. 0% residual  KIT 3 flow. CX with 80 % long stenosis , extending into the OM 1.   2 overlapping [place] MARIPOSA to 0%    KIT 3. RCA has 80 % proximal stenosis and  90 % SRIKANTH stenosis. Will stage intervention on those. No LV gram .      Renal arteries normal .   NO LESLIE       Will get him back and do the RCA soon.

## 2019-09-23 NOTE — PROGRESS NOTES
TRANSFER - OUT REPORT:    Verbal report given to Zuhair Francois RN(name) on Hazeline Body  being transferred to IVCU(unit) for routine progression of care       Report consisted of patients Situation, Background, Assessment and   Recommendations(SBAR). Information from the following report(s) SBAR, Kardex, Intake/Output, MAR, Recent Results and Cardiac Rhythm NSR, 1 AVB BBB was reviewed with the receiving nurse. Lines:   Peripheral IV 09/23/19 Anterior; Left Forearm (Active)   Site Assessment Clean, dry, & intact 9/23/2019  3:15 AM   Phlebitis Assessment 0 9/23/2019  3:15 AM   Infiltration Assessment 0 9/23/2019  3:15 AM   Dressing Status Clean, dry, & intact 9/23/2019  3:15 AM   Dressing Type Transparent;Tape 9/23/2019  3:15 AM   Hub Color/Line Status Pink;Flushed;Capped 9/23/2019  3:15 AM   Action Taken Blood drawn 9/23/2019  3:00 AM       Peripheral IV 09/23/19 Anterior;Distal;Right Forearm (Active)   Site Assessment Clean, dry, & intact 9/23/2019  3:15 AM   Phlebitis Assessment 0 9/23/2019  3:15 AM   Infiltration Assessment 0 9/23/2019  3:15 AM   Dressing Status Clean, dry, & intact 9/23/2019  3:15 AM   Dressing Type Transparent;Tape 9/23/2019  3:15 AM   Hub Color/Line Status Blue;Flushed;Capped 9/23/2019  3:15 AM        Opportunity for questions and clarification was provided.       Patient transported with:

## 2019-09-23 NOTE — PROGRESS NOTES
FESTUS:    * TBD: SNF vs Home w/ HHC  > 2nd IM Medicare letter before d/c  > schedule f/u appts as needed    CM reviewed pt's chart prior to moving forward with d/c planning. CM conduced room visit to make contact with pt and introduce role to ensure smooth transition of care between units. Pt is alert and oriented x4, resting in bed, with his wife Linda Corrales: [phone]) present at bedside. CM noted PT/OT consults and will continue to follow pt to determine safe FESTUS plan at d/c. Pt and wife inquired if someone from nutrition would be able to provide education in room - CM will f/u with pt's nurse regarding request.     CM will continue to follow patient for discharge planning needs and arrange for services as deemed necessary.     Katie Rosario MSW  Care Manager, [address] [place] [address]

## 2019-09-23 NOTE — PROGRESS NOTES
Occupational Therapy  Medical record reviewed. Nursing has just transferred pt to 2153. He is scheduled for cardiac Cath today;  Troponin remains elevated. Will defer at this time and continue to follow to initiate OT Evaluation. Ced Stewart

## 2019-09-23 NOTE — PROGRESS NOTES
Physical Therapy  Patient with bedrest following cath. Will check back tmrw for evaluation.   Harlan Lisa, PT, DPT

## 2019-09-24 ENCOUNTER — HOME HEALTH ADMISSION (OUTPATIENT)
Dept: HOME HEALTH SERVICES | Facility: HOME HEALTH | Age: 74
End: 2019-09-24

## 2019-09-24 PROBLEM — Z00.00 MEDICARE ANNUAL WELLNESS VISIT, SUBSEQUENT: Status: RESOLVED | Noted: 2017-08-25 | Resolved: 2019-09-24

## 2019-09-24 PROBLEM — Z12.5 PROSTATE CANCER SCREENING: Status: RESOLVED | Noted: 2018-08-03 | Resolved: 2019-09-24

## 2019-09-24 LAB
ANION GAP SERPL CALC-SCNC: 7 MMOL/L (ref 5–15)
BASOPHILS # BLD: 0 K/UL (ref 0–0.1)
BASOPHILS NFR BLD: 0 % (ref 0–1)
BUN SERPL-MCNC: 15 MG/DL (ref 6–20)
BUN/CREAT SERPL: 15 (ref 12–20)
CALCIUM SERPL-MCNC: 8.1 MG/DL (ref 8.5–10.1)
CHLORIDE SERPL-SCNC: 104 MMOL/L (ref 97–108)
CO2 SERPL-SCNC: 29 MMOL/L (ref 21–32)
CREAT SERPL-MCNC: 0.99 MG/DL (ref 0.7–1.3)
DIFFERENTIAL METHOD BLD: NORMAL
EOSINOPHIL # BLD: 0.1 K/UL (ref 0–0.4)
EOSINOPHIL NFR BLD: 2 % (ref 0–7)
ERYTHROCYTE [DISTWIDTH] IN BLOOD BY AUTOMATED COUNT: 13.7 % (ref 11.5–14.5)
GLUCOSE BLD STRIP.AUTO-MCNC: 143 MG/DL (ref 65–100)
GLUCOSE BLD STRIP.AUTO-MCNC: 196 MG/DL (ref 65–100)
GLUCOSE BLD STRIP.AUTO-MCNC: 203 MG/DL (ref 65–100)
GLUCOSE BLD STRIP.AUTO-MCNC: 203 MG/DL (ref 65–100)
GLUCOSE SERPL-MCNC: 198 MG/DL (ref 65–100)
HCT VFR BLD AUTO: 37.1 % (ref 36.6–50.3)
HGB BLD-MCNC: 12.1 G/DL (ref 12.1–17)
IMM GRANULOCYTES # BLD AUTO: 0 K/UL (ref 0–0.04)
IMM GRANULOCYTES NFR BLD AUTO: 0 % (ref 0–0.5)
LYMPHOCYTES # BLD: 1.5 K/UL (ref 0.8–3.5)
LYMPHOCYTES NFR BLD: 28 % (ref 12–49)
MCH RBC QN AUTO: 26.5 PG (ref 26–34)
MCHC RBC AUTO-ENTMCNC: 32.6 G/DL (ref 30–36.5)
MCV RBC AUTO: 81.2 FL (ref 80–99)
MONOCYTES # BLD: 0.4 K/UL (ref 0–1)
MONOCYTES NFR BLD: 8 % (ref 5–13)
NEUTS SEG # BLD: 3.2 K/UL (ref 1.8–8)
NEUTS SEG NFR BLD: 62 % (ref 32–75)
NRBC # BLD: 0 K/UL (ref 0–0.01)
NRBC BLD-RTO: 0 PER 100 WBC
PLATELET # BLD AUTO: 151 K/UL (ref 150–400)
PMV BLD AUTO: 10.4 FL (ref 8.9–12.9)
POTASSIUM SERPL-SCNC: 3.6 MMOL/L (ref 3.5–5.1)
RBC # BLD AUTO: 4.57 M/UL (ref 4.1–5.7)
SERVICE CMNT-IMP: ABNORMAL
SODIUM SERPL-SCNC: 140 MMOL/L (ref 136–145)
WBC # BLD AUTO: 5.3 K/UL (ref 4.1–11.1)

## 2019-09-24 PROCEDURE — 74011250637 HC RX REV CODE- 250/637: Performed by: INTERNAL MEDICINE

## 2019-09-24 PROCEDURE — 74011250637 HC RX REV CODE- 250/637: Performed by: FAMILY MEDICINE

## 2019-09-24 PROCEDURE — 74011636637 HC RX REV CODE- 636/637: Performed by: INTERNAL MEDICINE

## 2019-09-24 PROCEDURE — 97165 OT EVAL LOW COMPLEX 30 MIN: CPT

## 2019-09-24 PROCEDURE — 82962 GLUCOSE BLOOD TEST: CPT

## 2019-09-24 PROCEDURE — 65660000000 HC RM CCU STEPDOWN

## 2019-09-24 PROCEDURE — 74011636637 HC RX REV CODE- 636/637: Performed by: FAMILY MEDICINE

## 2019-09-24 PROCEDURE — 77010033678 HC OXYGEN DAILY

## 2019-09-24 PROCEDURE — 80048 BASIC METABOLIC PNL TOTAL CA: CPT

## 2019-09-24 PROCEDURE — 36415 COLL VENOUS BLD VENIPUNCTURE: CPT

## 2019-09-24 PROCEDURE — 85025 COMPLETE CBC W/AUTO DIFF WBC: CPT

## 2019-09-24 PROCEDURE — 97161 PT EVAL LOW COMPLEX 20 MIN: CPT

## 2019-09-24 PROCEDURE — 74011250636 HC RX REV CODE- 250/636: Performed by: INTERNAL MEDICINE

## 2019-09-24 RX ORDER — INSULIN GLARGINE 100 [IU]/ML
24 INJECTION, SOLUTION SUBCUTANEOUS DAILY
Status: DISCONTINUED | OUTPATIENT
Start: 2019-09-24 | End: 2019-09-25

## 2019-09-24 RX ORDER — SODIUM CHLORIDE 0.9 % (FLUSH) 0.9 %
SYRINGE (ML) INJECTION
Status: DISPENSED
Start: 2019-09-24 | End: 2019-09-25

## 2019-09-24 RX ADMIN — METOPROLOL TARTRATE 25 MG: 25 TABLET ORAL at 21:38

## 2019-09-24 RX ADMIN — HEPARIN SODIUM 5000 UNITS: 5000 INJECTION INTRAVENOUS; SUBCUTANEOUS at 12:50

## 2019-09-24 RX ADMIN — INSULIN LISPRO 4 UNITS: 100 INJECTION, SOLUTION INTRAVENOUS; SUBCUTANEOUS at 12:43

## 2019-09-24 RX ADMIN — CLOPIDOGREL BISULFATE 75 MG: 75 TABLET ORAL at 08:39

## 2019-09-24 RX ADMIN — HYDRALAZINE HYDROCHLORIDE 50 MG: 50 TABLET, FILM COATED ORAL at 21:38

## 2019-09-24 RX ADMIN — OMEGA-3 FATTY ACIDS CAP 1000 MG 1 CAPSULE: 1000 CAP at 08:40

## 2019-09-24 RX ADMIN — OMEGA-3 FATTY ACIDS CAP 1000 MG 1 CAPSULE: 1000 CAP at 21:56

## 2019-09-24 RX ADMIN — HYDRALAZINE HYDROCHLORIDE 50 MG: 50 TABLET, FILM COATED ORAL at 08:40

## 2019-09-24 RX ADMIN — AMLODIPINE BESYLATE 10 MG: 5 TABLET ORAL at 08:40

## 2019-09-24 RX ADMIN — BISACODYL 5 MG: 5 TABLET, COATED ORAL at 04:17

## 2019-09-24 RX ADMIN — CLONIDINE HYDROCHLORIDE 0.2 MG: 0.1 TABLET ORAL at 17:44

## 2019-09-24 RX ADMIN — ASPIRIN 81 MG: 81 TABLET, COATED ORAL at 08:40

## 2019-09-24 RX ADMIN — Medication 10 ML: at 04:03

## 2019-09-24 RX ADMIN — POTASSIUM CHLORIDE 20 MEQ: 750 TABLET, FILM COATED, EXTENDED RELEASE ORAL at 17:44

## 2019-09-24 RX ADMIN — CLONIDINE HYDROCHLORIDE 0.2 MG: 0.1 TABLET ORAL at 08:39

## 2019-09-24 RX ADMIN — BISACODYL 5 MG: 5 TABLET, COATED ORAL at 21:55

## 2019-09-24 RX ADMIN — Medication 10 ML: at 21:39

## 2019-09-24 RX ADMIN — GLIPIZIDE 10 MG: 5 TABLET ORAL at 08:40

## 2019-09-24 RX ADMIN — INSULIN LISPRO 4 UNITS: 100 INJECTION, SOLUTION INTRAVENOUS; SUBCUTANEOUS at 08:40

## 2019-09-24 RX ADMIN — PRAVASTATIN SODIUM 80 MG: 40 TABLET ORAL at 21:38

## 2019-09-24 RX ADMIN — TIMOLOL MALEATE 1 DROP: 2.5 SOLUTION OPHTHALMIC at 17:44

## 2019-09-24 RX ADMIN — HEPARIN SODIUM 5000 UNITS: 5000 INJECTION INTRAVENOUS; SUBCUTANEOUS at 04:03

## 2019-09-24 RX ADMIN — HEPARIN SODIUM 5000 UNITS: 5000 INJECTION INTRAVENOUS; SUBCUTANEOUS at 21:00

## 2019-09-24 RX ADMIN — TAMSULOSIN HYDROCHLORIDE 0.4 MG: 0.4 CAPSULE ORAL at 08:40

## 2019-09-24 RX ADMIN — Medication 10 ML: at 12:50

## 2019-09-24 RX ADMIN — OMEGA-3 FATTY ACIDS CAP 1000 MG 1 CAPSULE: 1000 CAP at 17:46

## 2019-09-24 RX ADMIN — METOPROLOL TARTRATE 25 MG: 25 TABLET ORAL at 08:40

## 2019-09-24 RX ADMIN — FINASTERIDE 5 MG: 5 TABLET, FILM COATED ORAL at 08:40

## 2019-09-24 RX ADMIN — INSULIN GLARGINE 24 UNITS: 100 INJECTION, SOLUTION SUBCUTANEOUS at 08:41

## 2019-09-24 RX ADMIN — LOSARTAN POTASSIUM 100 MG: 100 TABLET ORAL at 08:40

## 2019-09-24 RX ADMIN — POTASSIUM CHLORIDE 20 MEQ: 750 TABLET, FILM COATED, EXTENDED RELEASE ORAL at 08:40

## 2019-09-24 RX ADMIN — HYDRALAZINE HYDROCHLORIDE 50 MG: 50 TABLET, FILM COATED ORAL at 17:44

## 2019-09-24 NOTE — CARDIO/PULMONARY
Cardiac Rehab Note: chart review Pt is a 75 yo admitting diagnosis: NSTEMI, s/p PCI/MARIPOSA 9/23/19 with plans for staged PCI of RCA on 9/25/19. Consult has been acknowledged. Smoking history assessed. Patient is a non- smoker. Smoking Cessation Program link has not been added to the AVS. EF 60%  on 9/22/19 per echo. CAD education folder, with heart heathy diet, warning signs, heart facts, catheterization brochure, and out patient cardiac rehab program provided to Joelle Meadows on 9/24/19. Educated using teach back method. Reviewed CAD diagnosis definition and purpose of intervention. Discussed risk factors for CAD to include the following: family history, elevated BMI, hyperlipidemia, hypertension, diabetes, and stress. Discussed Heart Healthy/Low Sodium (less than 2000 mg) diet. Pt reports he has been on low sodium diet for years. Reviewed the importance of medication compliance, follow up appointments with cardiologist, signs and symptoms of angina, and what to report to physician after discharge. Emphasized the value of cardiac rehab. Discussed Cardiac Rehab Program format, benefits, and encouraged enrollment to assist with risk modification and management. Pt had been doing a home walking routine until recent when he developed VILLALTA. Pt prefers to discuss with his wife and have return to cath lab prior to committing to CR program.  Advised that CR staff will f/u with pt per his request.  
 
Joelle Meadows verbalized understanding with questions answered.   Joyce Simpson RN

## 2019-09-24 NOTE — DIABETES MGMT
[facility]    DTC Progress Note    Recommendations/ Comments:  Lantus held yesterday - which has likely contributed to elevated BG today. If appropriate, please:  1. Avoid holding Lantus dose when NPO; if concern for hypoglycemia when NPO, consider reduced Lantus dose. 2. Add CHO consistent diet to current diet order     Current hospital DM medication: Lantus 24 units , glipizide 10 mg daily    Chart reviewed on Svitlana Singh. Patient is a 74 y.o. male with known DM on glipizide 10 mg BID  , Januvia  100 mg daily at home. A1c:   Lab Results   Component Value Date/Time    Hemoglobin A1c 8.7 (H) 08/05/2019 09:43 AM    Hemoglobin A1c 7.7 (H) 04/22/2019 09:33 AM       Recent Glucose Results:   Lab Results   Component Value Date/Time     (H) 09/24/2019 04:13 AM    GLUCPOC 203 (H) 09/24/2019 11:30 AM    GLUCPOC 203 (H) 09/24/2019 07:55 AM    GLUCPOC 260 (H) 09/23/2019 09:23 PM        Lab Results   Component Value Date/Time    Creatinine 0.99 09/24/2019 04:13 AM     Estimated Creatinine Clearance: 78.4 mL/min (based on SCr of 0.99 mg/dL). Active Orders   Diet    DIET CARDIAC Regular        PO intake:   Patient Vitals for the past 72 hrs:   % Diet Eaten   09/24/19 1104 100 %   09/23/19 1901 100 %   09/22/19 1800 100 %   09/22/19 1202 100 %   09/22/19 1007 100 %       Will continue to follow as needed.     Thank you    Office: [address] [place] [address] Greg, [address] [place] [address]    Office: [phone]    Time spent: 4 minutes

## 2019-09-24 NOTE — PROGRESS NOTES
General Daily Progress Note    Admit Date: 9/21/2019  Hospital day 4    Subjective:     Patient has no complaint of shortness of breath or chest pain. Feels well.  ..   Medication side effects: none    Current Facility-Administered Medications   Medication Dose Route Frequency    insulin glargine (LANTUS) injection 24 Units  24 Units SubCUTAneous DAILY    potassium chloride SR (KLOR-CON 10) tablet 20 mEq  20 mEq Oral BID    sodium chloride (NS) flush 5-40 mL  5-40 mL IntraVENous Q8H    sodium chloride (NS) flush 5-40 mL  5-40 mL IntraVENous PRN    zolpidem (AMBIEN) tablet 5 mg  5 mg Oral QHS PRN    clopidogrel (PLAVIX) tablet 75 mg  75 mg Oral DAILY    cloNIDine HCl (CATAPRES) tablet 0.2 mg  0.2 mg Oral BID    nitroglycerin (Tridil) 200 mcg/ml infusion  0-200 mcg/min IntraVENous TITRATE    aspirin delayed-release tablet 81 mg  81 mg Oral DAILY    finasteride (PROSCAR) tablet 5 mg  5 mg Oral DAILY    glipiZIDE (GLUCOTROL) tablet 10 mg  10 mg Oral ACB    losartan (COZAAR) tablet 100 mg  100 mg Oral DAILY    omega 3-DHA-EPA-fish oil 1,000 mg (120 mg-180 mg) capsule 1 Cap  1 Cap Oral TID    pravastatin (PRAVACHOL) tablet 80 mg  80 mg Oral QHS    tamsulosin (FLOMAX) capsule 0.4 mg  0.4 mg Oral DAILY    timolol (TIMOPTIC) 0.25% ophthalmic solution  1 Drop Both Eyes BID    insulin lispro (HUMALOG) injection   SubCUTAneous AC&HS    glucose chewable tablet 16 g  4 Tab Oral PRN    dextrose (D50W) injection syrg 12.5-25 g  12.5-25 g IntraVENous PRN    glucagon (GLUCAGEN) injection 1 mg  1 mg IntraMUSCular PRN    acetaminophen (TYLENOL) tablet 500 mg  500 mg Oral Q4H PRN    HYDROcodone-acetaminophen (NORCO) 5-325 mg per tablet 1 Tab  1 Tab Oral Q6H PRN    naloxone (NARCAN) injection 0.4 mg  0.4 mg IntraVENous PRN    ondansetron (ZOFRAN) injection 2 mg  2 mg IntraVENous Q6H PRN    bisacodyl (DULCOLAX) tablet 5 mg  5 mg Oral DAILY PRN    nicotine (NICODERM CQ) 7 mg/24 hr patch 1 Patch  1 Patch TransDERmal DAILY PRN    heparin (porcine) injection 5,000 Units  5,000 Units SubCUTAneous Q8H    amLODIPine (NORVASC) tablet 10 mg  10 mg Oral DAILY    hydrALAZINE (APRESOLINE) tablet 50 mg  50 mg Oral TID    metoprolol tartrate (LOPRESSOR) tablet 25 mg  25 mg Oral Q12H        Review of Systems  Pertinent items are noted in HPI. Objective:     Patient Vitals for the past 8 hrs:   BP Temp Pulse Resp SpO2 Weight   09/24/19 0722 155/70 98 °F (36.7 °C) 68 17 92 %    09/24/19 0345   85   232 lb 12.9 oz (105.6 kg)   09/24/19 0200 149/72 98.6 °F (37 °C) 62 16 95 %    09/24/19 0000 142/68 98.7 °F (37.1 °C) (!) 59 16 93 %      No intake/output data recorded. 09/22 1901 - 09/24 0700  In: 2757.4 [P.O.:1420; I.V.:1337.4]  Out: 6348 [Urine:2925]    Physical Exam:   Visit Vitals  /70   Pulse 68   Temp 98 °F (36.7 °C)   Resp 17   Ht 5' 10\" (1.778 m)   Wt 232 lb 12.9 oz (105.6 kg)   SpO2 92%   BMI 33.40 kg/m²     Lungs: clear to auscultation bilaterally  Heart: regular rate and rhythm, S1, S2 normal, no murmur, click, rub or gallop  Abdomen: soft, non-tender.  Bowel sounds normal. No masses,  no organomegaly  Extremities: extremities normal, atraumatic, no cyanosis or edema      ECG: normal sinus rhythm     Data Review   Recent Results (from the past 24 hour(s))   GLUCOSE, POC    Collection Time: 09/23/19 12:25 PM   Result Value Ref Range    Glucose (POC) 182 (H) 65 - 100 mg/dL    Performed by GIORGIO VILLARREAL    EKG, 12 LEAD, INITIAL    Collection Time: 09/23/19 12:26 PM   Result Value Ref Range    Ventricular Rate 63 BPM    Atrial Rate 63 BPM    P-R Interval 204 ms    QRS Duration 146 ms    Q-T Interval 530 ms    QTC Calculation (Bezet) 542 ms    Calculated P Axis 5 degrees    Calculated R Axis -58 degrees    Calculated T Axis 141 degrees    Diagnosis       Normal sinus rhythm  Left axis deviation  Left bundle branch block  When compared with ECG of 21-SEP-2019 11:35,  Sinus rhythm has replaced Atrial fibrillation  Confirmed by CONNOR Ling ([id number]) on 9/23/2019 6:21:46 PM     GLUCOSE, POC    Collection Time: 09/23/19  5:19 PM   Result Value Ref Range    Glucose (POC) 319 (H) 65 - 100 mg/dL    Performed by Cammie Christie, POC    Collection Time: 09/23/19  9:23 PM   Result Value Ref Range    Glucose (POC) 260 (H) 65 - 100 mg/dL    Performed by Lv JAQUEZ    CBC WITH AUTOMATED DIFF    Collection Time: 09/24/19  4:13 AM   Result Value Ref Range    WBC 5.3 4.1 - 11.1 K/uL    RBC 4.57 4.10 - 5.70 M/uL    HGB 12.1 12.1 - 17.0 g/dL    HCT 37.1 36.6 - 50.3 %    MCV 81.2 80.0 - 99.0 FL    MCH 26.5 26.0 - 34.0 PG    MCHC 32.6 30.0 - 36.5 g/dL    RDW 13.7 11.5 - 14.5 %    PLATELET 881 425 - 791 K/uL    MPV 10.4 8.9 - 12.9 FL    NRBC 0.0 0  WBC    ABSOLUTE NRBC 0.00 0.00 - 0.01 K/uL    NEUTROPHILS 62 32 - 75 %    LYMPHOCYTES 28 12 - 49 %    MONOCYTES 8 5 - 13 %    EOSINOPHILS 2 0 - 7 %    BASOPHILS 0 0 - 1 %    IMMATURE GRANULOCYTES 0 0.0 - 0.5 %    ABS. NEUTROPHILS 3.2 1.8 - 8.0 K/UL    ABS. LYMPHOCYTES 1.5 0.8 - 3.5 K/UL    ABS. MONOCYTES 0.4 0.0 - 1.0 K/UL    ABS. EOSINOPHILS 0.1 0.0 - 0.4 K/UL    ABS. BASOPHILS 0.0 0.0 - 0.1 K/UL    ABS. IMM.  GRANS. 0.0 0.00 - 0.04 K/UL    DF AUTOMATED     METABOLIC PANEL, BASIC    Collection Time: 09/24/19  4:13 AM   Result Value Ref Range    Sodium 140 136 - 145 mmol/L    Potassium 3.6 3.5 - 5.1 mmol/L    Chloride 104 97 - 108 mmol/L    CO2 29 21 - 32 mmol/L    Anion gap 7 5 - 15 mmol/L    Glucose 198 (H) 65 - 100 mg/dL    BUN 15 6 - 20 MG/DL    Creatinine 0.99 0.70 - 1.30 MG/DL    BUN/Creatinine ratio 15 12 - 20      GFR est AA >60 >60 ml/min/1.73m2    GFR est non-AA >60 >60 ml/min/1.73m2    Calcium 8.1 (L) 8.5 - 10.1 MG/DL           Assessment:     Principal Problem:    [facility]non[facility]ST elevated [facility] (9/21/2019)    Active Problems:    BPH (benign prostatic hyperplasia) (8/2/2017)      ASVD (arteriosclerotic vascular disease) (8/2/2017)      Aortic stenosis (8/2/2017)      Primary osteoarthritis involving multiple joints (8/2/2017)      Essential hypertension (8/2/2017)      Back pain (8/2/2017)      Controlled type 2 diabetes mellitus with stage 2 chronic kidney disease, without long-term current use of insulin ([facility] (12/10/2017)        Plan:        1. Pulmonary edema on admission- symptomatically much better    2. Elevated troponin/ NSTEMI- sp 3 stents yesterday, and further stenting planned for tomorrow. 3. mild aortic stenosis on echo  4. A fib on admission EKG, denies prior hx.nl sinus rhythmn since then. 5. Hx difficult to control hypertension. Now on clonidine. 6. Diabetes- on glipizide and sliding scale. Will increase Lantus as sugars still in the 200s. Will also start jardiance at discharge for better control and cardiovascular protection. (would start now but not on hospital formulary).    7. Hypokalemia- better

## 2019-09-24 NOTE — PROGRESS NOTES
PHYSICAL THERAPY EVALUATION/DISCHARGE  Patient: Adrianna Currie (53 y.o. male)  Date: 9/24/2019  Primary Diagnosis: NSTEMI (non-ST elevated myocardial infarction) ([facility] [I21.4]  Procedure(s) (LRB):  LEFT HEART CATH / CORONARY ANGIOGRAPHY (N/A)  Left Ventriculography (N/A)  Angiography Renal Bilat (N/A)  Percutaneous Coronary Intervention (N/A) 1 Day Post-Op   Precautions:          ASSESSMENT  Based on the objective data described below, the patient presents with good strength, good functional mobility, steady gait, and good balance following admission for NSTEMI. Patient currently feels at his baseline with no SOB noted. Patient with safe and steady gait and no LOB noted. Functional Outcome Measure: The patient scored 75/100 on the Barthel outcome measure which is indicative of mild functional impairment. Other factors to consider for discharge: cardiac cath     Further skilled acute physical therapy is not indicated at this time. PLAN :  Recommendation for discharge: (in order for the patient to meet his/her long term goals)  No skilled physical therapy/ follow up rehabilitation needs identified at this time. This discharge recommendation:  Has not yet been discussed the attending provider and/or case management    Equipment recommendations for successful discharge: none       SUBJECTIVE:   Patient stated I feel so great.     OBJECTIVE DATA SUMMARY:   HISTORY:    Past Medical History:   Diagnosis Date    Aortic stenosis 8/2/2017    ASVD (arteriosclerotic vascular disease) 8/2/2017    Back pain 8/2/2017    BPH (benign prostatic hyperplasia) 8/2/2017    CKD (chronic kidney disease) 8/2/2017    Diabetes ([facility] [facility] 8/2/2017    DJD (degenerative joint disease) 8/2/2017    ED (erectile dysfunction) 8/2/2017    Guillain-[place] syndrome ([facility] 8/2/2017    Hyperlipidemia 8/2/2017    Hypertension 8/2/2017    Left carotid bruit 8/2/2017    Morbid obesity ([address] 8/2/2017    On statin therapy 8/2/2017  Urticaria 8/2/2017   History reviewed. No pertinent surgical history. Prior level of function: patient is independent with all aspects of functional mobility and ADLs. He lives with his wife. Denies any falls. Personal factors and/or comorbidities impacting plan of care: cardiac cath    Home Situation  Home Environment: Private residence  # Steps to Enter: 3  Rails to Enter: Yes  Hand Rails : Left  One/Two Story Residence: One story  Living Alone: No  Support Systems: Child(srikanth), Family member(s), Friends \ neighbors  Patient Expects to be Discharged to[de-identified] Private residence  Current DME Used/Available at Home: Fabrice Gehrig, straight, Walker, rollator, Tub transfer bench(reacher for shelf item retrieval)  Tub or Shower Type: Tub/Shower combination    EXAMINATION/PRESENTATION/DECISION MAKING:   Critical Behavior:  Neurologic State: Alert  Orientation Level: Oriented X4  Cognition: Appropriate decision making, Follows commands  Safety/Judgement: Awareness of environment  Hearing: Auditory  Auditory Impairment: None  Skin:    Edema:   Range Of Motion:  AROM: Generally decreased, functional                       Strength:    Strength: Generally decreased, functional                    Tone & Sensation:   Tone: Normal              Sensation: Intact               Coordination:  Coordination: Within functional limits  Vision:   Acuity: Within Defined Limits  Corrective Lenses: Reading glasses  Functional Mobility:  Bed Mobility:  Rolling: Independent  Supine to Sit: Independent     Scooting: Independent  Transfers:  Sit to Stand: Supervision  Stand to Sit: Supervision        Bed to Chair: Supervision              Balance:   Sitting: Intact  Standing: Impaired; Without support  Standing - Static: Good; Unsupported  Standing - Dynamic : Fair;Good; Unsupported(very mild unsteady gait entering bathroom)  Ambulation/Gait Training:  Distance (ft): 250 Feet (ft)  Assistive Device: Gait belt  Ambulation - Level of Assistance: Independent     Gait Description (WDL): Exceptions to WDL  Gait Abnormalities: Decreased step clearance        Base of Support: Widened     Speed/Elsy: Pace decreased (<100 feet/min)  Step Length: Right shortened;Left shortened                     Stairs:  Number of Stairs Trained: 3  Stairs - Level of Assistance: Supervision   Rail Use: Right         Functional Measure:  Barthel Index:    Bathin(requires Supervision only)  Bladder: 5  Bowels: 10  Groomin  Dressing: 10  Feeding: 10  Mobility: 10  Stairs: 5  Toilet Use: 10  Transfer (Bed to Chair and Back): 10  Total: 75/100       The Barthel ADL Index: Guidelines  1. The index should be used as a record of what a patient does, not as a record of what a patient could do. 2. The main aim is to establish degree of independence from any help, physical or verbal, however minor and for whatever reason. 3. The need for supervision renders the patient not independent. 4. A patient's performance should be established using the best available evidence. Asking the patient, friends/relatives and nurses are the usual sources, but direct observation and common sense are also important. However direct testing is not needed. 5. Usually the patient's performance over the preceding 24-48 hours is important, but occasionally longer periods will be relevant. 6. Middle categories imply that the patient supplies over 50 per cent of the effort. 7. Use of aids to be independent is allowed. Urban Wallace, Barthel, D.W. (5563). Functional evaluation: the Barthel Index. [address] [facility] (14)2. COURTNEY Zarco, Moy Chao., Gordy Frausto., Yu Browning, [address] [place] [address] (). Measuring the change indisability after inpatient rehabilitation; comparison of the responsiveness of the Barthel Index and Functional [place] Measure. Journal of Neurology, Neurosurgery, and Psychiatry, 66(4), 259-370.   Massiel Qiu, N.J.HERB, GURPREET Bermudez, Mariana Stacy MGAYATHRI (2004.) Assessment of post-stroke quality of life in cost-effectiveness studies: The usefulness of the Barthel Index and the EuroQoL-5D. Quality of Life Research, 15, 709-44            Physical Therapy Evaluation Charge Determination   History Examination Presentation Decision-Making   MEDIUM  Complexity : 1-2 comorbidities / personal factors will impact the outcome/ POC  MEDIUM Complexity : 3 Standardized tests and measures addressing body structure, function, activity limitation and / or participation in recreation  MEDIUM Complexity : Evolving with changing characteristics  Other outcome measures Barthel   LOW       Based on the above components, the patient evaluation is determined to be of the following complexity level: LOW         Activity Tolerance:   Good  Please refer to the flowsheet for vital signs taken during this treatment. After treatment patient left in no apparent distress:   Sitting in chair, Call bell within reach and Caregiver / family present    COMMUNICATION/EDUCATION:   The patients plan of care was discussed with: Occupational Therapist, Registered Nurse and . Fall prevention education was provided and the patient/caregiver indicated understanding. and Patient/family have participated as able in goal setting and plan of care.     Thank you for this referral.  Cristal Meza, PT, DPT   Time Calculation: 8 mins

## 2019-09-24 NOTE — PROGRESS NOTES
1930 - Bedside report from [facility]. SR, 1st AVB, BBB. No complaints, will wean NTG qtt.  + murmur, lungs considerably diminished, 2+BLE edema. Encouraged deep breathing exercises w teach back instruction. Up in chair. 2200 - tolerating weaning of NTG without issues or complaints. VSS.      0045 - NTG weaned off at this time. No chest or other discomfort. Bedside report to RN. No change in rhythm.

## 2019-09-24 NOTE — PROGRESS NOTES
Chart review. PTOT no recommendations for HH. Patient qualifies for [facility] and is agreeable to service. Wife will provide transportation at time of discharge. Neri Orantes submitted to [facility]. [address] accepted by [facility].     Oralia Boyd RN CM  Ext [phone]

## 2019-09-24 NOTE — PROGRESS NOTES
OCCUPATIONAL THERAPY EVALUATION/DISCHARGE  Patient: Adrianna Currie (82 y.o. male)  Date: 9/24/2019  Primary Diagnosis: NSTEMI (non-ST elevated myocardial infarction) ([facility] [address] [I21.4]  Procedure(s) (LRB):  LEFT HEART CATH / CORONARY ANGIOGRAPHY (N/A)  Left Ventriculography (N/A)  Angiography Renal Bilat (N/A)  Percutaneous Coronary Intervention (N/A) 1 Day Post-Op   Precautions:       ASSESSMENT  Based on the objective data described below, the patient presents very close to his independent functional baseline with performing his dynamic ADL routine at hands-free level on POD #1 s/p stent placement. Plan for additional stents tomorrow. HR was stable with position changes. Pt reported no symptoms of dizziness or fatigue during activity. He doffed/donned both socks with Mod I via tailor sit position at EOB with additional time, which he reports doing at baseline. Demonstrated only mild unsteadiness ambulating to bathroom, yet completed toilet transfer with Supervision. Reports he continues to miss urinating in the toilet and it ends up on the floor. Instructed Pt to ensure proper bathroom lighting, pulling gown to side and considering sitting to urinate for maximum safety. Overall he is functioning close to baseline and is not in need of acute/post-acute skilled OT at this time. OT will sign off. Current Level of Function (ADLs/self-care): Independent with seated aspects of ADLs. Supervision with toilet transfers w/o AD d/t very mild unsteadiness, yet anticipate this will continue to progress with continued OOB functional activity performance. Functional Outcome Measure: The patient scored 75/100 on the Barthel Index outcome measure which is indicative of minor impairment with ADL task performance and related mobility.  .      Other factors to consider for discharge: None     PLAN :  Recommend with staff: None    Recommendation for discharge: (in order for the patient to meet his/her long term goals)  No skilled occupational therapy/ follow up rehabilitation needs identified at this time. This discharge recommendation:  A follow-up discussion with the attending provider and/or case management is planned    Equipment recommendations for successful discharge: none for OT       SUBJECTIVE:   Patient stated I feel so much better than I did\"    OBJECTIVE DATA SUMMARY:   HISTORY:   Past Medical History:   Diagnosis Date    Aortic stenosis 8/2/2017    ASVD (arteriosclerotic vascular disease) 8/2/2017    Back pain 8/2/2017    BPH (benign prostatic hyperplasia) 8/2/2017    CKD (chronic kidney disease) 8/2/2017    Diabetes ([facility] [address] 8/2/2017    DJD (degenerative joint disease) 8/2/2017    ED (erectile dysfunction) 8/2/2017    Guillain-[place] syndrome ([facility] [address] 8/2/2017    Hyperlipidemia 8/2/2017    Hypertension 8/2/2017    Left carotid bruit 8/2/2017    Morbid obesity ([facility] [address] 8/2/2017    On statin therapy 8/2/2017    Urticaria 8/2/2017   History reviewed. No pertinent surgical history. Prior Level of Function/Environment/Context: Retired. Independent with ADLs and mobility w/o DME. Dons LB clothing items via tailor sit position at EOB at baseline. Drives. Performs light household tasks and cuts the grass occasionally. Enjoys fishing and hunting. Denies Hx of falls.      Expanded or extensive additional review of patient history:   Home Situation  Home Environment: Private residence  # Steps to Enter: 3  Rails to Enter: Yes  Hand Rails : Left  One/Two Story Residence: One story  Living Alone: No  Support Systems: Child(srikanth), Family member(s), Friends \ neighbors  Patient Expects to be Discharged to[de-identified] Private residence  Current DME Used/Available at Home: [address] [facility], straight, Walker, rollator, Tub transfer bench(reacher for shelf item retrieval)  Tub or Shower Type: Tub/Shower combination    Hand dominance: Right    EXAMINATION OF PERFORMANCE DEFICITS:  Cognitive/Behavioral Status:  Neurologic State: Alert  Orientation Level: Oriented X4  Cognition: Appropriate decision making; Follows commands  Perception: Appears intact  Perseveration: No perseveration noted  Safety/Judgement: Awareness of environment    Skin: Intact    Edema: None    Hearing: Auditory  Auditory Impairment: None    Vision/Perceptual:                           Acuity: Within Defined Limits    Corrective Lenses: Reading glasses    Range of Motion:  AROM: Generally decreased, functional                         Strength:  Strength: Generally decreased, functional                Coordination:  Coordination: Within functional limits  Fine Motor Skills-Upper: Left Intact; Right Intact    Gross Motor Skills-Upper: Left Intact; Right Intact    Tone & Sensation:  Tone: Normal  Sensation: Intact                      Balance:  Sitting: Intact  Standing: Impaired; Without support  Standing - Static: Good; Unsupported  Standing - Dynamic : Fair;Good; Unsupported(very mild unsteady gait entering bathroom)    Functional Mobility and Transfers for ADLs:  Bed Mobility:  Rolling: Independent  Supine to Sit: Independent  Scooting: Independent    Transfers:  Sit to Stand: Supervision  Stand to Sit: Supervision  Bed to Chair: Supervision  Bathroom Mobility: Supervision/set up  Toilet Transfer : Supervision  Tub Transfer: Contact guard assistance  Assistive Device : (hands-free)    ADL Assessment:  Feeding: Independent    Oral Facial Hygiene/Grooming: Independent    Bathing: Supervision(if standing)    Upper Body Dressing: Independent    Lower Body Dressing: Supervision(for standing aspects only)    Toileting: Supervision                ADL Intervention and task modifications:     Lower Body Dressing Assistance  Socks: Modified independent  Position Performed: Seated edge of bed(via tailor sit position which Pt performs at baseline)       Cognitive Retraining  Safety/Judgement: Awareness of environment    Functional Measure:  Barthel Index:    Bathin(requires Supervision only)  Bladder: 5  Bowels: 10  Groomin  Dressing: 10  Feeding: 10  Mobility: 10  Stairs: 5  Toilet Use: 10  Transfer (Bed to Chair and Back): 10  Total: 75/100        The Barthel ADL Index: Guidelines  1. The index should be used as a record of what a patient does, not as a record of what a patient could do. 2. The main aim is to establish degree of independence from any help, physical or verbal, however minor and for whatever reason. 3. The need for supervision renders the patient not independent. 4. A patient's performance should be established using the best available evidence. Asking the patient, friends/relatives and nurses are the usual sources, but direct observation and common sense are also important. However direct testing is not needed. 5. Usually the patient's performance over the preceding 24-48 hours is important, but occasionally longer periods will be relevant. 6. Middle categories imply that the patient supplies over 50 per cent of the effort. 7. Use of aids to be independent is allowed. Dov Eden., Barthel, D.W. (3245). Functional evaluation: the Barthel Index. [address] [facility] (14)2. COURTNEY Farmer, Yamileth Elkins., Corinne Zhu., [place], [address] [place] [address] (). Measuring the change indisability after inpatient rehabilitation; comparison of the responsiveness of the Barthel Index and Functional [place] Measure. Journal of Neurology, Neurosurgery, and Psychiatry, 66(4), 296-215. Gee Vaughn, N.J.A, Magdalena Kulkarni  W.J.VALENTIN, & Nima Alcantar M.A. (2004.) Assessment of post-stroke quality of life in cost-effectiveness studies: The usefulness of the Barthel Index and the EuroQoL-5D.  Quality of Life Research, 15, 331-21       Occupational Therapy Evaluation Charge Determination   History Examination Decision-Making   LOW Complexity : Brief history review  LOW Complexity : 1-3 performance deficits relating to physical, cognitive , or psychosocial skils that result in activity limitations and / or participation restrictions  LOW Complexity : No comorbidities that affect functional and no verbal or physical assistance needed to complete eval tasks       Based on the above components, the patient evaluation is determined to be of the following complexity level: LOW   Pain Ratin/10    Activity Tolerance:   Good  Please refer to the flowsheet for vital signs taken during this treatment. After treatment patient left in no apparent distress:    Sitting in chair, Call bell within reach and Caregiver / family present    COMMUNICATION/EDUCATION:   The patients plan of care was discussed with: Physical Therapist, Registered Nurse and Patient.     Thank you for this referral.  Rene Fish OTR/L  Time Calculation: 20 mins

## 2019-09-24 NOTE — PROGRESS NOTES
Progress Note      9/24/2019 1:46 PM  NAME: Lloyd Ha   MRN:  [id number]   Admit Diagnosis: NSTEMI (non-ST elevated myocardial infarction) [facility]) [I21.4]     Assessment:      pulmonary edema   ASCVD   Hx mild aortic stenosis  Atrial Fibrillation. Diabetes type 2 requiring Insulin ,  Which he refuses to take   CKD stage 3   HTN   Hyperlipidemia. Mild Carotid disease by duplex. Morbid obesity      9/22  Feeling better. Rhythm has converted back to NSR>      He continues to be quite hypertensive. He's on high dose IV NTG in addition to po meds   Will add Clonidine today and see if that helps. 9/24  Cath yesterday with multivessel disease. PCI of LAD and Cx. He has 2 RCA stenoses. Plan to do that tomorrow afternoon  Feels well . Cath site is OK .          Echo shows LVH with normal systolic fx. EF 55 - 60% . Mild aortic valve stenosis. Mean gradient 15 mmHg. Plan: For PCI of RCA tomorrow. Continue meds     Walk in jacome         []        High complexity decision making was performed    Subjective:     Lloyd Ha denies chest pain, dyspnea. Discussed with RN events overnight.      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    BPH (benign prostatic hyperplasia) N40.0    ASVD (arteriosclerotic vascular disease) I70.90    Urticaria L50.9    Aortic stenosis I35.0    Primary osteoarthritis involving multiple joints M15.0    Morbid obesity ([address] E66.01    Essential hypertension I10    Stage 2 chronic kidney disease N18.2    Left carotid bruit R09.89    Mixed hyperlipidemia E78.2    Back pain M54.9    Neoplasm of uncertain behavior of skin D48.5    Medicare annual wellness visit, subsequent Z00.00    Colon cancer screening Z12.11    Controlled type 2 diabetes mellitus with stage 2 chronic kidney disease, without long-term current use of insulin (HCC) E11.22, N18.2    Hematuria R31.9    Prostate cancer screening Z12.5    NSTEMI (non-ST elevated myocardial infarction) [facility] I21.4       Review of Systems:    Symptom Y/N Comments  Symptom Y/N Comments   Fever/Chills N   Chest Pain N    Poor Appetite N   Edema N    Cough N   Abdominal Pain N    Sputum N   Joint Pain N    SOB/VILLALTA N   Pruritis/Rash N    Nausea/vomit N   Tolerating PT/OT Y    Diarrhea N   Tolerating Diet Y    Constipation N   Other       Could NOT obtain due to:      Objective:      Physical Exam:    Last 24hrs VS reviewed since prior progress note. Most recent are:    Visit Vitals  /55   Pulse 61   Temp 97.8 °F (36.6 °C)   Resp 18   Ht 5' 10\" (1.778 m)   Wt 105.6 kg (232 lb 12.9 oz)   SpO2 95%   BMI 33.40 kg/m²       Intake/Output Summary (Last 24 hours) at 9/24/2019 1346  Last data filed at 9/24/2019 1104  Gross per 24 hour   Intake 2637.4 ml   Output 1800 ml   Net 837.4 ml        General Appearance: Well developed, well nourished, alert & oriented x 3,    no acute distress. Ears/Nose/Mouth/Throat: Hearing grossly normal.  Neck: Supple. Chest: Lungs clear to auscultation bilaterally. Cardiovascular: Regular rate and rhythm, S1S2 normal, no murmur. Abdomen: Soft, non-tender, bowel sounds are active. Extremities: No edema bilaterally. Skin: Warm and dry.     PMH/SH reviewed - no change compared to H&P    Data Review    Telemetry: normal sinus rhythm     Lab Data Personally Reviewed:    Recent Labs     09/24/19 0413 09/23/19 0247   WBC 5.3 6.7   HGB 12.1 11.9*   HCT 37.1 36.1*    154   LABRCNT(INR:3,PTP:3,APTT:3,)  Recent Labs     09/24/19 0413 09/23/19 0247 09/22/19  0444    136 138   K 3.6 3.2* 3.2*    100 100   CO2 29 30 32   BUN 15 15 17   CREA 0.99 0.97 0.97   * 212* 185*   CA 8.1* 8.0* 8.2*   LABRCNT(CPK:3,CpKMB:3,ckndx:3,troiq:3)  Lab Results   Component Value Date/Time    Cholesterol, total 194 08/05/2019 09:42 AM HDL Cholesterol 47 08/05/2019 09:42 AM    LDL, calculated 92 08/05/2019 09:42 AM    Triglyceride 274 (H) 08/05/2019 09:42 AM    CHOL/HDL Ratio 4 08/05/2019 09:42 AM   LABRCNT(sgot:3,gpt:3,ap:3,tbiL:3,TP:3,ALB:3,GLOB:3,ggt:3,aml:3,amyp:3,lpse:3,hlpse:3)No results for input(s): PH, PCO2, PO2 in the last 72 hours. Lab Results   Component Value Date/Time    Cholesterol, total 194 08/05/2019 09:42 AM    HDL Cholesterol 47 08/05/2019 09:42 AM    LDL, calculated 92 08/05/2019 09:42 AM    Triglyceride 274 (H) 08/05/2019 09:42 AM    CHOL/HDL Ratio 4 08/05/2019 09:42 AM   MEDTABLERyley Fields MD  No results for input(s): PH, PCO2, PO2 in the last 72 hours.     Medications Personally Reviewed:    Current Facility-Administered Medications   Medication Dose Route Frequency    sodium chloride (NS) flush        insulin glargine (LANTUS) injection 24 Units  24 Units SubCUTAneous DAILY    potassium chloride SR (KLOR-CON 10) tablet 20 mEq  20 mEq Oral BID    sodium chloride (NS) flush 5-40 mL  5-40 mL IntraVENous Q8H    sodium chloride (NS) flush 5-40 mL  5-40 mL IntraVENous PRN    zolpidem (AMBIEN) tablet 5 mg  5 mg Oral QHS PRN    clopidogrel (PLAVIX) tablet 75 mg  75 mg Oral DAILY    cloNIDine HCl (CATAPRES) tablet 0.2 mg  0.2 mg Oral BID    nitroglycerin (Tridil) 200 mcg/ml infusion  0-200 mcg/min IntraVENous TITRATE    aspirin delayed-release tablet 81 mg  81 mg Oral DAILY    finasteride (PROSCAR) tablet 5 mg  5 mg Oral DAILY    glipiZIDE (GLUCOTROL) tablet 10 mg  10 mg Oral ACB    losartan (COZAAR) tablet 100 mg  100 mg Oral DAILY    omega 3-DHA-EPA-fish oil 1,000 mg (120 mg-180 mg) capsule 1 Cap  1 Cap Oral TID    pravastatin (PRAVACHOL) tablet 80 mg  80 mg Oral QHS    tamsulosin (FLOMAX) capsule 0.4 mg  0.4 mg Oral DAILY    timolol (TIMOPTIC) 0.25% ophthalmic solution  1 Drop Both Eyes BID    insulin lispro (HUMALOG) injection   SubCUTAneous AC&HS    glucose chewable tablet 16 g  4 Tab Oral PRN  dextrose (D50W) injection syrg 12.5-25 g  12.5-25 g IntraVENous PRN    glucagon (GLUCAGEN) injection 1 mg  1 mg IntraMUSCular PRN    acetaminophen (TYLENOL) tablet 500 mg  500 mg Oral Q4H PRN    HYDROcodone-acetaminophen (NORCO) 5-325 mg per tablet 1 Tab  1 Tab Oral Q6H PRN    naloxone (NARCAN) injection 0.4 mg  0.4 mg IntraVENous PRN    ondansetron (ZOFRAN) injection 2 mg  2 mg IntraVENous Q6H PRN    bisacodyl (DULCOLAX) tablet 5 mg  5 mg Oral DAILY PRN    nicotine (NICODERM CQ) 7 mg/24 hr patch 1 Patch  1 Patch TransDERmal DAILY PRN    heparin (porcine) injection 5,000 Units  5,000 Units SubCUTAneous Q8H    amLODIPine (NORVASC) tablet 10 mg  10 mg Oral DAILY    hydrALAZINE (APRESOLINE) tablet 50 mg  50 mg Oral TID    metoprolol tartrate (LOPRESSOR) tablet 25 mg  25 mg Oral Q12H         Daniel Conway MD

## 2019-09-25 PROBLEM — Z12.11 COLON CANCER SCREENING: Status: RESOLVED | Noted: 2017-08-25 | Resolved: 2019-09-25

## 2019-09-25 LAB
GLUCOSE BLD STRIP.AUTO-MCNC: 131 MG/DL (ref 65–100)
GLUCOSE BLD STRIP.AUTO-MCNC: 151 MG/DL (ref 65–100)
GLUCOSE BLD STRIP.AUTO-MCNC: 184 MG/DL (ref 65–100)
GLUCOSE BLD STRIP.AUTO-MCNC: 208 MG/DL (ref 65–100)
SERVICE CMNT-IMP: ABNORMAL

## 2019-09-25 PROCEDURE — 77030028837 HC SYR ANGI PWR INJ COEU -A: Performed by: INTERNAL MEDICINE

## 2019-09-25 PROCEDURE — C1769 GUIDE WIRE: HCPCS | Performed by: INTERNAL MEDICINE

## 2019-09-25 PROCEDURE — 77030019569 HC BND COMPR RAD TERU -B: Performed by: INTERNAL MEDICINE

## 2019-09-25 PROCEDURE — 74011250637 HC RX REV CODE- 250/637: Performed by: INTERNAL MEDICINE

## 2019-09-25 PROCEDURE — C1874 STENT, COATED/COV W/DEL SYS: HCPCS | Performed by: INTERNAL MEDICINE

## 2019-09-25 PROCEDURE — C1894 INTRO/SHEATH, NON-LASER: HCPCS | Performed by: INTERNAL MEDICINE

## 2019-09-25 PROCEDURE — 74011250636 HC RX REV CODE- 250/636: Performed by: INTERNAL MEDICINE

## 2019-09-25 PROCEDURE — 74011636637 HC RX REV CODE- 636/637: Performed by: INTERNAL MEDICINE

## 2019-09-25 PROCEDURE — 77030019697 HC SYR ANGI INFL MRTM -B: Performed by: INTERNAL MEDICINE

## 2019-09-25 PROCEDURE — 77030018729 HC ELECTRD DEFIB PAD CARD -B: Performed by: INTERNAL MEDICINE

## 2019-09-25 PROCEDURE — C1887 CATHETER, GUIDING: HCPCS | Performed by: INTERNAL MEDICINE

## 2019-09-25 PROCEDURE — 99153 MOD SED SAME PHYS/QHP EA: CPT | Performed by: INTERNAL MEDICINE

## 2019-09-25 PROCEDURE — 77030019698 HC SYR ANGI MDLON MRTM -A: Performed by: INTERNAL MEDICINE

## 2019-09-25 PROCEDURE — 74011636320 HC RX REV CODE- 636/320: Performed by: INTERNAL MEDICINE

## 2019-09-25 PROCEDURE — 74011000250 HC RX REV CODE- 250: Performed by: INTERNAL MEDICINE

## 2019-09-25 PROCEDURE — 74011250637 HC RX REV CODE- 250/637: Performed by: FAMILY MEDICINE

## 2019-09-25 PROCEDURE — 82962 GLUCOSE BLOOD TEST: CPT

## 2019-09-25 PROCEDURE — C1725 CATH, TRANSLUMIN NON-LASER: HCPCS | Performed by: INTERNAL MEDICINE

## 2019-09-25 PROCEDURE — 74011250636 HC RX REV CODE- 250/636

## 2019-09-25 PROCEDURE — 93454 CORONARY ARTERY ANGIO S&I: CPT | Performed by: INTERNAL MEDICINE

## 2019-09-25 PROCEDURE — 92928 PRQ TCAT PLMT NTRAC ST 1 LES: CPT | Performed by: INTERNAL MEDICINE

## 2019-09-25 PROCEDURE — 99152 MOD SED SAME PHYS/QHP 5/>YRS: CPT | Performed by: INTERNAL MEDICINE

## 2019-09-25 PROCEDURE — 85347 COAGULATION TIME ACTIVATED: CPT

## 2019-09-25 PROCEDURE — 93005 ELECTROCARDIOGRAM TRACING: CPT

## 2019-09-25 PROCEDURE — 65660000000 HC RM CCU STEPDOWN

## 2019-09-25 PROCEDURE — 027034Z DILATION OF CORONARY ARTERY, ONE ARTERY WITH DRUG-ELUTING INTRALUMINAL DEVICE, PERCUTANEOUS APPROACH: ICD-10-PCS | Performed by: INTERNAL MEDICINE

## 2019-09-25 DEVICE — STENT RONYX30018UX RESOLUTE ONYX 3.00X18
Type: IMPLANTABLE DEVICE | Status: FUNCTIONAL
Brand: RESOLUTE ONYX™

## 2019-09-25 RX ORDER — VERAPAMIL HYDROCHLORIDE 2.5 MG/ML
INJECTION, SOLUTION INTRAVENOUS AS NEEDED
Status: DISCONTINUED | OUTPATIENT
Start: 2019-09-25 | End: 2019-09-25 | Stop reason: HOSPADM

## 2019-09-25 RX ORDER — HEPARIN SODIUM 1000 [USP'U]/ML
INJECTION, SOLUTION INTRAVENOUS; SUBCUTANEOUS AS NEEDED
Status: DISCONTINUED | OUTPATIENT
Start: 2019-09-25 | End: 2019-09-25 | Stop reason: HOSPADM

## 2019-09-25 RX ORDER — MAGNESIUM CITRATE
296 SOLUTION, ORAL ORAL AS NEEDED
Status: DISCONTINUED | OUTPATIENT
Start: 2019-09-25 | End: 2019-09-26 | Stop reason: HOSPADM

## 2019-09-25 RX ORDER — MIDAZOLAM HYDROCHLORIDE 1 MG/ML
INJECTION, SOLUTION INTRAMUSCULAR; INTRAVENOUS AS NEEDED
Status: DISCONTINUED | OUTPATIENT
Start: 2019-09-25 | End: 2019-09-25 | Stop reason: HOSPADM

## 2019-09-25 RX ORDER — ASPIRIN 325 MG
325 TABLET ORAL DAILY
Status: DISCONTINUED | OUTPATIENT
Start: 2019-09-26 | End: 2019-09-26

## 2019-09-25 RX ORDER — SODIUM CHLORIDE 0.9 % (FLUSH) 0.9 %
5-40 SYRINGE (ML) INJECTION EVERY 8 HOURS
Status: DISCONTINUED | OUTPATIENT
Start: 2019-09-25 | End: 2019-09-26 | Stop reason: HOSPADM

## 2019-09-25 RX ORDER — TIMOLOL MALEATE 2.5 MG/ML
1 SOLUTION/ DROPS OPHTHALMIC
Status: DISCONTINUED | OUTPATIENT
Start: 2019-09-25 | End: 2019-09-26 | Stop reason: HOSPADM

## 2019-09-25 RX ORDER — HEPARIN SODIUM 200 [USP'U]/100ML
INJECTION, SOLUTION INTRAVENOUS
Status: COMPLETED | OUTPATIENT
Start: 2019-09-25 | End: 2019-09-25

## 2019-09-25 RX ORDER — ZOLPIDEM TARTRATE 5 MG/1
5 TABLET ORAL
Status: DISCONTINUED | OUTPATIENT
Start: 2019-09-25 | End: 2019-09-26 | Stop reason: HOSPADM

## 2019-09-25 RX ORDER — SODIUM CHLORIDE 9 MG/ML
125 INJECTION, SOLUTION INTRAVENOUS CONTINUOUS
Status: DISCONTINUED | OUTPATIENT
Start: 2019-09-25 | End: 2019-09-26 | Stop reason: HOSPADM

## 2019-09-25 RX ORDER — INSULIN GLARGINE 100 [IU]/ML
24 INJECTION, SOLUTION SUBCUTANEOUS
Status: DISCONTINUED | OUTPATIENT
Start: 2019-09-25 | End: 2019-09-26 | Stop reason: HOSPADM

## 2019-09-25 RX ORDER — SODIUM CHLORIDE 9 MG/ML
100 INJECTION, SOLUTION INTRAVENOUS CONTINUOUS
Status: DISPENSED | OUTPATIENT
Start: 2019-09-25 | End: 2019-09-25

## 2019-09-25 RX ORDER — SODIUM CHLORIDE 0.9 % (FLUSH) 0.9 %
5-40 SYRINGE (ML) INJECTION AS NEEDED
Status: DISCONTINUED | OUTPATIENT
Start: 2019-09-25 | End: 2019-09-26 | Stop reason: HOSPADM

## 2019-09-25 RX ORDER — LIDOCAINE HYDROCHLORIDE 10 MG/ML
INJECTION, SOLUTION EPIDURAL; INFILTRATION; INTRACAUDAL; PERINEURAL AS NEEDED
Status: DISCONTINUED | OUTPATIENT
Start: 2019-09-25 | End: 2019-09-25 | Stop reason: HOSPADM

## 2019-09-25 RX ORDER — FENTANYL CITRATE 50 UG/ML
INJECTION, SOLUTION INTRAMUSCULAR; INTRAVENOUS AS NEEDED
Status: DISCONTINUED | OUTPATIENT
Start: 2019-09-25 | End: 2019-09-25 | Stop reason: HOSPADM

## 2019-09-25 RX ADMIN — TIMOLOL MALEATE 1 DROP: 2.5 SOLUTION OPHTHALMIC at 21:56

## 2019-09-25 RX ADMIN — INSULIN LISPRO 4 UNITS: 100 INJECTION, SOLUTION INTRAVENOUS; SUBCUTANEOUS at 22:32

## 2019-09-25 RX ADMIN — HEPARIN SODIUM 5000 UNITS: 5000 INJECTION INTRAVENOUS; SUBCUTANEOUS at 21:55

## 2019-09-25 RX ADMIN — AMLODIPINE BESYLATE 10 MG: 5 TABLET ORAL at 08:53

## 2019-09-25 RX ADMIN — HYDRALAZINE HYDROCHLORIDE 50 MG: 50 TABLET, FILM COATED ORAL at 16:48

## 2019-09-25 RX ADMIN — Medication 10 ML: at 04:54

## 2019-09-25 RX ADMIN — HEPARIN SODIUM 5000 UNITS: 5000 INJECTION INTRAVENOUS; SUBCUTANEOUS at 04:54

## 2019-09-25 RX ADMIN — OMEGA-3 FATTY ACIDS CAP 1000 MG 1 CAPSULE: 1000 CAP at 08:53

## 2019-09-25 RX ADMIN — Medication 10 ML: at 16:53

## 2019-09-25 RX ADMIN — HYDRALAZINE HYDROCHLORIDE 50 MG: 50 TABLET, FILM COATED ORAL at 08:53

## 2019-09-25 RX ADMIN — CLONIDINE HYDROCHLORIDE 0.2 MG: 0.1 TABLET ORAL at 08:54

## 2019-09-25 RX ADMIN — HYDRALAZINE HYDROCHLORIDE 50 MG: 50 TABLET, FILM COATED ORAL at 21:54

## 2019-09-25 RX ADMIN — PRAVASTATIN SODIUM 80 MG: 40 TABLET ORAL at 21:54

## 2019-09-25 RX ADMIN — POTASSIUM CHLORIDE 20 MEQ: 750 TABLET, FILM COATED, EXTENDED RELEASE ORAL at 16:49

## 2019-09-25 RX ADMIN — ASPIRIN 81 MG: 81 TABLET, COATED ORAL at 08:53

## 2019-09-25 RX ADMIN — Medication 10 ML: at 21:55

## 2019-09-25 RX ADMIN — CLONIDINE HYDROCHLORIDE 0.2 MG: 0.1 TABLET ORAL at 16:48

## 2019-09-25 RX ADMIN — LOSARTAN POTASSIUM 100 MG: 100 TABLET ORAL at 08:53

## 2019-09-25 RX ADMIN — TAMSULOSIN HYDROCHLORIDE 0.4 MG: 0.4 CAPSULE ORAL at 08:54

## 2019-09-25 RX ADMIN — INSULIN GLARGINE 24 UNITS: 100 INJECTION, SOLUTION SUBCUTANEOUS at 22:31

## 2019-09-25 RX ADMIN — GLIPIZIDE 10 MG: 5 TABLET ORAL at 08:54

## 2019-09-25 RX ADMIN — CLOPIDOGREL BISULFATE 75 MG: 75 TABLET ORAL at 08:54

## 2019-09-25 RX ADMIN — METOPROLOL TARTRATE 25 MG: 25 TABLET ORAL at 08:54

## 2019-09-25 RX ADMIN — FINASTERIDE 5 MG: 5 TABLET, FILM COATED ORAL at 09:00

## 2019-09-25 RX ADMIN — OMEGA-3 FATTY ACIDS CAP 1000 MG 1 CAPSULE: 1000 CAP at 21:54

## 2019-09-25 RX ADMIN — METOPROLOL TARTRATE 25 MG: 25 TABLET ORAL at 21:54

## 2019-09-25 RX ADMIN — OMEGA-3 FATTY ACIDS CAP 1000 MG 1 CAPSULE: 1000 CAP at 16:52

## 2019-09-25 RX ADMIN — INSULIN LISPRO 2 UNITS: 100 INJECTION, SOLUTION INTRAVENOUS; SUBCUTANEOUS at 08:52

## 2019-09-25 RX ADMIN — MAGNESIUM CITRATE 296 ML: 1.75 LIQUID ORAL at 16:48

## 2019-09-25 RX ADMIN — POTASSIUM CHLORIDE 20 MEQ: 750 TABLET, FILM COATED, EXTENDED RELEASE ORAL at 08:54

## 2019-09-25 RX ADMIN — SODIUM CHLORIDE 125 ML/HR: 900 INJECTION, SOLUTION INTRAVENOUS at 10:17

## 2019-09-25 NOTE — PROGRESS NOTES
FESTUS:    * Home with [facility] visit, f/u appts & family support    CM reviewed pt's chart before moving forward with d/c planning. CM sent request to CM specialist to secure f/u appt with pt's PCP; details regarding date/time are reflected in AVS.     CM will continue to follow patient for discharge planning needs and arrange for services as deemed necessary.     Douglas Espinoza, MSW  Care Manager, [address]

## 2019-09-25 NOTE — PROGRESS NOTES
Progress Note      9/25/2019 1:46 PM  NAME: Jadyn Crespo   MRN:  [id number]   Admit Diagnosis: NSTEMI (non-ST elevated myocardial infarction) [facility]) [I21.4]     Assessment:      pulmonary edema   ASCVD   Hx mild aortic stenosis  Atrial Fibrillation. Diabetes type 2 requiring Insulin ,  Which he refuses to take   CKD stage 3   HTN   Hyperlipidemia. Mild Carotid disease by duplex. Morbid obesity      9/22  Feeling better. Rhythm has converted back to NSR>      He continues to be quite hypertensive. He's on high dose IV NTG in addition to po meds   Will add Clonidine today and see if that helps. 9/24  Cath yesterday with multivessel disease. PCI of LAD and Cx. He has 2 RCA stenoses. Plan to do that tomorrow afternoon  Feels well . Cath site is OK .      9/25  Feeling well . Will proceed with PCI of RCA via R Radial today . Anticipate home tomorrow.         Echo shows LVH with normal systolic fx. EF 55 - 60% . Mild aortic valve stenosis. Mean gradient 15 mmHg. Plan: For PCI of RCA tomorrow. Continue meds     Walk in jacome         []        High complexity decision making was performed    Subjective:     Jadyn Crespo denies chest pain, dyspnea. Discussed with RN events overnight.      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    BPH (benign prostatic hyperplasia) N40.0    ASVD (arteriosclerotic vascular disease) I70.90    Urticaria L50.9    Aortic stenosis I35.0    Primary osteoarthritis involving multiple joints M15.0    Morbid obesity ([address] E66.01    Essential hypertension I10    Stage 2 chronic kidney disease N18.2    Left carotid bruit R09.89    Mixed hyperlipidemia E78.2    Back pain M54.9    Neoplasm of uncertain behavior of skin D48.5    Colon cancer screening Z12.11    Controlled type 2 diabetes mellitus with stage 2 chronic kidney disease, without long-term current use of insulin (HCC) E11.22, N18.2    Hematuria R31.9    NSTEMI (non-ST elevated myocardial infarction) ([facility] [address] I21.4       Review of Systems:    Symptom Y/N Comments  Symptom Y/N Comments   Fever/Chills N   Chest Pain N    Poor Appetite N   Edema N    Cough N   Abdominal Pain N    Sputum N   Joint Pain N    SOB/VILLALTA N   Pruritis/Rash N    Nausea/vomit N   Tolerating PT/OT Y    Diarrhea N   Tolerating Diet Y    Constipation N   Other       Could NOT obtain due to:      Objective:      Physical Exam:    Last 24hrs VS reviewed since prior progress note. Most recent are:    Visit Vitals  /74 (BP 1 Location: Left arm, BP Patient Position: At rest;Sitting)   Pulse 68   Temp 97.5 °F (36.4 °C)   Resp 16   Ht 5' 10\" (1.778 m)   Wt 105.6 kg (232 lb 12.9 oz)   SpO2 93%   BMI 33.40 kg/m²       Intake/Output Summary (Last 24 hours) at 9/25/2019 1010  Last data filed at 9/25/2019 0300  Gross per 24 hour   Intake 1460 ml   Output 1300 ml   Net 160 ml        General Appearance: Well developed, well nourished, alert & oriented x 3,    no acute distress. Ears/Nose/Mouth/Throat: Hearing grossly normal.  Neck: Supple. Chest: Lungs clear to auscultation bilaterally. Cardiovascular: Regular rate and rhythm, S1S2 normal, no murmur. Abdomen: Soft, non-tender, bowel sounds are active. Extremities: No edema bilaterally. Skin: Warm and dry.     PMH/SH reviewed - no change compared to H&P    Data Review    Telemetry: normal sinus rhythm     Lab Data Personally Reviewed:    Recent Labs     09/24/19 0413 09/23/19  0247   WBC 5.3 6.7   HGB 12.1 11.9*   HCT 37.1 36.1*    154   LABRCNT(INR:3,PTP:3,APTT:3,)  Recent Labs     09/24/19  0413 09/23/19  0247    136   K 3.6 3.2*    100   CO2 29 30   BUN 15 15   CREA 0.99 0.97   * 212*   CA 8.1* 8.0*   LABRCNT(CPK:3,CpKMB:3,ckndx:3,troiq:3)  Lab Results   Component Value Date/Time    Cholesterol, total 194 08/05/2019 09:42 AM    HDL Cholesterol 47 08/05/2019 09:42 AM    LDL, calculated 92 08/05/2019 09:42 AM    Triglyceride 274 (H) 08/05/2019 09:42 AM    CHOL/HDL Ratio 4 08/05/2019 09:42 AM   LABRCNT(sgot:3,gpt:3,ap:3,tbiL:3,TP:3,ALB:3,GLOB:3,ggt:3,aml:3,amyp:3,lpse:3,hlpse:3)No results for input(s): PH, PCO2, PO2 in the last 72 hours. Lab Results   Component Value Date/Time    Cholesterol, total 194 08/05/2019 09:42 AM    HDL Cholesterol 47 08/05/2019 09:42 AM    LDL, calculated 92 08/05/2019 09:42 AM    Triglyceride 274 (H) 08/05/2019 09:42 AM    CHOL/HDL Ratio 4 08/05/2019 09:42 AM   MEDTABLETimothy Thornell Angelucci, MD  No results for input(s): PH, PCO2, PO2 in the last 72 hours.     Medications Personally Reviewed:    Current Facility-Administered Medications   Medication Dose Route Frequency    magnesium citrate solution 296 mL  296 mL Oral PRN    timolol (TIMOPTIC) 0.25% ophthalmic solution  1 Drop Both Eyes QHS    0.9% sodium chloride infusion  125 mL/hr IntraVENous CONTINUOUS    insulin glargine (LANTUS) injection 24 Units  24 Units SubCUTAneous DAILY    potassium chloride SR (KLOR-CON 10) tablet 20 mEq  20 mEq Oral BID    sodium chloride (NS) flush 5-40 mL  5-40 mL IntraVENous Q8H    sodium chloride (NS) flush 5-40 mL  5-40 mL IntraVENous PRN    zolpidem (AMBIEN) tablet 5 mg  5 mg Oral QHS PRN    clopidogrel (PLAVIX) tablet 75 mg  75 mg Oral DAILY    cloNIDine HCl (CATAPRES) tablet 0.2 mg  0.2 mg Oral BID    nitroglycerin (Tridil) 200 mcg/ml infusion  0-200 mcg/min IntraVENous TITRATE    aspirin delayed-release tablet 81 mg  81 mg Oral DAILY    finasteride (PROSCAR) tablet 5 mg  5 mg Oral DAILY    glipiZIDE (GLUCOTROL) tablet 10 mg  10 mg Oral ACB    losartan (COZAAR) tablet 100 mg  100 mg Oral DAILY    omega 3-DHA-EPA-fish oil 1,000 mg (120 mg-180 mg) capsule 1 Cap  1 Cap Oral TID    pravastatin (PRAVACHOL) tablet 80 mg  80 mg Oral QHS    tamsulosin (FLOMAX) capsule 0.4 mg  0.4 mg Oral DAILY    insulin lispro (HUMALOG) injection   SubCUTAneous AC&HS    glucose chewable tablet 16 g  4 Tab Oral PRN    dextrose (D50W) injection syrg 12.5-25 g  12.5-25 g IntraVENous PRN    glucagon (GLUCAGEN) injection 1 mg  1 mg IntraMUSCular PRN    acetaminophen (TYLENOL) tablet 500 mg  500 mg Oral Q4H PRN    HYDROcodone-acetaminophen (NORCO) 5-325 mg per tablet 1 Tab  1 Tab Oral Q6H PRN    naloxone (NARCAN) injection 0.4 mg  0.4 mg IntraVENous PRN    ondansetron (ZOFRAN) injection 2 mg  2 mg IntraVENous Q6H PRN    bisacodyl (DULCOLAX) tablet 5 mg  5 mg Oral DAILY PRN    nicotine (NICODERM CQ) 7 mg/24 hr patch 1 Patch  1 Patch TransDERmal DAILY PRN    heparin (porcine) injection 5,000 Units  5,000 Units SubCUTAneous Q8H    amLODIPine (NORVASC) tablet 10 mg  10 mg Oral DAILY    hydrALAZINE (APRESOLINE) tablet 50 mg  50 mg Oral TID    metoprolol tartrate (LOPRESSOR) tablet 25 mg  25 mg Oral Q12H         Vera Lyn MD

## 2019-09-25 NOTE — DIABETES MGMT
[facility]    [facility] Progress Note    Recommendations/ Comments:  Lantus held yesterday - which has likely contributed to elevated BG today. If appropriate, please:  1. Add CHO consistent diet when no longer NPO   2. Titrate Lantus 2 units q 2 days untile fasting BG < 180 mg/dL    Current hospital DM medication: Lantus 24 units , glipizide 10 mg daily    Chart reviewed on [facility]. Patient is a 74 y.o. male with known DM on glipizide 10 mg BID, Januvia  100 mg daily at home. A1c:   Lab Results   Component Value Date/Time    Hemoglobin A1c 8.7 (H) 08/05/2019 09:43 AM    Hemoglobin A1c 7.7 (H) 04/22/2019 09:33 AM       Recent Glucose Results:   Lab Results   Component Value Date/Time    GLUCPOC 184 (H) 09/25/2019 07:36 AM    GLUCPOC 196 (H) 09/24/2019 09:42 PM    GLUCPOC 143 (H) 09/24/2019 04:21 PM        Lab Results   Component Value Date/Time    Creatinine 0.99 09/24/2019 04:13 AM     Estimated Creatinine Clearance: 78.4 mL/min (based on SCr of 0.99 mg/dL). Active Orders   Diet    DIET NPO        PO intake:   Patient Vitals for the past 72 hrs:   % Diet Eaten   09/24/19 1900 100 %   09/24/19 1104 100 %   09/23/19 1901 100 %   09/22/19 1800 100 %   09/22/19 1202 100 %       Will continue to follow as needed.     Thank you    Office: [address] [place] [address] [place], [address] [place] [address]    Office: [phone]    Time spent: 4 minutes

## 2019-09-25 NOTE — PROGRESS NOTES
1900 - bedside report from RN. NSr AVB 1st, BBB, some SB in 50's when resting. No complaints except no bm since Saturday am. Passing lots of gas. Prune juice provided. Lungs much clearer today, Legs 2+, less tight. 2200 - dulcolax PO adm.  (2nd dose). 0000 - CHG and linen change done this evening. NPO.      0600 - Weight unchanged. Dr. Charli Richmond on rounds. 0700 - NPO. Bedside report to RN. Same rhythm.

## 2019-09-25 NOTE — ROUTINE PROCESS
Bedside and Verbal shift change report given to Danielle Salomon RN (oncoming nurse) by Geneva Sanchez RN (offgoing nurse). Report included the following information SBAR, Intake/Output and Cardiac Rhythm NSR.

## 2019-09-25 NOTE — PROGRESS NOTES
1900 - Bedside report from [facility]. NSR AVB 1st, BBB. No complaints. R wrist benign. Voiding, eating. Still constipated.

## 2019-09-25 NOTE — PROGRESS NOTES
General Daily Progress Note    Admit Date: 9/21/2019  Hospital day 5    Subjective:     Patient has no complaint of chest pain or dyspnea.  ..   Medication side effects: none    Current Facility-Administered Medications   Medication Dose Route Frequency    magnesium citrate solution 296 mL  296 mL Oral PRN    insulin glargine (LANTUS) injection 24 Units  24 Units SubCUTAneous DAILY    potassium chloride SR (KLOR-CON 10) tablet 20 mEq  20 mEq Oral BID    sodium chloride (NS) flush 5-40 mL  5-40 mL IntraVENous Q8H    sodium chloride (NS) flush 5-40 mL  5-40 mL IntraVENous PRN    zolpidem (AMBIEN) tablet 5 mg  5 mg Oral QHS PRN    clopidogrel (PLAVIX) tablet 75 mg  75 mg Oral DAILY    cloNIDine HCl (CATAPRES) tablet 0.2 mg  0.2 mg Oral BID    nitroglycerin (Tridil) 200 mcg/ml infusion  0-200 mcg/min IntraVENous TITRATE    aspirin delayed-release tablet 81 mg  81 mg Oral DAILY    finasteride (PROSCAR) tablet 5 mg  5 mg Oral DAILY    glipiZIDE (GLUCOTROL) tablet 10 mg  10 mg Oral ACB    losartan (COZAAR) tablet 100 mg  100 mg Oral DAILY    omega 3-DHA-EPA-fish oil 1,000 mg (120 mg-180 mg) capsule 1 Cap  1 Cap Oral TID    pravastatin (PRAVACHOL) tablet 80 mg  80 mg Oral QHS    tamsulosin (FLOMAX) capsule 0.4 mg  0.4 mg Oral DAILY    timolol (TIMOPTIC) 0.25% ophthalmic solution  1 Drop Both Eyes BID    insulin lispro (HUMALOG) injection   SubCUTAneous AC&HS    glucose chewable tablet 16 g  4 Tab Oral PRN    dextrose (D50W) injection syrg 12.5-25 g  12.5-25 g IntraVENous PRN    glucagon (GLUCAGEN) injection 1 mg  1 mg IntraMUSCular PRN    acetaminophen (TYLENOL) tablet 500 mg  500 mg Oral Q4H PRN    HYDROcodone-acetaminophen (NORCO) 5-325 mg per tablet 1 Tab  1 Tab Oral Q6H PRN    naloxone (NARCAN) injection 0.4 mg  0.4 mg IntraVENous PRN    ondansetron (ZOFRAN) injection 2 mg  2 mg IntraVENous Q6H PRN    bisacodyl (DULCOLAX) tablet 5 mg  5 mg Oral DAILY PRN    nicotine (NICODERM CQ) 7 mg/24 hr patch 1 Patch  1 Patch TransDERmal DAILY PRN    heparin (porcine) injection 5,000 Units  5,000 Units SubCUTAneous Q8H    amLODIPine (NORVASC) tablet 10 mg  10 mg Oral DAILY    hydrALAZINE (APRESOLINE) tablet 50 mg  50 mg Oral TID    metoprolol tartrate (LOPRESSOR) tablet 25 mg  25 mg Oral Q12H        Review of Systems  Pertinent items are noted in HPI. Objective:     Patient Vitals for the past 8 hrs:   BP Temp Pulse Resp SpO2 Weight   09/25/19 0735 177/74 97.5 °F (36.4 °C) 68 16 93 %    09/25/19 0459      232 lb 12.9 oz (105.6 kg)   09/25/19 0300 157/69 97.6 °F (36.4 °C) (!) 59 16 95 %      No intake/output data recorded. 09/23 1901 - 09/25 0700  In: 3737.4 [P.O.:2400; I.V.:1337.4]  Out: 3100 [Urine:3100]    Physical Exam:   Visit Vitals  /74 (BP 1 Location: Left arm, BP Patient Position: At rest;Sitting)   Pulse 68   Temp 97.5 °F (36.4 °C)   Resp 16   Ht 5' 10\" (1.778 m)   Wt 232 lb 12.9 oz (105.6 kg)   SpO2 93%   BMI 33.40 kg/m²     Lungs: clear to auscultation bilaterally  Heart: regular rate and rhythm, S1, S2 normal, no murmur, click, rub or gallop  Abdomen: soft, non-tender. Bowel sounds normal. No masses,  no organomegaly  Extremities: extremities normal, atraumatic, no cyanosis or edema      ECG: normal sinus rhythm     Data Review   Recent Results (from the past 24 hour(s))   GLUCOSE, POC    Collection Time: 09/24/19 11:30 AM   Result Value Ref Range    Glucose (POC) 203 (H) 65 - 100 mg/dL    Performed by [email or web address], POC    Collection Time: 09/24/19  4:21 PM   Result Value Ref Range    Glucose (POC) 143 (H) 65 - 100 mg/dL    Performed by [email or web address], POC    Collection Time: 09/24/19  9:42 PM   Result Value Ref Range    Glucose (POC) 196 (H) 65 - 100 mg/dL    Performed by Barnetta Kos.     GLUCOSE, POC    Collection Time: 09/25/19  7:36 AM   Result Value Ref Range    Glucose (POC) 184 (H) 65 - 100 mg/dL    Performed by Birdie Andrews Assessment:     Principal Problem:    NSTEMI (non-ST elevated myocardial infarction) ([facility] [address] (9/21/2019)    Active Problems:    BPH (benign prostatic hyperplasia) (8/2/2017)      ASVD (arteriosclerotic vascular disease) (8/2/2017)      Aortic stenosis (8/2/2017)      Primary osteoarthritis involving multiple joints (8/2/2017)      Essential hypertension (8/2/2017)      Back pain (8/2/2017)      Controlled type 2 diabetes mellitus with stage 2 chronic kidney disease, without long-term current use of insulin ([address] (12/10/2017)        Plan:        1. Pulmonary edema on admission- symptomatically much better    2. Elevated troponin/ NSTEMI- sp 3 stents yesterday, and further stenting planned for today  3. mild aortic stenosis on echo  4. A fib on admission EKG, denies prior hx.nl sinus rhythmn since then. anticoagulation regimen will be a challenge at discharge- will defer to cardiology. 5. Hx difficult to control hypertension. Now on clonidine.   6. Diabetes- on glipizide and sliding scale. Will increase Lantus as sugars still in the 200s. consider jardiance at discharge, altho pt says that Dr. Danielle Zhou wrote him a prescription for invokana and it was unaffordable ($1500 for 3 months)  7.  Hypokalemia- better

## 2019-09-26 VITALS
HEIGHT: 70 IN | BODY MASS INDEX: 33.17 KG/M2 | HEART RATE: 68 BPM | OXYGEN SATURATION: 94 % | WEIGHT: 231.7 LBS | SYSTOLIC BLOOD PRESSURE: 162 MMHG | DIASTOLIC BLOOD PRESSURE: 68 MMHG | TEMPERATURE: 97.5 F | RESPIRATION RATE: 18 BRPM

## 2019-09-26 LAB
ANION GAP SERPL CALC-SCNC: 7 MMOL/L (ref 5–15)
ATRIAL RATE: 60 BPM
BACTERIA SPEC CULT: NORMAL
BASOPHILS # BLD: 0 K/UL (ref 0–0.1)
BASOPHILS NFR BLD: 0 % (ref 0–1)
BUN SERPL-MCNC: 14 MG/DL (ref 6–20)
BUN/CREAT SERPL: 17 (ref 12–20)
CALCIUM SERPL-MCNC: 8.2 MG/DL (ref 8.5–10.1)
CALCULATED P AXIS, ECG09: 60 DEGREES
CALCULATED R AXIS, ECG10: -37 DEGREES
CALCULATED T AXIS, ECG11: 155 DEGREES
CHLORIDE SERPL-SCNC: 107 MMOL/L (ref 97–108)
CO2 SERPL-SCNC: 26 MMOL/L (ref 21–32)
CREAT SERPL-MCNC: 0.82 MG/DL (ref 0.7–1.3)
DIAGNOSIS, 93000: NORMAL
DIFFERENTIAL METHOD BLD: ABNORMAL
EOSINOPHIL # BLD: 0.1 K/UL (ref 0–0.4)
EOSINOPHIL NFR BLD: 2 % (ref 0–7)
ERYTHROCYTE [DISTWIDTH] IN BLOOD BY AUTOMATED COUNT: 13.7 % (ref 11.5–14.5)
GLUCOSE BLD STRIP.AUTO-MCNC: 149 MG/DL (ref 65–100)
GLUCOSE SERPL-MCNC: 153 MG/DL (ref 65–100)
HCT VFR BLD AUTO: 35.9 % (ref 36.6–50.3)
HGB BLD-MCNC: 11.5 G/DL (ref 12.1–17)
IMM GRANULOCYTES # BLD AUTO: 0 K/UL (ref 0–0.04)
IMM GRANULOCYTES NFR BLD AUTO: 0 % (ref 0–0.5)
LYMPHOCYTES # BLD: 1.2 K/UL (ref 0.8–3.5)
LYMPHOCYTES NFR BLD: 26 % (ref 12–49)
MCH RBC QN AUTO: 26.5 PG (ref 26–34)
MCHC RBC AUTO-ENTMCNC: 32 G/DL (ref 30–36.5)
MCV RBC AUTO: 82.7 FL (ref 80–99)
MONOCYTES # BLD: 0.4 K/UL (ref 0–1)
MONOCYTES NFR BLD: 7 % (ref 5–13)
NEUTS SEG # BLD: 3.1 K/UL (ref 1.8–8)
NEUTS SEG NFR BLD: 65 % (ref 32–75)
NRBC # BLD: 0 K/UL (ref 0–0.01)
NRBC BLD-RTO: 0 PER 100 WBC
P-R INTERVAL, ECG05: 232 MS
PLATELET # BLD AUTO: 169 K/UL (ref 150–400)
PMV BLD AUTO: 10.5 FL (ref 8.9–12.9)
POTASSIUM SERPL-SCNC: 4 MMOL/L (ref 3.5–5.1)
Q-T INTERVAL, ECG07: 512 MS
QRS DURATION, ECG06: 148 MS
QTC CALCULATION (BEZET), ECG08: 512 MS
RBC # BLD AUTO: 4.34 M/UL (ref 4.1–5.7)
SERVICE CMNT-IMP: ABNORMAL
SERVICE CMNT-IMP: NORMAL
SODIUM SERPL-SCNC: 140 MMOL/L (ref 136–145)
VENTRICULAR RATE, ECG03: 60 BPM
WBC # BLD AUTO: 4.7 K/UL (ref 4.1–11.1)

## 2019-09-26 PROCEDURE — 36415 COLL VENOUS BLD VENIPUNCTURE: CPT

## 2019-09-26 PROCEDURE — 74011636637 HC RX REV CODE- 636/637: Performed by: INTERNAL MEDICINE

## 2019-09-26 PROCEDURE — 74011250637 HC RX REV CODE- 250/637: Performed by: INTERNAL MEDICINE

## 2019-09-26 PROCEDURE — 80048 BASIC METABOLIC PNL TOTAL CA: CPT

## 2019-09-26 PROCEDURE — 85025 COMPLETE CBC W/AUTO DIFF WBC: CPT

## 2019-09-26 PROCEDURE — 74011250636 HC RX REV CODE- 250/636: Performed by: INTERNAL MEDICINE

## 2019-09-26 PROCEDURE — 82962 GLUCOSE BLOOD TEST: CPT

## 2019-09-26 RX ORDER — CLONIDINE HYDROCHLORIDE 0.2 MG/1
0.2 TABLET ORAL 2 TIMES DAILY
Qty: 60 TAB | Refills: 12 | Status: SHIPPED | OUTPATIENT
Start: 2019-09-26 | End: 2022-03-11

## 2019-09-26 RX ORDER — NICOTINE 7MG/24HR
1 PATCH, TRANSDERMAL 24 HOURS TRANSDERMAL
Status: SHIPPED | COMMUNITY
Start: 2019-09-26 | End: 2019-09-30 | Stop reason: ALTCHOICE

## 2019-09-26 RX ORDER — METOPROLOL TARTRATE 25 MG/1
25 TABLET, FILM COATED ORAL EVERY 12 HOURS
Qty: 60 TAB | Refills: 12 | Status: SHIPPED | OUTPATIENT
Start: 2019-09-26 | End: 2021-10-27 | Stop reason: SDUPTHER

## 2019-09-26 RX ORDER — CLOPIDOGREL BISULFATE 75 MG/1
75 TABLET ORAL DAILY
Qty: 30 TAB | Refills: 12 | Status: SHIPPED | OUTPATIENT
Start: 2019-09-26 | End: 2021-09-13 | Stop reason: ALTCHOICE

## 2019-09-26 RX ORDER — HYDRALAZINE HYDROCHLORIDE 50 MG/1
50 TABLET, FILM COATED ORAL 3 TIMES DAILY
Qty: 90 TAB | Refills: 12 | Status: SHIPPED | OUTPATIENT
Start: 2019-09-26 | End: 2019-11-12 | Stop reason: SDUPTHER

## 2019-09-26 RX ADMIN — FINASTERIDE 5 MG: 5 TABLET, FILM COATED ORAL at 08:38

## 2019-09-26 RX ADMIN — HEPARIN SODIUM 5000 UNITS: 5000 INJECTION INTRAVENOUS; SUBCUTANEOUS at 03:41

## 2019-09-26 RX ADMIN — TAMSULOSIN HYDROCHLORIDE 0.4 MG: 0.4 CAPSULE ORAL at 08:38

## 2019-09-26 RX ADMIN — LOSARTAN POTASSIUM 100 MG: 100 TABLET ORAL at 08:38

## 2019-09-26 RX ADMIN — INSULIN LISPRO 2 UNITS: 100 INJECTION, SOLUTION INTRAVENOUS; SUBCUTANEOUS at 08:40

## 2019-09-26 RX ADMIN — METOPROLOL TARTRATE 25 MG: 25 TABLET ORAL at 08:39

## 2019-09-26 RX ADMIN — AMLODIPINE BESYLATE 10 MG: 5 TABLET ORAL at 08:39

## 2019-09-26 RX ADMIN — GLIPIZIDE 10 MG: 5 TABLET ORAL at 08:39

## 2019-09-26 RX ADMIN — CLOPIDOGREL BISULFATE 75 MG: 75 TABLET ORAL at 08:39

## 2019-09-26 RX ADMIN — CLONIDINE HYDROCHLORIDE 0.2 MG: 0.1 TABLET ORAL at 08:38

## 2019-09-26 RX ADMIN — Medication 10 ML: at 03:42

## 2019-09-26 RX ADMIN — ASPIRIN 81 MG: 81 TABLET, COATED ORAL at 08:44

## 2019-09-26 RX ADMIN — OMEGA-3 FATTY ACIDS CAP 1000 MG 1 CAPSULE: 1000 CAP at 08:40

## 2019-09-26 RX ADMIN — HYDRALAZINE HYDROCHLORIDE 50 MG: 50 TABLET, FILM COATED ORAL at 08:38

## 2019-09-26 RX ADMIN — POTASSIUM CHLORIDE 20 MEQ: 750 TABLET, FILM COATED, EXTENDED RELEASE ORAL at 08:38

## 2019-09-26 NOTE — PROGRESS NOTES
CM acknowledged discharge order. CM met with patient. He is aware of discharge. Family is enroute to provide transportation. Follow up appointment with PCP and service for [facility] noted on AVS.    Patient wanted to convey \"I have been very pleased with all aspects of my care\". \"Thank you for being here\". Nursing informed CM tasks are complete. NN informed of discharge plan.      Vicki Brown RN CM  Ext [phone]

## 2019-09-26 NOTE — CARDIO/PULMONARY
Cardiac Rehab Note: chart review Consult has been acknowledged DX MI PCI/stenting 9/25/2019 Smoking history assessed. Patient is a never smoker. EF 56-60%  on 9/22/2019 per echo MI/CAD education folder, with heart heathy diet, warning signs, heart facts, catheterization brochure, and out patient cardiac rehab program provided to Tameka Hart on 9/26/2019 Educated using teach back method. Reviewed CAD diagnosis definition and purpose of intervention. Discussed risk factors for CAD to include the following: family history, elevated BMI, hyperlipidemia, hypertension, diabetes, and stress. Discussed Heart Healthy/Low Sodium (less than 2000 mg) diet. Reviewed the importance of medication compliance, follow up appointments with cardiologist, signs and symptoms of angina, and what to report to physician after discharge. Emphasized the value of cardiac rehab. Discussed Cardiac Rehab Program format, benefits, and encouraged enrollment to assist with risk modification and management. Taught with pt and wife was enroute- pt was to let staff know when wife arrived so I could discuss Cardiac Rehab with them both. When went back to see if wife had arrived,pt had been discharged. Will follow up with phone call to discuss OP Cardiac Rehab. Tameka Hart verbalized understanding with questions answered.   Bridgett Boucher RN

## 2019-09-26 NOTE — PROGRESS NOTES
Progress Note      9/26/2019 1:46 PM  NAME: Jadyn Crespo   MRN:  [id number]   Admit Diagnosis: NSTEMI (non-ST elevated myocardial infarction) [facility]) [I21.4]     Assessment:      pulmonary edema   ASCVD   Hx mild aortic stenosis  Atrial Fibrillation. Diabetes type 2 requiring Insulin ,  Which he refuses to take   CKD stage 3   HTN   Hyperlipidemia. Mild Carotid disease by duplex. Morbid obesity      9/22  Feeling better. Rhythm has converted back to NSR>      He continues to be quite hypertensive. He's on high dose IV NTG in addition to po meds   Will add Clonidine today and see if that helps. 9/24  Cath yesterday with multivessel disease. PCI of LAD and Cx. He has 2 RCA stenoses. Plan to do that tomorrow afternoon  Feels well . Cath site is OK .      9/25  Feeling well . Will proceed with PCI of RCA via R Radial today . Anticipate home tomorrow. 9/26  Feels well . Hgb and Creat. Are fine. Has been OOB. PCI of RCA yesterday , radial site looks fine. OK to discharge today. See Dr Shellie Calvillo in a couple weeks   See Dr Jim Rodriguez in a month.         Echo shows LVH with normal systolic fx. EF 55 - 60% . Mild aortic valve stenosis. Mean gradient 15 mmHg. Plan: For PCI of RCA tomorrow. Continue meds     Walk in jacome         [x]        High complexity decision making was performed    Subjective:     Jadyn Crespo denies chest pain, dyspnea. Discussed with RN events overnight.      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    BPH (benign prostatic hyperplasia) N40.0    ASVD (arteriosclerotic vascular disease) I70.90    Urticaria L50.9    Aortic stenosis I35.0    Primary osteoarthritis involving multiple joints M15.0    Morbid obesity ([address] E66.01    Essential hypertension I10    Stage 2 chronic kidney disease N18.2    Left carotid bruit R09.89    Mixed hyperlipidemia E78.2    Back pain M54.9    Neoplasm of uncertain behavior of skin D48.5    Controlled type 2 diabetes mellitus with stage 2 chronic kidney disease, without long-term current use of insulin (HCC) E11.22, N18.2    Hematuria R31.9    NSTEMI (non-ST elevated myocardial infarction) ([facility] [address] I21.4       Review of Systems:    Symptom Y/N Comments  Symptom Y/N Comments   Fever/Chills N   Chest Pain N    Poor Appetite N   Edema N    Cough N   Abdominal Pain N    Sputum N   Joint Pain N    SOB/VILLALTA N   Pruritis/Rash N    Nausea/vomit N   Tolerating PT/OT Y    Diarrhea N   Tolerating Diet Y    Constipation N   Other       Could NOT obtain due to:      Objective:      Physical Exam:    Last 24hrs VS reviewed since prior progress note. Most recent are:    Visit Vitals  /68 (BP 1 Location: Left arm, BP Patient Position: At rest)   Pulse 68   Temp 97.5 °F (36.4 °C)   Resp 18   Ht 5' 10\" (1.778 m)   Wt 105.1 kg (231 lb 11.3 oz)   SpO2 94%   BMI 33.25 kg/m²       Intake/Output Summary (Last 24 hours) at 9/26/2019 0825  Last data filed at 9/26/2019 0303  Gross per 24 hour   Intake 1700 ml   Output 1300 ml   Net 400 ml        General Appearance: Well developed, well nourished, alert & oriented x 3,    no acute distress. Ears/Nose/Mouth/Throat: Hearing grossly normal.  Neck: Supple. Chest: Lungs clear to auscultation bilaterally. Cardiovascular: Regular rate and rhythm, S1S2 normal, no murmur. Abdomen: Soft, non-tender, bowel sounds are active. Extremities: No edema bilaterally. Skin: Warm and dry.     PMH/SH reviewed - no change compared to H&P    Data Review    Telemetry: normal sinus rhythm     Lab Data Personally Reviewed:    Recent Labs     09/26/19 0344 09/24/19  0413   WBC 4.7 5.3   HGB 11.5* 12.1   HCT 35.9* 37.1    151   LABRCNT(INR:3,PTP:3,APTT:3,)  Recent Labs     09/26/19  0344 09/24/19  0413    140   K 4.0 3.6    104   CO2 26 29 BUN 14 15   CREA 0.82 0.99   * 198*   CA 8.2* 8.1*   LABRCNT(CPK:3,CpKMB:3,ckndx:3,troiq:3)  Lab Results   Component Value Date/Time    Cholesterol, total 194 08/05/2019 09:42 AM    HDL Cholesterol 47 08/05/2019 09:42 AM    LDL, calculated 92 08/05/2019 09:42 AM    Triglyceride 274 (H) 08/05/2019 09:42 AM    CHOL/HDL Ratio 4 08/05/2019 09:42 AM   LABRCNT(sgot:3,gpt:3,ap:3,tbiL:3,TP:3,ALB:3,GLOB:3,ggt:3,aml:3,amyp:3,lpse:3,hlpse:3)No results for input(s): PH, PCO2, PO2 in the last 72 hours. Lab Results   Component Value Date/Time    Cholesterol, total 194 08/05/2019 09:42 AM    HDL Cholesterol 47 08/05/2019 09:42 AM    LDL, calculated 92 08/05/2019 09:42 AM    Triglyceride 274 (H) 08/05/2019 09:42 AM    CHOL/HDL Ratio 4 08/05/2019 09:42 AM   MEDTABLERyley Suazo MD  No results for input(s): PH, PCO2, PO2 in the last 72 hours.     Medications Personally Reviewed:    Current Facility-Administered Medications   Medication Dose Route Frequency    magnesium citrate solution 296 mL  296 mL Oral PRN    timolol (TIMOPTIC) 0.25% ophthalmic solution  1 Drop Both Eyes QHS    0.9% sodium chloride infusion  125 mL/hr IntraVENous CONTINUOUS    sodium chloride (NS) flush 5-40 mL  5-40 mL IntraVENous Q8H    sodium chloride (NS) flush 5-40 mL  5-40 mL IntraVENous PRN    aspirin tablet 325 mg  325 mg Oral DAILY    zolpidem (AMBIEN) tablet 5 mg  5 mg Oral QHS PRN    insulin glargine (LANTUS) injection 24 Units  24 Units SubCUTAneous QHS    potassium chloride SR (KLOR-CON 10) tablet 20 mEq  20 mEq Oral BID    sodium chloride (NS) flush 5-40 mL  5-40 mL IntraVENous Q8H    sodium chloride (NS) flush 5-40 mL  5-40 mL IntraVENous PRN    zolpidem (AMBIEN) tablet 5 mg  5 mg Oral QHS PRN    clopidogrel (PLAVIX) tablet 75 mg  75 mg Oral DAILY    cloNIDine HCl (CATAPRES) tablet 0.2 mg  0.2 mg Oral BID    nitroglycerin (Tridil) 200 mcg/ml infusion  0-200 mcg/min IntraVENous TITRATE    aspirin delayed-release tablet 81 mg  81 mg Oral DAILY    finasteride (PROSCAR) tablet 5 mg  5 mg Oral DAILY    glipiZIDE (GLUCOTROL) tablet 10 mg  10 mg Oral ACB    losartan (COZAAR) tablet 100 mg  100 mg Oral DAILY    omega 3-DHA-EPA-fish oil 1,000 mg (120 mg-180 mg) capsule 1 Cap  1 Cap Oral TID    pravastatin (PRAVACHOL) tablet 80 mg  80 mg Oral QHS    tamsulosin (FLOMAX) capsule 0.4 mg  0.4 mg Oral DAILY    insulin lispro (HUMALOG) injection   SubCUTAneous AC&HS    glucose chewable tablet 16 g  4 Tab Oral PRN    dextrose (D50W) injection syrg 12.5-25 g  12.5-25 g IntraVENous PRN    glucagon (GLUCAGEN) injection 1 mg  1 mg IntraMUSCular PRN    acetaminophen (TYLENOL) tablet 500 mg  500 mg Oral Q4H PRN    HYDROcodone-acetaminophen (NORCO) 5-325 mg per tablet 1 Tab  1 Tab Oral Q6H PRN    naloxone (NARCAN) injection 0.4 mg  0.4 mg IntraVENous PRN    ondansetron (ZOFRAN) injection 2 mg  2 mg IntraVENous Q6H PRN    bisacodyl (DULCOLAX) tablet 5 mg  5 mg Oral DAILY PRN    nicotine (NICODERM CQ) 7 mg/24 hr patch 1 Patch  1 Patch TransDERmal DAILY PRN    heparin (porcine) injection 5,000 Units  5,000 Units SubCUTAneous Q8H    amLODIPine (NORVASC) tablet 10 mg  10 mg Oral DAILY    hydrALAZINE (APRESOLINE) tablet 50 mg  50 mg Oral TID    metoprolol tartrate (LOPRESSOR) tablet 25 mg  25 mg Oral Q12H         Mina Laguna MD

## 2019-09-26 NOTE — PROGRESS NOTES
0700: Received bedside report from night shift nurse, Brianna Zuniga. Pt resting in the chair. Eating breakfast. R Radial site CDI, no bleeding/hematoma. No complaints. 0830: Morning meds. Spoke with MD Hussein about discharge plans. 4002: Pt meeting with CM. Plan to D/C home today with family. 1000: Reivewed discharge instructions. Reviewed follow up appointments, medications to start, medications to change, and medications to continue. Reviewed site care for R Radial and R Groin. Answered any questions. Pt to be discharged home with family.

## 2019-09-26 NOTE — PROGRESS NOTES
General Daily Progress Note    Admit Date: 9/21/2019  Hospital day 6    Subjective:     Patient has no complaint of shortness of breath or chest pain. Anxious to  Go home.  ..   Medication side effects: none    Current Facility-Administered Medications   Medication Dose Route Frequency    magnesium citrate solution 296 mL  296 mL Oral PRN    timolol (TIMOPTIC) 0.25% ophthalmic solution  1 Drop Both Eyes QHS    0.9% sodium chloride infusion  125 mL/hr IntraVENous CONTINUOUS    sodium chloride (NS) flush 5-40 mL  5-40 mL IntraVENous Q8H    sodium chloride (NS) flush 5-40 mL  5-40 mL IntraVENous PRN    aspirin tablet 325 mg  325 mg Oral DAILY    zolpidem (AMBIEN) tablet 5 mg  5 mg Oral QHS PRN    insulin glargine (LANTUS) injection 24 Units  24 Units SubCUTAneous QHS    potassium chloride SR (KLOR-CON 10) tablet 20 mEq  20 mEq Oral BID    sodium chloride (NS) flush 5-40 mL  5-40 mL IntraVENous Q8H    sodium chloride (NS) flush 5-40 mL  5-40 mL IntraVENous PRN    zolpidem (AMBIEN) tablet 5 mg  5 mg Oral QHS PRN    clopidogrel (PLAVIX) tablet 75 mg  75 mg Oral DAILY    cloNIDine HCl (CATAPRES) tablet 0.2 mg  0.2 mg Oral BID    nitroglycerin (Tridil) 200 mcg/ml infusion  0-200 mcg/min IntraVENous TITRATE    aspirin delayed-release tablet 81 mg  81 mg Oral DAILY    finasteride (PROSCAR) tablet 5 mg  5 mg Oral DAILY    glipiZIDE (GLUCOTROL) tablet 10 mg  10 mg Oral ACB    losartan (COZAAR) tablet 100 mg  100 mg Oral DAILY    omega 3-DHA-EPA-fish oil 1,000 mg (120 mg-180 mg) capsule 1 Cap  1 Cap Oral TID    pravastatin (PRAVACHOL) tablet 80 mg  80 mg Oral QHS    tamsulosin (FLOMAX) capsule 0.4 mg  0.4 mg Oral DAILY    insulin lispro (HUMALOG) injection   SubCUTAneous AC&HS    glucose chewable tablet 16 g  4 Tab Oral PRN    dextrose (D50W) injection syrg 12.5-25 g  12.5-25 g IntraVENous PRN    glucagon (GLUCAGEN) injection 1 mg  1 mg IntraMUSCular PRN    acetaminophen (TYLENOL) tablet 500 mg  500 mg Oral Q4H PRN    HYDROcodone-acetaminophen (NORCO) 5-325 mg per tablet 1 Tab  1 Tab Oral Q6H PRN    naloxone (NARCAN) injection 0.4 mg  0.4 mg IntraVENous PRN    ondansetron (ZOFRAN) injection 2 mg  2 mg IntraVENous Q6H PRN    bisacodyl (DULCOLAX) tablet 5 mg  5 mg Oral DAILY PRN    nicotine (NICODERM CQ) 7 mg/24 hr patch 1 Patch  1 Patch TransDERmal DAILY PRN    heparin (porcine) injection 5,000 Units  5,000 Units SubCUTAneous Q8H    amLODIPine (NORVASC) tablet 10 mg  10 mg Oral DAILY    hydrALAZINE (APRESOLINE) tablet 50 mg  50 mg Oral TID    metoprolol tartrate (LOPRESSOR) tablet 25 mg  25 mg Oral Q12H        Review of Systems  Pertinent items are noted in HPI. Objective:     Patient Vitals for the past 8 hrs:   BP Temp Pulse Resp SpO2   09/26/19 0300 152/73 97.6 °F (36.4 °C) 60 18 94 %     No intake/output data recorded. 09/24 1901 - 09/26 0700  In: 2400 [P.O.:2100; I.V.:300]  Out: 2000 [Urine:2000]    Physical Exam:   Visit Vitals  /73 (BP 1 Location: Left arm, BP Patient Position: At rest)   Pulse 60   Temp 97.6 °F (36.4 °C)   Resp 18   Ht 5' 10\" (1.778 m)   Wt 232 lb 12.9 oz (105.6 kg)   SpO2 94%   BMI 33.40 kg/m²     Lungs: clear to auscultation bilaterally  Heart: regular rate and rhythm, S1, S2 normal, no murmur, click, rub or gallop  Abdomen: soft, non-tender.  Bowel sounds normal. No masses,  no organomegaly  Extremities: extremities normal, atraumatic, no cyanosis or edema      ECG: normal sinus rhythm     Data Review   Recent Results (from the past 24 hour(s))   GLUCOSE, POC    Collection Time: 09/25/19 12:57 PM   Result Value Ref Range    Glucose (POC) 151 (H) 65 - 100 mg/dL    Performed by Peg ROY    EKG, 12 LEAD, SUBSEQUENT    Collection Time: 09/25/19  3:29 PM   Result Value Ref Range    Ventricular Rate 60 BPM    Atrial Rate 60 BPM    P-R Interval 232 ms    QRS Duration 148 ms    Q-T Interval 512 ms    QTC Calculation (Bezet) 512 ms    Calculated P Axis 60 degrees Calculated R Axis -37 degrees    Calculated T Axis 155 degrees    Diagnosis       Sinus rhythm with 1st degree AV block with premature supraventricular   complexes  Left axis deviation  Left bundle branch block  When compared with ECG of 23-SEP-2019 12:26,  premature supraventricular complexes are now present     GLUCOSE, POC    Collection Time: 09/25/19  3:33 PM   Result Value Ref Range    Glucose (POC) 131 (H) 65 - 100 mg/dL    Performed by [id number] [facility], POC    Collection Time: 09/25/19 10:26 PM   Result Value Ref Range    Glucose (POC) 208 (H) 65 - 100 mg/dL    Performed by Nestor JAQUEZ    CBC WITH AUTOMATED DIFF    Collection Time: 09/26/19  3:44 AM   Result Value Ref Range    WBC 4.7 4.1 - 11.1 K/uL    RBC 4.34 4.10 - 5.70 M/uL    HGB 11.5 (L) 12.1 - 17.0 g/dL    HCT 35.9 (L) 36.6 - 50.3 %    MCV 82.7 80.0 - 99.0 FL    MCH 26.5 26.0 - 34.0 PG    MCHC 32.0 30.0 - 36.5 g/dL    RDW 13.7 11.5 - 14.5 %    PLATELET 910 356 - 921 K/uL    MPV 10.5 8.9 - 12.9 FL    NRBC 0.0 0  WBC    ABSOLUTE NRBC 0.00 0.00 - 0.01 K/uL    NEUTROPHILS 65 32 - 75 %    LYMPHOCYTES 26 12 - 49 %    MONOCYTES 7 5 - 13 %    EOSINOPHILS 2 0 - 7 %    BASOPHILS 0 0 - 1 %    IMMATURE GRANULOCYTES 0 0.0 - 0.5 %    ABS. NEUTROPHILS 3.1 1.8 - 8.0 K/UL    ABS. LYMPHOCYTES 1.2 0.8 - 3.5 K/UL    ABS. MONOCYTES 0.4 0.0 - 1.0 K/UL    ABS. EOSINOPHILS 0.1 0.0 - 0.4 K/UL    ABS. BASOPHILS 0.0 0.0 - 0.1 K/UL    ABS. IMM.  GRANS. 0.0 0.00 - 0.04 K/UL    DF AUTOMATED     METABOLIC PANEL, BASIC    Collection Time: 09/26/19  3:44 AM   Result Value Ref Range    Sodium 140 136 - 145 mmol/L    Potassium 4.0 3.5 - 5.1 mmol/L    Chloride 107 97 - 108 mmol/L    CO2 26 21 - 32 mmol/L    Anion gap 7 5 - 15 mmol/L    Glucose 153 (H) 65 - 100 mg/dL    BUN 14 6 - 20 MG/DL    Creatinine 0.82 0.70 - 1.30 MG/DL    BUN/Creatinine ratio 17 12 - 20      GFR est AA >60 >60 ml/min/1.73m2    GFR est non-AA >60 >60 ml/min/1.73m2    Calcium 8.2 (L) 8.5 - 10.1 MG/DL   GLUCOSE, POC    Collection Time: 09/26/19  7:30 AM   Result Value Ref Range    Glucose (POC) 149 (H) 65 - 100 mg/dL    Performed by Claude Courser PCT            Assessment:     Principal Problem:    NSTEMI (non-ST elevated myocardial infarction) ([facility] (9/21/2019)    Active Problems:    BPH (benign prostatic hyperplasia) (8/2/2017)      ASVD (arteriosclerotic vascular disease) (8/2/2017)      Aortic stenosis (8/2/2017)      Primary osteoarthritis involving multiple joints (8/2/2017)      Essential hypertension (8/2/2017)      Back pain (8/2/2017)      Controlled type 2 diabetes mellitus with stage 2 chronic kidney disease, without long-term current use of insulin ([facility] (12/10/2017)        Plan:     1. Pulmonary edema on admission- symptomatically much better    2. Elevated troponin/ NSTEMI- sp 4 stents   3. mild aortic stenosis on echo  4. A fib on admission EKG, denies prior hx.nl sinus rhythmn since then. anticoagulation regimen will be a challenge at discharge- will defer to cardiology. 5. Hx difficult to control hypertension. Now on clonidine.   6. Diabetes- on glipizide and sliding scale. Resume home meds at discharge. Will leave to Dr. Corrinne Harries next week whether to change to jardiance. Apparently invokana was prescribed once, but cost $1500 for 3 months. 7. Hypokalemia- better  8.  Disposition- ok from my standpoint to go home today if ok with Dr. Yasmany Benavides

## 2019-09-27 ENCOUNTER — HOME CARE VISIT (OUTPATIENT)
Dept: SCHEDULING | Facility: HOME HEALTH | Age: 74
End: 2019-09-27

## 2019-09-27 ENCOUNTER — PATIENT OUTREACH (OUTPATIENT)
Dept: INTERNAL MEDICINE CLINIC | Age: 74
End: 2019-09-27

## 2019-09-27 PROCEDURE — G0299 HHS/HOSPICE OF RN EA 15 MIN: HCPCS

## 2019-09-27 NOTE — DISCHARGE SUMMARY
[address] SUMMARY    Name:  Gene Gates  MR#:  [id number]  :  1945  ACCOUNT #:  [de-identified]  ADMIT DATE:  2019  DISCHARGE DATE:  2019    FINAL DIAGNOSES:  1. Three-vessel coronary artery disease, status post placement of 4 stents. 2.  Non-ST elevation myocardial infarction. 3.  Pulmonary edema, on admission. 4.  Mild aortic stenosis, on echocardiogram.  5.  Question of transient atrial fibrillation, on admission. 6.  Difficult to control hypertension. 7.  Diabetes. 8.  Hypokalemia. CONSULTATIONS:  Saul Carrillo MD    PROCEDURES:  Cardiac catheter x2 with 4 stents placed. DISCHARGE MEDIATIONS:  Hydralazine 50 mg t.i.d., clonidine 0.2 mg twice daily, Plavix 75 mg daily, metoprolol 25 mg b.i.d., Nicoderm patch, glipizide 10 mg b.i.d., lisinopril 40 mg daily, Flomax 0.4 mg daily, Januvia 100 mg daily, finasteride 5 mg daily, losartan 100 mg daily, metformin 500 mg in the morning and 1000 mg in the evening, pravastatin 80 mg daily, amlodipine 10 mg daily, aspirin 81 mg daily. FOLLOWUP:  Follow up with Dr. Coni Flynn next week. HISTORY OF PRESENT ILLNESS:  The patient is a very pleasant 40-year-old white male, who had had 2 days of increasing shortness of breath and lightheadedness prior to admission, this may got so severe on the day of admission rescue squad came and brought the patient to the hospital.  Blood pressure was quite high in the rescue squad. Seen in the emergency room, troponin was elevated. Chest x-rays showed pulmonary edema and the patient was given IV nitroglycerin and diuretics. REVIEW OF SYSTEMS:  Please see HPI. PAST MEDICAL HISTORY:  Significant for aortic stenosis, chronic back pain, BPH, history of renal failure, diabetes, degenerative joint disease, past history of Guillain-Barré, ED, hyperlipidemia, hypertension, left carotid bruits, morbid obesity. SOCIAL HISTORY:  Smoking is negative.   Alcohol use is socially. The patient is . 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 PHYSICAL EXAMINATION:  GENERAL:  Alert, cooperative and intact. VITAL SIGNS:  On admission, temperature 98.1, pulse 83, respiratory rate 16, blood pressure 177/84, O2 saturation 98% on oxygen. LUNGS:  Rales bilaterally in the bases. CARDIOVASCULAR:  Regular rate and rhythm, 3/6 systolic murmur. ABDOMEN:  Soft, nontender. EXTREMITIES:  No edema. NEUROLOGIC:  Exam is nonfocal.    LABORATORY DATA:  On admission, white count 12,800, hemoglobin 15.4, platelet 529,409. Potassium 3.2, glucose 185, BUN 17, creatinine 0.97. Troponin was 1.56. Pro-BNP was 1725. Chest x-ray on admission showed bilateral pulmonary edema. Again after receiving diuretics and IV nitroglycerine, the patient is breathing much better. He is seen in consultation by Dr. Maryam Copeland. On 09/23, he had a cardiac catheter done showing 80% stenosis in its mid LAD. Stent was placed. Also, had an 80% long stenosis in circumflex. Two overlapping stents placed and 80% proximal stenosis in the right coronary artery. On Wednesday, the patient had another catheter by Dr. Maryam Copeland. A stent was placed in the proximal right coronary artery. The distal SRIKANTH branch did not get the wire pass through it. Also of note, while in the hospital, the patient had an echocardiogram showing ejection fraction of 56%-60%, mild aortic valve stenosis, aortic valve area is 1 cm2, trace mitral regurg. On the patient's admission, EKG was read as showing some atrial fibrillation. However, he did not have any further atrial fibrillation while in the hospital.  The patient was discharged on aspirin and Plavix, but the anticoagulation was felt to be too risky. He was stable and discharged home on 09/26. DISCHARGE INSTRUCTIONS:  Followup with Dr. Taniya Lloyd next week.       Isra Yadav MD      [id number]  D:  09/26/2019 18:40  T:  09/27/2019 17:49  JOB #:  [id number]  CC:

## 2019-09-28 PROBLEM — I25.10 ASCVD (ARTERIOSCLEROTIC CARDIOVASCULAR DISEASE): Status: ACTIVE | Noted: 2019-09-28

## 2019-09-28 PROBLEM — I48.0 PAF (PAROXYSMAL ATRIAL FIBRILLATION) (HCC): Status: ACTIVE | Noted: 2019-09-28

## 2019-09-28 PROBLEM — J81.0 ACUTE PULMONARY EDEMA (HCC): Status: ACTIVE | Noted: 2019-09-28

## 2019-09-28 NOTE — PROGRESS NOTES
Transitions Of Care [facility] 9/21/19 for chest pain discharged 9/26/19)       HPI:  Gaurav Hoffman is a 74y.o. year old male who is here for a follow up visit for hospitalization transition of care. He was last seen by me on 8/5/2019. Discharged on: 9/26/2019    Diagnosis in hospital:   1. Non-ST elevation MI  2. ASCVD with three-vessel coronary disease with 80% blockage of LAD, circumflex and RCA treated with a total of 4 stents over 2 settings  3. Pulmonary edema on admission  4. Mild aortic stenosis by echo  5. Question transient atrial fibrillation at arrival  6. Hypertension difficult to control  7. Diabetes mellitus  8. Hypokalemia different settings. Complications in hospital: No complications    Medication changes: Addition of aspirin 81 mg daily, Plavix 75 daily, metoprolol 25 twice daily, and clonidine 0.2 twice daily. Discharge Summary reviewed. Today 9/30/2019      He reports the following: Since discharge from the hospital he is doing a much better job of following his diet which his wife confirms. He denies any current chest pain, shortness of breath, palpitations, PND, orthopnea or any other cardiorespiratory complaints. He denies any lightheadedness or dizziness or sequela of elevated blood pressure or low blood pressure. He denies any headaches or other neurologic complaints. He has his chronic unchanged arthritic complaints. He does claim medical compliance and has noted better dietary compliance since he was hospitalized.           Visit Vitals  /56 (BP 1 Location: Left arm, BP Patient Position: Sitting)   Pulse (!) 58   Temp 97.9 °F (36.6 °C) (Oral)   Resp 18   Ht 5' 10\" (1.778 m)   Wt 233 lb (105.7 kg)   SpO2 (!) 58%   BMI 33.43 kg/m²       Historical Data    Past Medical History:   Diagnosis Date    Aortic stenosis 8/2/2017    ASVD (arteriosclerotic vascular disease) 8/2/2017    Back pain 8/2/2017    BPH (benign prostatic hyperplasia) 8/2/2017    CKD (chronic kidney disease) 8/2/2017    Diabetes ([facility] 8/2/2017    DJD (degenerative joint disease) 8/2/2017    ED (erectile dysfunction) 8/2/2017    Guillain-[place] syndrome ([facility] 8/2/2017    Hyperlipidemia 8/2/2017    Hypertension 8/2/2017    Left carotid bruit 8/2/2017    Morbid obesity ([facility] 8/2/2017    On statin therapy 8/2/2017    Urticaria 8/2/2017       Past Surgical History:   Procedure Laterality Date    CARDIAC SURG PROCEDURE UNLIST  09/2019    4 stents placed       Outpatient Encounter Medications as of 9/30/2019   Medication Sig Dispense Refill    hydrALAZINE (APRESOLINE) 50 mg tablet Take 1 Tab by mouth three (3) times daily. 90 Tab 12    cloNIDine HCl (CATAPRES) 0.2 mg tablet Take 1 Tab by mouth two (2) times a day. 60 Tab 12    clopidogrel (PLAVIX) 75 mg tab Take 1 Tab by mouth daily. 30 Tab 12    metoprolol tartrate (LOPRESSOR) 25 mg tablet Take 1 Tab by mouth every twelve (12) hours. 60 Tab 12    glipiZIDE (GLUCOTROL) 10 mg tablet TAKE ONE TABLET BY MOUTH TWICE A  Tab 3    lisinopril (PRINIVIL, ZESTRIL) 40 mg tablet TAKE 1 TABLET BY MOUTH ONCE DAILY 90 Tab 3    tamsulosin (FLOMAX) 0.4 mg capsule TAKE TWO CAPSULES BY MOUTH DAILY 180 Cap prn    SITagliptin (JANUVIA) 100 mg tablet Take 1 Tab by mouth daily. 30 Tab prn    finasteride (PROSCAR) 5 mg tablet Take 1 Tab by mouth daily.  90 Tab prn    losartan (COZAAR) 100 mg tablet TAKE ONE TABLET BY MOUTH DAILY 90 Tab 3    metFORMIN (GLUCOPHAGE) 500 mg tablet TAKE ONE TABLET BY MOUTH EVERY MORNING AND TAKE TWO TABLETS BY MOUTH EVERY EVENING WITH DINNER 270 Tab 2    pravastatin (PRAVACHOL) 80 mg tablet TAKE ONE TABLET BY MOUTH DAILY 90 Tab 2    amLODIPine (NORVASC) 10 mg tablet TAKE ONE TABLET BY MOUTH DAILY 90 Tab 3    timolol (TIMOPTIC-XE) 0.5 % ophthalmic gel-forming       meloxicam (MOBIC) 15 mg tablet TAKE ONE TABLET BY MOUTH DAILY 90 Tab 3    Omega-3-DHA-EPA-Fish Oil 1,000 mg (120 mg-180 mg) cap Take 1 Cap by mouth three (3) times daily.      aspirin delayed-release 81 mg tablet Take  by mouth daily.  [DISCONTINUED] nicotine (NICODERM CQ) 7 mg/24 hr 1 Patch by TransDERmal route daily as needed ([employer]). No facility-administered encounter medications on file as of 9/30/2019.          Allergies   Allergen Reactions    Adhesive Tape-Silicones Unknown (comments)    Chocolate Flavor Unknown (comments)        Social History     Socioeconomic History    Marital status:      Spouse name: Not on file    Number of children: Not on file    Years of education: Not on file    Highest education level: Not on file   Occupational History    Not on file   Social Needs    Financial resource strain: Not on file    Food insecurity:     Worry: Not on file     Inability: Not on file    Transportation needs:     Medical: Not on file     Non-medical: Not on file   Tobacco Use    Smoking status: Never Smoker    Smokeless tobacco: Never Used   Substance and Sexual Activity    Alcohol use: Yes     Comment: social    Drug use: No    Sexual activity: Not on file   Lifestyle    Physical activity:     Days per week: Not on file     Minutes per session: Not on file    Stress: Not on file   Relationships    Social connections:     Talks on phone: Not on file     Gets together: Not on file     Attends Evangelical service: Not on file     Active member of club or organization: Not on file     Attends meetings of clubs or organizations: Not on file     Relationship status: Not on file    Intimate partner violence:     Fear of current or ex partner: Not on file     Emotionally abused: Not on file     Physically abused: Not on file     Forced sexual activity: Not on file   Other Topics Concern    Not on file   Social History Narrative    Not on file      REVIEW OF SYSTEMS:  General: negative for - chills or fever  ENT: negative for - headaches, nasal congestion or tinnitus  Eyes: no blurred or visual changes  Neck: No stiffness or swollen nodes  Respiratory: negative for - cough, hemoptysis, shortness of breath or wheezing  Cardiovascular : negative for - chest pain, edema, palpitations or shortness of breath  Gastrointestinal: negative for - abdominal pain, blood in stools, heartburn or nausea/vomiting  Genito-Urinary: no dysuria, trouble voiding, or hematuria  Musculoskeletal: negative for - gait disturbance, joint pain, joint stiffness or joint swelling  Neurological: no TIA or stroke symptoms  Hematologic: no bruises, no bleeding  Lymphatic: no swollen glands  Integument: no lumps, mole changes, nail changes or rash  Endocrine:no malaise/lethargy poly uria or polydipsia or unexpected weight changes      Visit Vitals  /56 (BP 1 Location: Left arm, BP Patient Position: Sitting)   Pulse (!) 58   Temp 97.9 °F (36.6 °C) (Oral)   Resp 18   Ht 5' 10\" (1.778 m)   Wt 233 lb (105.7 kg)   SpO2 (!) 58%   BMI 33.43 kg/m²     CONSTITUTIONAL: well , well nourished, appears age appropriate  HEAD: normocephalic, atraumatic  EYES: sclera anicteric, PERRL, EOMI  ENMT:moist mucous membranes, pharynx clear          Nares: w/o erythema or edema  NECK: supple. Thyroid normal, No JVD or bruits  RESPIRATORY: Chest: clear to ascultation and percussion   CARDIOVASCULAR: Heart: regular rate and rhythm no murmurs, rubs or gallops, PMI not displaced, no thrill  GASTROINTESTINAL: Abdomen: non distended, soft, non-tender, bowel sounds normal  HEMATOLOGIC: no petechiae or purpura  LYMPHATIC: no lymphadenopathy  MUSCULOSKELETAL: Extremities: no edema or active synovitis, pulse 1+   BACK; no point or CVAT  INTEGUMENT: No unusual rashes or suspicious skin lesions noted. Nails appear normal.  NEUROLOGIC: non-focal exam   MENTAL STATUS: alert and oriented, appropriate affect   PSYCHIATRIC: normal affect    ASSESSMENT:   1. NSTEMI (non-ST elevated myocardial infarction) ([facility]    2. ASCVD (arteriosclerotic cardiovascular disease)    3. Acute pulmonary edema (HCC)    4. PAF (paroxysmal atrial fibrillation) (HCC)    5. Stage 2 chronic kidney disease    6. Essential hypertension    7. Controlled type 2 diabetes mellitus with stage 2 chronic kidney disease, without long-term current use of insulin (HCC)      Impression  1. Non-ST elevation MI fortunately resolved an echocardiogram revealed no evidence of left ventricular dysfunction with ejection fraction 55 to 60%  2. ASCVD now clinically stable status post stent placement in each of his major arteries. He did have a lesion in his posterior lateral off of his right coronary that could not be cannulated to stent. Continue aspirin and Plavix. 3.  Pulmonary edema at hospital presentation now resolved I think that was secondary to the acute MI and probably transient atrial fibrillation  4. Transient atrial fibrillation currently no evidence recurrence   5   CKD stage II repeat status is pending  6. Hypertension that seems to be more than adequate control today so I will stop his losartan  7. Diabetes mellitus he currently is on Glucotrol, Januvia and metformin and we had previously tried to add Invokana which was too expensive. We will try working more diligently with diet and exercise and see if he can do a better job of getting his weight down. All of the above discussed with his wife at length. Follow-up scheduled for 6 weeks with a clear understanding I will see him sooner if there is a problem. He is an extremely high risk patient for repeat hospitalization and further complications. PLAN:  .  Orders Placed This Encounter    METABOLIC PANEL, BASIC ([facility])    AMB POC EKG ROUTINE W/ 12 LEADS, INTER & REP         ATTENTION:   This medical record was transcribed using an electronic medical records system. Although proofread, it may and can contain electronic and spelling errors. Other human spelling and other errors may be present. Corrections may be executed at a later time.   Please feel free to contact us for any clarifications as needed. Follow-up and Dispositions    · Return in about 6 weeks (around 11/11/2019). Tray Baker MD    Orders Placed This Encounter    METABOLIC PANEL, BASIC ([facility])    AMB POC EKG ROUTINE W/ 12 LEADS, INTER & REP     Order Specific Question:   Reason for Exam:     Answer:   ASCVD          No results found for any visits on 09/30/19. I have reviewed the patient's medical history in detail and updated the computerized patient record. We had a prolonged discussion about these complex clinical issues and went over the various important aspects to consider. All questions were answered. Advised him to call back or return to office if symptoms do not improve, change in nature, or persist.    He was given an after visit summary or informed of [facility] which includes patient instructions, diagnoses, current medications, & vitals. He expressed understanding with the diagnosis and plan.

## 2019-09-30 ENCOUNTER — OFFICE VISIT (OUTPATIENT)
Dept: INTERNAL MEDICINE CLINIC | Age: 74
End: 2019-09-30

## 2019-09-30 VITALS
BODY MASS INDEX: 33.36 KG/M2 | RESPIRATION RATE: 18 BRPM | DIASTOLIC BLOOD PRESSURE: 56 MMHG | WEIGHT: 233 LBS | TEMPERATURE: 97.9 F | HEIGHT: 70 IN | OXYGEN SATURATION: 58 % | HEART RATE: 58 BPM | SYSTOLIC BLOOD PRESSURE: 110 MMHG

## 2019-09-30 DIAGNOSIS — I21.4 NSTEMI (NON-ST ELEVATED MYOCARDIAL INFARCTION) (HCC): Primary | ICD-10-CM

## 2019-09-30 DIAGNOSIS — I48.0 PAF (PAROXYSMAL ATRIAL FIBRILLATION) (HCC): ICD-10-CM

## 2019-09-30 DIAGNOSIS — I10 ESSENTIAL HYPERTENSION: ICD-10-CM

## 2019-09-30 DIAGNOSIS — N18.2 STAGE 2 CHRONIC KIDNEY DISEASE: ICD-10-CM

## 2019-09-30 DIAGNOSIS — N18.2 CONTROLLED TYPE 2 DIABETES MELLITUS WITH STAGE 2 CHRONIC KIDNEY DISEASE, WITHOUT LONG-TERM CURRENT USE OF INSULIN (HCC): ICD-10-CM

## 2019-09-30 DIAGNOSIS — E11.22 CONTROLLED TYPE 2 DIABETES MELLITUS WITH STAGE 2 CHRONIC KIDNEY DISEASE, WITHOUT LONG-TERM CURRENT USE OF INSULIN (HCC): ICD-10-CM

## 2019-09-30 DIAGNOSIS — J81.0 ACUTE PULMONARY EDEMA (HCC): ICD-10-CM

## 2019-09-30 DIAGNOSIS — I25.10 ASCVD (ARTERIOSCLEROTIC CARDIOVASCULAR DISEASE): ICD-10-CM

## 2019-09-30 LAB
ACT BLD: 257 SECS (ref 79–138)
ACT BLD: 279 SECS (ref 79–138)
ACT BLD: 345 SECS (ref 79–138)
ANION GAP SERPL CALC-SCNC: 8 MMOL/L
BUN SERPL-MCNC: 13 MG/DL (ref 9–20)
CALCIUM SERPL-MCNC: 8.9 MG/DL (ref 8.4–10.2)
CHLORIDE SERPL-SCNC: 102 MMOL/L (ref 98–107)
CO2 SERPL-SCNC: 27 MMOL/L (ref 22–32)
CREAT SERPL-MCNC: 0.8 MG/DL (ref 0.8–1.5)
GLUCOSE SERPL-MCNC: 185 MG/DL (ref 75–110)
POTASSIUM SERPL-SCNC: 4.8 MMOL/L (ref 3.6–5)
SODIUM SERPL-SCNC: 137 MMOL/L (ref 137–145)

## 2019-09-30 NOTE — PATIENT INSTRUCTIONS

## 2019-09-30 NOTE — PROGRESS NOTES
Chief Complaint   Patient presents with   [facility] 9/21/19 for chest pain discharged 9/26/19     Visit Vitals  /56 (BP 1 Location: Left arm, BP Patient Position: Sitting)   Pulse (!) 58   Temp 97.9 °F (36.6 °C) (Oral)   Resp 18   Ht 5' 10\" (1.778 m)   Wt 233 lb (105.7 kg)   SpO2 (!) 58%   BMI 33.43 kg/m²     1. Have you been to the ER, urgent care clinic since your last visit? Hospitalized since your last visit? ED [facility] 9/21/19 for chest pain    2. Have you seen or consulted any other health care providers outside of the [address] [place] [address] since your last visit? Include any pap smears or colon screening.  No

## 2019-10-01 NOTE — PROGRESS NOTES
Kidney function is good but blood sugar is up even though is nonfasting still need to work more with diet. Wife informed.

## 2019-10-01 NOTE — PROGRESS NOTES
Kidney function is good but blood sugar is up even though is nonfasting still need to work more with diet.

## 2019-10-03 NOTE — PROGRESS NOTES
Hospital Discharge Follow-Up      Date/Time:  10/3/2019 1:28 PM    Patient was admitted to [facility] on 19 and discharged on 19 for NSTEMI. The physician discharge summary was available at the time of outreach. NN attempted to contact patient within 2 days, unsuccessfully. Patient in to see PCP for marcia visit on 19. Top Challenges reviewed with the provider            Method of communication with provider :none    Inpatient RRAT score: 32  Was this a readmission? no   Patient stated reason for the readmission: n/a    Nurse Navigator unable to contact  the patient by telephone to perform post hospital discharge assessment. Verified name and  with patient as identifiers. Provided introduction to self, and explanation of the Nurse Navigator role. Reviewed discharge instructions and red flags with patient who verbalized understanding. Patient given an opportunity to ask questions and does not have any further questions or concerns at this time. The patient agrees to contact the PCP office for questions related to their healthcare. NN provided contact information for future reference. Disease Specific:   N/A    Summary of patient's top problems:  1. S/P NSTEMI resolved with stenting. No left ventricular dysfunction. EF = 55-60%. Home Health orders at discharge: [address]: n/a  Date of initial visit: [facility] ordered/company: n/a  Durable Medical Equipment received: n/a    Barriers to care? none    Advance Care Planning:   Does patient have an Advance Directive:  not on file     Medication(s):   New Medications at Discharge: clonidine, clopidogrel, metoprolol, nicotine  Changed Medications at Discharge: hydralazine  Discontinued Medications at Discharge: n/a    Medication reconciliation was performed with patient, who verbalizes understanding of administration of home medications.   There were no barriers to obtaining medications identified at this time. Referral to Pharm D needed: no     Current Outpatient Medications   Medication Sig    hydrALAZINE (APRESOLINE) 50 mg tablet Take 1 Tab by mouth three (3) times daily.  cloNIDine HCl (CATAPRES) 0.2 mg tablet Take 1 Tab by mouth two (2) times a day.  clopidogrel (PLAVIX) 75 mg tab Take 1 Tab by mouth daily.  metoprolol tartrate (LOPRESSOR) 25 mg tablet Take 1 Tab by mouth every twelve (12) hours.  glipiZIDE (GLUCOTROL) 10 mg tablet TAKE ONE TABLET BY MOUTH TWICE A DAY    lisinopril (PRINIVIL, ZESTRIL) 40 mg tablet TAKE 1 TABLET BY MOUTH ONCE DAILY    tamsulosin (FLOMAX) 0.4 mg capsule TAKE TWO CAPSULES BY MOUTH DAILY    SITagliptin (JANUVIA) 100 mg tablet Take 1 Tab by mouth daily.  finasteride (PROSCAR) 5 mg tablet Take 1 Tab by mouth daily.  losartan (COZAAR) 100 mg tablet TAKE ONE TABLET BY MOUTH DAILY    metFORMIN (GLUCOPHAGE) 500 mg tablet TAKE ONE TABLET BY MOUTH EVERY MORNING AND TAKE TWO TABLETS BY MOUTH EVERY EVENING WITH DINNER    pravastatin (PRAVACHOL) 80 mg tablet TAKE ONE TABLET BY MOUTH DAILY    amLODIPine (NORVASC) 10 mg tablet TAKE ONE TABLET BY MOUTH DAILY    timolol (TIMOPTIC-XE) 0.5 % ophthalmic gel-forming     meloxicam (MOBIC) 15 mg tablet TAKE ONE TABLET BY MOUTH DAILY    [place]-3-DHA-EPA-Fish Oil 1,000 mg (120 mg-180 mg) cap Take 1 Cap by mouth three (3) times daily.  aspirin delayed-release 81 mg tablet Take  by mouth daily. No current facility-administered medications for this visit. There are no discontinued medications. [facility] follow up appointment(s):   Future Appointments   Date Time Provider Martín Trujillo   11/1/2019  1:00 PM JOLEEN MILLER   11/12/2019  8:50 AM Alexandru Silva MD [facility] JEFF RODRIGUEZ      Non-[facility] follow up appointment(s): n/a  [facility]:  n/a       Goals      Returns to baseline activity level.       10/3/19-NN attempted to contact patient unsuccessfully on 9/27/19. Patient came in for marcia visit with PCP on 9/30/19. Patient was asymptomatic at that time. NSTEMI resolved without left ventricular dysfunction. EF=55-60%. Patient reports he is adhering to dietary restrictions regarding heart healthy choices, and his HTN is more controlled. Patient will f/u with PCP again in 6 weeks.   NN will contact patient in 2 weeks to see how he is progressing. / vs

## 2019-10-17 RX ORDER — MELOXICAM 15 MG/1
TABLET ORAL
Qty: 90 TAB | Refills: 2 | Status: SHIPPED | OUTPATIENT
Start: 2019-10-17 | End: 2020-08-14

## 2019-10-18 ENCOUNTER — PATIENT OUTREACH (OUTPATIENT)
Dept: INTERNAL MEDICINE CLINIC | Age: 74
End: 2019-10-18

## 2019-10-18 NOTE — PROGRESS NOTES
Patient has graduated from the Transitions of Care Coordination  program on 10/18/19. Patient's symptoms are stable at this time. Patient/family has the ability to self-manage. Care management goals have been completed at this time. No further nurse navigator follow up scheduled. Goals Addressed                 This Visit's Progress     COMPLETED: Returns to baseline activity level. 10/3/19-NN attempted to contact patient unsuccessfully on 9/27/19. Patient came in for marcia visit with PCP on 9/30/19. Patient was asymptomatic at that time. NSTEMI resolved without left ventricular dysfunction. EF=55-60%. Patient reports he is adhering to dietary restrictions regarding heart healthy choices, and his HTN is more controlled. Patient will f/u with PCP again in 6 weeks. NN will contact patient in 2 weeks to see how he is progressing. / vs            Pt has nurse navigator's contact information for any further questions, concerns, or needs.   Patients upcoming visits:    Future Appointments   Date Time Provider Martín Trujillo   11/1/2019  1:00 PM JOLEEN MILLER   11/12/2019  8:50 AM Diamond Arora MD [address]

## 2019-11-01 ENCOUNTER — HOSPITAL ENCOUNTER (OUTPATIENT)
Dept: CARDIAC REHAB | Age: 74
Discharge: HOME | End: 2019-11-01
Attending: INTERNAL MEDICINE
Payer: MEDICARE

## 2019-11-01 VITALS — HEIGHT: 70 IN | BODY MASS INDEX: 40.52 KG/M2 | WEIGHT: 283 LBS

## 2019-11-01 DIAGNOSIS — Z95.5 STENTED CORONARY ARTERY: ICD-10-CM

## 2019-11-01 DIAGNOSIS — E78.5 HYPERLIPIDEMIA, UNSPECIFIED HYPERLIPIDEMIA TYPE: ICD-10-CM

## 2019-11-01 PROCEDURE — 93798 PHYS/QHP OP CAR RHAB W/ECG: CPT

## 2019-11-01 RX ORDER — PRAVASTATIN SODIUM 80 MG/1
TABLET ORAL
Qty: 90 TAB | Refills: 1 | Status: SHIPPED | OUTPATIENT
Start: 2019-11-01 | End: 2020-05-12

## 2019-11-01 NOTE — CARDIO/PULMONARY
Cardiopulmonary Rehab Orientation:      Patient is a 74year old patient of Dr. Timmy Fish who presents to rehab for NSTEMI (9/21/19) and PCI (9/25/19). Patient was having increased SOB and lightheadedness when he presented to the ED. NSTEMI and PCI. Medical History includes BPH, stage 2 kidney disease, type 2 diabetes, hyperlipidemia, and HTN. Pt's VS were as follows: BP on R arm 160/82,  L arm 140/70, HR 62, oxygen saturation 95% RA   Lungs are clear to auscultation. Pt denies cough. No apparent edema noted. BMI 40.69. Heart rhythm on monitor showed 1st degree, BBB, depressed ST segment, and PVC's. Smoking history assessed and patient is a non smoker. Pt immunizations, allergies, and medications were reviewed and are up to date. Patient has occasional left knee pain from a former injury but he states it rarely causes him problems    Patient completed the 6MWT without signs or symptoms and was able to complete 270 meters. PSYCHOSOCIAL:  Patient has a significant other and a supportive extended family. He has some stress around his concern for a grandson with a drinking problem but states he tries not to let it cause too much stress. He enjoys reading, fishing, and hunting.

## 2019-11-04 DIAGNOSIS — I10 ESSENTIAL HYPERTENSION: ICD-10-CM

## 2019-11-04 RX ORDER — AMLODIPINE BESYLATE 10 MG/1
TABLET ORAL
Qty: 90 TAB | Refills: 2 | Status: SHIPPED | OUTPATIENT
Start: 2019-11-04 | End: 2020-08-30

## 2019-11-04 NOTE — TELEPHONE ENCOUNTER
PCP: Mitul Cortez MD    Last appt: 9/30/2019  Future Appointments   Date Time Provider Martín Trujillo   11/6/2019  1:00 PM [id number]   11/8/2019  1:00 PM MRM CARDIOPULM EXERCISE [facility] REG   11/8/2019  2:00 PM [facility]M NUTRITION [facility] REG   11/11/2019  2:00 PM MRM CARDIOPULM EXERCISE [facility] REG   11/12/2019  8:50 AM Mitul Cortez MD [facility]   11/13/2019  1:00 PM MRM CARDIOPULM EXERCISE [facility] REG   11/13/2019  2:00 PM  [facility]   11/15/2019  1:00 PM MRM CARDIOPULM EXERCISE [facility] REG   11/18/2019  2:00 PM MRM CARDIOPULM EXERCISE [facility] REG   11/20/2019  1:00 PM MRM CARDIOPULM EXERCISE [facility] REG   11/20/2019  2:00 PM  [facility]   11/22/2019  1:00 PM MRM [id number] [address] REG   11/25/2019  2:00 PM MRM CARDIOPULM EXERCISE [facility] REG   11/27/2019  1:00 PM MRM CARDIOPULM EXERCISE [facility] REG   11/27/2019  2:00 PM  [employer]   12/2/2019  2:00 PM MRM CARDIOPULM EXERCISE [facility] REG   12/4/2019  1:00 PM MRM [id number] [address] REG   12/4/2019  2:00 PM  [facility]   12/6/2019  1:00 PM MRM [id number] [facility] 434 REG   12/9/2019  2:00 PM MRM [id number] [facility] 434 REG   12/11/2019  1:00 PM MRM [id number] [facility] 434 REG   12/11/2019  2:00 PM  [employer]   12/13/2019  1:00 PM MRM [id number] [facility] [address] REG   12/16/2019  2:00 PM MRM [id number] [facility] [address] REG   12/18/2019  2:00 PM  [facility]   12/20/2019  1:00 PM MRM [id number] [address] [address] REG   12/23/2019  2:00 PM MRM CARDIOPULM EXERCISE \[facility]\""[facility] REG   12/27/2019  1:00 PM MRM CARDIOPULM EXERCISE \[facility]\""[facility] REG   12/30/2019 2:00 PM MRM CARDIOPULM EXERCISE [facility]   1/3/2020  1:00 PM MRM CARDIOPULM EXERCISE [facility] REG   1/6/2020  2:00 PM MRM CARDIOPULM EXERCISE [facility] REG   1/8/2020  2:00 PM  [address]  REG   1/13/2020  2:00 PM MRM CARDIOPULM EXERCISE [facility] REG   1/20/2020  2:00 PM MRM CARDIOPULM EXERCISE [facility] REG       Requested Prescriptions     Pending Prescriptions Disp Refills    amLODIPine (NORVASC) 10 mg tablet [Pharmacy Med Name: AMLODIPINE BESYLATE 10 MG TAB] 90 Tab 2     Sig: TAKE ONE TABLET BY MOUTH DAILY

## 2019-11-06 ENCOUNTER — APPOINTMENT (OUTPATIENT)
Dept: CARDIAC REHAB | Age: 74
End: 2019-11-06
Attending: INTERNAL MEDICINE
Payer: MEDICARE

## 2019-11-06 ENCOUNTER — HOSPITAL ENCOUNTER (OUTPATIENT)
Dept: CARDIAC REHAB | Age: 74
Discharge: HOME | End: 2019-11-06
Attending: INTERNAL MEDICINE
Payer: MEDICARE

## 2019-11-06 NOTE — CARDIO/PULMONARY
Patient presents to rehab with elevated blood pressure. 216/81, 199/84, 224/94 and manual 242/90. Patient did not take any of his blood presure medications today. Spoke with Harriet at Dr. Lorena Torres office who advised patient go home and take his medications. He will also go to [facility] and check his blood pressure to make sure it has gone down. He has an appointment with Dr. Tigre Rae tomorrow. Patient asymptomatic.

## 2019-11-08 ENCOUNTER — HOSPITAL ENCOUNTER (OUTPATIENT)
Dept: CARDIAC REHAB | Age: 74
Discharge: HOME | End: 2019-11-08
Attending: INTERNAL MEDICINE
Payer: MEDICARE

## 2019-11-08 VITALS — BODY MASS INDEX: 33.43 KG/M2 | WEIGHT: 233 LBS

## 2019-11-08 PROCEDURE — 93797 PHYS/QHP OP CAR RHAB WO ECG: CPT | Performed by: DIETITIAN, REGISTERED

## 2019-11-08 PROCEDURE — 93798 PHYS/QHP OP CAR RHAB W/ECG: CPT

## 2019-11-08 NOTE — PROGRESS NOTES
Cardiac Rehab Nutrition Assessment - 1:1 Evaluation     NAME: Kati Ashraf : 1945 AGE: 74 y.o. GENDER: male  CARDIAC REHAB ADMITTING DIAGNOSIS:     Relevant Comorbidites:DM, HTN, hyperlipidemia, Stage II kidney disease    LABS:   Lab Results   Component Value Date/Time    Hemoglobin A1c 8.7 (H) 2019 09:43 AM    Hemoglobin A1c (POC) 7.4 (A) 2018 10:47 AM     Lab Results   Component Value Date/Time    Cholesterol, total 194 2019 09:42 AM    Cholesterol (POC) 154.0 2018 10:47 AM    HDL Cholesterol 47 2019 09:42 AM    HDL Cholesterol (POC) 52.0 2018 10:47 AM    LDL Cholesterol (POC) 70.2 2018 10:47 AM    LDL, calculated 92 2019 09:42 AM    VLDL, calculated 44 (H) 2019 09:42 AM    VLDL 55 2019 09:42 AM    Triglyceride 274 (H) 2019 09:42 AM    Triglycerides (POC) 159.0 2018 10:47 AM    CHOL/HDL Ratio 4 2019 09:42 AM     MEDICATIONS/SUPPLEMENTS:   [unfilled]  Prior to Admission medications    Medication Sig Start Date End Date Taking? Authorizing Provider   amLODIPine (NORVASC) 10 mg tablet TAKE ONE TABLET BY MOUTH DAILY 19   Louann Lazo MD   pravastatin (PRAVACHOL) 80 mg tablet TAKE ONE TABLET BY MOUTH DAILY 19   Louann Lazo MD   meloxicam (MOBIC) 15 mg tablet TAKE ONE TABLET BY MOUTH DAILY 10/17/19   Louann Lazo MD   hydrALAZINE (APRESOLINE) 50 mg tablet Take 1 Tab by mouth three (3) times daily. 19   Ligia Aquino MD   cloNIDine HCl (CATAPRES) 0.2 mg tablet Take 1 Tab by mouth two (2) times a day. 19   Ligia Aquino MD   clopidogrel (PLAVIX) 75 mg tab Take 1 Tab by mouth daily. 19   Ligia Aquino MD   metoprolol tartrate (LOPRESSOR) 25 mg tablet Take 1 Tab by mouth every twelve (12) hours.  19   Ligia Aquino MD   glipiZIDE (GLUCOTROL) 10 mg tablet TAKE ONE TABLET BY MOUTH TWICE A DAY 19   Louann Lazo MD   lisinopril (Steffanie Russian) 40 mg tablet TAKE 1 TABLET BY MOUTH ONCE DAILY 7/1/19   William Sarkar MD   tamsulosin [facility]) 0.4 mg capsule TAKE TWO CAPSULES BY MOUTH DAILY 4/22/19   William Sarkar MD   SITagliptin (JANUVIA) 100 mg tablet Take 1 Tab by mouth daily. 4/22/19   William Sarkar MD   finasteride (PROSCAR) 5 mg tablet Take 1 Tab by mouth daily. 4/22/19   William Sarkar MD   losartan (COZAAR) 100 mg tablet TAKE ONE TABLET BY MOUTH DAILY 3/22/19   William Sarkar MD   metFORMIN (GLUCOPHAGE) 500 mg tablet TAKE ONE TABLET BY MOUTH EVERY MORNING AND TAKE TWO TABLETS BY MOUTH EVERY EVENING WITH DINNER 1/9/19   William Sarkar MD   timolol (TIMOPTIC-XE) 0.5 % ophthalmic gel-forming  9/19/18   Provider, Historical   Omega-3-DHA-EPA-Fish Oil 1,000 mg (120 mg-180 mg) cap Take 1 Cap by mouth three (3) times daily. Provider, Historical   aspirin delayed-release 81 mg tablet Take  by mouth daily.     Provider, Historical       ANTHROPOMETRICS:    Ht Readings from Last 1 Encounters:   11/01/19 5' 10\" (1.778 m)      Wt: 233 lbs (105.9kg)   IBW: 166 # +/- 10%  %IBW: 140 % +/- 10%    BMI: 33.5 kg/M2 Category: Obesity Class I  Waist: 47.5 inches    Reported Wt Hx:  Wt Readings from Last 10 Encounters:   11/01/19 128.4 kg (283 lb)   09/30/19 105.7 kg (233 lb)   09/26/19 105.1 kg (231 lb 11.3 oz)   08/05/19 105.5 kg (232 lb 9.6 oz)   04/22/19 106.3 kg (234 lb 6.4 oz)   01/17/19 106.2 kg (234 lb 3.2 oz)   10/16/18 105.2 kg (232 lb)   08/03/18 104.8 kg (231 lb)   07/02/18 107.8 kg (237 lb 9.6 oz)   04/02/18 108.1 kg (238 lb 6.4 oz)     Reported Diet Hx:    Rate Your Plate Score:  (Score 58-72: Making many healthy choices; 41-57: Some choices need improving 24-40: many choices need improving)     24 Hour Diet Recall  Breakfast Toast, oatmeal or 2 egg, 2 low sodium hoffman, toast   Lunch [place] or salad, baked chips   Dinner Pork chops, tossed salad, green beans   Snacks Cookies-sugar free   Beverages Sugar free drinks, 16 oz Pepsi Environmental/Social:  Pt lives with wife; both cook      NUTRITION INTERVENTION:  Nutrition 60 minute one-on-one education & goal setting with Dariel Jarrett    Reviewed with Dariel Jarrett relevant labs compared to ideals. Reviewed weight history and patient's verbalized weight goal as well as any real or perceived barriers to obtaining the goal. Collaborated with patient to set a specific short and long term weight goal.     Reviewed Rate Your Plate and conducted a verbal diet recall. Assessed for environmental, financial, psychosocial, physical and comorbidities that may impact the food and eating patterns / behaviors of Kana Riojas [place] with patient to set specific nutrient goals as well as specific food / behavior changes that will help patient meet the overall goal of following a heart healthy eating pattern (using guidelines as set forth by the American Heart Association and modeled after healthful eating patterns as recognized by the USDA Dietary Guidelines such as DASH, Mediterranean or plant-based). Briefly reviewed with Dariel Jarrett the nutrition information in the Cardiac Rehab patient education book and encouraged Dariel Jarrett to read thoroughly, ask questions as needed, and use for future reference for heart healthy nutrition information. Dariel Jarrett is scheduled to participate in Cardiac Rehab group nutrition classes. PATIENT GOALS:    Weight Goals:  Short Term Weight Goal: 215 lbs  Long Term Weight Goal: 200 lbs    Nutrition Goals:  Daily Recommendations:  Calories: 4862-4923  /day  (using Jessica Keyshawn with AF x1.3-1.4-500)    Saturated Fat: no more than 12-13 g/day  Trans Fat: 0 g/day  Sodium: no more than 5455-2034 mg/day  Fruit: 2-3 ser / day  Vegetables: 4-5 ser/day    Other:  1. Read food labels and limit saturated fat and sodium to above recommendations  2.  Limit carbs to 45-60g per meal, 15-20g per snack    Keeping a food diary was recommended. Questions addressed. Follow-up plans discussed. Velma Alvarado verbalized understanding.             Julia Landin RD

## 2019-11-11 ENCOUNTER — HOSPITAL ENCOUNTER (OUTPATIENT)
Dept: CARDIAC REHAB | Age: 74
Discharge: HOME | End: 2019-11-11
Attending: INTERNAL MEDICINE
Payer: MEDICARE

## 2019-11-11 VITALS — WEIGHT: 233 LBS | BODY MASS INDEX: 33.43 KG/M2

## 2019-11-11 PROCEDURE — 93798 PHYS/QHP OP CAR RHAB W/ECG: CPT

## 2019-11-11 NOTE — PROGRESS NOTES
Chief Complaint   Patient presents with    Hypertension     3 month follow up    Diabetes    Chronic Kidney Disease       SUBJECTIVE:    Elinor Victor is a 66 y.o. male who returns in follow-up of his medical problems include hypertension, diabetes, hyperlipidemia, CKD, ASCVD status post recent non-ST elevation MI, paroxysmal atrial fibrillation, DJD, CKD stage II, morbid obesity and other medical problems. He is taking his medications and is now participating in cardiac rehab getting some exercise that way. He says he is trying to eat better. He currently denies any chest pain, shortness breath, palpitations, PND, orthopnea or other cardiorespiratory complaints. There are no GI or  complaints. He notes no headaches, dizziness or neurologic complaints. He has no change of his chronic arthritic complaints and there are no other complaints on complete review of systems. Current Outpatient Medications   Medication Sig Dispense Refill    tamsulosin (FLOMAX) 0.4 mg capsule TAKE TWO CAPSULES BY MOUTH DAILY 180 Cap prn    hydrALAZINE (APRESOLINE) 100 mg tablet Take 1 Tab by mouth three (3) times daily. 90 Tab prn    amLODIPine (NORVASC) 10 mg tablet TAKE ONE TABLET BY MOUTH DAILY 90 Tab 2    pravastatin (PRAVACHOL) 80 mg tablet TAKE ONE TABLET BY MOUTH DAILY 90 Tab 1    meloxicam (MOBIC) 15 mg tablet TAKE ONE TABLET BY MOUTH DAILY 90 Tab 2    cloNIDine HCl (CATAPRES) 0.2 mg tablet Take 1 Tab by mouth two (2) times a day. 60 Tab 12    clopidogrel (PLAVIX) 75 mg tab Take 1 Tab by mouth daily. 30 Tab 12    metoprolol tartrate (LOPRESSOR) 25 mg tablet Take 1 Tab by mouth every twelve (12) hours. 60 Tab 12    glipiZIDE (GLUCOTROL) 10 mg tablet TAKE ONE TABLET BY MOUTH TWICE A  Tab 3    lisinopril (PRINIVIL, ZESTRIL) 40 mg tablet TAKE 1 TABLET BY MOUTH ONCE DAILY 90 Tab 3    SITagliptin (JANUVIA) 100 mg tablet Take 1 Tab by mouth daily.  30 Tab prn    finasteride (PROSCAR) 5 mg tablet Take 1 Tab by mouth daily. 90 Tab prn    losartan (COZAAR) 100 mg tablet TAKE ONE TABLET BY MOUTH DAILY 90 Tab 3    metFORMIN (GLUCOPHAGE) 500 mg tablet TAKE ONE TABLET BY MOUTH EVERY MORNING AND TAKE TWO TABLETS BY MOUTH EVERY EVENING WITH DINNER 270 Tab 2    timolol (TIMOPTIC-XE) 0.5 % ophthalmic gel-forming       Omega-3-DHA-EPA-Fish Oil 1,000 mg (120 mg-180 mg) cap Take 1 Cap by mouth three (3) times daily.  aspirin delayed-release 81 mg tablet Take  by mouth daily.        Past Medical History:   Diagnosis Date    Aortic stenosis 8/2/2017    ASVD (arteriosclerotic vascular disease) 8/2/2017    Back pain 8/2/2017    BPH (benign prostatic hyperplasia) 8/2/2017    CKD (chronic kidney disease) 8/2/2017    Diabetes ([facility] [address] 8/2/2017    DJD (degenerative joint disease) 8/2/2017    ED (erectile dysfunction) 8/2/2017    Guillain-[place] syndrome ([facility] [address] 8/2/2017    Hyperlipidemia 8/2/2017    Hypertension 8/2/2017    Left carotid bruit 8/2/2017    Morbid obesity ([facility] [address] 8/2/2017    On statin therapy 8/2/2017    Urticaria 8/2/2017     Past Surgical History:   Procedure Laterality Date    CARDIAC SURG PROCEDURE UNLIST  09/2019    4 stents placed     Allergies   Allergen Reactions    Adhesive Tape-Silicones Unknown (comments)    Chocolate Flavor Unknown (comments)       REVIEW OF SYSTEMS:  General: negative for - chills or fever, or weight loss or gain  ENT: negative for - headaches, nasal congestion or tinnitus  Eyes: no blurred or visual changes  Neck: No stiffness or swollen nodes  Respiratory: negative for - cough, hemoptysis, shortness of breath or wheezing  Cardiovascular : negative for - chest pain, edema, palpitations or shortness of breath  Gastrointestinal: negative for - abdominal pain, blood in stools, heartburn or nausea/vomiting  Genito-Urinary: no dysuria, trouble voiding, or hematuria  Musculoskeletal: negative for - gait disturbance, joint pain, joint stiffness or joint swelling  Neurological: no TIA or stroke symptoms  Hematologic: no bruises, no bleeding  Lymphatic: no swollen glands  Integument: no lumps, mole changes, nail changes or rash  Endocrine:no malaise/lethargy poly uria or polydipsia or unexpected weight changes        Social History     Socioeconomic History    Marital status:      Spouse name: Not on file    Number of children: Not on file    Years of education: Not on file    Highest education level: Not on file   Social Needs    Financial resource strain: Not hard at all   [employer] insecurity:     Worry: Patient refused     Inability: Patient refused    Transportation needs:     Medical: Patient refused     Non-medical: Patient refused   Tobacco Use    Smoking status: Never Smoker    Smokeless tobacco: Never Used   Substance and Sexual Activity    Alcohol use: Yes     Comment: social    Drug use: No   Other Topics Concern     Family History   Problem Relation Age of Onset    Heart Disease Mother         murmor    Heart Disease Father        OBJECTIVE:     Visit Vitals  /76   Pulse 62   Temp 97.9 °F (36.6 °C) (Oral)   Resp 20   Ht 5' 10\" (1.778 m)   Wt 229 lb 9.6 oz (104.1 kg)   SpO2 95%   BMI 32.94 kg/m²     CONSTITUTIONAL:   well nourished, appears age appropriate  EYES: sclera anicteric, PERRL, EOMI  ENMT:nares clear, moist mucous membranes, pharynx clear  NECK: supple. Thyroid normal, No JVD or bruits  RESPIRATORY: Chest: clear to ascultation and percussion, normal inspiratory effort  CARDIOVASCULAR: Heart: regular rate and rhythm no murmurs, rubs or gallops, PMI not displaced, No thrills  GASTROINTESTINAL: Abdomen: non distended, soft, non tender, bowel sounds normal  HEMATOLOGIC: no purpura, petechiae or bruising  LYMPHATIC: No lymph node enlargemant  MUSCULOSKELETAL: Extremities: no edema or active synovitis, pulse 1+   INTEGUMENT: No unusual rashes or suspicious skin lesions noted.  Nails appear normal.  PERIPHERAL VASCULAR: normal pulses femoral, PT and DP  NEUROLOGIC: non-focal exam, A & O X 3  PSYCHIATRIC:, appropriate affect     ASSESSMENT:   1. Essential hypertension    2. Controlled type 2 diabetes mellitus with stage 2 chronic kidney disease, without long-term current use of insulin ([address]    3. Mixed hyperlipidemia    4. Primary osteoarthritis involving multiple joints    5. Morbid obesity (HCC)    6. Stage 2 chronic kidney disease      Impression  1. Hypertension that is not controlled so increase hydralazine to 100 mg 3 times daily and recheck in 1 month. 2.  Diabetes repeat status pending and prior lab reviewed no make adjustments if necessary. 3.  Hyperlipidemia prior lab reviewed and repeat status pending and I will adjust if needed. 4. DJD that is stable  5. Obesity we did discuss diet, exercise and weight reduction for overall health benefit. 6.  CKD stage II repeat status pending  I will call with lab results and make further recommendations or adjustments if necessary. Follow-up scheduled again for 3 months or sooner should to be a problem; however I will check him in 1 month regarding his hypertension. PLAN:  .  Orders Placed This Encounter    METABOLIC PANEL, COMPREHENSIVE ([facility])    LIPID PANEL ([facility])    CK ([facility])    HEMOGLOBIN A1C W/O EAG ([facility])    tamsulosin (FLOMAX) 0.4 mg capsule    hydrALAZINE (APRESOLINE) 100 mg tablet         ATTENTION:   This medical record was transcribed using an electronic medical records system. Although proofread, it may and can contain electronic and spelling errors. Other human spelling and other errors may be present. Corrections may be executed at a later time. Please feel free to contact us for any clarifications as needed. Follow-up and Dispositions    · Return in about 4 weeks (around 12/10/2019). No results found for any visits on 11/12/19.     Inez Sousa MD    The patient verbalized understanding of the problems and plans as explained.

## 2019-11-12 ENCOUNTER — OFFICE VISIT (OUTPATIENT)
Dept: INTERNAL MEDICINE CLINIC | Age: 74
End: 2019-11-12

## 2019-11-12 VITALS
OXYGEN SATURATION: 95 % | WEIGHT: 229.6 LBS | SYSTOLIC BLOOD PRESSURE: 180 MMHG | HEIGHT: 70 IN | TEMPERATURE: 97.9 F | BODY MASS INDEX: 32.87 KG/M2 | HEART RATE: 62 BPM | DIASTOLIC BLOOD PRESSURE: 76 MMHG | RESPIRATION RATE: 20 BRPM

## 2019-11-12 DIAGNOSIS — N18.2 STAGE 2 CHRONIC KIDNEY DISEASE: ICD-10-CM

## 2019-11-12 DIAGNOSIS — E11.22 CONTROLLED TYPE 2 DIABETES MELLITUS WITH STAGE 2 CHRONIC KIDNEY DISEASE, WITHOUT LONG-TERM CURRENT USE OF INSULIN (HCC): ICD-10-CM

## 2019-11-12 DIAGNOSIS — M15.9 PRIMARY OSTEOARTHRITIS INVOLVING MULTIPLE JOINTS: ICD-10-CM

## 2019-11-12 DIAGNOSIS — E66.01 MORBID OBESITY (HCC): ICD-10-CM

## 2019-11-12 DIAGNOSIS — I10 ESSENTIAL HYPERTENSION: Primary | ICD-10-CM

## 2019-11-12 DIAGNOSIS — E78.2 MIXED HYPERLIPIDEMIA: ICD-10-CM

## 2019-11-12 DIAGNOSIS — N18.2 CONTROLLED TYPE 2 DIABETES MELLITUS WITH STAGE 2 CHRONIC KIDNEY DISEASE, WITHOUT LONG-TERM CURRENT USE OF INSULIN (HCC): ICD-10-CM

## 2019-11-12 LAB
A-G RATIO,AGRAT: 1.8 RATIO
ALBUMIN SERPL-MCNC: 4.1 G/DL (ref 3.9–5.4)
ALP SERPL-CCNC: 53 U/L (ref 38–126)
ALT SERPL-CCNC: 13 U/L (ref 0–50)
ANION GAP SERPL CALC-SCNC: 11 MMOL/L
AST SERPL W P-5'-P-CCNC: 17 U/L (ref 14–36)
BILIRUB SERPL-MCNC: 2 MG/DL (ref 0.2–1.3)
BUN SERPL-MCNC: 12 MG/DL (ref 9–20)
BUN/CREATININE RATIO,BUCR: 15 RATIO
CALCIUM SERPL-MCNC: 9.5 MG/DL (ref 8.4–10.2)
CHLORIDE SERPL-SCNC: 101 MMOL/L (ref 98–107)
CHOL/HDL RATIO,CHHD: 3 RATIO (ref 0–4)
CHOLEST SERPL-MCNC: 150 MG/DL (ref 0–200)
CK SERPL-CCNC: 52 U/L (ref 30–135)
CO2 SERPL-SCNC: 29 MMOL/L (ref 22–32)
CREAT SERPL-MCNC: 0.8 MG/DL (ref 0.8–1.5)
GLOBULIN,GLOB: 2.3
GLUCOSE SERPL-MCNC: 163 MG/DL (ref 75–110)
HDLC SERPL-MCNC: 52 MG/DL (ref 35–130)
LDL/HDL RATIO,LDHD: 1 RATIO
LDLC SERPL CALC-MCNC: 70 MG/DL (ref 0–130)
POTASSIUM SERPL-SCNC: 4.1 MMOL/L (ref 3.6–5)
PROT SERPL-MCNC: 6.4 G/DL (ref 6.3–8.2)
SODIUM SERPL-SCNC: 141 MMOL/L (ref 137–145)
TRIGL SERPL-MCNC: 142 MG/DL (ref 0–200)
VLDLC SERPL CALC-MCNC: 28 MG/DL

## 2019-11-12 RX ORDER — HYDRALAZINE HYDROCHLORIDE 100 MG/1
100 TABLET, FILM COATED ORAL 3 TIMES DAILY
Qty: 90 TAB | Status: SHIPPED | OUTPATIENT
Start: 2019-11-12 | End: 2021-09-13 | Stop reason: ALTCHOICE

## 2019-11-12 RX ORDER — TAMSULOSIN HYDROCHLORIDE 0.4 MG/1
CAPSULE ORAL
Qty: 180 CAP | Status: SHIPPED | OUTPATIENT
Start: 2019-11-12 | End: 2021-03-15

## 2019-11-12 NOTE — PROGRESS NOTES
Chief Complaint   Patient presents with    Hypertension     3 month follow up    Diabetes    Chronic Kidney Disease     Visit Vitals  /60 (BP 1 Location: Left arm, BP Patient Position: Sitting)   Pulse 62   Temp 97.9 °F (36.6 °C) (Oral)   Resp 20   Ht 5' 10\" (1.778 m)   Wt 229 lb 9.6 oz (104.1 kg)   SpO2 95%   BMI 32.94 kg/m²     1. Have you been to the ER, urgent care clinic since your last visit? Hospitalized since your last visit? [address] [facility] 9/21/19 for heart attack    2. Have you seen or consulted any other health care providers outside of the [address] [place] [address] since your last visit? Include any pap smears or colon screening.  no

## 2019-11-12 NOTE — PATIENT INSTRUCTIONS
Arthritis: Care Instructions Your Care Instructions Arthritis, also called osteoarthritis, is a breakdown of the cartilage that cushions your joints. When the cartilage wears down, your bones rub against each other. This causes pain and stiffness. Many people have some arthritis as they age. Arthritis most often affects the joints of the spine, hands, hips, knees, or feet. You can take simple measures to protect your joints, ease your pain, and help you stay active. Follow-up care is a key part of your treatment and safety. Be sure to make and go to all appointments, and call your doctor if you are having problems. It's also a good idea to know your test results and keep a list of the medicines you take. How can you care for yourself at home? · Stay at a healthy weight. Being overweight puts extra strain on your joints. · Talk to your doctor or physical therapist about exercises that will help ease joint pain. ? Stretch. You may enjoy gentle forms of yoga to help keep your joints and muscles flexible. ? Walk instead of jog. Other types of exercise that are less stressful on the joints include riding a bicycle, swimming, virgilio chi, or water exercise. ? Lift weights. Strong muscles help reduce stress on your joints. Stronger thigh muscles, for example, take some of the stress off of the knees and hips. Learn the right way to lift weights so you do not make joint pain worse. · Take your medicines exactly as prescribed. Call your doctor if you think you are having a problem with your medicine. · Take pain medicines exactly as directed. ? If the doctor gave you a prescription medicine for pain, take it as prescribed. ? If you are not taking a prescription pain medicine, ask your doctor if you can take an over-the-counter medicine. · Use a cane, crutch, walker, or another device if you need help to get around. These can help rest your joints.  You also can use other things to make life easier, such as a higher toilet seat and padded handles on kitchen utensils. · Do not sit in low chairs, which can make it hard to get up. · Put heat or cold on your sore joints as needed. Use whichever helps you most. You also can take turns with hot and cold packs. ? Apply heat 2 or 3 times a day for 20 to 30 minutesusing a heating pad, hot shower, or hot packto relieve pain and stiffness. ? Put ice or a cold pack on your sore joint for 10 to 20 minutes at a time. Put a thin cloth between the ice and your skin. When should you call for help? Call your doctor now or seek immediate medical care if: 
  · You have sudden swelling, warmth, or pain in any joint.  
  · You have joint pain and a fever or rash.  
  · You have such bad pain that you cannot use a joint.  
 Watch closely for changes in your health, and be sure to contact your doctor if: 
  · You have mild joint symptoms that continue even with more than 6 weeks of care at home.  
  · You have stomach pain or other problems with your medicine. Where can you learn more? Go to http://matilde-girish.info/. Enter T719 in the search box to learn more about \"Arthritis: Care Instructions. \" Current as of: April 1, 2019 Content Version: 12.2 © 2355-5712 [employer]. Care instructions adapted under license by [employer] (which disclaims liability or warranty for this information). If you have questions about a medical condition or this instruction, always ask your healthcare professional. [place] [address] any warranty or liability for your use of this information.

## 2019-11-13 ENCOUNTER — HOSPITAL ENCOUNTER (OUTPATIENT)
Dept: CARDIAC REHAB | Age: 74
Discharge: HOME | End: 2019-11-13
Attending: INTERNAL MEDICINE
Payer: MEDICARE

## 2019-11-13 VITALS — BODY MASS INDEX: 33.58 KG/M2 | WEIGHT: 234 LBS

## 2019-11-13 LAB — HBA1C MFR BLD HPLC: 7.1 % (ref 4–5.7)

## 2019-11-13 PROCEDURE — 93798 PHYS/QHP OP CAR RHAB W/ECG: CPT

## 2019-11-13 PROCEDURE — 93797 PHYS/QHP OP CAR RHAB WO ECG: CPT

## 2019-11-15 ENCOUNTER — HOSPITAL ENCOUNTER (OUTPATIENT)
Dept: CARDIAC REHAB | Age: 74
Discharge: HOME | End: 2019-11-15
Attending: INTERNAL MEDICINE
Payer: MEDICARE

## 2019-11-15 VITALS — BODY MASS INDEX: 33.58 KG/M2 | WEIGHT: 234 LBS

## 2019-11-15 PROCEDURE — 93798 PHYS/QHP OP CAR RHAB W/ECG: CPT

## 2019-11-18 ENCOUNTER — HOSPITAL ENCOUNTER (OUTPATIENT)
Dept: CARDIAC REHAB | Age: 74
Discharge: HOME | End: 2019-11-18
Attending: INTERNAL MEDICINE
Payer: MEDICARE

## 2019-11-18 VITALS — BODY MASS INDEX: 33.58 KG/M2 | WEIGHT: 234 LBS

## 2019-11-18 PROCEDURE — 93798 PHYS/QHP OP CAR RHAB W/ECG: CPT

## 2019-11-20 ENCOUNTER — HOSPITAL ENCOUNTER (OUTPATIENT)
Dept: CARDIAC REHAB | Age: 74
Discharge: HOME | End: 2019-11-20
Attending: INTERNAL MEDICINE
Payer: MEDICARE

## 2019-11-20 VITALS — WEIGHT: 233 LBS | BODY MASS INDEX: 33.43 KG/M2

## 2019-11-20 PROCEDURE — 93798 PHYS/QHP OP CAR RHAB W/ECG: CPT

## 2019-11-20 PROCEDURE — 93797 PHYS/QHP OP CAR RHAB WO ECG: CPT | Performed by: DIETITIAN, REGISTERED

## 2019-11-22 ENCOUNTER — HOSPITAL ENCOUNTER (OUTPATIENT)
Dept: CARDIAC REHAB | Age: 74
Discharge: HOME | End: 2019-11-22
Attending: INTERNAL MEDICINE
Payer: MEDICARE

## 2019-11-22 VITALS — WEIGHT: 232 LBS | BODY MASS INDEX: 33.29 KG/M2

## 2019-11-22 PROCEDURE — 93798 PHYS/QHP OP CAR RHAB W/ECG: CPT

## 2019-11-25 ENCOUNTER — TELEPHONE (OUTPATIENT)
Dept: INTERNAL MEDICINE CLINIC | Age: 74
End: 2019-11-25

## 2019-11-25 NOTE — TELEPHONE ENCOUNTER
Kimberly Pérez (Cardiac Rehab Nurse)    States patient's blood pressure is high and would like  To know what changes were made to the patient's medication    Call back [phone]

## 2019-11-27 ENCOUNTER — HOSPITAL ENCOUNTER (OUTPATIENT)
Dept: CARDIAC REHAB | Age: 74
Discharge: HOME | End: 2019-11-27
Attending: INTERNAL MEDICINE
Payer: MEDICARE

## 2019-11-27 VITALS — WEIGHT: 232 LBS | BODY MASS INDEX: 33.29 KG/M2

## 2019-11-27 PROCEDURE — 93797 PHYS/QHP OP CAR RHAB WO ECG: CPT

## 2019-11-27 PROCEDURE — 93798 PHYS/QHP OP CAR RHAB W/ECG: CPT

## 2019-12-02 ENCOUNTER — HOSPITAL ENCOUNTER (OUTPATIENT)
Dept: CARDIAC REHAB | Age: 74
Discharge: HOME | End: 2019-12-02
Attending: INTERNAL MEDICINE
Payer: MEDICARE

## 2019-12-02 VITALS — WEIGHT: 235 LBS | BODY MASS INDEX: 33.72 KG/M2

## 2019-12-02 PROCEDURE — 93798 PHYS/QHP OP CAR RHAB W/ECG: CPT

## 2019-12-04 ENCOUNTER — HOSPITAL ENCOUNTER (OUTPATIENT)
Dept: CARDIAC REHAB | Age: 74
Discharge: HOME | End: 2019-12-04
Attending: INTERNAL MEDICINE
Payer: MEDICARE

## 2019-12-04 VITALS — WEIGHT: 234 LBS | BODY MASS INDEX: 33.58 KG/M2

## 2019-12-04 PROCEDURE — 93797 PHYS/QHP OP CAR RHAB WO ECG: CPT | Performed by: DIETITIAN, REGISTERED

## 2019-12-04 PROCEDURE — 93798 PHYS/QHP OP CAR RHAB W/ECG: CPT

## 2019-12-06 ENCOUNTER — HOSPITAL ENCOUNTER (OUTPATIENT)
Dept: CARDIAC REHAB | Age: 74
Discharge: HOME | End: 2019-12-06
Attending: INTERNAL MEDICINE
Payer: MEDICARE

## 2019-12-06 VITALS — BODY MASS INDEX: 33.58 KG/M2 | WEIGHT: 234 LBS

## 2019-12-06 PROCEDURE — 93798 PHYS/QHP OP CAR RHAB W/ECG: CPT

## 2019-12-09 ENCOUNTER — HOSPITAL ENCOUNTER (OUTPATIENT)
Dept: CARDIAC REHAB | Age: 74
Discharge: HOME | End: 2019-12-09
Attending: INTERNAL MEDICINE
Payer: MEDICARE

## 2019-12-09 VITALS — WEIGHT: 236 LBS | BODY MASS INDEX: 33.86 KG/M2

## 2019-12-09 PROCEDURE — 93798 PHYS/QHP OP CAR RHAB W/ECG: CPT

## 2019-12-11 ENCOUNTER — HOSPITAL ENCOUNTER (OUTPATIENT)
Dept: CARDIAC REHAB | Age: 74
Discharge: HOME | End: 2019-12-11
Attending: INTERNAL MEDICINE
Payer: MEDICARE

## 2019-12-11 VITALS — BODY MASS INDEX: 34.15 KG/M2 | WEIGHT: 238 LBS

## 2019-12-11 PROCEDURE — 93798 PHYS/QHP OP CAR RHAB W/ECG: CPT

## 2019-12-11 PROCEDURE — 93797 PHYS/QHP OP CAR RHAB WO ECG: CPT | Performed by: DIETITIAN, REGISTERED

## 2019-12-13 ENCOUNTER — HOSPITAL ENCOUNTER (OUTPATIENT)
Dept: CARDIAC REHAB | Age: 74
Discharge: HOME | End: 2019-12-13
Attending: INTERNAL MEDICINE
Payer: MEDICARE

## 2019-12-13 VITALS — WEIGHT: 238 LBS | BODY MASS INDEX: 34.15 KG/M2

## 2019-12-13 PROCEDURE — 93798 PHYS/QHP OP CAR RHAB W/ECG: CPT

## 2019-12-16 ENCOUNTER — HOSPITAL ENCOUNTER (OUTPATIENT)
Dept: CARDIAC REHAB | Age: 74
Discharge: HOME | End: 2019-12-16
Attending: INTERNAL MEDICINE
Payer: MEDICARE

## 2019-12-16 VITALS — WEIGHT: 235 LBS | BODY MASS INDEX: 33.72 KG/M2

## 2019-12-16 PROCEDURE — 93798 PHYS/QHP OP CAR RHAB W/ECG: CPT

## 2019-12-17 NOTE — PROGRESS NOTES
Chief Complaint   Patient presents with    Hypertension     1 month f/up       SUBJECTIVE:    Kiera Navarro is a 66 y.o. male returns in follow-up for his hypertension after having been seen here on November 12 with a blood pressure 180/76. At that point his hydralazine was increased to 100 mg 3 times a day and he has been taking that regularly. He currently denies any chest pain, shortness of breath, palpitations, PND, orthopnea or other cardiorespiratory complaints. He has no GI or  complaints. He has no headaches, dizziness or neurologic complaints. There are no current arthritic complaints and there are no other complaints on complete review of systems except for problems with insomnia he says about every other night he will wake up at 3 in the morning and cannot get back to sleep and he is desperate for something to help him sleep at night. Current Outpatient Medications   Medication Sig Dispense Refill    temazepam (RESTORIL) 15 mg capsule Take 1 Cap by mouth nightly as needed for Sleep. Max Daily Amount: 15 mg. 30 Cap 5    tamsulosin (FLOMAX) 0.4 mg capsule TAKE TWO CAPSULES BY MOUTH DAILY 180 Cap prn    hydrALAZINE (APRESOLINE) 100 mg tablet Take 1 Tab by mouth three (3) times daily. 90 Tab prn    amLODIPine (NORVASC) 10 mg tablet TAKE ONE TABLET BY MOUTH DAILY 90 Tab 2    pravastatin (PRAVACHOL) 80 mg tablet TAKE ONE TABLET BY MOUTH DAILY 90 Tab 1    meloxicam (MOBIC) 15 mg tablet TAKE ONE TABLET BY MOUTH DAILY 90 Tab 2    cloNIDine HCl (CATAPRES) 0.2 mg tablet Take 1 Tab by mouth two (2) times a day. 60 Tab 12    clopidogrel (PLAVIX) 75 mg tab Take 1 Tab by mouth daily. 30 Tab 12    metoprolol tartrate (LOPRESSOR) 25 mg tablet Take 1 Tab by mouth every twelve (12) hours.  60 Tab 12    glipiZIDE (GLUCOTROL) 10 mg tablet TAKE ONE TABLET BY MOUTH TWICE A  Tab 3    lisinopril (PRINIVIL, ZESTRIL) 40 mg tablet TAKE 1 TABLET BY MOUTH ONCE DAILY 90 Tab 3    SITagliptin (JANUVIA) 100 mg tablet Take 1 Tab by mouth daily. 30 Tab prn    finasteride (PROSCAR) 5 mg tablet Take 1 Tab by mouth daily. 90 Tab prn    losartan (COZAAR) 100 mg tablet TAKE ONE TABLET BY MOUTH DAILY 90 Tab 3    metFORMIN (GLUCOPHAGE) 500 mg tablet TAKE ONE TABLET BY MOUTH EVERY MORNING AND TAKE TWO TABLETS BY MOUTH EVERY EVENING WITH DINNER 270 Tab 2    timolol (TIMOPTIC-XE) 0.5 % ophthalmic gel-forming       Omega-3-DHA-EPA-Fish Oil 1,000 mg (120 mg-180 mg) cap Take 1 Cap by mouth three (3) times daily.  aspirin delayed-release 81 mg tablet Take  by mouth daily.        Past Medical History:   Diagnosis Date    Aortic stenosis 8/2/2017    ASVD (arteriosclerotic vascular disease) 8/2/2017    Back pain 8/2/2017    BPH (benign prostatic hyperplasia) 8/2/2017    CKD (chronic kidney disease) 8/2/2017    Diabetes ([facility] [address] [address] 8/2/2017    DJD (degenerative joint disease) 8/2/2017    ED (erectile dysfunction) 8/2/2017    Guillain-[place] syndrome ([facility] [address] 8/2/2017    Hyperlipidemia 8/2/2017    Hypertension 8/2/2017    Left carotid bruit 8/2/2017    Morbid obesity ([facility] [address] 8/2/2017    On statin therapy 8/2/2017    Urticaria 8/2/2017     Past Surgical History:   Procedure Laterality Date    CARDIAC SURG PROCEDURE UNLIST  09/2019    4 stents placed     Allergies   Allergen Reactions    Adhesive Tape-Silicones Unknown (comments)    Chocolate Flavor Unknown (comments)       REVIEW OF SYSTEMS:  General: negative for - chills or fever, or weight loss or gain  ENT: negative for - headaches, nasal congestion or tinnitus  Eyes: no blurred or visual changes  Neck: No stiffness or swollen nodes  Respiratory: negative for - cough, hemoptysis, shortness of breath or wheezing  Cardiovascular : negative for - chest pain, edema, palpitations or shortness of breath  Gastrointestinal: negative for - abdominal pain, blood in stools, heartburn or nausea/vomiting  Genito-Urinary: no dysuria, trouble voiding, or hematuria  Musculoskeletal: negative for - gait disturbance, joint pain, joint stiffness or joint swelling  Neurological: no TIA or stroke symptoms  Hematologic: no bruises, no bleeding  Lymphatic: no swollen glands  Integument: no lumps, mole changes, nail changes or rash  Endocrine:no malaise/lethargy poly uria or polydipsia or unexpected weight changes  Psychiatric: Insomnia as noted        Social History     Socioeconomic History    Marital status:      Spouse name: Not on file    Number of children: Not on file    Years of education: Not on file    Highest education level: Not on file   Social Needs    Financial resource strain: Not hard at all   [employer] insecurity:     Worry: Patient refused     Inability: Patient refused    Transportation needs:     Medical: Patient refused     Non-medical: Patient refused   Tobacco Use    Smoking status: Never Smoker    Smokeless tobacco: Never Used   Substance and Sexual Activity    Alcohol use: Yes     Comment: social    Drug use: No   Other Topics Concern     Family History   Problem Relation Age of Onset    Heart Disease Mother         murmor    Heart Disease Father        OBJECTIVE:     Visit Vitals  /62   Pulse 60   Temp 97.5 °F (36.4 °C)   Resp 18   Ht 5' 10\" (1.778 m)   Wt 236 lb 9.6 oz (107.3 kg)   SpO2 94%   BMI 33.95 kg/m²     CONSTITUTIONAL:   well nourished, appears age appropriate  EYES: sclera anicteric, PERRL, EOMI  ENMT:nares clear, moist mucous membranes, pharynx clear  NECK: supple.  Thyroid normal, No JVD or bruits  RESPIRATORY: Chest: clear to ascultation and percussion, normal inspiratory effort  CARDIOVASCULAR: Heart: regular rate and rhythm no murmurs, rubs or gallops, PMI not displaced, No thrills  GASTROINTESTINAL: Abdomen: non distended, soft, non tender, bowel sounds normal  HEMATOLOGIC: no purpura, petechiae or bruising  LYMPHATIC: No lymph node enlargemant  MUSCULOSKELETAL: Extremities: no edema or active synovitis, pulse 1+   INTEGUMENT: No unusual rashes or suspicious skin lesions noted. Nails appear normal.  PERIPHERAL VASCULAR: normal pulses femoral, PT and DP  NEUROLOGIC: non-focal exam, A & O X 3  PSYCHIATRIC:, appropriate affect     ASSESSMENT:   1. Essential hypertension    2. Morbid obesity ([address]    3. Primary insomnia      Impression  1. Hypertension that is now controlled so continue current therapy reviewed with him. 2.  Morbid obesity unfortunate weight is up and I again stressed diet, exercise and weight reduction for overall health benefit. 3.  Insomnia we will try temazepam 15 mg at bedtime  I will recheck a myself again in 2 months or sooner should to be a problem. No labs today. PLAN:  .  Orders Placed This Encounter    temazepam (RESTORIL) 15 mg capsule         ATTENTION:   This medical record was transcribed using an electronic medical records system. Although proofread, it may and can contain electronic and spelling errors. Other human spelling and other errors may be present. Corrections may be executed at a later time. Please feel free to contact us for any clarifications as needed. Follow-up and Dispositions    · Return in about 2 months (around 2/18/2020). No results found for any visits on 12/18/19. Anabella Valles MD    The patient verbalized understanding of the problems and plans as explained.

## 2019-12-18 ENCOUNTER — OFFICE VISIT (OUTPATIENT)
Dept: INTERNAL MEDICINE CLINIC | Age: 74
End: 2019-12-18

## 2019-12-18 VITALS
HEART RATE: 60 BPM | RESPIRATION RATE: 18 BRPM | HEIGHT: 70 IN | BODY MASS INDEX: 33.87 KG/M2 | TEMPERATURE: 97.5 F | DIASTOLIC BLOOD PRESSURE: 62 MMHG | SYSTOLIC BLOOD PRESSURE: 134 MMHG | WEIGHT: 236.6 LBS | OXYGEN SATURATION: 94 %

## 2019-12-18 DIAGNOSIS — E66.01 MORBID OBESITY (HCC): ICD-10-CM

## 2019-12-18 DIAGNOSIS — I10 ESSENTIAL HYPERTENSION: Primary | ICD-10-CM

## 2019-12-18 DIAGNOSIS — F51.01 PRIMARY INSOMNIA: ICD-10-CM

## 2019-12-18 RX ORDER — TEMAZEPAM 15 MG/1
15 CAPSULE ORAL
Qty: 30 CAP | Refills: 5 | Status: SHIPPED | OUTPATIENT
Start: 2019-12-18 | End: 2020-02-19 | Stop reason: ALTCHOICE

## 2019-12-18 NOTE — PROGRESS NOTES
Chief Complaint   Patient presents with    Hypertension     1 month f/up     1. Have you been to the ER, urgent care clinic since your last visit? Hospitalized since your last visit? No    2. Have you seen or consulted any other health care providers outside of the [address] [place] [address] since your last visit? Include any pap smears or colon screening.  No

## 2019-12-20 ENCOUNTER — HOSPITAL ENCOUNTER (OUTPATIENT)
Dept: CARDIAC REHAB | Age: 74
Discharge: HOME | End: 2019-12-20
Attending: INTERNAL MEDICINE
Payer: MEDICARE

## 2019-12-20 PROCEDURE — 93798 PHYS/QHP OP CAR RHAB W/ECG: CPT

## 2019-12-23 ENCOUNTER — HOSPITAL ENCOUNTER (OUTPATIENT)
Dept: CARDIAC REHAB | Age: 74
Discharge: HOME | End: 2019-12-23
Attending: INTERNAL MEDICINE
Payer: MEDICARE

## 2019-12-23 VITALS — WEIGHT: 237 LBS | BODY MASS INDEX: 34.01 KG/M2

## 2019-12-23 PROCEDURE — 93798 PHYS/QHP OP CAR RHAB W/ECG: CPT

## 2019-12-27 ENCOUNTER — HOSPITAL ENCOUNTER (OUTPATIENT)
Dept: CARDIAC REHAB | Age: 74
Discharge: HOME | End: 2019-12-27
Attending: INTERNAL MEDICINE
Payer: MEDICARE

## 2019-12-27 VITALS — BODY MASS INDEX: 34.29 KG/M2 | WEIGHT: 239 LBS

## 2019-12-27 PROCEDURE — 93798 PHYS/QHP OP CAR RHAB W/ECG: CPT

## 2019-12-30 ENCOUNTER — HOSPITAL ENCOUNTER (OUTPATIENT)
Dept: CARDIAC REHAB | Age: 74
Discharge: HOME | End: 2019-12-30
Attending: INTERNAL MEDICINE
Payer: MEDICARE

## 2019-12-30 VITALS — BODY MASS INDEX: 33.58 KG/M2 | WEIGHT: 234 LBS

## 2019-12-30 PROCEDURE — 93798 PHYS/QHP OP CAR RHAB W/ECG: CPT

## 2020-01-03 ENCOUNTER — HOSPITAL ENCOUNTER (OUTPATIENT)
Dept: CARDIAC REHAB | Age: 75
Discharge: HOME | End: 2020-01-03
Attending: INTERNAL MEDICINE
Payer: MEDICARE

## 2020-01-03 VITALS — WEIGHT: 238 LBS | BODY MASS INDEX: 34.15 KG/M2

## 2020-01-03 PROCEDURE — 93798 PHYS/QHP OP CAR RHAB W/ECG: CPT

## 2020-01-06 ENCOUNTER — HOSPITAL ENCOUNTER (OUTPATIENT)
Dept: CARDIAC REHAB | Age: 75
Discharge: HOME | End: 2020-01-06
Attending: INTERNAL MEDICINE
Payer: MEDICARE

## 2020-01-06 VITALS — WEIGHT: 234 LBS | BODY MASS INDEX: 33.58 KG/M2

## 2020-01-06 PROCEDURE — 93798 PHYS/QHP OP CAR RHAB W/ECG: CPT

## 2020-01-08 ENCOUNTER — HOSPITAL ENCOUNTER (OUTPATIENT)
Dept: CARDIAC REHAB | Age: 75
Discharge: HOME | End: 2020-01-08
Attending: INTERNAL MEDICINE
Payer: MEDICARE

## 2020-01-08 VITALS — WEIGHT: 234 LBS | BODY MASS INDEX: 33.58 KG/M2

## 2020-01-08 PROCEDURE — 93797 PHYS/QHP OP CAR RHAB WO ECG: CPT

## 2020-01-08 PROCEDURE — 93798 PHYS/QHP OP CAR RHAB W/ECG: CPT

## 2020-01-10 ENCOUNTER — HOSPITAL ENCOUNTER (OUTPATIENT)
Dept: CARDIAC REHAB | Age: 75
Discharge: HOME | End: 2020-01-10
Attending: INTERNAL MEDICINE
Payer: MEDICARE

## 2020-01-10 VITALS — WEIGHT: 234 LBS | BODY MASS INDEX: 33.58 KG/M2

## 2020-01-10 PROCEDURE — 93798 PHYS/QHP OP CAR RHAB W/ECG: CPT

## 2020-01-13 ENCOUNTER — HOSPITAL ENCOUNTER (OUTPATIENT)
Dept: CARDIAC REHAB | Age: 75
Discharge: HOME | End: 2020-01-13
Attending: INTERNAL MEDICINE
Payer: MEDICARE

## 2020-01-13 VITALS — WEIGHT: 234 LBS | BODY MASS INDEX: 33.58 KG/M2

## 2020-01-13 PROCEDURE — 93798 PHYS/QHP OP CAR RHAB W/ECG: CPT

## 2020-01-14 RX ORDER — HYDRALAZINE HYDROCHLORIDE 25 MG/1
TABLET, FILM COATED ORAL
Qty: 90 TAB | Status: SHIPPED | OUTPATIENT
Start: 2020-01-14 | End: 2020-02-19 | Stop reason: ALTCHOICE

## 2020-01-14 NOTE — TELEPHONE ENCOUNTER
RX refill request from the patient/pharmacy. Patient last seen 12- with labs, and next appt. scheduled for 02-  Requested Prescriptions     Pending Prescriptions Disp Refills    hydrALAZINE (APRESOLINE) 25 mg tablet [Pharmacy Med Name: hydrALAZINE 25 MG TABLET] 90 Tab PRN     Sig: TAKE ONE TABLET BY MOUTH THREE TIMES A DAY   .

## 2020-01-15 ENCOUNTER — HOSPITAL ENCOUNTER (OUTPATIENT)
Dept: CARDIAC REHAB | Age: 75
Discharge: HOME | End: 2020-01-15
Attending: INTERNAL MEDICINE
Payer: MEDICARE

## 2020-01-15 VITALS — WEIGHT: 234 LBS | BODY MASS INDEX: 33.58 KG/M2

## 2020-01-15 PROCEDURE — 93798 PHYS/QHP OP CAR RHAB W/ECG: CPT

## 2020-01-17 ENCOUNTER — HOSPITAL ENCOUNTER (OUTPATIENT)
Dept: CARDIAC REHAB | Age: 75
Discharge: HOME | End: 2020-01-17
Attending: INTERNAL MEDICINE
Payer: MEDICARE

## 2020-01-17 VITALS — BODY MASS INDEX: 33.86 KG/M2 | WEIGHT: 236 LBS

## 2020-01-17 PROCEDURE — 93798 PHYS/QHP OP CAR RHAB W/ECG: CPT

## 2020-01-20 ENCOUNTER — HOSPITAL ENCOUNTER (OUTPATIENT)
Dept: CARDIAC REHAB | Age: 75
Discharge: HOME | End: 2020-01-20
Attending: INTERNAL MEDICINE
Payer: MEDICARE

## 2020-01-20 VITALS — BODY MASS INDEX: 33.86 KG/M2 | WEIGHT: 236 LBS

## 2020-01-20 PROCEDURE — 93798 PHYS/QHP OP CAR RHAB W/ECG: CPT

## 2020-01-24 ENCOUNTER — HOSPITAL ENCOUNTER (OUTPATIENT)
Dept: CARDIAC REHAB | Age: 75
Discharge: HOME | End: 2020-01-24
Attending: INTERNAL MEDICINE
Payer: MEDICARE

## 2020-01-24 VITALS — WEIGHT: 236 LBS | BODY MASS INDEX: 33.86 KG/M2

## 2020-01-24 PROCEDURE — 93798 PHYS/QHP OP CAR RHAB W/ECG: CPT

## 2020-01-24 NOTE — CARDIO/PULMONARY
Reuben Blair Completed phase II cardiac rehab and attended 36 sessions from 11/1/19. Reuben Blair is interested in maintaining optimal health and will work with Unruly Munoz MD. Reuben Blair has improved his endurance. Blood pressure is 100/46. He has improved his [employer], [employer], and nutrition scores and these were reviewed with patient. His six minute walk distance improved slightly from 270 meters to 290 meters and METS improved from 2.3 to 2.5. His Hgb A1C improved to 7.1 on 11/12/19 (down from 8.7 on 8/5/19). Lipid panel also improved with total cholesterol down to 150 (from 194), triglycerides 142 (down from 274), LDL 70 (down from 92), and HDL 52 (up from 47). Reuben Blair plans to continue walking at home for exercise 3 days a week for at least 30 minutes and he plans to join [employer] and will attend at least 2 x week for 30-60 minutes. Pt also plans to continue weight loss efforts and he will continue to read food labels for fat, sodium and carb values. Leandra Robledo, GUNNAR 
1/24/2020

## 2020-02-18 PROBLEM — I50.32 DIASTOLIC CHF, CHRONIC (HCC): Status: ACTIVE | Noted: 2020-02-18

## 2020-02-18 PROBLEM — J81.0 ACUTE PULMONARY EDEMA (HCC): Status: RESOLVED | Noted: 2019-09-28 | Resolved: 2020-02-18

## 2020-02-18 NOTE — PROGRESS NOTES
Chief Complaint   Patient presents with    Hypertension     3 month follow up    Diabetes    Benign Prostatic Hypertrophy       SUBJECTIVE:    Reuben Blair is a 66 y.o. male who returns in follow-up of his medical problems include hypertension, diabetes, hyperlipidemia, BPH, ASCVD status post non-ST elevation MI, paroxysmal atrial fibrillation, morbid obesity and other medical problems. He is taking his medications and trying to follow his diet but not really getting any exercise and knows he could do a little better with diet. He currently denies any chest pain, shortness of breath, palpitations, PND, orthopnea or other cardiorespiratory complaints. He notes no GI or  complaints. He notes no headaches, dizziness or neurologic complaints. He has no current active arthritic complaints and there are no other complaints on complete review of systems. Current Outpatient Medications   Medication Sig Dispense Refill    tamsulosin (FLOMAX) 0.4 mg capsule TAKE TWO CAPSULES BY MOUTH DAILY 180 Cap prn    hydrALAZINE (APRESOLINE) 100 mg tablet Take 1 Tab by mouth three (3) times daily. 90 Tab prn    amLODIPine (NORVASC) 10 mg tablet TAKE ONE TABLET BY MOUTH DAILY 90 Tab 2    pravastatin (PRAVACHOL) 80 mg tablet TAKE ONE TABLET BY MOUTH DAILY 90 Tab 1    meloxicam (MOBIC) 15 mg tablet TAKE ONE TABLET BY MOUTH DAILY 90 Tab 2    cloNIDine HCl (CATAPRES) 0.2 mg tablet Take 1 Tab by mouth two (2) times a day. 60 Tab 12    clopidogrel (PLAVIX) 75 mg tab Take 1 Tab by mouth daily. 30 Tab 12    metoprolol tartrate (LOPRESSOR) 25 mg tablet Take 1 Tab by mouth every twelve (12) hours. 60 Tab 12    glipiZIDE (GLUCOTROL) 10 mg tablet TAKE ONE TABLET BY MOUTH TWICE A  Tab 3    lisinopril (PRINIVIL, ZESTRIL) 40 mg tablet TAKE 1 TABLET BY MOUTH ONCE DAILY 90 Tab 3    SITagliptin (JANUVIA) 100 mg tablet Take 1 Tab by mouth daily.  30 Tab prn    finasteride (PROSCAR) 5 mg tablet Take 1 Tab by mouth daily. 90 Tab prn    losartan (COZAAR) 100 mg tablet TAKE ONE TABLET BY MOUTH DAILY 90 Tab 3    metFORMIN (GLUCOPHAGE) 500 mg tablet TAKE ONE TABLET BY MOUTH EVERY MORNING AND TAKE TWO TABLETS BY MOUTH EVERY EVENING WITH DINNER 270 Tab 2    timolol (TIMOPTIC-XE) 0.5 % ophthalmic gel-forming       Omega-3-DHA-EPA-Fish Oil 1,000 mg (120 mg-180 mg) cap Take 1 Cap by mouth three (3) times daily.  aspirin delayed-release 81 mg tablet Take  by mouth daily.        Past Medical History:   Diagnosis Date    Aortic stenosis 8/2/2017    ASVD (arteriosclerotic vascular disease) 8/2/2017    Back pain 8/2/2017    BPH (benign prostatic hyperplasia) 8/2/2017    CKD (chronic kidney disease) 8/2/2017    Diabetes ([facility] [address] 8/2/2017    DJD (degenerative joint disease) 8/2/2017    ED (erectile dysfunction) 8/2/2017    Guillain-[place] syndrome ([facility] [address] 8/2/2017    Hyperlipidemia 8/2/2017    Hypertension 8/2/2017    Left carotid bruit 8/2/2017    Morbid obesity ([facility] [address] 8/2/2017    On statin therapy 8/2/2017    Urticaria 8/2/2017     Past Surgical History:   Procedure Laterality Date    CARDIAC SURG PROCEDURE UNLIST  09/2019    4 stents placed     Allergies   Allergen Reactions    Adhesive Tape-Silicones Unknown (comments)    Chocolate Flavor Unknown (comments)       REVIEW OF SYSTEMS:  General: negative for - chills or fever, or weight loss or gain  ENT: negative for - headaches, nasal congestion or tinnitus  Eyes: no blurred or visual changes  Neck: No stiffness or swollen nodes  Respiratory: negative for - cough, hemoptysis, shortness of breath or wheezing  Cardiovascular : negative for - chest pain, edema, palpitations or shortness of breath  Gastrointestinal: negative for - abdominal pain, blood in stools, heartburn or nausea/vomiting  Genito-Urinary: no dysuria, trouble voiding, or hematuria  Musculoskeletal: negative for - gait disturbance, joint pain, joint stiffness or joint swelling  Neurological: no TIA or stroke symptoms  Hematologic: no bruises, no bleeding  Lymphatic: no swollen glands  Integument: no lumps, mole changes, nail changes or rash  Endocrine:no malaise/lethargy poly uria or polydipsia or unexpected weight changes        Social History     Socioeconomic History    Marital status:      Spouse name: Not on file    Number of children: Not on file    Years of education: Not on file    Highest education level: Not on file   Social Needs    Financial resource strain: Not hard at all   [employer] insecurity:     Worry: Patient refused     Inability: Patient refused    Transportation needs:     Medical: Patient refused     Non-medical: Patient refused   Tobacco Use    Smoking status: Never Smoker    Smokeless tobacco: Never Used   Substance and Sexual Activity    Alcohol use: Yes     Comment: social    Drug use: No   Other Topics Concern     Family History   Problem Relation Age of Onset    Heart Disease Mother         murmor    Heart Disease Father        OBJECTIVE:     Visit Vitals  /56 (BP 1 Location: Left arm, BP Patient Position: Sitting)   Pulse (!) 52   Temp 97.5 °F (36.4 °C) (Oral)   Resp 19   Ht 5' 10\" (1.778 m)   Wt 236 lb 1.6 oz (107.1 kg)   SpO2 97%   BMI 33.88 kg/m²     CONSTITUTIONAL:   well nourished, appears age appropriate  EYES: sclera anicteric, PERRL, EOMI  ENMT:nares clear, moist mucous membranes, pharynx clear  NECK: supple. Thyroid normal, No JVD or bruits  RESPIRATORY: Chest: clear to ascultation and percussion, normal inspiratory effort  CARDIOVASCULAR: Heart: regular rate and rhythm no murmurs, rubs or gallops, PMI not displaced, No thrills  GASTROINTESTINAL: Abdomen: non distended, soft, non tender, bowel sounds normal  HEMATOLOGIC: no purpura, petechiae or bruising  LYMPHATIC: No lymph node enlargemant  MUSCULOSKELETAL: Extremities: no edema or active synovitis, pulse 1+. No diabetic foot changes noted. INTEGUMENT: No unusual rashes or suspicious skin lesions noted. Nails appear normal.  PERIPHERAL VASCULAR: normal pulses femoral, PT and DP  NEUROLOGIC: non-focal exam, A & O X 3. Normal distal sensation and proprioception all toes both feet. PSYCHIATRIC:, appropriate affect     ASSESSMENT:   1. Essential hypertension    2. Controlled type 2 diabetes mellitus with stage 2 chronic kidney disease, without long-term current use of insulin ([facility] [address]    3. Mixed hyperlipidemia    4. Morbid obesity ([facility] [address]    5. Primary osteoarthritis involving multiple joints    6. Stage 2 chronic kidney disease    7. Benign prostatic hyperplasia with lower urinary tract symptoms, symptom details unspecified    8. Guillain-[place] syndrome ([facility] [address]    9. Diastolic CHF, chronic (HCC)    10. PAF (paroxysmal atrial fibrillation) ([facility] [address]    11. Colon cancer screening      Impression  1. Hypertension that is controlled so continue current therapy reviewed with him. 2.  Diabetes repeat status pending a prior lab reviewed and I will make adjustments if necessary. 3.  Hyperlipidemia prior lab reviewed and repeat status pending I will adjust if needed. 4.  Morbid obesity we did discuss diet, exercise and weight reduction for overall health benefit.  5   DJD that is stable  6. CKD stage II repeat status is pending  7. BPH currently asymptomatic  8. 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 Diastolic CHF compensated  10. History of paroxysmal atrial fibrillation no recent recurrence  He is overdue for colonoscopy we will arrange that  I will call with lab results and make further recommendations or adjustments if necessary. Follow-up scheduled for 3 months or sooner if there is a problem.     PLAN:  .  Orders Placed This Encounter    METABOLIC PANEL, COMPREHENSIVE ([facility])    LIPID PANEL ([facility])    CK ([facility])    HEMOGLOBIN A1C W/O EAG ([facility])    [place] [facility]         ATTENTION:   This medical record was transcribed using an electronic medical records system. Although proofread, it may and can contain electronic and spelling errors. Other human spelling and other errors may be present. Corrections may be executed at a later time. Please feel free to contact us for any clarifications as needed. Follow-up and Dispositions    · Return in about 3 months (around 5/19/2020). No results found for any visits on 02/19/20. Kylie Olivera MD    The patient verbalized understanding of the problems and plans as explained.

## 2020-02-19 ENCOUNTER — OFFICE VISIT (OUTPATIENT)
Dept: INTERNAL MEDICINE CLINIC | Age: 75
End: 2020-02-19

## 2020-02-19 VITALS
DIASTOLIC BLOOD PRESSURE: 56 MMHG | RESPIRATION RATE: 19 BRPM | HEART RATE: 52 BPM | BODY MASS INDEX: 33.8 KG/M2 | SYSTOLIC BLOOD PRESSURE: 136 MMHG | WEIGHT: 236.1 LBS | TEMPERATURE: 97.5 F | OXYGEN SATURATION: 97 % | HEIGHT: 70 IN

## 2020-02-19 DIAGNOSIS — I50.32 DIASTOLIC CHF, CHRONIC (HCC): ICD-10-CM

## 2020-02-19 DIAGNOSIS — N18.2 CONTROLLED TYPE 2 DIABETES MELLITUS WITH STAGE 2 CHRONIC KIDNEY DISEASE, WITHOUT LONG-TERM CURRENT USE OF INSULIN (HCC): ICD-10-CM

## 2020-02-19 DIAGNOSIS — Z12.11 COLON CANCER SCREENING: ICD-10-CM

## 2020-02-19 DIAGNOSIS — E66.01 MORBID OBESITY (HCC): ICD-10-CM

## 2020-02-19 DIAGNOSIS — M15.9 PRIMARY OSTEOARTHRITIS INVOLVING MULTIPLE JOINTS: ICD-10-CM

## 2020-02-19 DIAGNOSIS — E11.22 CONTROLLED TYPE 2 DIABETES MELLITUS WITH STAGE 2 CHRONIC KIDNEY DISEASE, WITHOUT LONG-TERM CURRENT USE OF INSULIN (HCC): ICD-10-CM

## 2020-02-19 DIAGNOSIS — G61.0 GUILLAIN-BARRE SYNDROME (HCC): ICD-10-CM

## 2020-02-19 DIAGNOSIS — I48.0 PAF (PAROXYSMAL ATRIAL FIBRILLATION) (HCC): ICD-10-CM

## 2020-02-19 DIAGNOSIS — I10 ESSENTIAL HYPERTENSION: Primary | ICD-10-CM

## 2020-02-19 DIAGNOSIS — N18.2 STAGE 2 CHRONIC KIDNEY DISEASE: ICD-10-CM

## 2020-02-19 DIAGNOSIS — N40.1 BENIGN PROSTATIC HYPERPLASIA WITH LOWER URINARY TRACT SYMPTOMS, SYMPTOM DETAILS UNSPECIFIED: ICD-10-CM

## 2020-02-19 DIAGNOSIS — E78.2 MIXED HYPERLIPIDEMIA: ICD-10-CM

## 2020-02-19 LAB
A-G RATIO,AGRAT: 1.6 RATIO
ALBUMIN SERPL-MCNC: 3.8 G/DL (ref 3.9–5.4)
ALP SERPL-CCNC: 42 U/L (ref 38–126)
ALT SERPL-CCNC: 13 U/L (ref 0–50)
ANION GAP SERPL CALC-SCNC: 11 MMOL/L
AST SERPL W P-5'-P-CCNC: 19 U/L (ref 14–36)
BILIRUB SERPL-MCNC: 1.3 MG/DL (ref 0.2–1.3)
BUN SERPL-MCNC: 15 MG/DL (ref 9–20)
BUN/CREATININE RATIO,BUCR: 19 RATIO
CALCIUM SERPL-MCNC: 9.1 MG/DL (ref 8.4–10.2)
CHLORIDE SERPL-SCNC: 101 MMOL/L (ref 98–107)
CHOL/HDL RATIO,CHHD: 3 RATIO (ref 0–4)
CHOLEST SERPL-MCNC: 127 MG/DL (ref 0–200)
CK SERPL-CCNC: 52 U/L (ref 30–135)
CO2 SERPL-SCNC: 27 MMOL/L (ref 22–32)
CREAT SERPL-MCNC: 0.8 MG/DL (ref 0.8–1.5)
GLOBULIN,GLOB: 2.4
GLUCOSE SERPL-MCNC: 154 MG/DL (ref 75–110)
HBA1C MFR BLD HPLC: 7.3 % (ref 4–5.7)
HDLC SERPL-MCNC: 50 MG/DL (ref 35–130)
LDL/HDL RATIO,LDHD: 1 RATIO
LDLC SERPL CALC-MCNC: 55 MG/DL (ref 0–130)
POTASSIUM SERPL-SCNC: 4.1 MMOL/L (ref 3.6–5)
PROT SERPL-MCNC: 6.2 G/DL (ref 6.3–8.2)
SODIUM SERPL-SCNC: 139 MMOL/L (ref 137–145)
TRIGL SERPL-MCNC: 112 MG/DL (ref 0–200)
VLDLC SERPL CALC-MCNC: 22 MG/DL

## 2020-02-19 NOTE — PATIENT INSTRUCTIONS

## 2020-02-19 NOTE — PROGRESS NOTES
Chief Complaint   Patient presents with    Hypertension     3 month follow up    Diabetes    Benign Prostatic Hypertrophy     Visit Vitals  /56 (BP 1 Location: Left arm, BP Patient Position: Sitting)   Pulse (!) 52   Temp 97.5 °F (36.4 °C) (Oral)   Resp 19   Ht 5' 10\" (1.778 m)   Wt 236 lb 1.6 oz (107.1 kg)   SpO2 97%   BMI 33.88 kg/m²     1. Have you been to the ER, urgent care clinic since your last visit? Hospitalized since your last visit? No    2. Have you seen or consulted any other health care providers outside of the [address] [place] [address] since your last visit? Include any pap smears or colon screening.    Dr. Madera-cardiology 2/2020

## 2020-03-20 RX ORDER — METFORMIN HYDROCHLORIDE 500 MG/1
TABLET ORAL
Qty: 270 TAB | Refills: 3 | Status: SHIPPED | OUTPATIENT
Start: 2020-03-20 | End: 2021-11-08

## 2020-03-20 NOTE — TELEPHONE ENCOUNTER
RX refill request from the patient/pharmacy. Patient last seen 02- with labs, and next appt. scheduled for 05-  Requested Prescriptions     Pending Prescriptions Disp Refills    metFORMIN (GLUCOPHAGE) 500 mg tablet [Pharmacy Med Name: metFORMIN  MG TABLET] 270 Tab 3     Sig: TAKE ONE TABLET BY MOUTH EVERY MORNING AND TAKE TWO TABLETS BY MOUTH EVERY EVENING WITH DINNER   .

## 2020-04-06 DIAGNOSIS — I10 ESSENTIAL HYPERTENSION: ICD-10-CM

## 2020-04-06 RX ORDER — LOSARTAN POTASSIUM 100 MG/1
TABLET ORAL
Qty: 90 TAB | Refills: 2 | Status: SHIPPED | OUTPATIENT
Start: 2020-04-06 | End: 2021-02-09 | Stop reason: SDUPTHER

## 2020-05-12 DIAGNOSIS — E78.5 HYPERLIPIDEMIA, UNSPECIFIED HYPERLIPIDEMIA TYPE: ICD-10-CM

## 2020-05-12 RX ORDER — PRAVASTATIN SODIUM 80 MG/1
TABLET ORAL
Qty: 90 TAB | Refills: 1 | Status: SHIPPED | OUTPATIENT
Start: 2020-05-12 | End: 2020-11-02

## 2020-05-12 NOTE — TELEPHONE ENCOUNTER
PCP: Joe Hope MD    Last appt: 2/19/2020  Future Appointments   Date Time Provider Martín Trujillo   5/27/2020  9:10 AM Joe Hope MD [address]       Requested Prescriptions     Pending Prescriptions Disp Refills    pravastatin (PRAVACHOL) 80 mg tablet [Pharmacy Med Name: PRAVASTATIN SODIUM 80 MG TAB] 90 Tab 1     Sig: TAKE ONE TABLET BY MOUTH DAILY

## 2020-05-23 DIAGNOSIS — N18.2 CONTROLLED TYPE 2 DIABETES MELLITUS WITH STAGE 2 CHRONIC KIDNEY DISEASE, WITHOUT LONG-TERM CURRENT USE OF INSULIN (HCC): ICD-10-CM

## 2020-05-23 DIAGNOSIS — E11.22 CONTROLLED TYPE 2 DIABETES MELLITUS WITH STAGE 2 CHRONIC KIDNEY DISEASE, WITHOUT LONG-TERM CURRENT USE OF INSULIN (HCC): ICD-10-CM

## 2020-05-26 RX ORDER — SITAGLIPTIN 100 MG/1
TABLET, FILM COATED ORAL
Qty: 30 TAB | Refills: 5 | Status: SHIPPED | OUTPATIENT
Start: 2020-05-26 | End: 2022-03-11

## 2020-05-26 NOTE — TELEPHONE ENCOUNTER
RX refill request from the patient/pharmacy. Patient last seen 02- with labs, and next appt. scheduled for 05-  Requested Prescriptions     Pending Prescriptions Disp Refills    Januvia 100 mg tablet [Pharmacy Med Name: JANUVIA 100 MG TABLET] 30 Tab 5     Sig: TAKE ONE TABLET BY MOUTH DAILY   .

## 2020-06-03 NOTE — PROGRESS NOTES
Chief Complaint   Patient presents with    Follow-up     3 mo       SUBJECTIVE:    James Vega is a 66 y.o. male who returns in follow-up of his medical problems include hypertension, diabetes, hyperlipidemia, ASCVD, ASVD, CKD stage II and other multi-medical problems. He is taking his medications and trying to follow his diet and remain physically active. He currently denies any chest pain, shortness of breath, palpitations, PND, orthopnea or other cardiorespiratory complaints. There are no GI or  complaints. He notes no headaches, dizziness or neurologic complaints. There are no current active arthritic complaints and no other complaints on complete review of systems. Current Outpatient Medications   Medication Sig Dispense Refill    Januvia 100 mg tablet TAKE ONE TABLET BY MOUTH DAILY 30 Tab 5    pravastatin (PRAVACHOL) 80 mg tablet TAKE ONE TABLET BY MOUTH DAILY 90 Tab 1    losartan (COZAAR) 100 mg tablet TAKE ONE TABLET BY MOUTH DAILY 90 Tab 2    metFORMIN (GLUCOPHAGE) 500 mg tablet TAKE ONE TABLET BY MOUTH EVERY MORNING AND TAKE TWO TABLETS BY MOUTH EVERY EVENING WITH DINNER 270 Tab 3    tamsulosin (FLOMAX) 0.4 mg capsule TAKE TWO CAPSULES BY MOUTH DAILY 180 Cap prn    hydrALAZINE (APRESOLINE) 100 mg tablet Take 1 Tab by mouth three (3) times daily. 90 Tab prn    amLODIPine (NORVASC) 10 mg tablet TAKE ONE TABLET BY MOUTH DAILY 90 Tab 2    meloxicam (MOBIC) 15 mg tablet TAKE ONE TABLET BY MOUTH DAILY 90 Tab 2    cloNIDine HCl (CATAPRES) 0.2 mg tablet Take 1 Tab by mouth two (2) times a day. 60 Tab 12    clopidogrel (PLAVIX) 75 mg tab Take 1 Tab by mouth daily. 30 Tab 12    metoprolol tartrate (LOPRESSOR) 25 mg tablet Take 1 Tab by mouth every twelve (12) hours.  60 Tab 12    glipiZIDE (GLUCOTROL) 10 mg tablet TAKE ONE TABLET BY MOUTH TWICE A  Tab 3    lisinopril (PRINIVIL, ZESTRIL) 40 mg tablet TAKE 1 TABLET BY MOUTH ONCE DAILY 90 Tab 3    finasteride (PROSCAR) 5 mg tablet Take 1 Tab by mouth daily. 90 Tab prn    timolol (TIMOPTIC-XE) 0.5 % ophthalmic gel-forming       Omega-3-DHA-EPA-Fish Oil 1,000 mg (120 mg-180 mg) cap Take 1 Cap by mouth three (3) times daily.  aspirin delayed-release 81 mg tablet Take  by mouth daily.        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    Hyperlipidemia 8/2/2017    Hypertension 8/2/2017    Left carotid bruit 8/2/2017    Morbid obesity ([facility] 8/2/2017    On statin therapy 8/2/2017    Urticaria 8/2/2017     Past Surgical History:   Procedure Laterality Date    CARDIAC SURG PROCEDURE UNLIST  09/2019    4 stents placed     Allergies   Allergen Reactions    Adhesive Tape-Silicones Unknown (comments)    Chocolate Flavor Unknown (comments)       REVIEW OF SYSTEMS:  General: negative for - chills or fever, or weight loss or gain  ENT: negative for - headaches, nasal congestion or tinnitus  Eyes: no blurred or visual changes  Neck: No stiffness or swollen nodes  Respiratory: negative for - cough, hemoptysis, shortness of breath or wheezing  Cardiovascular : negative for - chest pain, edema, palpitations or shortness of breath  Gastrointestinal: negative for - abdominal pain, blood in stools, heartburn or nausea/vomiting  Genito-Urinary: no dysuria, trouble voiding, or hematuria  Musculoskeletal: negative for - gait disturbance, joint pain, joint stiffness or joint swelling  Neurological: no TIA or stroke symptoms  Hematologic: no bruises, no bleeding  Lymphatic: no swollen glands  Integument: no lumps, mole changes, nail changes or rash  Endocrine:no malaise/lethargy poly uria or polydipsia or unexpected weight changes        Social History     Socioeconomic History    Marital status:      Spouse name: Not on file    Number of children: Not on file    Years of education: Not on file    Highest education level: Not on file   Social Needs    Financial resource strain: Not hard at all   Pierson-Khurram insecurity     Worry: Patient refused     Inability: Patient refused    Transportation needs     Medical: Patient refused     Non-medical: Patient refused   Tobacco Use    Smoking status: Never Smoker    Smokeless tobacco: Never Used   Substance and Sexual Activity    Alcohol use: Yes     Comment: social    Drug use: No   Other Topics Concern     Family History   Problem Relation Age of Onset    Heart Disease Mother         murmor    Heart Disease Father        OBJECTIVE:     Visit Vitals  /66   Pulse (!) 56   Temp 97.1 °F (36.2 °C) (Oral)   Resp 18   Ht 5' 10\" (1.778 m)   Wt 235 lb (106.6 kg)   SpO2 97%   BMI 33.72 kg/m²     CONSTITUTIONAL:   well nourished, appears age appropriate  EYES: sclera anicteric, PERRL, EOMI  ENMT:nares clear, moist mucous membranes, pharynx clear  NECK: supple. Thyroid normal, No JVD or bruits  RESPIRATORY: Chest: clear to ascultation and percussion, normal inspiratory effort  CARDIOVASCULAR: Heart: regular rate and rhythm with 2/6 systolic murmur left sternal border without rubs or gallops, PMI not displaced, No thrills, no peripheral edema  GASTROINTESTINAL: Abdomen: non distended, soft, non tender, bowel sounds normal  HEMATOLOGIC: no purpura, petechiae or bruising  LYMPHATIC: No lymph node enlargemant  MUSCULOSKELETAL: Extremities: no active synovitis, pulse 1+   INTEGUMENT: No unusual rashes or suspicious skin lesions noted. Nails appear normal.  PERIPHERAL VASCULAR: normal pulses femoral, PT and DP  NEUROLOGIC: non-focal exam, A & O X 3  PSYCHIATRIC:, appropriate affect     ASSESSMENT:   1. Essential hypertension    2.  Controlled type 2 diabetes mellitus with stage 2 chronic kidney disease, without long-term current use of insulin ([address]    3. Mixed hyperlipidemia    4. Diastolic CHF, chronic ([address]    5. Morbid obesity ([address]    6. Primary osteoarthritis involving multiple joints    7. Stage 2 chronic kidney disease    8. ASCVD (arteriosclerotic cardiovascular disease)    9. ASVD (arteriosclerotic vascular disease)      Impression  1. Hypertension that is controlled so continue current therapy reviewed with him. 2.  Diabetes mellitus repeat status pending a prior lab review not make adjustments if necessary. 3   Hyperlipidemia prior lab reviewed and repeat status pending and I will adjust if needed. 4.  Diastolic CHF compensated  5. Morbid obesity we did discuss diet, exercise and weight reduction for overall health benefit and I note his weight is down 1 pound  6. DJD that is stable  7. CKD stage II repeat status pending  8. ASCVD clinically stable continue aspirin daily  I will recheck a myself again in 3 months or sooner should it be a problem. I will call with lab results in the interim. PLAN:  .  Orders Placed This Encounter    METABOLIC PANEL, COMPREHENSIVE ([facility] [facility] [employer] [facility])    LIPID PANEL ([facility])    CK ([facility] [employer] [facility])    HEMOGLOBIN A1C W/O EAG ([facility])         ATTENTION:   This medical record was transcribed using an electronic medical records system. Although proofread, it may and can contain electronic and spelling errors. Other human spelling and other errors may be present. Corrections may be executed at a later time. Please feel free to contact us for any clarifications as needed. Follow-up and Dispositions    · Return in about 3 months (around 9/4/2020). No results found for any visits on 06/04/20. Reena Ladd MD    The patient verbalized understanding of the problems and plans as explained.

## 2020-06-04 ENCOUNTER — OFFICE VISIT (OUTPATIENT)
Dept: INTERNAL MEDICINE CLINIC | Age: 75
End: 2020-06-04

## 2020-06-04 VITALS
HEART RATE: 56 BPM | BODY MASS INDEX: 33.64 KG/M2 | OXYGEN SATURATION: 97 % | TEMPERATURE: 97.1 F | HEIGHT: 70 IN | DIASTOLIC BLOOD PRESSURE: 66 MMHG | RESPIRATION RATE: 18 BRPM | WEIGHT: 235 LBS | SYSTOLIC BLOOD PRESSURE: 130 MMHG

## 2020-06-04 DIAGNOSIS — I70.90 ASVD (ARTERIOSCLEROTIC VASCULAR DISEASE): ICD-10-CM

## 2020-06-04 DIAGNOSIS — M15.9 PRIMARY OSTEOARTHRITIS INVOLVING MULTIPLE JOINTS: ICD-10-CM

## 2020-06-04 DIAGNOSIS — E78.2 MIXED HYPERLIPIDEMIA: ICD-10-CM

## 2020-06-04 DIAGNOSIS — N18.2 CONTROLLED TYPE 2 DIABETES MELLITUS WITH STAGE 2 CHRONIC KIDNEY DISEASE, WITHOUT LONG-TERM CURRENT USE OF INSULIN (HCC): ICD-10-CM

## 2020-06-04 DIAGNOSIS — E66.01 MORBID OBESITY (HCC): ICD-10-CM

## 2020-06-04 DIAGNOSIS — I25.10 ASCVD (ARTERIOSCLEROTIC CARDIOVASCULAR DISEASE): ICD-10-CM

## 2020-06-04 DIAGNOSIS — I10 ESSENTIAL HYPERTENSION: Primary | ICD-10-CM

## 2020-06-04 DIAGNOSIS — I50.32 DIASTOLIC CHF, CHRONIC (HCC): ICD-10-CM

## 2020-06-04 DIAGNOSIS — N18.2 STAGE 2 CHRONIC KIDNEY DISEASE: ICD-10-CM

## 2020-06-04 DIAGNOSIS — E11.22 CONTROLLED TYPE 2 DIABETES MELLITUS WITH STAGE 2 CHRONIC KIDNEY DISEASE, WITHOUT LONG-TERM CURRENT USE OF INSULIN (HCC): ICD-10-CM

## 2020-06-04 LAB
A-G RATIO,AGRAT: 1.6 RATIO
ALBUMIN SERPL-MCNC: 3.9 G/DL (ref 3.9–5.4)
ALP SERPL-CCNC: 49 U/L (ref 38–126)
ALT SERPL-CCNC: 15 U/L (ref 0–50)
ANION GAP SERPL CALC-SCNC: 12 MMOL/L
AST SERPL W P-5'-P-CCNC: 17 U/L (ref 14–36)
BILIRUB SERPL-MCNC: 1.2 MG/DL (ref 0.2–1.3)
BUN SERPL-MCNC: 14 MG/DL (ref 9–20)
BUN/CREATININE RATIO,BUCR: 16 RATIO
CALCIUM SERPL-MCNC: 9.1 MG/DL (ref 8.4–10.2)
CHLORIDE SERPL-SCNC: 100 MMOL/L (ref 98–107)
CHOL/HDL RATIO,CHHD: 3 RATIO (ref 0–4)
CHOLEST SERPL-MCNC: 141 MG/DL (ref 0–200)
CK SERPL-CCNC: 58 U/L (ref 30–135)
CO2 SERPL-SCNC: 25 MMOL/L (ref 22–32)
CREAT SERPL-MCNC: 0.9 MG/DL (ref 0.8–1.5)
GLOBULIN,GLOB: 2.4
GLUCOSE SERPL-MCNC: 230 MG/DL (ref 75–110)
HBA1C MFR BLD HPLC: 8.7 % (ref 4–5.7)
HDLC SERPL-MCNC: 49 MG/DL (ref 35–130)
LDL/HDL RATIO,LDHD: 1 RATIO
LDLC SERPL CALC-MCNC: 69 MG/DL (ref 0–130)
POTASSIUM SERPL-SCNC: 4.2 MMOL/L (ref 3.6–5)
PROT SERPL-MCNC: 6.3 G/DL (ref 6.3–8.2)
SODIUM SERPL-SCNC: 137 MMOL/L (ref 137–145)
TRIGL SERPL-MCNC: 116 MG/DL (ref 0–200)
VLDLC SERPL CALC-MCNC: 23 MG/DL

## 2020-06-04 NOTE — PATIENT INSTRUCTIONS

## 2020-06-04 NOTE — PROGRESS NOTES
Room:     Identified pt with two pt identifiers(name and ). Reviewed record in preparation for visit and have obtained necessary documentation. All patient medications has been reviewed. Chief Complaint   Patient presents with    Follow-up     3 mo       Health Maintenance Due   Topic    DTaP/Tdap/Td series (1 - Tdap)    Shingrix Vaccine Age 50> (1 of 2)    GLAUCOMA SCREENING Q2Y        There were no vitals filed for this visit. 1.Have you traveled outside of the [address] [place] [address] [address] in the last month No    2. Have you been in close contact with someone who is suspected to have COVID-19 or has tested positive. No    3. Do you have any signs or symptoms. ? 4. Have you been to the ER, urgent care clinic since your last visit? Hospitalized since your last visit? No    5. Have you seen or consulted any other health care providers outside of the [address] [place] [address] since your last visit? Include any pap smears or colon screening. No      7. Are you fasting for blood work today? YES    8. Do you have an Advanced Directive/ Living Will in place? NO  If yes, do we have a copy on file NO  If no, would you like information NO    Patient is accompanied by self I have received verbal consent from Doron Solis to discuss any/all medical information while they are present in the room.

## 2020-06-05 NOTE — PROGRESS NOTES
Labs remarkable for very high blood sugar and glycohemoglobin indicating poor diabetes control. What is he taking. Need to do better job with diet, exercise and weight reduction.

## 2020-06-08 NOTE — PROGRESS NOTES
Called patient to discuss what he is taking for his Diabetes. States he is taking Januvia 100 mg daily, Metformin 500 mg 1 tablet twice a day; and Glipizide 10 mg bid. States he has not been doing as well with his diet and plans to join a gym as soon as they open back up. Discussed walking as a good form of exercise for now. Does not want to add any additional medication at this time.

## 2020-07-22 RX ORDER — FINASTERIDE 5 MG/1
TABLET, FILM COATED ORAL
Qty: 90 TAB | Status: SHIPPED | OUTPATIENT
Start: 2020-07-22 | End: 2021-09-21

## 2020-07-22 NOTE — TELEPHONE ENCOUNTER
PCP: Roman Cruz MD    Last appt: 6/4/2020  Future Appointments   Date Time Provider Martín Trujillo   8/5/2020 10:00 AM [facility]       Requested Prescriptions     Pending Prescriptions Disp Refills    finasteride (PROSCAR) 5 mg tablet [Pharmacy Med Name: FINASTERIDE 5 MG TABLET] 90 Tab PRN     Sig: TAKE ONE TABLET BY MOUTH DAILY

## 2020-08-04 PROBLEM — Z13.39 ALCOHOL SCREENING: Status: ACTIVE | Noted: 2020-08-04

## 2020-08-04 NOTE — PROGRESS NOTES
This is a Subsequent Medicare Annual Wellness Visit providing Personalized Prevention Plan Services (PPPS) (Performed 12 months after initial AWV and PPPS )    I have reviewed the patient's medical history in detail and updated the computerized patient record. He presents today for his Medicare subsequent annual wellness examination and screening questionnaire. He is also in follow-up of his multiple medical problems include hypertension, diabetes, hyperlipidemia, ASCVD status post MI, history of paroxysmal atrial fibrillation, compensated diastolic CHF, history of [place] Barr[place] syndrome, ASVD, elevated PSA followed by urology, CKD stage II, and other medical problems. He is taking his medications and trying to follow his diet and try and remain physically active. He currently denies any chest pain, shortness of breath, palpitations, PND, orthopnea or other cardiorespiratory complaints. He notes no current GI or  complaints. He notes no headaches, dizziness or neurologic complaints. He does have some chronic arthritic complaints of the not change but has no new arthritic complaints. There are no other complaints on complete review of systems.     History     Past Medical History:   Diagnosis Date    Aortic stenosis 8/2/2017    ASVD (arteriosclerotic vascular disease) 8/2/2017    Back pain 8/2/2017    BPH (benign prostatic hyperplasia) 8/2/2017    CKD (chronic kidney disease) 8/2/2017    Diabetes ([address] 8/2/2017    DJD (degenerative joint disease) 8/2/2017    ED (erectile dysfunction) 8/2/2017    Guillain-[place] syndrome ([address] 8/2/2017    Hyperlipidemia 8/2/2017    Hypertension 8/2/2017    Left carotid bruit 8/2/2017    Morbid obesity ([address] 8/2/2017    On statin therapy 8/2/2017    Urticaria 8/2/2017      Past Surgical History:   Procedure Laterality Date    CARDIAC SURG PROCEDURE UNLIST  09/2019    4 stents placed     Social History     Tobacco Use    Smoking status: Never Smoker    Smokeless tobacco: Never Used   Substance Use Topics    Alcohol use: Yes     Comment: social    Drug use: No     Current Outpatient Medications   Medication Sig Dispense Refill    finasteride (PROSCAR) 5 mg tablet TAKE ONE TABLET BY MOUTH DAILY 90 Tab PRN    Januvia 100 mg tablet TAKE ONE TABLET BY MOUTH DAILY 30 Tab 5    pravastatin (PRAVACHOL) 80 mg tablet TAKE ONE TABLET BY MOUTH DAILY 90 Tab 1    losartan (COZAAR) 100 mg tablet TAKE ONE TABLET BY MOUTH DAILY 90 Tab 2    metFORMIN (GLUCOPHAGE) 500 mg tablet TAKE ONE TABLET BY MOUTH EVERY MORNING AND TAKE TWO TABLETS BY MOUTH EVERY EVENING WITH DINNER (Patient taking differently: Taking one table in the morning and one in the evening) 270 Tab 3    tamsulosin (FLOMAX) 0.4 mg capsule TAKE TWO CAPSULES BY MOUTH DAILY 180 Cap prn    hydrALAZINE (APRESOLINE) 100 mg tablet Take 1 Tab by mouth three (3) times daily. 90 Tab prn    amLODIPine (NORVASC) 10 mg tablet TAKE ONE TABLET BY MOUTH DAILY 90 Tab 2    meloxicam (MOBIC) 15 mg tablet TAKE ONE TABLET BY MOUTH DAILY 90 Tab 2    cloNIDine HCl (CATAPRES) 0.2 mg tablet Take 1 Tab by mouth two (2) times a day. 60 Tab 12    clopidogrel (PLAVIX) 75 mg tab Take 1 Tab by mouth daily. 30 Tab 12    metoprolol tartrate (LOPRESSOR) 25 mg tablet Take 1 Tab by mouth every twelve (12) hours. 60 Tab 12    glipiZIDE (GLUCOTROL) 10 mg tablet TAKE ONE TABLET BY MOUTH TWICE A  Tab 3    lisinopril (PRINIVIL, ZESTRIL) 40 mg tablet TAKE 1 TABLET BY MOUTH ONCE DAILY 90 Tab 3    timolol (TIMOPTIC-XE) 0.5 % ophthalmic gel-forming       Omega-3-DHA-EPA-Fish Oil 1,000 mg (120 mg-180 mg) cap Take 1 Cap by mouth three (3) times daily.  aspirin delayed-release 81 mg tablet Take  by mouth daily.        Allergies   Allergen Reactions    Adhesive Tape-Silicones Unknown (comments)    Chocolate Flavor Unknown (comments)     Family History   Problem Relation Age of Onset    Heart Disease Mother         murmor    Heart Disease Father Patient Active Problem List    Diagnosis    Diastolic CHF, chronic (HCC)    ASCVD (arteriosclerotic cardiovascular disease)     80% LAD, 80% CX, 80% RCA all treated with sten t9/2019      Controlled type 2 diabetes mellitus with stage 2 chronic kidney disease, without long-term current use of insulin ([address]    Primary osteoarthritis involving multiple joints    Morbid obesity ([address]    Essential hypertension    Stage 2 chronic kidney disease    Mixed hyperlipidemia    BPH (benign prostatic hyperplasia)    ASVD (arteriosclerotic vascular disease)     Carotid Dopplers August 2019 mild stenosis carotids bilateral less than 50%      Aortic stenosis    Alcohol screening    Primary insomnia    PAF (paroxysmal atrial fibrillation) ([address]     9/2019 at time of MI      NSTEMI (non-ST elevated myocardial infarction) ([address]    Prostate cancer screening    Hematuria    Medicare annual wellness visit, subsequent    ED (erectile dysfunction)    Guillain-[place] syndrome ([address]    Urticaria    Left carotid bruit    Back pain    Neoplasm of uncertain behavior of skin    Stenosis of both internal carotid arteries       Patient Care Team:  Ariana Sethi MD as PCP - General (Internal Medicine)  Ariana Sethi MD [facility] PCP [facility]  Charles Thomas MD (Cardiology)    Depression Risk Factor Screening:     3 most recent PHQ Screens 8/5/2020   Little interest or pleasure in doing things Not at all   Feeling down, depressed, irritable, or hopeless Not at all   Total Score PHQ 2 0   Trouble falling or staying asleep, or sleeping too much -   Feeling tired or having little energy -   Poor appetite, weight loss, or overeating -   Feeling bad about yourself - or that you are a failure or have let yourself or your family down -   Trouble concentrating on things such as school, work, reading, or watching TV -   Moving or speaking so slowly that other people could have noticed; or the opposite being so fidgety that others notice -   Thoughts of being better off dead, or hurting yourself in some way -   PHQ 9 Score -     Alcohol Risk Factor Screening: You do not drink alcohol or very rarely. Functional Ability and Level of Safety:     Fall Risk     Fall Risk Assessment, last 12 mths 8/5/2020   Able to walk? Yes   Fall in past 12 months? No       Hearing Loss   mild    Activities of Daily Living   Self-care. ADL Assessment 8/5/2020   Feeding yourself No Help Needed   Getting from bed to chair No Help Needed   Getting dressed No Help Needed   Bathing or showering No Help Needed   Walk across the room (includes cane/walker) No Help Needed   Using the telphone No Help Needed   Taking your medications No Help Needed   Preparing meals No Help Needed   Managing money (expenses/bills) No Help Needed   Moderately strenuous housework (laundry) No Help Needed   Shopping for personal items (toiletries/medicines) No Help Needed   Shopping for groceries No Help Needed   Driving No Help Needed   Climbing a flight of stairs No Help Needed   Getting to places beyond walking distances No Help Needed       Abuse Screen   Patient is not abused    Social History     Social History Narrative    Not on file       Review of Systems      ROS:    Constitutional: He denies fevers, weight loss, sweats. Eyes: No blurred or double vision. ENT: No difficulty with swallowing, taste, speech or smell. Neck: no stiffness or swelling  Respiratory: No cough wheezing or shortness of breath. Cardiovascular: Denies chest pain, palpitations, unexplained indigestion or syncope. Gastrointestinal:  No changes in bowel movements, no abdominal pain, no bloating. Genitourinary:  He denies frequency, nocturia or stranguria. Extremities: No joint pain, stiffness or swelling. Neurological:  No numbness, tingling, burring paresthesias or loss of motor strength.   No syncope, dizziness or frequent headache  Lymphatic: no adenopathy noted  Hematologic: no easy bruising or bleeding gums  Skin:  No recent rashes or mole changes. Psychiatric/Behavioral:  Negative for depression. Physical Examination     Evaluation of Cognitive Function:  Mood/affect:  happy  Appearance: age appropriate  Family member/caregiver input: none    Visit Vitals  /60 (BP 1 Location: Left arm, BP Patient Position: Sitting)   Pulse (!) 58   Temp 97.5 °F (36.4 °C)   Resp 18   Ht 5' 10\" (1.778 m)   Wt 234 lb 3.2 oz (106.2 kg)   SpO2 97%   BMI 33.60 kg/m²     Vitals:    08/05/20 1026   BP: 132/60   Pulse: (!) 58   Resp: 18   Temp: 97.5 °F (36.4 °C)   SpO2: 97%   Weight: 234 lb 3.2 oz (106.2 kg)   Height: 5' 10\" (1.778 m)   PainSc:   0 - No pain        PHYSICAL EXAM:    General appearance - alert, well appearing, and in no distress  Mental status - alert, oriented to person, place, and time  HEENT:  Ears - bilateral TM's and external ear canals clear  Eyes - pupillary responses were normal.  Extraocular muscle function intact. Lids and conjunctiva not injected. Fundoscopic exam revealed sharp disc margins. eye movements intact  Pharynx- clear with teeth in good repair. No masses were noted  Neck - supple without thyromegaly or change of left carotid burit. No JVD noted  Lungs - clear to auscultation and percussion  Cardiac- normal rate, regular rhythm with 2/6 systolic murmur left sternal border without change. PMI not displaced. No gallop, rub or click  Abdomen - flat, soft, non-tender without palpable organomegaly or mass. No pulsatile mass was felt, and not bruit was heard.   Bowel sounds were active  : Circumcised, Testes descended w/o masses  Rectal: Deferred is recently done by the urologist  Extremities -  no clubbing cyanosis or edema  Lymphatics - no palpable lymphadenopathy, no hepatosplenomegaly  Hematologic: no petechiae or purpura  Peripheral vascular -Femoral, Dorsalis pedis and posterior tibial pulses felt without difficulty  Skin - no rash or unusual mole change noted  Neurological - Cranial nerves II-XII grossly intact. Motor strength 5/5. DTR's 2+ and symmetric. Station and gait normal  Back exam - full range of motion, no tenderness, palpable spasm or pain on motion  Musculoskeletal - no joint tenderness, deformity or swelling        Results for orders placed or performed in visit on 12/72/65   METABOLIC PANEL, COMPREHENSIVE   Result Value Ref Range    Glucose 230 (H) 75 - 110 mg/dL    BUN 14.0 9.0 - 20.0 mg/dL    Creatinine 0.9 0.8 - 1.5 mg/dL    Sodium 137 137 - 145 mmol/L    Potassium 4.2 3.6 - 5.0 mmol/L    Chloride 100 98 - 107 mmol/L    CO2 25.0 22.0 - 32.0 mmol/L    Calcium 9.1 8.4 - 10.2 mg/dl    Protein, total 6.3 6.3 - 8.2 g/dL    Albumin 3.9 3.9 - 5.4 g/dL    ALT (SGPT) 15 0 - 50 U/L    AST (SGOT) 17.0 14.0 - 36.0 U/L    Alk. phosphatase 49 38 - 126 U/L    Bilirubin, total 1.2 0.2 - 1.3 mg/dL    BUN/Creatinine ratio 16 Ratio    GFR est AA >60 mL/min/1.73m2    GFR est non-AA >60 mL/min/1.73m2    Globulin 2.40     A-G Ratio 1.6 Ratio    Anion gap 12 mmol/L   LIPID PANEL   Result Value Ref Range    Cholesterol, total 141 0 - 200 mg/dL    Triglyceride 116 0 - 200 mg/dL    HDL Cholesterol 49 35 - 130 mg/dL    VLDL 23 mg/dL    LDL, calculated 69 0 - 130 mg/dL    CHOL/HDL Ratio 3 0 - 4 Ratio    LDL/HDL Ratio 1 Ratio   CK   Result Value Ref Range    CK 58.00 30.00 - 135.00 U/L   HEMOGLOBIN A1C W/O EAG   Result Value Ref Range    Hemoglobin A1c 8.7 (H) 4.0 - 5.7 %       Advice/Referrals/Counseling   Education and counseling provided:  Are appropriate based on today's review and evaluation  End-of-Life planning (with patient's consent)  Pneumococcal Vaccine  Influenza Vaccine  Colorectal cancer screening tests      Assessment/Plan     ASSESSMENT:   1. Essential hypertension    2. Controlled type 2 diabetes mellitus with stage 2 chronic kidney disease, without long-term current use of insulin ([address]    3. Mixed hyperlipidemia    4.  ASCVD (arteriosclerotic cardiovascular disease)    5. Diastolic CHF, chronic (HCC)    6. Morbid obesity ([facility]    7. Primary osteoarthritis involving multiple joints    8. Stage 2 chronic kidney disease    9. Benign prostatic hyperplasia with lower urinary tract symptoms, symptom details unspecified    10. ASVD (arteriosclerotic vascular disease)    11. Aortic valve stenosis, etiology of cardiac valve disease unspecified    12. PAF (paroxysmal atrial fibrillation) ([facility]    13. Prostate cancer screening    14. Alcohol screening    15. Medicare annual wellness visit, subsequent      Impression  1. Hypertension that is controlled so continue current therapy reviewed with him  2. Diabetes repeat status pending a prior lab reviewed and I will make adjustments if necessary. I detailed what he is doing as far as his diet and hopefully will see his A1c is better as it was not controlled on last visit. 3.  Hyperlipidemia prior lab reviewed and repeat status pending and I will adjust if needed. 4   ASCVD clinically stable with prior MI.  EKG obtained today left bundle branch block with no acute process. Bradycardic some PACs noted and first-degree AV block. 5.  Diastolic CHF compensated  6. Morbid obesity we did discuss diet, exercise and weight reduction for overall health benefit. 7. DJD chronically present but stable  8. CKD stage II repeat status pending  Benign BPH with prior elevated PSA now followed by urology  10. ASVD with no change in murmur and he says he is up-to-date on Doppler test  11. Aortic stenosis murmur without change  12. Paroxysmal atrial fibrillation no recent symptoms  13. Prostate cancer screening done by urologist not repeated by me  14   Annual alcohol screening is done and he claims to drink some socially but never more than 2 drinks per time.   I did caution regarding more than 2 drinks per day with increased cardiovascular risk and increased risk of liver disease and other GI effects in males.  I will call with lab results and make further recommendations or adjustments if necessary. Medicare subsequent annual wellness examination and screening questionnaire is completed today. The results were reviewed with him and his questions were answered. Lifestyle recommendations and modifications discussed and made. Follow-up with me in 3 months or sooner if there is a problem. 40 minutes spent in direct care of this patient today not inclusive of the time spent on Medicare wellness examination. Greater than 50% in counseling coordination of care. PLAN:  .  Orders Placed This Encounter    LIPID PANEL    CBC WITH AUTOMATED DIFF ([facility])    METABOLIC PANEL, COMPREHENSIVE ([facility])    CK ([facility])    HEMOGLOBIN A1C W/O EAG ([facility])    T4, FREE ([facility])    TSH 3RD GENERATION ([facility])    URINALYSIS W/O MICRO ([facility])    URINE, MICROALBUMIN, SEMIQUANTITATIVE ([facility])    AMB POC EKG ROUTINE W/ 12 LEADS, INTER & REP         ATTENTION:   This medical record was transcribed using an electronic medical records system. Although proofread, it may and can contain electronic and spelling errors. Other human spelling and other errors may be present. Corrections may be executed at a later time. Please feel free to contact us for any clarifications as needed. Follow-up and Dispositions    · Return in about 3 months (around 11/5/2020). Monika Maravilla MD    Recommended healthy diet low in carbohydrates, fats, sodium and cholesterol. Recommended regular cardiovascular exercise 3-6 times per week for 30-60 minutes daily.       Current Outpatient Medications   Medication Sig Dispense Refill    finasteride (PROSCAR) 5 mg tablet TAKE ONE TABLET BY MOUTH DAILY 90 Tab PRN    Januvia 100 mg tablet TAKE ONE TABLET BY MOUTH DAILY 30 Tab 5    pravastatin (PRAVACHOL) 80 mg tablet TAKE ONE TABLET BY MOUTH DAILY 90 Tab 1    losartan (COZAAR) 100 mg tablet TAKE ONE TABLET BY MOUTH DAILY 90 Tab 2    metFORMIN (GLUCOPHAGE) 500 mg tablet TAKE ONE TABLET BY MOUTH EVERY MORNING AND TAKE TWO TABLETS BY MOUTH EVERY EVENING WITH DINNER (Patient taking differently: Taking one table in the morning and one in the evening) 270 Tab 3    tamsulosin (FLOMAX) 0.4 mg capsule TAKE TWO CAPSULES BY MOUTH DAILY 180 Cap prn    hydrALAZINE (APRESOLINE) 100 mg tablet Take 1 Tab by mouth three (3) times daily. 90 Tab prn    amLODIPine (NORVASC) 10 mg tablet TAKE ONE TABLET BY MOUTH DAILY 90 Tab 2    meloxicam (MOBIC) 15 mg tablet TAKE ONE TABLET BY MOUTH DAILY 90 Tab 2    cloNIDine HCl (CATAPRES) 0.2 mg tablet Take 1 Tab by mouth two (2) times a day. 60 Tab 12    clopidogrel (PLAVIX) 75 mg tab Take 1 Tab by mouth daily. 30 Tab 12    metoprolol tartrate (LOPRESSOR) 25 mg tablet Take 1 Tab by mouth every twelve (12) hours. 60 Tab 12    glipiZIDE (GLUCOTROL) 10 mg tablet TAKE ONE TABLET BY MOUTH TWICE A  Tab 3    lisinopril (PRINIVIL, ZESTRIL) 40 mg tablet TAKE 1 TABLET BY MOUTH ONCE DAILY 90 Tab 3    timolol (TIMOPTIC-XE) 0.5 % ophthalmic gel-forming       Omega-3-DHA-EPA-Fish Oil 1,000 mg (120 mg-180 mg) cap Take 1 Cap by mouth three (3) times daily.  aspirin delayed-release 81 mg tablet Take  by mouth daily. No results found for any visits on 08/05/20. Verbal and written instructions (see AVS) provided. Patient expresses understanding of diagnosis and treatment plan.     Nic Gage MD

## 2020-08-05 ENCOUNTER — OFFICE VISIT (OUTPATIENT)
Dept: INTERNAL MEDICINE CLINIC | Age: 75
End: 2020-08-05
Payer: MEDICARE

## 2020-08-05 VITALS
RESPIRATION RATE: 18 BRPM | HEART RATE: 58 BPM | OXYGEN SATURATION: 97 % | DIASTOLIC BLOOD PRESSURE: 60 MMHG | WEIGHT: 234.2 LBS | BODY MASS INDEX: 33.53 KG/M2 | TEMPERATURE: 97.5 F | HEIGHT: 70 IN | SYSTOLIC BLOOD PRESSURE: 132 MMHG

## 2020-08-05 DIAGNOSIS — N18.2 CONTROLLED TYPE 2 DIABETES MELLITUS WITH STAGE 2 CHRONIC KIDNEY DISEASE, WITHOUT LONG-TERM CURRENT USE OF INSULIN (HCC): ICD-10-CM

## 2020-08-05 DIAGNOSIS — I50.32 DIASTOLIC CHF, CHRONIC (HCC): ICD-10-CM

## 2020-08-05 DIAGNOSIS — Z13.39 ALCOHOL SCREENING: ICD-10-CM

## 2020-08-05 DIAGNOSIS — I10 ESSENTIAL HYPERTENSION: Primary | ICD-10-CM

## 2020-08-05 DIAGNOSIS — E11.22 CONTROLLED TYPE 2 DIABETES MELLITUS WITH STAGE 2 CHRONIC KIDNEY DISEASE, WITHOUT LONG-TERM CURRENT USE OF INSULIN (HCC): ICD-10-CM

## 2020-08-05 DIAGNOSIS — E78.2 MIXED HYPERLIPIDEMIA: ICD-10-CM

## 2020-08-05 DIAGNOSIS — I70.90 ASVD (ARTERIOSCLEROTIC VASCULAR DISEASE): ICD-10-CM

## 2020-08-05 DIAGNOSIS — I48.0 PAF (PAROXYSMAL ATRIAL FIBRILLATION) (HCC): ICD-10-CM

## 2020-08-05 DIAGNOSIS — Z12.5 PROSTATE CANCER SCREENING: ICD-10-CM

## 2020-08-05 DIAGNOSIS — I35.0 AORTIC VALVE STENOSIS, ETIOLOGY OF CARDIAC VALVE DISEASE UNSPECIFIED: ICD-10-CM

## 2020-08-05 DIAGNOSIS — M15.9 PRIMARY OSTEOARTHRITIS INVOLVING MULTIPLE JOINTS: ICD-10-CM

## 2020-08-05 DIAGNOSIS — I25.10 ASCVD (ARTERIOSCLEROTIC CARDIOVASCULAR DISEASE): ICD-10-CM

## 2020-08-05 DIAGNOSIS — Z00.00 MEDICARE ANNUAL WELLNESS VISIT, SUBSEQUENT: ICD-10-CM

## 2020-08-05 DIAGNOSIS — N39.0 URINARY TRACT INFECTION WITHOUT HEMATURIA, SITE UNSPECIFIED: ICD-10-CM

## 2020-08-05 DIAGNOSIS — N40.1 BENIGN PROSTATIC HYPERPLASIA WITH LOWER URINARY TRACT SYMPTOMS, SYMPTOM DETAILS UNSPECIFIED: ICD-10-CM

## 2020-08-05 DIAGNOSIS — E66.01 MORBID OBESITY (HCC): ICD-10-CM

## 2020-08-05 DIAGNOSIS — N18.2 STAGE 2 CHRONIC KIDNEY DISEASE: ICD-10-CM

## 2020-08-05 LAB
A-G RATIO,AGRAT: 1.7 RATIO
ALBUMIN SERPL-MCNC: 4 G/DL (ref 3.9–5.4)
ALP SERPL-CCNC: 53 U/L (ref 38–126)
ALT SERPL-CCNC: 16 U/L (ref 0–50)
ANION GAP SERPL CALC-SCNC: 13 MMOL/L
AST SERPL W P-5'-P-CCNC: 19 U/L (ref 14–36)
BACTERIA,BACTU: ABNORMAL
BILIRUB SERPL-MCNC: 1.2 MG/DL (ref 0.2–1.3)
BILIRUB UR QL: NEGATIVE
BUN SERPL-MCNC: 14 MG/DL (ref 9–20)
BUN/CREATININE RATIO,BUCR: 16 RATIO
CALCIUM SERPL-MCNC: 9.3 MG/DL (ref 8.4–10.2)
CHLORIDE SERPL-SCNC: 97 MMOL/L (ref 98–107)
CK SERPL-CCNC: 66 U/L (ref 30–135)
CLARITY: CLEAR
CO2 SERPL-SCNC: 26 MMOL/L (ref 22–32)
COLOR UR: ABNORMAL
CREAT SERPL-MCNC: 0.9 MG/DL (ref 0.8–1.5)
ERYTHROCYTE [DISTWIDTH] IN BLOOD BY AUTOMATED COUNT: 13.7 %
GLOBULIN,GLOB: 2.4
GLUCOSE 24H UR-MRATE: ABNORMAL G/(24.H)
GLUCOSE SERPL-MCNC: 228 MG/DL (ref 75–110)
HBA1C MFR BLD HPLC: 9.1 % (ref 4–5.7)
HCT VFR BLD AUTO: 38.1 % (ref 37–51)
HGB BLD-MCNC: 12.8 G/DL (ref 12–18)
HGB UR QL STRIP: NEGATIVE
KETONES UR QL STRIP.AUTO: NEGATIVE
LEUKOCYTE ESTERASE: NEGATIVE
LYMPHOCYTES ABSOLUTE: 1.5 K/UL (ref 0.6–4.1)
LYMPHOCYTES NFR BLD: 27.3 % (ref 10–58.5)
MCH RBC QN AUTO: 26.9 PG (ref 26–32)
MCHC RBC AUTO-ENTMCNC: 33.6 G/DL (ref 30–36)
MCV RBC AUTO: 80 FL (ref 80–97)
MICROALBUMIN, URINE: 100 MG/L (ref 0–20)
MONOCYTES ABS-DIF,2141: 0.3 K/UL (ref 0–1.8)
MONOCYTES NFR BLD: 5.4 % (ref 0.1–24)
NEUTROPHILS # BLD: 67.3 % (ref 37–92)
NEUTROPHILS ABS,2156: 3.6 K/UL (ref 2–7.8)
NITRITE UR QL STRIP.AUTO: NEGATIVE
PH UR STRIP: 5 [PH] (ref 5–7)
PLATELET # BLD AUTO: 149 K/UL (ref 140–440)
POTASSIUM SERPL-SCNC: 4.3 MMOL/L (ref 3.6–5)
PROT SERPL-MCNC: 6.4 G/DL (ref 6.3–8.2)
PROT UR STRIP-MCNC: ABNORMAL MG/DL
RBC # BLD AUTO: 4.76 M/UL (ref 4.2–6.3)
RBC #/AREA URNS HPF: ABNORMAL #/HPF
SODIUM SERPL-SCNC: 136 MMOL/L (ref 137–145)
SP GR UR REFRACTOMETRY: 1.02 (ref 1–1.03)
T4 FREE SERPL-MCNC: 0.92 NG/DL (ref 0.58–2.3)
TSH SERPL DL<=0.05 MIU/L-ACNC: 1.96 UIU/ML (ref 0.34–5.6)
UROBILINOGEN UR QL STRIP.AUTO: NEGATIVE
WBC # BLD AUTO: 5.4 K/UL (ref 4.1–10.9)
WBC URNS QL MICRO: 0 #/HPF

## 2020-08-05 PROCEDURE — G8510 SCR DEP NEG, NO PLAN REQD: HCPCS | Performed by: INTERNAL MEDICINE

## 2020-08-05 PROCEDURE — 83036 HEMOGLOBIN GLYCOSYLATED A1C: CPT | Performed by: INTERNAL MEDICINE

## 2020-08-05 PROCEDURE — 82550 ASSAY OF CK (CPK): CPT | Performed by: INTERNAL MEDICINE

## 2020-08-05 PROCEDURE — 1101F PT FALLS ASSESS-DOCD LE1/YR: CPT | Performed by: INTERNAL MEDICINE

## 2020-08-05 PROCEDURE — G8754 DIAS BP LESS 90: HCPCS | Performed by: INTERNAL MEDICINE

## 2020-08-05 PROCEDURE — G8752 SYS BP LESS 140: HCPCS | Performed by: INTERNAL MEDICINE

## 2020-08-05 PROCEDURE — 80053 COMPREHEN METABOLIC PANEL: CPT | Performed by: INTERNAL MEDICINE

## 2020-08-05 PROCEDURE — 81003 URINALYSIS AUTO W/O SCOPE: CPT | Performed by: INTERNAL MEDICINE

## 2020-08-05 PROCEDURE — 99215 OFFICE O/P EST HI 40 MIN: CPT | Performed by: INTERNAL MEDICINE

## 2020-08-05 PROCEDURE — G0439 PPPS, SUBSEQ VISIT: HCPCS | Performed by: INTERNAL MEDICINE

## 2020-08-05 PROCEDURE — 84443 ASSAY THYROID STIM HORMONE: CPT | Performed by: INTERNAL MEDICINE

## 2020-08-05 PROCEDURE — G8536 NO DOC ELDER MAL SCRN: HCPCS | Performed by: INTERNAL MEDICINE

## 2020-08-05 PROCEDURE — 93000 ELECTROCARDIOGRAM COMPLETE: CPT | Performed by: INTERNAL MEDICINE

## 2020-08-05 PROCEDURE — G8427 DOCREV CUR MEDS BY ELIG CLIN: HCPCS | Performed by: INTERNAL MEDICINE

## 2020-08-05 PROCEDURE — 3052F HG A1C>EQUAL 8.0%<EQUAL 9.0%: CPT | Performed by: INTERNAL MEDICINE

## 2020-08-05 PROCEDURE — G8417 CALC BMI ABV UP PARAM F/U: HCPCS | Performed by: INTERNAL MEDICINE

## 2020-08-05 PROCEDURE — 82044 UR ALBUMIN SEMIQUANTITATIVE: CPT | Performed by: INTERNAL MEDICINE

## 2020-08-05 PROCEDURE — 85025 COMPLETE CBC W/AUTO DIFF WBC: CPT | Performed by: INTERNAL MEDICINE

## 2020-08-05 PROCEDURE — 84439 ASSAY OF FREE THYROXINE: CPT | Performed by: INTERNAL MEDICINE

## 2020-08-05 NOTE — PATIENT INSTRUCTIONS

## 2020-08-06 DIAGNOSIS — I10 ESSENTIAL HYPERTENSION: ICD-10-CM

## 2020-08-06 LAB
CHOLEST SERPL-MCNC: 156 MG/DL (ref 100–199)
HDLC SERPL-MCNC: 44 MG/DL
LDLC SERPL CALC-MCNC: 83 MG/DL (ref 0–99)
TRIGL SERPL-MCNC: 145 MG/DL (ref 0–149)
VLDLC SERPL CALC-MCNC: 29 MG/DL (ref 5–40)

## 2020-08-06 RX ORDER — LISINOPRIL 40 MG/1
TABLET ORAL
Qty: 90 TAB | Refills: 3 | Status: SHIPPED | OUTPATIENT
Start: 2020-08-06 | End: 2020-08-17

## 2020-08-06 NOTE — TELEPHONE ENCOUNTER
RX refill request from the patient/pharmacy. Patient last seen 08- with labs, and next appt. scheduled for 11-  Requested Prescriptions     Pending Prescriptions Disp Refills    lisinopriL (PRINIVIL, ZESTRIL) 40 mg tablet [Pharmacy Med Name: LISINOPRIL 40 MG TABLET] 90 Tab 3     Sig: TAKE 1 TABLET BY MOUTH EVERY DAY   .

## 2020-08-07 LAB — BACTERIA UR CULT: NORMAL

## 2020-08-07 NOTE — PROGRESS NOTES
Increase blood sugar as well as increased triglycerides extremely high glycohemoglobin some very poor diabetes control and increased protein in the urine indicating he started to develop kidney complications of his diabetes being poorly controlled. What exactly is he taking on a regular basis on a daily basis so we can decide what to do to change it. Currently he certainly needs to work on diet and exercise for sure in addition to the medicine changes we make.

## 2020-08-10 NOTE — PROGRESS NOTES
Increase blood sugar as well as increased triglycerides extremely high glycohemoglobin some very poor diabetes control and increased protein in the urine indicating he started to develop kidney complications of his diabetes being poorly controlled. What exactly is he taking on a regular basis on a daily basis so we can decide what to do to change it. Currently he certainly needs to work on diet and exercise for sure in addition to the medicine changes we make. Reviewed patient's mediations. Currently he is taking Januvia 100 mg daily, Metformin 500 mg 1 am and 2 in the pm; and Glipizide 10 mg bid. Dr. Boo Valverde informed and recommends patient add Jardiance 10 mg daily. Rx sent to Dr. Boo Valverde to review and approve.

## 2020-08-10 NOTE — TELEPHONE ENCOUNTER
RX refill request from the patient/pharmacy. Patient last seen 08- with labs, and next appt. scheduled for 11-  Requested Prescriptions     Pending Prescriptions Disp Refills    empagliflozin (Jardiance) 10 mg tablet 30 Tab 11     Sig: Take 1 Tab by mouth daily. Jennifer Quiroz

## 2020-08-12 NOTE — TELEPHONE ENCOUNTER
RX refill request from the patient/pharmacy. Patient last seen 08- with labs, and next appt. scheduled for 11-  Requested Prescriptions     Pending Prescriptions Disp Refills    dapagliflozin (Farxiga) 5 mg tab tablet 30 Tab 5     Sig: Take 1 Tab by mouth daily. Nereyda García

## 2020-08-14 RX ORDER — MELOXICAM 15 MG/1
TABLET ORAL
Qty: 90 TAB | Refills: 1 | Status: SHIPPED | OUTPATIENT
Start: 2020-08-14 | End: 2021-03-04

## 2020-08-15 DIAGNOSIS — I10 ESSENTIAL HYPERTENSION: ICD-10-CM

## 2020-08-17 RX ORDER — LISINOPRIL 40 MG/1
TABLET ORAL
Qty: 90 TAB | Refills: 3 | Status: SHIPPED | OUTPATIENT
Start: 2020-08-17 | End: 2021-09-07

## 2020-08-28 DIAGNOSIS — I10 ESSENTIAL HYPERTENSION: ICD-10-CM

## 2020-08-28 NOTE — TELEPHONE ENCOUNTER
RX refill request from the patient/pharmacy. Patient last seen 08- with labs, and next appt. scheduled for 11-  Requested Prescriptions     Pending Prescriptions Disp Refills    amLODIPine (NORVASC) 10 mg tablet [Pharmacy Med Name: amLODIPine BESYLATE 10 MG TAB] 90 Tab 1     Sig: TAKE ONE TABLET BY MOUTH DAILY   .

## 2020-08-30 RX ORDER — AMLODIPINE BESYLATE 10 MG/1
TABLET ORAL
Qty: 90 TAB | Refills: 1 | Status: SHIPPED | OUTPATIENT
Start: 2020-08-30 | End: 2020-09-02

## 2020-09-01 DIAGNOSIS — I10 ESSENTIAL HYPERTENSION: ICD-10-CM

## 2020-09-01 DIAGNOSIS — E11.22 CONTROLLED TYPE 2 DIABETES MELLITUS WITH STAGE 2 CHRONIC KIDNEY DISEASE, WITHOUT LONG-TERM CURRENT USE OF INSULIN (HCC): ICD-10-CM

## 2020-09-01 DIAGNOSIS — N18.2 CONTROLLED TYPE 2 DIABETES MELLITUS WITH STAGE 2 CHRONIC KIDNEY DISEASE, WITHOUT LONG-TERM CURRENT USE OF INSULIN (HCC): ICD-10-CM

## 2020-09-02 RX ORDER — AMLODIPINE BESYLATE 10 MG/1
TABLET ORAL
Qty: 90 TAB | Refills: 3 | Status: SHIPPED | OUTPATIENT
Start: 2020-09-02 | End: 2021-10-11

## 2020-09-02 RX ORDER — GLIPIZIDE 10 MG/1
TABLET ORAL
Qty: 180 TAB | Refills: 3 | Status: SHIPPED | OUTPATIENT
Start: 2020-09-02 | End: 2021-10-01

## 2020-09-02 NOTE — TELEPHONE ENCOUNTER
RX refill request from the patient/pharmacy. Patient last seen 08- with labs, and next appt. scheduled for 11-  Requested Prescriptions     Pending Prescriptions Disp Refills    glipiZIDE (GLUCOTROL) 10 mg tablet [Pharmacy Med Name: glipiZIDE 10 MG TABLET] 180 Tab 3     Sig: TAKE ONE TABLET BY MOUTH TWICE A DAY    amLODIPine (NORVASC) 10 mg tablet [Pharmacy Med Name: amLODIPine BESYLATE 10 MG TAB] 90 Tab 3     Sig: TAKE ONE TABLET BY MOUTH DAILY   .

## 2020-09-03 PROBLEM — Z12.5 PROSTATE CANCER SCREENING: Status: RESOLVED | Noted: 2018-08-03 | Resolved: 2020-09-03

## 2020-09-03 PROBLEM — Z00.00 MEDICARE ANNUAL WELLNESS VISIT, SUBSEQUENT: Status: RESOLVED | Noted: 2017-08-25 | Resolved: 2020-09-03

## 2020-10-31 DIAGNOSIS — E78.5 HYPERLIPIDEMIA, UNSPECIFIED HYPERLIPIDEMIA TYPE: ICD-10-CM

## 2020-11-02 RX ORDER — PRAVASTATIN SODIUM 80 MG/1
TABLET ORAL
Qty: 90 TAB | Refills: 1 | Status: SHIPPED | OUTPATIENT
Start: 2020-11-02 | End: 2021-02-09 | Stop reason: SDUPTHER

## 2020-11-02 NOTE — TELEPHONE ENCOUNTER
RX refill request from the patient/pharmacy. Patient last seen 08- with labs, and next appt. scheduled for 11-  Requested Prescriptions     Pending Prescriptions Disp Refills    pravastatin (PRAVACHOL) 80 mg tablet [Pharmacy Med Name: PRAVASTATIN SODIUM 80 MG TAB] 90 Tab 1     Sig: TAKE ONE TABLET BY MOUTH DAILY   .

## 2021-02-09 ENCOUNTER — OFFICE VISIT (OUTPATIENT)
Dept: INTERNAL MEDICINE CLINIC | Age: 76
End: 2021-02-09
Payer: MEDICARE

## 2021-02-09 VITALS
TEMPERATURE: 97.7 F | RESPIRATION RATE: 18 BRPM | SYSTOLIC BLOOD PRESSURE: 110 MMHG | OXYGEN SATURATION: 98 % | WEIGHT: 228.6 LBS | DIASTOLIC BLOOD PRESSURE: 66 MMHG | BODY MASS INDEX: 32.73 KG/M2 | HEART RATE: 63 BPM | HEIGHT: 70 IN

## 2021-02-09 DIAGNOSIS — I50.32 DIASTOLIC CHF, CHRONIC (HCC): ICD-10-CM

## 2021-02-09 DIAGNOSIS — Z23 NEEDS FLU SHOT: ICD-10-CM

## 2021-02-09 DIAGNOSIS — E66.01 MORBID OBESITY (HCC): ICD-10-CM

## 2021-02-09 DIAGNOSIS — E11.22 CONTROLLED TYPE 2 DIABETES MELLITUS WITH STAGE 2 CHRONIC KIDNEY DISEASE, WITHOUT LONG-TERM CURRENT USE OF INSULIN (HCC): ICD-10-CM

## 2021-02-09 DIAGNOSIS — I21.4 NSTEMI (NON-ST ELEVATED MYOCARDIAL INFARCTION) (HCC): ICD-10-CM

## 2021-02-09 DIAGNOSIS — I48.0 PAF (PAROXYSMAL ATRIAL FIBRILLATION) (HCC): ICD-10-CM

## 2021-02-09 DIAGNOSIS — N18.2 CONTROLLED TYPE 2 DIABETES MELLITUS WITH STAGE 2 CHRONIC KIDNEY DISEASE, WITHOUT LONG-TERM CURRENT USE OF INSULIN (HCC): ICD-10-CM

## 2021-02-09 DIAGNOSIS — I10 ESSENTIAL HYPERTENSION: Primary | ICD-10-CM

## 2021-02-09 DIAGNOSIS — E78.5 HYPERLIPIDEMIA, UNSPECIFIED HYPERLIPIDEMIA TYPE: ICD-10-CM

## 2021-02-09 DIAGNOSIS — E78.2 MIXED HYPERLIPIDEMIA: ICD-10-CM

## 2021-02-09 DIAGNOSIS — G61.0 GUILLAIN-BARRE SYNDROME (HCC): ICD-10-CM

## 2021-02-09 DIAGNOSIS — M15.9 PRIMARY OSTEOARTHRITIS INVOLVING MULTIPLE JOINTS: ICD-10-CM

## 2021-02-09 DIAGNOSIS — I10 ESSENTIAL HYPERTENSION: ICD-10-CM

## 2021-02-09 DIAGNOSIS — I25.10 ASCVD (ARTERIOSCLEROTIC CARDIOVASCULAR DISEASE): ICD-10-CM

## 2021-02-09 LAB
A-G RATIO,AGRAT: 1.9 RATIO
ALBUMIN SERPL-MCNC: 3.8 G/DL (ref 3.9–5.4)
ALP SERPL-CCNC: 61 U/L (ref 38–126)
ALT SERPL-CCNC: 13 U/L (ref 0–50)
ANION GAP SERPL CALC-SCNC: 9 MMOL/L
AST SERPL W P-5'-P-CCNC: 20 U/L (ref 14–36)
BILIRUB SERPL-MCNC: 1.2 MG/DL (ref 0.2–1.3)
BUN SERPL-MCNC: 18 MG/DL (ref 9–20)
BUN/CREATININE RATIO,BUCR: 20 RATIO
CALCIUM SERPL-MCNC: 9 MG/DL (ref 8.4–10.2)
CHLORIDE SERPL-SCNC: 100 MMOL/L (ref 98–107)
CHOL/HDL RATIO,CHHD: 3 RATIO (ref 0–4)
CHOLEST SERPL-MCNC: 176 MG/DL (ref 0–200)
CK SERPL-CCNC: 64 U/L (ref 30–135)
CO2 SERPL-SCNC: 27 MMOL/L (ref 22–32)
CREAT SERPL-MCNC: 0.9 MG/DL (ref 0.8–1.5)
GLOBULIN,GLOB: 2
GLUCOSE SERPL-MCNC: 254 MG/DL (ref 75–110)
HBA1C MFR BLD HPLC: 10.1 % (ref 4–5.7)
HDLC SERPL-MCNC: 52 MG/DL (ref 35–130)
LDL/HDL RATIO,LDHD: 2 RATIO
LDLC SERPL CALC-MCNC: 88 MG/DL (ref 0–130)
POTASSIUM SERPL-SCNC: 4.3 MMOL/L (ref 3.6–5)
PROT SERPL-MCNC: 5.8 G/DL (ref 6.3–8.2)
SODIUM SERPL-SCNC: 136 MMOL/L (ref 137–145)
TRIGL SERPL-MCNC: 179 MG/DL (ref 0–200)
VLDLC SERPL CALC-MCNC: 36 MG/DL

## 2021-02-09 PROCEDURE — 90694 VACC AIIV4 NO PRSRV 0.5ML IM: CPT | Performed by: INTERNAL MEDICINE

## 2021-02-09 PROCEDURE — G8754 DIAS BP LESS 90: HCPCS | Performed by: INTERNAL MEDICINE

## 2021-02-09 PROCEDURE — G0008 ADMIN INFLUENZA VIRUS VAC: HCPCS | Performed by: INTERNAL MEDICINE

## 2021-02-09 PROCEDURE — G8510 SCR DEP NEG, NO PLAN REQD: HCPCS | Performed by: INTERNAL MEDICINE

## 2021-02-09 PROCEDURE — 83036 HEMOGLOBIN GLYCOSYLATED A1C: CPT | Performed by: INTERNAL MEDICINE

## 2021-02-09 PROCEDURE — 82550 ASSAY OF CK (CPK): CPT | Performed by: INTERNAL MEDICINE

## 2021-02-09 PROCEDURE — G8752 SYS BP LESS 140: HCPCS | Performed by: INTERNAL MEDICINE

## 2021-02-09 PROCEDURE — G8417 CALC BMI ABV UP PARAM F/U: HCPCS | Performed by: INTERNAL MEDICINE

## 2021-02-09 PROCEDURE — 99214 OFFICE O/P EST MOD 30 MIN: CPT | Performed by: INTERNAL MEDICINE

## 2021-02-09 PROCEDURE — 80053 COMPREHEN METABOLIC PANEL: CPT | Performed by: INTERNAL MEDICINE

## 2021-02-09 PROCEDURE — 1101F PT FALLS ASSESS-DOCD LE1/YR: CPT | Performed by: INTERNAL MEDICINE

## 2021-02-09 PROCEDURE — G8536 NO DOC ELDER MAL SCRN: HCPCS | Performed by: INTERNAL MEDICINE

## 2021-02-09 PROCEDURE — 80061 LIPID PANEL: CPT | Performed by: INTERNAL MEDICINE

## 2021-02-09 PROCEDURE — G8427 DOCREV CUR MEDS BY ELIG CLIN: HCPCS | Performed by: INTERNAL MEDICINE

## 2021-02-09 RX ORDER — PRAVASTATIN SODIUM 80 MG/1
TABLET ORAL
Qty: 90 TAB | Refills: 1 | Status: SHIPPED | OUTPATIENT
Start: 2021-02-09 | End: 2021-12-06

## 2021-02-09 RX ORDER — LOSARTAN POTASSIUM 100 MG/1
TABLET ORAL
Qty: 90 TAB | Refills: 1 | Status: SHIPPED | OUTPATIENT
Start: 2021-02-09 | End: 2021-09-03

## 2021-02-09 NOTE — TELEPHONE ENCOUNTER
RX refill request from the patient/pharmacy. Patient last seen 02- with labs, and next appt. scheduled for 05-  Requested Prescriptions     Pending Prescriptions Disp Refills    pravastatin (PRAVACHOL) 80 mg tablet 90 Tab 1     Sig: TAKE ONE TABLET BY MOUTH DAILY    losartan (COZAAR) 100 mg tablet 90 Tab 1     Sig: TAKE ONE TABLET BY MOUTH DAILY   .

## 2021-02-09 NOTE — PROGRESS NOTES
Chief Complaint   Patient presents with    Hypertension     overdue 3 month follow up    CHF    Diabetes    Cholesterol Problem       SUBJECTIVE:    Arash Dickinson is a 66 y.o. male who returns in follow-up for his medical problems include hypertension, diabetes, hyperlipidemia, was in congestive heart failure, non-ST elevation MI with ASCVD D, paroxysmal atrial fibrillation, compensated CHF and other multiple medical problems. He is overdue for follow-up having missed his last appointment. He currently denies any chest pain, shortness of breath, palpitations, PND, orthopnea or other cardiac or respiratory complaints. He notes no current GI or  complaints. He notes no headaches, dizziness or neurologic complaints. There are no current active arthritic complaints and and no other complaints on complete your systems. Current Outpatient Medications   Medication Sig Dispense Refill    pravastatin (PRAVACHOL) 80 mg tablet TAKE ONE TABLET BY MOUTH DAILY 90 Tab 1    glipiZIDE (GLUCOTROL) 10 mg tablet TAKE ONE TABLET BY MOUTH TWICE A  Tab 3    amLODIPine (NORVASC) 10 mg tablet TAKE ONE TABLET BY MOUTH DAILY 90 Tab 3    lisinopriL (PRINIVIL, ZESTRIL) 40 mg tablet TAKE 1 TABLET BY MOUTH EVERY DAY 90 Tab 3    meloxicam (MOBIC) 15 mg tablet TAKE ONE TABLET BY MOUTH DAILY 90 Tab 1    dapagliflozin (Farxiga) 5 mg tab tablet Take 1 Tab by mouth daily.  30 Tab 5    finasteride (PROSCAR) 5 mg tablet TAKE ONE TABLET BY MOUTH DAILY 90 Tab PRN    Januvia 100 mg tablet TAKE ONE TABLET BY MOUTH DAILY 30 Tab 5    losartan (COZAAR) 100 mg tablet TAKE ONE TABLET BY MOUTH DAILY 90 Tab 2    metFORMIN (GLUCOPHAGE) 500 mg tablet TAKE ONE TABLET BY MOUTH EVERY MORNING AND TAKE TWO TABLETS BY MOUTH EVERY EVENING WITH DINNER (Patient taking differently: Taking one table in the morning and one in the evening) 270 Tab 3    tamsulosin (FLOMAX) 0.4 mg capsule TAKE TWO CAPSULES BY MOUTH DAILY 180 Cap prn    hydrALAZINE (APRESOLINE) 100 mg tablet Take 1 Tab by mouth three (3) times daily. 90 Tab prn    cloNIDine HCl (CATAPRES) 0.2 mg tablet Take 1 Tab by mouth two (2) times a day. 60 Tab 12    clopidogrel (PLAVIX) 75 mg tab Take 1 Tab by mouth daily. 30 Tab 12    metoprolol tartrate (LOPRESSOR) 25 mg tablet Take 1 Tab by mouth every twelve (12) hours. 60 Tab 12    timolol (TIMOPTIC-XE) 0.5 % ophthalmic gel-forming       Omega-3-DHA-EPA-Fish Oil 1,000 mg (120 mg-180 mg) cap Take 1 Cap by mouth three (3) times daily.  aspirin delayed-release 81 mg tablet Take  by mouth daily.        Past Medical History:   Diagnosis Date    Aortic stenosis 8/2/2017    ASVD (arteriosclerotic vascular disease) 8/2/2017    Back pain 8/2/2017    BPH (benign prostatic hyperplasia) 8/2/2017    CKD (chronic kidney disease) 8/2/2017    Diabetes ([address] 8/2/2017    DJD (degenerative joint disease) 8/2/2017    ED (erectile dysfunction) 8/2/2017    Guillain-Vintondale syndrome ([address] 8/2/2017    Hyperlipidemia 8/2/2017    Hypertension 8/2/2017    Left carotid bruit 8/2/2017    Morbid obesity ([address] 8/2/2017    On statin therapy 8/2/2017    Urticaria 8/2/2017     Past Surgical History:   Procedure Laterality Date    ND CARDIAC SURG PROCEDURE UNLIST  09/2019    4 stents placed     Allergies   Allergen Reactions    Adhesive Tape-Silicones Unknown (comments)    Chocolate Flavor Unknown (comments)       REVIEW OF SYSTEMS:  General: negative for - chills or fever, or weight loss or gain  ENT: negative for - headaches, nasal congestion or tinnitus  Eyes: no blurred or visual changes  Neck: No stiffness or swollen nodes  Respiratory: negative for - cough, hemoptysis, shortness of breath or wheezing  Cardiovascular : negative for - chest pain, edema, palpitations or shortness of breath  Gastrointestinal: negative for - abdominal pain, blood in stools, heartburn or nausea/vomiting  Genito-Urinary: no dysuria, trouble voiding, or hematuria  Musculoskeletal: negative for - gait disturbance, joint pain, joint stiffness or joint swelling  Neurological: no TIA or stroke symptoms  Hematologic: no bruises, no bleeding  Lymphatic: no swollen glands  Integument: no lumps, mole changes, nail changes or rash  Endocrine:no malaise/lethargy poly uria or polydipsia or unexpected weight changes        Social History     Socioeconomic History    Marital status:      Spouse name: Not on file    Number of children: Not on file    Years of education: Not on file    Highest education level: Not on file   Social Needs    Financial resource strain: Not hard at all   Hector-Khurram insecurity     Worry: Patient refused     Inability: Patient refused    Transportation needs     Medical: Patient refused     Non-medical: Patient refused   Tobacco Use    Smoking status: Never Smoker    Smokeless tobacco: Never Used   Substance and Sexual Activity    Alcohol use: Yes     Comment: social    Drug use: No   Other Topics Concern     Family History   Problem Relation Age of Onset    Heart Disease Mother         murmor    Heart Disease Father        OBJECTIVE:     Visit Vitals  /66 (BP 1 Location: Left upper arm, BP Patient Position: Sitting, BP Cuff Size: Large adult)   Pulse 63   Temp 97.7 °F (36.5 °C) (Temporal)   Resp 18   Ht 5' 10\" (1.778 m)   Wt 228 lb 9.6 oz (103.7 kg)   SpO2 98%   BMI 32.80 kg/m²     CONSTITUTIONAL:   well nourished, appears age appropriate  EYES: sclera anicteric, PERRL, EOMI  ENMT:nares clear, moist mucous membranes, pharynx clear  NECK: supple.  Thyroid normal, No JVD or bruits  RESPIRATORY: Chest: clear to ascultation and percussion, normal inspiratory effort  CARDIOVASCULAR: Heart: regular rate and rhythm no murmurs, rubs or gallops, PMI not displaced, No thrills, no peripheral edema  GASTROINTESTINAL: Abdomen: non distended, soft, non tender, bowel sounds normal  HEMATOLOGIC: no purpura, petechiae or bruising  LYMPHATIC: No lymph node enlargemant  MUSCULOSKELETAL: Extremities: no active synovitis, pulse 1+ . No diabetic foot changes noted. INTEGUMENT: No unusual rashes or suspicious skin lesions noted. Nails appear normal.  PERIPHERAL VASCULAR: normal pulses femoral, PT and DP  NEUROLOGIC: non-focal exam, A & O X 3. Normal distal sensation and proprioception all toes both feet. PSYCHIATRIC:, appropriate affect     ASSESSMENT:   1. Essential hypertension    2. Controlled type 2 diabetes mellitus with stage 2 chronic kidney disease, without long-term current use of insulin ([facility]    3. Mixed hyperlipidemia    4. ASCVD (arteriosclerotic cardiovascular disease)    5. Diastolic CHF, chronic (HCC)    6. NSTEMI (non-ST elevated myocardial infarction) ([facility]    7. PAF (paroxysmal atrial fibrillation) ([facility]    8. Primary osteoarthritis involving multiple joints    9. Morbid obesity ([facility]    10. Guillain-Paul syndrome ([facility]    11. Needs flu shot      Impression  1. Hypertension that is controlled so continue current therapy reviewed with him. 2.  Diabetes repeat status pending a prior lab review not make adjustments if necessary. 3.  Hyperlipidemia prior lab reviewed repeat status pending I will adjust if needed. 4.  ASCVD clinically stable continue aspirin daily. He is status post non-ST elevation MI and has compensated CHF. 5.  Paroxysmal atrial fibrillation no recent symptoms  6. DJD that is stable  7. Morbid obesity we did discuss diet, exercise and weight reduction for overall health benefit. He has taken flu shot even though he had a history Guillain-Barré he has taken flu shot each year and has had no problem with that so flu shot given today. I will call the lab and make further recommendations or adjustments if necessary. Follow-up scheduled for 3 months or sooner should it be a problem.     PLAN:  .  Orders Placed This Encounter    Influenza Vaccine, QUAD, 65 Yrs +  IM  (Fluad 19301 )    METABOLIC PANEL, COMPREHENSIVE ([facility])    LIPID PANEL ([facility])    CK ([facility])    HEMOGLOBIN A1C W/O EAG ([facility])         ATTENTION:   This medical record was transcribed using an electronic medical records system. Although proofread, it may and can contain electronic and spelling errors. Other human spelling and other errors may be present. Corrections may be executed at a later time. Please feel free to contact us for any clarifications as needed. Follow-up and Dispositions    · Return in about 3 months (around 5/9/2021). No results found for any visits on 02/09/21. Justin Durbin MD    The patient verbalized understanding of the problems and plans as explained.

## 2021-02-09 NOTE — PATIENT INSTRUCTIONS
Learning About ARBs Introduction ARBs (angiotensin II receptor blockers) block a hormone that makes blood vessels narrow. As a result, the blood vessels relax and widen. This lowers blood pressure. ARBs also put more water and salt into the urine. This also lowers blood pressure. ARBs can treat: 
· High blood pressure. · Coronary artery disease. · Heart failure. They also may be used to help your kidneys when you have diabetes. Examples · candesartan (Atacand) · irbesartan (Avapro) · losartan (Cozaar) · olmesartan (Benicar) · valsartan (Diovan) This is not a complete list of all ARBs. Possible side effects Side effects may include: 
· Low blood pressure. You may feel dizzy and weak. · High potassium levels. You may have other side effects or reactions not listed here. Check the information that comes with your medicine. What to know about taking this medicine · ARBs may be used if you had a cough when you tried to take an ACE inhibitor. ARBs are less likely to cause a cough. · You may need regular blood tests. · Take your medicines exactly as prescribed. Call your doctor if you think you are having a problem with your medicine. · Tell your doctor or pharmacist all the medicines you take. This includes over-the-counter medicines, vitamins, herbal products, and supplements. Taking some medicines together can cause problems. · You should not take ARBs if you are pregnant or planning to become pregnant. Where can you learn more? Go to http://www.gray.com/ Enter K212 in the search box to learn more about \"Learning About ARBs. \" Current as of: December 16, 2019               Content Version: 12.6 © 7400-5873 [employer]. Care instructions adapted under license by [employer] (which disclaims liability or warranty for this information). If you have questions about a medical condition or this instruction, always ask your healthcare professional. [address] any warranty or liability for your use of this information.

## 2021-02-09 NOTE — PROGRESS NOTES
Chief Complaint   Patient presents with    Hypertension     overdue 3 month follow up    CHF    Diabetes    Cholesterol Problem     Visit Vitals  /66 (BP 1 Location: Left upper arm, BP Patient Position: Sitting, BP Cuff Size: Large adult)   Pulse 63   Temp 97.7 °F (36.5 °C) (Temporal)   Resp 18   Ht 5' 10\" (1.778 m)   Wt 228 lb 9.6 oz (103.7 kg)   SpO2 98%   BMI 32.80 kg/m²     1. Have you been to the ER, urgent care clinic since your last visit? Hospitalized since your last visit? No    2. Have you seen or consulted any other health care providers outside of the [address] [place] [address] since your last visit? Include any pap smears or colon screening. Dr. Kimberly Zimmerman      After obtaining consent and per verbal order from Dr. Payton Gonzalez, patient received influenza vaccine given by Son Mary and verified by Amarilis Allen. Fluad Influenza 0.5ml was given IM in left deltoid. Patient tolerated injection and was observed for 10 minutes post injection. VIS was given.

## 2021-02-10 NOTE — PROGRESS NOTES
Very high blood sugar and glycohemoglobin so really needs insulin and the only room to increase his oral medications or increasing Farxiga to 10 mg daily. Needs to diet but definitely needs better blood sugar control so I would highly recommend starting Lantus 20 units daily.

## 2021-03-04 RX ORDER — MELOXICAM 15 MG/1
TABLET ORAL
Qty: 90 TAB | Refills: 0 | Status: SHIPPED | OUTPATIENT
Start: 2021-03-04 | End: 2021-09-13 | Stop reason: ALTCHOICE

## 2021-03-04 NOTE — TELEPHONE ENCOUNTER
PCP: Mendez Ching MD    Last appt: 6/4/2020  Future Appointments   Date Time Provider Martín Trujillo   5/11/2021  8:40 AM Mendez Ching MD [facility]       Last refilled:8/14/20    Requested Prescriptions     Pending Prescriptions Disp Refills    meloxicam (MOBIC) 15 mg tablet [Pharmacy Med Name: MELOXICAM 15 MG TABLET] 90 Tab 0     Sig: TAKE ONE TABLET BY MOUTH DAILY

## 2021-03-15 RX ORDER — TAMSULOSIN HYDROCHLORIDE 0.4 MG/1
CAPSULE ORAL
Qty: 180 CAP | Status: SHIPPED | OUTPATIENT
Start: 2021-03-15 | End: 2022-03-11

## 2021-03-16 RX ORDER — HYDRALAZINE HYDROCHLORIDE 25 MG/1
TABLET, FILM COATED ORAL
Qty: 90 TAB | Status: SHIPPED | OUTPATIENT
Start: 2021-03-16 | End: 2021-09-13 | Stop reason: ALTCHOICE

## 2021-03-16 NOTE — TELEPHONE ENCOUNTER
RX refill request from the patient/pharmacy. Patient last seen 02- with labs, and next appt. scheduled for 05-  Requested Prescriptions     Pending Prescriptions Disp Refills    hydrALAZINE (APRESOLINE) 25 mg tablet [Pharmacy Med Name: hydrALAZINE 25 MG TABLET] 90 Tab PRN     Sig: TAKE ONE TABLET BY MOUTH THREE TIMES A DAY   .

## 2021-05-11 NOTE — PROGRESS NOTES
Very high blood sugar and glycohemoglobin so really needs insulin and the only room to increase his oral medications or increasing Farxiga to 10 mg daily. Needs to diet but definitely needs better blood sugar control so I would highly recommend starting Lantus 20 units daily. Patient failed to call back regarding his labs and failed to f/up for his 3 month check up on 5-.

## 2021-09-02 DIAGNOSIS — I10 ESSENTIAL HYPERTENSION: ICD-10-CM

## 2021-09-03 RX ORDER — LOSARTAN POTASSIUM 100 MG/1
TABLET ORAL
Qty: 90 TABLET | Refills: 1 | Status: SHIPPED | OUTPATIENT
Start: 2021-09-03 | End: 2021-09-13 | Stop reason: ALTCHOICE

## 2021-09-03 NOTE — TELEPHONE ENCOUNTER
RX refill request from the patient/pharmacy. Patient last seen 02- with labs, and next appt. scheduled for 09-  Requested Prescriptions     Pending Prescriptions Disp Refills    losartan (COZAAR) 100 mg tablet [Pharmacy Med Name: LOSARTAN POTASSIUM 100 MG TAB] 90 Tablet 1     Sig: TAKE ONE TABLET BY MOUTH DAILY   .

## 2021-09-04 DIAGNOSIS — I10 ESSENTIAL HYPERTENSION: ICD-10-CM

## 2021-09-07 RX ORDER — LISINOPRIL 40 MG/1
TABLET ORAL
Qty: 90 TABLET | Refills: 3 | Status: SHIPPED | OUTPATIENT
Start: 2021-09-07 | End: 2022-03-11

## 2021-09-07 NOTE — TELEPHONE ENCOUNTER
RX refill request from the patient/pharmacy. Patient last seen 02- with labs, and next appt. scheduled for 09-  Requested Prescriptions     Pending Prescriptions Disp Refills    lisinopriL (PRINIVIL, ZESTRIL) 40 mg tablet [Pharmacy Med Name: LISINOPRIL 40 MG TABLET] 90 Tablet 3     Sig: TAKE 1 TABLET BY MOUTH EVERY DAY   .

## 2021-09-12 NOTE — PROGRESS NOTES
This is a Subsequent Medicare Annual Wellness Visit providing Personalized Prevention Plan Services (PPPS) (Performed 12 months after initial AWV and PPPS )    I have reviewed the patient's medical history in detail and updated the computerized patient record. He returns today for his Medicare subsequent annual wellness examination and screening questionnaire accompanied by his wife. He is also in follow-up of his multiple medical problems which he is long overdue having had appointment in May which time he was a no-show and now coming in today. His medical problems consist of chronic problems of hypertension, diabetes, hyperlipidemia, ASCVD status post non-ST elevation MI in 2019 with stent placement, paroxysmal atrial fibrillation, diastolic CHF compensated, aortic stenosis, ASVD of both carotids less than 50% on last check, history of Guillain-Barré syndrome without residual, BPH, DJD, CKD stage II, morbid obesity, insomnia and other multiple medical problems. In addition to his chronic problems he has an acute problem today of some left knee pain is been bothering him for a while and he is concerned about that but it does not give out on him a lock up on him but he would like to get a cortisone shot in it. He also notes his energy has decreased significantly and he has lost some weight. He was not sure how much he had lost but per our scales he is actually down 29 pounds from his last visit here on February 9. He does note he is more constipated now school and sometimes 2 or 3 days without a bowel movement. He is noted no change in color of his bowel movements. He does note intermittently having some postnasal drainage that is blood-streaked but has no bleeding from his nose. He denies any difficulty swallowing. He notes his appetite is decreased. He denies any chest pain, shortness of breath, palpitations, PND, orthopnea or other cardiac or respiratory complaints. He is now off the Plavix.   Other than change in bowel habits he notes no GI complaints and there are no  complaints. He has no headaches, dizziness or neurologic complaints except general weakness. The only arthritic complaints are those related to the left knee. He notes no other complaints on complete review of systems. This is all confirmed by his wife present with him today. He was diagnosed with a melanoma on his scalp by Dr. Shen Angulo and has been using some cream and has a decision as to whether to surgically remove the melanoma he has follow-up scheduled with her. History     Past Medical History:   Diagnosis Date    Aortic stenosis 8/2/2017    ASVD (arteriosclerotic vascular disease) 8/2/2017    Back pain 8/2/2017    BPH (benign prostatic hyperplasia) 8/2/2017    CKD (chronic kidney disease) 8/2/2017    Diabetes ([address] 8/2/2017    DJD (degenerative joint disease) 8/2/2017    ED (erectile dysfunction) 8/2/2017    Guillain-[place] syndrome ([address] 8/2/2017    Hyperlipidemia 8/2/2017    Hypertension 8/2/2017    Left carotid bruit 8/2/2017    Melanoma ([address] 2021    Morbid obesity ([address] 8/2/2017    On statin therapy 8/2/2017    Urticaria 8/2/2017      Past Surgical History:   Procedure Laterality Date    FL CARDIAC SURG PROCEDURE UNLIST  09/2019    4 stents placed     Social History     Tobacco Use    Smoking status: Never Smoker    Smokeless tobacco: Never Used   Vaping Use    Vaping Use: Never used   Substance Use Topics    Alcohol use: Yes     Comment: social    Drug use: No     Current Outpatient Medications   Medication Sig Dispense Refill    methylPREDNISolone acetate (DEPO-MEDROL) 40 mg/mL injection 1 mL by IntraMUSCular route once for 1 dose.  1 mL 0    lisinopriL (PRINIVIL, ZESTRIL) 40 mg tablet TAKE 1 TABLET BY MOUTH EVERY DAY 90 Tablet 3    tamsulosin (FLOMAX) 0.4 mg capsule TAKE TWO CAPSULES BY MOUTH DAILY 180 Cap PRN    pravastatin (PRAVACHOL) 80 mg tablet TAKE ONE TABLET BY MOUTH DAILY 90 Tab 1    glipiZIDE (GLUCOTROL) 10 mg tablet TAKE ONE TABLET BY MOUTH TWICE A  Tab 3    amLODIPine (NORVASC) 10 mg tablet TAKE ONE TABLET BY MOUTH DAILY 90 Tab 3    finasteride (PROSCAR) 5 mg tablet TAKE ONE TABLET BY MOUTH DAILY 90 Tab PRN    Januvia 100 mg tablet TAKE ONE TABLET BY MOUTH DAILY 30 Tab 5    metFORMIN (GLUCOPHAGE) 500 mg tablet TAKE ONE TABLET BY MOUTH EVERY MORNING AND TAKE TWO TABLETS BY MOUTH EVERY EVENING WITH DINNER (Patient taking differently: Taking one table in the morning and one in the evening) 270 Tab 3    cloNIDine HCl (CATAPRES) 0.2 mg tablet Take 1 Tab by mouth two (2) times a day. 60 Tab 12    metoprolol tartrate (LOPRESSOR) 25 mg tablet Take 1 Tab by mouth every twelve (12) hours. 60 Tab 12    timolol (TIMOPTIC-XE) 0.5 % ophthalmic gel-forming       Omega-3-DHA-EPA-Fish Oil 1,000 mg (120 mg-180 mg) cap Take 1 Cap by mouth three (3) times daily.  aspirin delayed-release 81 mg tablet Take  by mouth daily.        Allergies   Allergen Reactions    Adhesive Tape-Silicones Unknown (comments)    Chocolate Flavor Unknown (comments)     Family History   Problem Relation Age of Onset    Heart Disease Mother         murmor    Heart Disease Father        Patient Active Problem List    Diagnosis    Diastolic CHF, chronic (HCC)    ASCVD (arteriosclerotic cardiovascular disease)     80% LAD, 80% CX, 80% RCA all treated with sten t9/2019      Controlled type 2 diabetes mellitus with stage 2 chronic kidney disease, without long-term current use of insulin (HCC)    Primary osteoarthritis involving multiple joints    Morbid [facility]    Essential hypertension    Stage 2 chronic kidney disease    Mixed hyperlipidemia    PAF (paroxysmal atrial fibrillation) ([facility]     9/2019 at time of MI      NSTEMI (non-ST elevated myocardial infarction) ([facility]     9/2019  80% mid LAD -stent    80% long stenosis Cx - 2 stents,    80% RCA - stent        BPH (benign prostatic hyperplasia)    ASVD (arteriosclerotic vascular disease)     Carotid Dopplers August 2019 mild stenosis carotids bilateral less than 50%      Aortic stenosis    Primary osteoarthritis of left knee    Weight loss    Epistaxis    Alcohol screening    Primary insomnia    Prostate cancer screening    Hematuria    Medicare annual wellness visit, subsequent    ED (erectile dysfunction)    Guillain-[place] syndrome ([facility]    Urticaria    Left carotid bruit    Back pain    Neoplasm of uncertain behavior of skin    Stenosis of both internal carotid arteries     8/12/2019 < 50% bilateral by duplex         Patient Care Team:  Leah Gutierrez MD as PCP - General (Internal Medicine)  Leah Gutierrez MD as PCP - [facility]  Geraldo Mcpherson MD (Cardiology)    Depression Risk Factor Screening:     3 most recent PHQ Screens 9/13/2021   Little interest or pleasure in doing things Not at all   Feeling down, depressed, irritable, or hopeless Several days   Total Score PHQ 2 1   Trouble falling or staying asleep, or sleeping too much -   Feeling tired or having little energy -   Poor appetite, weight loss, or overeating -   Feeling bad about yourself - or that you are a failure or have let yourself or your family down -   Trouble concentrating on things such as school, work, reading, or watching TV -   Moving or speaking so slowly that other people could have noticed; or the opposite being so fidgety that others notice -   Thoughts of being better off dead, or hurting yourself in some way -   PHQ 9 Score -     Alcohol Risk Factor Screening: You do not drink alcohol or very rarely. Functional Ability and Level of Safety:     Fall Risk     Fall Risk Assessment, last 12 mths 9/13/2021   Able to walk? Yes   Fall in past 12 months? 0   Do you feel unsteady? 0   Are you worried about falling 0       Hearing Loss   mild    Activities of Daily Living   Self-care.    ADL Assessment 9/13/2021   Feeding yourself No Help Needed   Getting from bed to chair No Help Needed   Getting dressed No Help Needed   Bathing or showering No Help Needed   Walk across the room (includes cane/walker) No Help Needed   Using the telphone No Help Needed   Taking your medications No Help Needed   Preparing meals No Help Needed   Managing money (expenses/bills) No Help Needed   Moderately strenuous housework (laundry) No Help Needed   Shopping for personal items (toiletries/medicines) No Help Needed   Shopping for groceries No Help Needed   Driving No Help Needed   Climbing a flight of stairs No Help Needed   Getting to places beyond walking distances No Help Needed       Abuse Screen   Patient is not abused    Social History     Social History Narrative    Not on file       Review of Systems      ROS:    Constitutional: He denies fevers, weight loss, sweats but overall energy level is decreased. Eyes: No blurred or double vision. ENT: No difficulty with swallowing, taste, speech or smell. Occasion noting some blood-streaked postnasal drainage but no nosebleeds  Neck: no stiffness or swelling  Respiratory: No cough wheezing or shortness of breath. Cardiovascular: Denies chest pain, palpitations, unexplained indigestion or syncope. Gastrointestinal: Positive for more problems with constipation now sometimes going 3 days without a bowel movement. No abdominal pain, no bloating. Genitourinary:  He denies frequency, nocturia or stranguria. Extremities: No joint pain, stiffness or swelling except left knee as noted. Neurological:  No numbness, tingling, burring paresthesias or loss of motor strength. No syncope, dizziness or frequent headache  Lymphatic: no adenopathy noted  Hematologic: no easy bruising or bleeding gums  Skin:  No recent rashes or mole changes. Psychiatric/Behavioral:  Negative for depression.       Physical Examination     Evaluation of Cognitive Function:  Mood/affect:  happy  Appearance: age appropriate  Family member/caregiver input: Wife     Vitals:    09/13/21 1041 09/13/21 1105   BP: (!) 160/94 136/86   Pulse: 78    Resp: 17    Temp: 98.3 °F (36.8 °C)    TempSrc: Oral    SpO2: 98%    Weight: 199 lb 8 oz (90.5 kg)    Height: 5' 10\" (1.778 m)    PainSc:  10 - Worst pain ever    PainLoc: Knee         PHYSICAL EXAM:    General appearance - alert, well appearing, and in no distress  Mental status - alert, oriented to person, place, and time  HEENT:  Ears - bilateral TM's and external ear canals clear  Eyes - pupillary responses were normal.  Extraocular muscle function intact. Lids and conjunctiva not injected. Fundoscopic exam revealed sharp disc margins. eye movements intact  Pharynx- clear with teeth in good repair. No masses were noted  Neck - supple without thyromegaly or burit. No JVD noted  Lungs - clear to auscultation and percussion  Cardiac- normal rate, regular rhythm without murmurs. PMI not displaced. No gallop, rub or click  Abdomen - flat, soft, non-tender without palpable organomegaly or mass. No pulsatile mass was felt, and not bruit was heard. Bowel sounds were active  : Circumcised, Testes descended w/o masses  Rectal: normal sphincter tone, prostate 1+ enlarged, smooth and nontender without nodules, no masses, stool brown and hemacult negative  Extremities -  no clubbing cyanosis or edema. Left knee tender with range of motion and palpation. No erythema, increased temperature or joint effusion. Lymphatics - no palpable lymphadenopathy, no hepatosplenomegaly  Hematologic: no petechiae or purpura  Peripheral vascular -Femoral, Dorsalis pedis and posterior tibial pulses felt without difficulty  Skin - no rash or unusual mole change noted  Neurological - Cranial nerves II-XII grossly intact. Motor strength 5/5. DTR's 2+ and symmetric.   Station and gait normal  Back exam - full range of motion, no tenderness, palpable spasm or pain on motion  Musculoskeletal - no joint tenderness, deformity or swelling        Results for orders placed or performed in visit on 09/13/21   AMB POC URINE, MICROALBUMIN, SEMIQUANT (1 RESULT)   Result Value Ref Range    Microalbumin urine (POC) 150 (A) 0 - 20 MG/L       Advice/Referrals/Counseling   Education and counseling provided:  Are appropriate based on today's review and evaluation  End-of-Life planning (with patient's consent)  Pneumococcal Vaccine  Influenza Vaccine  Colorectal cancer screening tests      Assessment/Plan     ASSESSMENT:   1. Essential hypertension    2. Controlled type 2 diabetes mellitus with stage 2 chronic kidney disease, without long-term current use of insulin ([facility]    3. Mixed hyperlipidemia    4. Primary osteoarthritis involving multiple joints    5. Stage 2 chronic kidney disease    6. ASCVD (arteriosclerotic cardiovascular disease)    7. Diastolic CHF, chronic (HCC)    8. Aortic valve stenosis, etiology of cardiac valve disease unspecified    9. ASVD (arteriosclerotic vascular disease)    10. PAF (paroxysmal atrial fibrillation) ([facility]    11. NSTEMI (non-ST elevated myocardial infarction) ([facility]    12. Morbid obesity ([facility]    13. Benign prostatic hyperplasia with lower urinary tract symptoms, symptom details unspecified    14. Guillain-[place] syndrome ([facility]    15. Primary insomnia    16. Stenosis of both internal carotid arteries    17. Prostate cancer screening    18. Alcohol screening    19. Medicare annual wellness visit, subsequent    20. Primary osteoarthritis of left knee    21. Weight loss    22. Epistaxis      Impression  1. Hypertension that is controlled adequately on recheck by me so continue current therapy reviewed with he and his wife. 2.  Diabetes we will see what that status is make adjustments if necessary. Prior numbers reviewed. 3.  Hyperlipidemia prior numbers reviewed repeat status pending I will adjust if needed. 4. DJD stable except for the left knee   5. CKD stage II repeat status pending  6.   ASCVD clinically stable continue aspirin daily. EKG obtained today no acute process, left bundle branch block without change. 7.  Diastolic CHF compensated  8. Aortic stenosis stable  9. ASVD we will repeat his carotid Dopplers  10. Paroxysmal atrial fibrillation no recent symptoms  11   Prior non-ST elevation MI with stent placement back in 2019  12. Morbid obesity weight is actually down 29 pounds which is concerning  13. BPH currently asymptomatic  14   History of Guillain-Barré syndrome with no residual  15. Insomnia that is stable  17. Bilateral carotid stenosis as noted scheduling Dopplers  17. Prostate cancer screening done today with PSA pending  18. Annual alcohol screening is done he does drink some alcohol socially. I did caution with males more than 2 drinks per day with increased cardiovascular risk and increased risk of liver disease and other GI effects. 19. DJD of left knee we discussed treatment options and per his request elected to go with injection. After informed consent and Betadine scrub the left knee is intermittently injected with Depo-Medrol 40 mg and a cc lidocaine which he tolerated quite well. 20.  Weight loss that is concerning with a 29 pound weight loss. With the epistaxis I will get him an ENT appointment to look there and also will get his colonoscopy scheduled. Lab studies are pending  21.  Epistaxis as noted ENT appointment  All of the above was discussed with his wife present with him today. 40 minutes spent in direct care of this patient today not including time spent on Medicare wellness examination or procedures. Medicare subsequent annual wellness examination screening questionnaires completed today. The results were reviewed with him and his wife and questions were answered. Lifestyle recommendations and modifications discussed and made. I will call with lab results and make further recommendations or adjustments if necessary.   Follow-up as scheduled for 3 months although I will probably need to see him sooner and we will if we need to. I will call with all of his lab results and follow-up on all of the other studies that have been scheduled. PLAN:  .  Orders Placed This Encounter    DRAIN/INJECT LARGE JOINT/BURSA    CBC WITH AUTOMATED DIFF    METABOLIC PANEL, COMPREHENSIVE    LIPID PANEL    CK    HEMOGLOBIN A1C WITH EAG    PSA SCREENING (SCREENING)    T4 (THYROXINE)    TSH 3RD GENERATION    URINALYSIS W/ REFLEX CULTURE    [facility]    AMB POC URINE, MICROALBUMIN, SEMIQUANT (1 RESULT)    AMB POC EKG ROUTINE W/ 12 LEADS, INTER & REP    methylPREDNISolone acetate (DEPO-MEDROL) 40 mg/mL injection         ATTENTION:   This medical record was transcribed using an electronic medical records system. Although proofread, it may and can contain electronic and spelling errors. Other human spelling and other errors may be present. Corrections may be executed at a later time. Please feel free to contact us for any clarifications as needed. Follow-up and Dispositions    · Return in about 3 months (around 12/13/2021). Clinton Burks MD    Recommended healthy diet low in carbohydrates, fats, sodium and cholesterol. Recommended regular cardiovascular exercise 3-6 times per week for 30-60 minutes daily. Current Outpatient Medications   Medication Sig Dispense Refill    methylPREDNISolone acetate (DEPO-MEDROL) 40 mg/mL injection 1 mL by IntraMUSCular route once for 1 dose.  1 mL 0    lisinopriL (PRINIVIL, ZESTRIL) 40 mg tablet TAKE 1 TABLET BY MOUTH EVERY DAY 90 Tablet 3    tamsulosin (FLOMAX) 0.4 mg capsule TAKE TWO CAPSULES BY MOUTH DAILY 180 Cap PRN    pravastatin (PRAVACHOL) 80 mg tablet TAKE ONE TABLET BY MOUTH DAILY 90 Tab 1    glipiZIDE (GLUCOTROL) 10 mg tablet TAKE ONE TABLET BY MOUTH TWICE A  Tab 3    amLODIPine (NORVASC) 10 mg tablet TAKE ONE TABLET BY MOUTH DAILY 90 Tab 3    finasteride (PROSCAR) 5 mg tablet TAKE ONE TABLET BY MOUTH DAILY 90 Tab PRN    Januvia 100 mg tablet TAKE ONE TABLET BY MOUTH DAILY 30 Tab 5    metFORMIN (GLUCOPHAGE) 500 mg tablet TAKE ONE TABLET BY MOUTH EVERY MORNING AND TAKE TWO TABLETS BY MOUTH EVERY EVENING WITH DINNER (Patient taking differently: Taking one table in the morning and one in the evening) 270 Tab 3    cloNIDine HCl (CATAPRES) 0.2 mg tablet Take 1 Tab by mouth two (2) times a day. 60 Tab 12    metoprolol tartrate (LOPRESSOR) 25 mg tablet Take 1 Tab by mouth every twelve (12) hours. 60 Tab 12    timolol (TIMOPTIC-XE) 0.5 % ophthalmic gel-forming       Omega-3-DHA-EPA-Fish Oil 1,000 mg (120 mg-180 mg) cap Take 1 Cap by mouth three (3) times daily.  aspirin delayed-release 81 mg tablet Take  by mouth daily. Results for orders placed or performed in visit on 09/13/21   AMB POC URINE, MICROALBUMIN, SEMIQUANT (1 RESULT)   Result Value Ref Range    Microalbumin urine (POC) 150 (A) 0 - 20 MG/L       Verbal and written instructions (see AVS) provided. Patient expresses understanding of diagnosis and treatment plan.     Papito Gamez MD

## 2021-09-13 ENCOUNTER — OFFICE VISIT (OUTPATIENT)
Dept: INTERNAL MEDICINE CLINIC | Age: 76
End: 2021-09-13
Payer: MEDICARE

## 2021-09-13 VITALS
OXYGEN SATURATION: 98 % | WEIGHT: 199.5 LBS | HEART RATE: 78 BPM | BODY MASS INDEX: 28.56 KG/M2 | HEIGHT: 70 IN | RESPIRATION RATE: 17 BRPM | DIASTOLIC BLOOD PRESSURE: 86 MMHG | TEMPERATURE: 98.3 F | SYSTOLIC BLOOD PRESSURE: 136 MMHG

## 2021-09-13 DIAGNOSIS — I50.32 DIASTOLIC CHF, CHRONIC (HCC): ICD-10-CM

## 2021-09-13 DIAGNOSIS — I70.90 ASVD (ARTERIOSCLEROTIC VASCULAR DISEASE): ICD-10-CM

## 2021-09-13 DIAGNOSIS — N18.2 CONTROLLED TYPE 2 DIABETES MELLITUS WITH STAGE 2 CHRONIC KIDNEY DISEASE, WITHOUT LONG-TERM CURRENT USE OF INSULIN (HCC): ICD-10-CM

## 2021-09-13 DIAGNOSIS — Z12.5 PROSTATE CANCER SCREENING: ICD-10-CM

## 2021-09-13 DIAGNOSIS — I65.23 STENOSIS OF BOTH INTERNAL CAROTID ARTERIES: ICD-10-CM

## 2021-09-13 DIAGNOSIS — G61.0 GUILLAIN-BARRE SYNDROME (HCC): ICD-10-CM

## 2021-09-13 DIAGNOSIS — N40.1 BENIGN PROSTATIC HYPERPLASIA WITH LOWER URINARY TRACT SYMPTOMS, SYMPTOM DETAILS UNSPECIFIED: ICD-10-CM

## 2021-09-13 DIAGNOSIS — I21.4 NSTEMI (NON-ST ELEVATED MYOCARDIAL INFARCTION) (HCC): ICD-10-CM

## 2021-09-13 DIAGNOSIS — Z13.39 ALCOHOL SCREENING: ICD-10-CM

## 2021-09-13 DIAGNOSIS — R63.4 WEIGHT LOSS: ICD-10-CM

## 2021-09-13 DIAGNOSIS — E66.01 MORBID OBESITY (HCC): ICD-10-CM

## 2021-09-13 DIAGNOSIS — M17.12 PRIMARY OSTEOARTHRITIS OF LEFT KNEE: ICD-10-CM

## 2021-09-13 DIAGNOSIS — I10 ESSENTIAL HYPERTENSION: Primary | ICD-10-CM

## 2021-09-13 DIAGNOSIS — Z00.00 MEDICARE ANNUAL WELLNESS VISIT, SUBSEQUENT: ICD-10-CM

## 2021-09-13 DIAGNOSIS — I48.0 PAF (PAROXYSMAL ATRIAL FIBRILLATION) (HCC): ICD-10-CM

## 2021-09-13 DIAGNOSIS — E11.22 CONTROLLED TYPE 2 DIABETES MELLITUS WITH STAGE 2 CHRONIC KIDNEY DISEASE, WITHOUT LONG-TERM CURRENT USE OF INSULIN (HCC): ICD-10-CM

## 2021-09-13 DIAGNOSIS — M15.9 PRIMARY OSTEOARTHRITIS INVOLVING MULTIPLE JOINTS: ICD-10-CM

## 2021-09-13 DIAGNOSIS — I35.0 AORTIC VALVE STENOSIS, ETIOLOGY OF CARDIAC VALVE DISEASE UNSPECIFIED: ICD-10-CM

## 2021-09-13 DIAGNOSIS — F51.01 PRIMARY INSOMNIA: ICD-10-CM

## 2021-09-13 DIAGNOSIS — R04.0 EPISTAXIS: ICD-10-CM

## 2021-09-13 DIAGNOSIS — I25.10 ASCVD (ARTERIOSCLEROTIC CARDIOVASCULAR DISEASE): ICD-10-CM

## 2021-09-13 DIAGNOSIS — N18.2 STAGE 2 CHRONIC KIDNEY DISEASE: ICD-10-CM

## 2021-09-13 DIAGNOSIS — E78.2 MIXED HYPERLIPIDEMIA: ICD-10-CM

## 2021-09-13 LAB — MICROALBUMIN UR TEST STR-MCNC: 150 MG/L (ref 0–20)

## 2021-09-13 PROCEDURE — G8536 NO DOC ELDER MAL SCRN: HCPCS | Performed by: INTERNAL MEDICINE

## 2021-09-13 PROCEDURE — 82044 UR ALBUMIN SEMIQUANTITATIVE: CPT | Performed by: INTERNAL MEDICINE

## 2021-09-13 PROCEDURE — G8417 CALC BMI ABV UP PARAM F/U: HCPCS | Performed by: INTERNAL MEDICINE

## 2021-09-13 PROCEDURE — G8510 SCR DEP NEG, NO PLAN REQD: HCPCS | Performed by: INTERNAL MEDICINE

## 2021-09-13 PROCEDURE — 20610 DRAIN/INJ JOINT/BURSA W/O US: CPT | Performed by: INTERNAL MEDICINE

## 2021-09-13 PROCEDURE — 93010 ELECTROCARDIOGRAM REPORT: CPT | Performed by: INTERNAL MEDICINE

## 2021-09-13 PROCEDURE — G8754 DIAS BP LESS 90: HCPCS | Performed by: INTERNAL MEDICINE

## 2021-09-13 PROCEDURE — 1101F PT FALLS ASSESS-DOCD LE1/YR: CPT | Performed by: INTERNAL MEDICINE

## 2021-09-13 PROCEDURE — G8427 DOCREV CUR MEDS BY ELIG CLIN: HCPCS | Performed by: INTERNAL MEDICINE

## 2021-09-13 PROCEDURE — 99215 OFFICE O/P EST HI 40 MIN: CPT | Performed by: INTERNAL MEDICINE

## 2021-09-13 PROCEDURE — G0439 PPPS, SUBSEQ VISIT: HCPCS | Performed by: INTERNAL MEDICINE

## 2021-09-13 PROCEDURE — G8752 SYS BP LESS 140: HCPCS | Performed by: INTERNAL MEDICINE

## 2021-09-13 RX ORDER — METHYLPREDNISOLONE ACETATE 40 MG/ML
40 INJECTION, SUSPENSION INTRA-ARTICULAR; INTRALESIONAL; INTRAMUSCULAR; SOFT TISSUE ONCE
Qty: 1 ML | Refills: 0
Start: 2021-09-13 | End: 2021-09-13

## 2021-09-13 NOTE — PROGRESS NOTES
Chief Complaint   Patient presents with   Julius Freire Annual Wellness Visit     Visit Vitals  BP (!) 160/94 (BP 1 Location: Left upper arm, BP Patient Position: Sitting, BP Cuff Size: Adult)   Pulse 78   Temp 98.3 °F (36.8 °C) (Oral)   Resp 17   Ht 5' 10\" (1.778 m)   Wt 199 lb 8 oz (90.5 kg)   SpO2 98%   BMI 28.63 kg/m²     1. Have you been to the ER, urgent care clinic since your last visit? Hospitalized since your last visit? No    2. Have you seen or consulted any other health care providers outside of the [address] [place] [address] since your last visit? Include any pap smears or colon screening.  Dr. Jerome Mace  Dr. Carl Rico Screening:     3 most recent PHQ Screens 9/13/2021   Little interest or pleasure in doing things Not at all   Feeling down, depressed, irritable, or hopeless Several days   Total Score PHQ 2 1   Trouble falling or staying asleep, or sleeping too much -   Feeling tired or having little energy -   Poor appetite, weight loss, or overeating -   Feeling bad about yourself - or that you are a failure or have let yourself or your family down -   Trouble concentrating on things such as school, work, reading, or watching TV -   Moving or speaking so slowly that other people could have noticed; or the opposite being so fidgety that others notice -   Thoughts of being better off dead, or hurting yourself in some way -   PHQ 9 Score -       Functional Ability and Level of Safety:     Activities of Daily Living  ADL Assessment 9/13/2021   Feeding yourself No Help Needed   Getting from bed to chair No Help Needed   Getting dressed No Help Needed   Bathing or showering No Help Needed   Walk across the room (includes cane/walker) No Help Needed   Using the telphone No Help Needed   Taking your medications No Help Needed   Preparing meals No Help Needed   Managing money (expenses/bills) No Help Needed   Moderately strenuous housework (laundry) No Help Needed   Shopping for personal items (toiletries/medicines) No Help Needed   Shopping for groceries No Help Needed   Driving No Help Needed   Climbing a flight of stairs No Help Needed   Getting to places beyond walking distances No Help Needed       Fall Risk  Fall Risk Assessment, last 12 mths 9/13/2021   Able to walk? Yes   Fall in past 12 months? 0   Do you feel unsteady? 0   Are you worried about falling 0       Abuse Screen  Abuse Screening Questionnaire 9/13/2021   Do you ever feel afraid of your partner? N   Are you in a relationship with someone who physically or mentally threatens you? N   Is it safe for you to go home?  Y         Patient Care Team   Patient Care Team:  Dennise Dee MD as PCP - General (Internal Medicine)  Dennise Dee MD [facility] PCP [facility]  [facility]  Leonela Juarez MD (Cardiology)

## 2021-09-13 NOTE — PATIENT INSTRUCTIONS
Arthritis: Care Instructions  Overview     Arthritis, also called osteoarthritis, is a breakdown of the cartilage that cushions your joints. When the cartilage wears down, your bones rub against each other. This causes pain and stiffness. Many people have some arthritis as they age. Arthritis most often affects the joints of the spine, hands, hips, knees, or feet. Arthritis never goes away completely. But medicine and home treatment can help reduce pain and help you stay active. Follow-up care is a key part of your treatment and safety. Be sure to make and go to all appointments, and call your doctor if you are having problems. It's also a good idea to know your test results and keep a list of the medicines you take. How can you care for yourself at home? · Stay at a healthy weight. Being overweight puts extra strain on your joints. · Talk to your doctor or physical therapist about exercises that will help ease joint pain. ? Stretch. You may enjoy gentle forms of yoga to help keep your joints and muscles flexible. ? Walk instead of jog. Other types of exercise that are less stressful on the joints include riding a bike, swimming, virgilio chi, or water exercise. ? Lift weights. Strong muscles help reduce stress on your joints. Stronger thigh muscles, for example, take some of the stress off of the knees and hips. Learn the right way to lift weights so you don't make joint pain worse. · Take your medicines exactly as prescribed. Call your doctor if you think you are having a problem with your medicine. · Take pain medicines exactly as directed. ? If the doctor gave you a prescription medicine for pain, take it as prescribed. ? If you are not taking a prescription pain medicine, ask your doctor if you can take an over-the-counter medicine. · Use a cane, crutch, walker, or another device if you need help to get around. These can help rest your joints.  You also can use other things to make life easier, such as a higher toilet seat and padded handles on kitchen utensils. · Do not sit in low chairs. They can make it hard to get up. · Put heat or cold on your sore joints as needed. Use whichever helps you most. You can also switch between hot and cold packs. ? Apply heat 2 or 3 times a day for 20 to 30 minutesusing a heating pad, hot shower, or hot packto relieve pain and stiffness. But don't use heat on a swollen joint. ? Put ice or a cold pack on your sore joint for 10 to 20 minutes at a time. Put a thin cloth between the ice and your skin. When should you call for help? Call your doctor now or seek immediate medical care if:    · You have sudden swelling, warmth, or pain in any joint.     · You have joint pain and a fever or rash.     · You have such bad pain that you cannot use a joint. Watch closely for changes in your health, and be sure to contact your doctor if:    · You have mild joint symptoms that continue even with more than 6 weeks of care at home.     · You have stomach pain or other problems with your medicine. Where can you learn more? Go to http://www.gray.com/  Enter I453 in the search box to learn more about \"Arthritis: Care Instructions. \"  Current as of: August 5, 2020               Content Version: 12.8  © 4603-8786 [employer]. Care instructions adapted under license by [employer] (which disclaims liability or warranty for this information). If you have questions about a medical condition or this instruction, always ask your healthcare professional. [place] [address] any warranty or liability for your use of this information.

## 2021-09-14 LAB
ALBUMIN SERPL-MCNC: 3.8 G/DL (ref 3.5–5)
ALBUMIN/GLOB SERPL: 1.1 {RATIO} (ref 1.1–2.2)
ALP SERPL-CCNC: 131 U/L (ref 45–117)
ALT SERPL-CCNC: 16 U/L (ref 12–78)
ANION GAP SERPL CALC-SCNC: 9 MMOL/L (ref 5–15)
APPEARANCE UR: CLEAR
AST SERPL-CCNC: 18 U/L (ref 15–37)
BACTERIA URNS QL MICRO: NEGATIVE /HPF
BASOPHILS # BLD: 0 K/UL (ref 0–0.1)
BASOPHILS NFR BLD: 0 % (ref 0–1)
BILIRUB SERPL-MCNC: 1.3 MG/DL (ref 0.2–1)
BILIRUB UR QL: NEGATIVE
BUN SERPL-MCNC: 14 MG/DL (ref 6–20)
BUN/CREAT SERPL: 13 (ref 12–20)
CALCIUM SERPL-MCNC: 9.6 MG/DL (ref 8.5–10.1)
CHLORIDE SERPL-SCNC: 101 MMOL/L (ref 97–108)
CHOLEST SERPL-MCNC: 156 MG/DL
CK SERPL-CCNC: 70 U/L (ref 39–308)
CO2 SERPL-SCNC: 25 MMOL/L (ref 21–32)
COLOR UR: ABNORMAL
CREAT SERPL-MCNC: 1.04 MG/DL (ref 0.7–1.3)
DIFFERENTIAL METHOD BLD: ABNORMAL
EOSINOPHIL # BLD: 0 K/UL (ref 0–0.4)
EOSINOPHIL NFR BLD: 0 % (ref 0–7)
EPITH CASTS URNS QL MICRO: ABNORMAL /LPF
ERYTHROCYTE [DISTWIDTH] IN BLOOD BY AUTOMATED COUNT: 13.6 % (ref 11.5–14.5)
EST. AVERAGE GLUCOSE BLD GHB EST-MCNC: 246 MG/DL
GLOBULIN SER CALC-MCNC: 3.6 G/DL (ref 2–4)
GLUCOSE SERPL-MCNC: 160 MG/DL (ref 65–100)
GLUCOSE UR STRIP.AUTO-MCNC: 500 MG/DL
HBA1C MFR BLD: 10.2 % (ref 4–5.6)
HCT VFR BLD AUTO: 45 % (ref 36.6–50.3)
HDLC SERPL-MCNC: 56 MG/DL
HDLC SERPL: 2.8 {RATIO} (ref 0–5)
HGB BLD-MCNC: 14.3 G/DL (ref 12.1–17)
HGB UR QL STRIP: NEGATIVE
HYALINE CASTS URNS QL MICRO: ABNORMAL /LPF (ref 0–5)
IMM GRANULOCYTES # BLD AUTO: 0 K/UL (ref 0–0.04)
IMM GRANULOCYTES NFR BLD AUTO: 0 % (ref 0–0.5)
KETONES UR QL STRIP.AUTO: 15 MG/DL
LDLC SERPL CALC-MCNC: 72.2 MG/DL (ref 0–100)
LEUKOCYTE ESTERASE UR QL STRIP.AUTO: NEGATIVE
LYMPHOCYTES # BLD: 1.7 K/UL (ref 0.8–3.5)
LYMPHOCYTES NFR BLD: 21 % (ref 12–49)
MCH RBC QN AUTO: 25.5 PG (ref 26–34)
MCHC RBC AUTO-ENTMCNC: 31.8 G/DL (ref 30–36.5)
MCV RBC AUTO: 80.4 FL (ref 80–99)
MONOCYTES # BLD: 0.5 K/UL (ref 0–1)
MONOCYTES NFR BLD: 6 % (ref 5–13)
MUCOUS THREADS URNS QL MICRO: ABNORMAL /LPF
NEUTS SEG # BLD: 5.9 K/UL (ref 1.8–8)
NEUTS SEG NFR BLD: 73 % (ref 32–75)
NITRITE UR QL STRIP.AUTO: NEGATIVE
NRBC # BLD: 0 K/UL (ref 0–0.01)
NRBC BLD-RTO: 0 PER 100 WBC
PH UR STRIP: 5.5 [PH] (ref 5–8)
PLATELET # BLD AUTO: 327 K/UL (ref 150–400)
PMV BLD AUTO: 10.4 FL (ref 8.9–12.9)
POTASSIUM SERPL-SCNC: 4.1 MMOL/L (ref 3.5–5.1)
PROT SERPL-MCNC: 7.4 G/DL (ref 6.4–8.2)
PROT UR STRIP-MCNC: 300 MG/DL
PSA SERPL-MCNC: 1.8 NG/ML (ref 0.01–4)
RBC # BLD AUTO: 5.6 M/UL (ref 4.1–5.7)
RBC #/AREA URNS HPF: ABNORMAL /HPF (ref 0–5)
SODIUM SERPL-SCNC: 135 MMOL/L (ref 136–145)
SP GR UR REFRACTOMETRY: 1.02 (ref 1–1.03)
T4 SERPL-MCNC: 9.6 UG/DL (ref 4.5–12.1)
TRIGL SERPL-MCNC: 139 MG/DL (ref ?–150)
TSH SERPL DL<=0.05 MIU/L-ACNC: 0.89 UIU/ML (ref 0.36–3.74)
UA: UC IF INDICATED,UAUC: ABNORMAL
UROBILINOGEN UR QL STRIP.AUTO: 1 EU/DL (ref 0.2–1)
VLDLC SERPL CALC-MCNC: 27.8 MG/DL
WBC # BLD AUTO: 8.2 K/UL (ref 4.1–11.1)
WBC URNS QL MICRO: ABNORMAL /HPF (ref 0–4)

## 2021-09-17 ENCOUNTER — HOSPITAL ENCOUNTER (OUTPATIENT)
Dept: VASCULAR SURGERY | Age: 76
Discharge: HOME | End: 2021-09-17
Attending: INTERNAL MEDICINE
Payer: MEDICARE

## 2021-09-17 DIAGNOSIS — I70.90 ASVD (ARTERIOSCLEROTIC VASCULAR DISEASE): ICD-10-CM

## 2021-09-17 PROCEDURE — 93880 EXTRACRANIAL BILAT STUDY: CPT

## 2021-09-17 NOTE — PROGRESS NOTES
Glycohemoglobin of 10.2 which is actually just a tad higher than before and indicates extremely poor diabetes control. The only other oral medication to add would be Jardiance so start with 10 mg daily. If insurance will not cover Jardiance and use Farxiga 5 mg daily. The other option is insulin. Diet and exercise are florence. There is more protein in the urine indicating the kidneys will become a problem if we do not get better diabetes control.

## 2021-09-20 LAB
LEFT CCA DIST DIAS: 8.5 CM/S
LEFT CCA DIST SYS: 39.1 CM/S
LEFT CCA PROX DIAS: 12 CM/S
LEFT CCA PROX SYS: 61.4 CM/S
LEFT ECA DIAS: 0 CM/S
LEFT ECA SYS: 47.9 CM/S
LEFT ICA DIST DIAS: 18.2 CM/S
LEFT ICA DIST SYS: 58.4 CM/S
LEFT ICA MID DIAS: 11.3 CM/S
LEFT ICA MID SYS: 52.3 CM/S
LEFT ICA PROX DIAS: 9.8 CM/S
LEFT ICA PROX SYS: 28.4 CM/S
LEFT ICA/CCA SYS: 1.49
LEFT SUBCLAVIAN DIAS: 0 CM/S
LEFT SUBCLAVIAN SYS: 93.5 CM/S
LEFT VERTEBRAL DIAS: 11.25 CM/S
LEFT VERTEBRAL SYS: 45.3 CM/S
RIGHT CCA DIST DIAS: 11.3 CM/S
RIGHT CCA DIST SYS: 52.3 CM/S
RIGHT CCA PROX DIAS: 12.1 CM/S
RIGHT CCA PROX SYS: 54.9 CM/S
RIGHT ECA DIAS: 0 CM/S
RIGHT ECA SYS: 53.1 CM/S
RIGHT ICA DIST DIAS: 18 CM/S
RIGHT ICA DIST SYS: 60.7 CM/S
RIGHT ICA MID DIAS: 23.2 CM/S
RIGHT ICA MID SYS: 81.4 CM/S
RIGHT ICA PROX DIAS: 11.3 CM/S
RIGHT ICA PROX SYS: 54 CM/S
RIGHT ICA/CCA SYS: 1.6
RIGHT SUBCLAVIAN DIAS: 0 CM/S
RIGHT SUBCLAVIAN SYS: 70.3 CM/S
RIGHT VERTEBRAL DIAS: 8.71 CM/S
RIGHT VERTEBRAL SYS: 31.7 CM/S

## 2021-09-21 RX ORDER — FINASTERIDE 5 MG/1
TABLET, FILM COATED ORAL
Qty: 90 TABLET | Status: SHIPPED | OUTPATIENT
Start: 2021-09-21 | End: 2022-03-11

## 2021-09-21 NOTE — TELEPHONE ENCOUNTER
PCP: Robert Larkin MD    Last appt: 6/4/2020  Future Appointments   Date Time Provider Martín Trujillo   12/15/2021  9:00 AM Robert Larkin MD [facility]       Last refilled:7/22/20    Requested Prescriptions     Pending Prescriptions Disp Refills    finasteride (PROSCAR) 5 mg tablet [Pharmacy Med Name: FINASTERIDE 5 MG TABLET] 90 Tablet PRN     Sig: TAKE ONE TABLET BY MOUTH DAILY

## 2021-09-21 NOTE — TELEPHONE ENCOUNTER
RX refill request from the patient/pharmacy. Patient last seen 09- with labs, and next appt. scheduled for 12-  Requested Prescriptions     Pending Prescriptions Disp Refills    empagliflozin (Jardiance) 10 mg tablet 30 Tablet 5     Sig: Take 1 Tablet by mouth daily. Rosalva Prajapati

## 2021-09-21 NOTE — PROGRESS NOTES
Glycohemoglobin of 10.2 which is actually just a tad higher than before and indicates extremely poor diabetes control. The only other oral medication to add would be Jardiance so start with 10 mg daily. If insurance will not cover Jardiance and use Farxiga 5 mg daily. The other option is insulin. Diet and exercise are florence. There is more protein in the urine indicating the kidneys will become a problem if we do not get better diabetes control. Discuss all of this with patient's wife. Rx for Jardiance 10 mg sent to patient's pharmacy. Advised to call back if this medication is not covered by insurance so we can change it.

## 2021-09-30 ENCOUNTER — OFFICE VISIT (OUTPATIENT)
Dept: INTERNAL MEDICINE CLINIC | Age: 76
End: 2021-09-30
Payer: MEDICARE

## 2021-09-30 ENCOUNTER — HOSPITAL ENCOUNTER (OUTPATIENT)
Dept: GENERAL RADIOLOGY | Age: 76
Discharge: HOME | End: 2021-09-30
Payer: MEDICARE

## 2021-09-30 VITALS
RESPIRATION RATE: 17 BRPM | DIASTOLIC BLOOD PRESSURE: 70 MMHG | HEIGHT: 70 IN | HEART RATE: 76 BPM | BODY MASS INDEX: 27.43 KG/M2 | TEMPERATURE: 97.8 F | OXYGEN SATURATION: 95 % | WEIGHT: 191.6 LBS | SYSTOLIC BLOOD PRESSURE: 122 MMHG

## 2021-09-30 DIAGNOSIS — M25.522 LEFT ELBOW PAIN: ICD-10-CM

## 2021-09-30 DIAGNOSIS — M25.562 CHRONIC PAIN OF LEFT KNEE: Primary | ICD-10-CM

## 2021-09-30 DIAGNOSIS — G89.29 CHRONIC MIDLINE LOW BACK PAIN WITHOUT SCIATICA: ICD-10-CM

## 2021-09-30 DIAGNOSIS — M25.562 CHRONIC PAIN OF LEFT KNEE: ICD-10-CM

## 2021-09-30 DIAGNOSIS — M48.061 LUMBAR FORAMINAL STENOSIS: Primary | ICD-10-CM

## 2021-09-30 DIAGNOSIS — G89.29 CHRONIC PAIN OF LEFT KNEE: ICD-10-CM

## 2021-09-30 DIAGNOSIS — G89.29 CHRONIC PAIN OF LEFT KNEE: Primary | ICD-10-CM

## 2021-09-30 DIAGNOSIS — M25.559 HIP PAIN: ICD-10-CM

## 2021-09-30 DIAGNOSIS — M54.50 CHRONIC MIDLINE LOW BACK PAIN WITHOUT SCIATICA: ICD-10-CM

## 2021-09-30 DIAGNOSIS — M48.061 SPINAL STENOSIS OF LUMBAR REGION, UNSPECIFIED WHETHER NEUROGENIC CLAUDICATION PRESENT: ICD-10-CM

## 2021-09-30 PROCEDURE — G8754 DIAS BP LESS 90: HCPCS | Performed by: INTERNAL MEDICINE

## 2021-09-30 PROCEDURE — 73502 X-RAY EXAM HIP UNI 2-3 VIEWS: CPT

## 2021-09-30 PROCEDURE — G8427 DOCREV CUR MEDS BY ELIG CLIN: HCPCS | Performed by: INTERNAL MEDICINE

## 2021-09-30 PROCEDURE — 73080 X-RAY EXAM OF ELBOW: CPT

## 2021-09-30 PROCEDURE — 1101F PT FALLS ASSESS-DOCD LE1/YR: CPT | Performed by: INTERNAL MEDICINE

## 2021-09-30 PROCEDURE — 73562 X-RAY EXAM OF KNEE 3: CPT

## 2021-09-30 PROCEDURE — G8510 SCR DEP NEG, NO PLAN REQD: HCPCS | Performed by: INTERNAL MEDICINE

## 2021-09-30 PROCEDURE — 99213 OFFICE O/P EST LOW 20 MIN: CPT | Performed by: INTERNAL MEDICINE

## 2021-09-30 PROCEDURE — 72100 X-RAY EXAM L-S SPINE 2/3 VWS: CPT

## 2021-09-30 PROCEDURE — G8536 NO DOC ELDER MAL SCRN: HCPCS | Performed by: INTERNAL MEDICINE

## 2021-09-30 PROCEDURE — G8752 SYS BP LESS 140: HCPCS | Performed by: INTERNAL MEDICINE

## 2021-09-30 PROCEDURE — G8417 CALC BMI ABV UP PARAM F/U: HCPCS | Performed by: INTERNAL MEDICINE

## 2021-09-30 NOTE — PROGRESS NOTES
Called and spoke to patient  Two pt identifiers confirmed  Informed patient per Dr. Chadd Rodas xray showed moderate to severe arthritis in his elbow. Patient verbalized understanding of information discussed  with no further questions at this time.

## 2021-09-30 NOTE — PROGRESS NOTES
Called and spoke to patient  Two pt identifiers confirmed  Informed patient per Dr. Jabari Mckinney that hip xray showed degenerative joint disease. Patient verbalized understanding of information discussed  with no further questions at this time.

## 2021-09-30 NOTE — PROGRESS NOTES
Chief Complaint   Patient presents with    Back Pain    Hip Pain     left hip pain    Knee Pain     left knee pain     Visit Vitals  /70 (BP 1 Location: Right arm, BP Patient Position: Sitting, BP Cuff Size: Adult)   Pulse 76   Temp 97.8 °F (36.6 °C) (Oral)   Resp 17   Ht 5' 10\" (1.778 m)   Wt 191 lb 9.6 oz (86.9 kg)   SpO2 95%   BMI 27.49 kg/m²     1. Have you been to the ER, urgent care clinic since your last visit? Hospitalized since your last visit? No    2. Have you seen or consulted any other health care providers outside of the [address] [place] [address] since your last visit? Include any pap smears or colon screening.  No

## 2021-09-30 NOTE — PROGRESS NOTES
Called and spoke to patient  Two pt identifiers confirmed  Informed patient per Dr. Jabari Mckinney that xray showed mild arthritis in the knee  Patient verbalized understanding of information discussed  with no further questions at this time.

## 2021-09-30 NOTE — PROGRESS NOTES
Called and spoke to patient  Two pt identifiers confirmed  Informed patient per Dr. Amdao Capone that xray showed multilevel arthritis and appears to have foraminal and canal stenosis and pt needs an MRI of his lumbar spine   MRI referral was placed  Patient verbalized understanding of information discussed  with no further questions at this time.

## 2021-09-30 NOTE — PROGRESS NOTES
Per Dr. Caballero Bridbrianaroom xray shows foraminal stenosis and spinal stenosis and pt needs MRI of lumbar spine for further evaluation. Referral was placed.

## 2021-09-30 NOTE — PROGRESS NOTES
Subjective:   Lu Orlando is a 66 y.o. male      Chief Complaint   Patient presents with    Back Pain    Hip Pain     left hip pain    Knee Pain     left knee pain        History of present illness: He presents today complaining of left hip pain, left knee pain and left elbow pain as well as low back pain. He notes he is having a hard time walking because of the pain.   He does use a cane and has noticed that the left elbow pain over a month he attributes to onset of that when he had some blood drawn here about a month ago seen here on September 13 but I do not detect any abnormalities that we did indicate that on exam.    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    BPH (benign prostatic hyperplasia) N40.0    ASVD (arteriosclerotic vascular disease) I70.90    Urticaria L50.9    Aortic stenosis I35.0    Primary osteoarthritis involving multiple joints M89.49    Morbid obesity ([facility] E66.01    Essential hypertension I10    Stage 2 chronic kidney disease N18.2    Left carotid bruit R09.89    Mixed hyperlipidemia E78.2    Back pain M54.9    Neoplasm of uncertain behavior of skin D48.5    Medicare annual wellness visit, subsequent Z00.00    Controlled type 2 diabetes mellitus with stage 2 chronic kidney disease, without long-term current use of insulin (HCC) E11.22, N18.2    Hematuria R31.9    Prostate cancer screening Z12.5    NSTEMI (non-ST elevated myocardial infarction) ([facility] [facility] I21.4    ASCVD (arteriosclerotic cardiovascular disease) I25.10    PAF (paroxysmal atrial fibrillation) (HCC) I48.0    Primary insomnia Z43.55    Diastolic CHF, chronic (HCC) I50.32    Alcohol screening Z13.39    Primary osteoarthritis of left knee M17.12    Weight loss R63.4    Epistaxis R04.0    Chronic pain of left knee M25.562, G89.29    Hip pain M25.559    Chronic midline low back pain without sciatica M54.5, G89.29    Left elbow pain M25.522      Past Medical History:   Diagnosis Date    Aortic stenosis 8/2/2017    ASVD (arteriosclerotic vascular disease) 8/2/2017    Back pain 8/2/2017    BPH (benign prostatic hyperplasia) 8/2/2017    CKD (chronic kidney disease) 8/2/2017    Diabetes ([facility] 8/2/2017    DJD (degenerative joint disease) 8/2/2017    ED (erectile dysfunction) 8/2/2017    Guillain-[place] syndrome ([address] 8/2/2017    Hyperlipidemia 8/2/2017    Hypertension 8/2/2017    Left carotid bruit 8/2/2017    Melanoma ([facility] [address] 2021    Morbid obesity ([facility] 8/2/2017    On statin therapy 8/2/2017    Urticaria 8/2/2017      Allergies   Allergen Reactions    Adhesive Tape-Silicones Unknown (comments)    Chocolate Flavor Unknown (comments)      Family History   Problem Relation Age of Onset    Heart Disease Mother         murmor    Heart Disease Father       Social History     Socioeconomic History    Marital status:      Spouse name: Not on file    Number of children: Not on file    Years of education: Not on file    Highest education level: Not on file   Occupational History    Not on file   Tobacco Use    Smoking status: Never Smoker    Smokeless tobacco: Never Used   Vaping Use    Vaping Use: Never used   Substance and Sexual Activity    Alcohol use: Yes     Comment: social    Drug use: No    Sexual activity: Not on file   Other Topics Concern     Service Not Asked    Blood Transfusions Not Asked    Caffeine Concern Not Asked    Occupational Exposure Not Asked    Hobby Hazards Not Asked    Sleep Concern Not Asked    Stress Concern Not Asked    Weight Concern Not Asked    Special Diet Not Asked    Back Care Not Asked    Exercise Not Asked    Bike Helmet Not Asked    Seat Belt Not Asked    Self-Exams Not Asked   Social History Narrative    Not on file     Social Determinants of Health     Financial Resource Strain:     Difficulty of Paying Living Expenses:    Food Insecurity:     Worried About [facility] in the Last Year:     [facility] in the Last Year:    Transportation Needs:     Lack of Transportation (Medical):  Lack of Transportation (Non-Medical):    Physical Activity:     Days of Exercise per Week:     Minutes of Exercise per Session:    Stress:     Feeling of Stress :    Social Connections:     Frequency of Communication with Friends and Family:     Frequency of Social Gatherings with Friends and Family:     Attends [employer] Services:     Active Member of Clubs or Organizations:     Attends Club or Organization Meetings:     Marital Status:    Intimate Partner Violence:     Fear of Current or Ex-Partner:     Emotionally Abused:     Physically Abused:     Sexually Abused:      Prior to Admission medications    Medication Sig Start Date End Date Taking? Authorizing Provider   empagliflozin (Jardiance) 10 mg tablet Take 1 Tablet by mouth daily.  9/21/21  Yes Princess Tran MD   finasteride (PROSCAR) 5 mg tablet TAKE ONE TABLET BY MOUTH DAILY 9/21/21  Yes Princess Tran MD   lisinopriL (PRINIVIL, ZESTRIL) 40 mg tablet TAKE 1 TABLET BY MOUTH EVERY DAY 9/7/21  Yes Princess Tran MD   tamsulosin (FLOMAX) 0.4 mg capsule TAKE TWO CAPSULES BY MOUTH DAILY 3/15/21  Yes Princess Tran MD   pravastatin (PRAVACHOL) 80 mg tablet TAKE ONE TABLET BY MOUTH DAILY 2/9/21  Yes Princess Tran MD   glipiZIDE (GLUCOTROL) 10 mg tablet TAKE ONE TABLET BY MOUTH TWICE A DAY 9/2/20  Yes Princess Tran MD   amLODIPine (NORVASC) 10 mg tablet TAKE ONE TABLET BY MOUTH DAILY 9/2/20  Yes Princess Tran MD   Januvia 100 mg tablet TAKE ONE TABLET BY MOUTH DAILY 5/26/20  Yes Princess Tran MD   metFORMIN (GLUCOPHAGE) 500 mg tablet TAKE ONE TABLET BY MOUTH EVERY MORNING AND TAKE TWO TABLETS BY MOUTH EVERY EVENING WITH DINNER  Patient taking differently: Taking one table in the morning and one in the evening 3/20/20  Yes Johnathon Britt MD   cloNIDine HCl (CATAPRES) 0.2 mg tablet Take 1 Tab by mouth two (2) times a day. 9/26/19  Yes Qiana Varner MD   metoprolol tartrate (LOPRESSOR) 25 mg tablet Take 1 Tab by mouth every twelve (12) hours. 9/26/19  Yes Qiana Varner MD   timolol (TIMOPTIC-XE) 0.5 % ophthalmic gel-forming  9/19/18  Yes Provider, Historical   Omega-3-DHA-EPA-Fish Oil 1,000 mg (120 mg-180 mg) cap Take 1 Cap by mouth three (3) times daily. Yes Provider, Historical   aspirin delayed-release 81 mg tablet Take  by mouth daily. Yes Provider, Historical        Review of Systems              Constitutional:  He denies fever, weight loss, sweats or fatigue. EYES: No blurred or double vision,               ENT: no nasal congestion, no headache or dizziness. No difficulty with               swallowing, taste, speech or smell. Respiratory:  No cough, wheezing or shortness of breath. No sputum production. Cardiac:  Denies chest pain, palpitations, unexplained indigestion, syncope, edema, PND or orthopnea. GI:  No changes in bowel movements, no abdominal pain, no bloating, anorexia, nausea, vomiting or heartburn. :  No frequency or dysuria. Denies incontinence or sexual dysfunction. Extremities:  No joint pain, stiffness or swelling  Back:.no pain or soreness  Skin:  No recent rashes or mole changes. Neurological:  No numbness, tingling, burning paresthesias or loss of motor strength. No syncope, dizziness, frequent headaches or memory loss. Hematologic:  No easy bruising  Lymphatic: No lymph node enlargement    Objective:     Vitals:    09/30/21 1100   BP: 122/70   Pulse: 76   Resp: 17   Temp: 97.8 °F (36.6 °C)   TempSrc: Oral   SpO2: 95%   Weight: 191 lb 9.6 oz (86.9 kg)   Height: 5' 10\" (1.778 m)   PainSc:   0 - No pain       Body mass index is 27.49 kg/m².    Physical Examination:              General Appearance:  Well-developed, well-nourished, no acute distress. HEENT:      Ears:  The TMs and ear canals were clear. Eyes:  The pupillary responses were normal.  Extraocular muscle function intact. Lids and conjunctiva not injected. Funduscopic exam revealed sharp disc margins. Nares: Clear w/o edema or erythema  Pharynx:  Clear with teeth in good repair. No masses were noted. Neck:  Supple without thyromegaly or adenopathy. No JVD noted. No carotid                bruits. Lungs:  Clear to auscultation and percussion. Cardiac:  Regular rate and rhythm without murmur. PMI not displaced. No gallop, rub or click. Abdominal: Soft, non-tender, no hepata-spleenomegally or masses  Extremities:  No clubbing, cyanosis or edema. Skin:  No rash or unusual mole changes noted. Lymph Nodes:  None felt in the cervical, supraclavicular, axillary or inguinal region. Neurological: . DTRs 2+ and symmetric. Station and gait normal.   Hematologic:   No purpura or petechiae        Assessment/Plan:         1. Chronic pain of left knee    2. Hip pain    3. Chronic midline low back pain without sciatica    4. Left elbow pain        Impressions/Plan:  Impression  1. Chronic pain left knee exam reveals no effusion  2. Left hip pain range of motion does elicit some discomfort  3. Low back pain no tenderness palpation  4. Left elbow pain exam is normal I will get x-rays of all the above-noted joints and back and we will notify regarding results and make further recommendations. I suspect this is all arthritis related. Orders Placed This Encounter    XR SPINE LUMB 2 OR 3 V    XR KNEE LT 3 V    XR HIP LT W OR WO PELV 2-3 VWS    XR ELBOW LT MIN 3 V       Follow-up and Dispositions    · Return At prior scheduled appointment. No results found for any visits on 09/30/21. Rasheeda Alvarez MD    The patient was given after the visit summary the patient verbalized an understanding of the plans and problems as explained.   He presents today complaints of left hip pain, left knee pain and low back pain is so severe he is a hard time walking and thus he is walking using a cane and there was complaint of some left elbow pain. He does attribute elbow pain to starting about a month ago when he had blood drawn when he was here although I doubt that would be causing the long-term elbow pain as his elbow exam is normal today to me.

## 2021-09-30 NOTE — PROGRESS NOTES
Multilevel arthritis and appears to have foraminal and canal stenosis and needs an MRI of his lumbar spine

## 2021-10-01 DIAGNOSIS — N18.2 CONTROLLED TYPE 2 DIABETES MELLITUS WITH STAGE 2 CHRONIC KIDNEY DISEASE, WITHOUT LONG-TERM CURRENT USE OF INSULIN (HCC): ICD-10-CM

## 2021-10-01 DIAGNOSIS — R63.4 UNEXPLAINED WEIGHT LOSS: Primary | ICD-10-CM

## 2021-10-01 DIAGNOSIS — E11.22 CONTROLLED TYPE 2 DIABETES MELLITUS WITH STAGE 2 CHRONIC KIDNEY DISEASE, WITHOUT LONG-TERM CURRENT USE OF INSULIN (HCC): ICD-10-CM

## 2021-10-01 RX ORDER — GLIPIZIDE 10 MG/1
TABLET ORAL
Qty: 180 TABLET | Refills: 3 | Status: SHIPPED | OUTPATIENT
Start: 2021-10-01 | End: 2022-03-11

## 2021-10-01 NOTE — TELEPHONE ENCOUNTER
RX refill request from the patient/pharmacy. Patient last seen 09- with labs, and next appt. scheduled for 12-  Requested Prescriptions     Pending Prescriptions Disp Refills    glipiZIDE (GLUCOTROL) 10 mg tablet [Pharmacy Med Name: glipiZIDE 10 MG TABLET] 180 Tablet 3     Sig: TAKE ONE TABLET BY MOUTH TWICE A DAY   .

## 2021-10-01 NOTE — PROGRESS NOTES
Pts wife is concerned about 30lb weight loss and when he eats he only eats 1/4 of meal and feels full. Pt denies any vomiting or diarrhea.  Per Dr. Candi Alvarez referral to Dr. eJan Izaguirre

## 2021-10-06 ENCOUNTER — HOSPITAL ENCOUNTER (OUTPATIENT)
Dept: MRI IMAGING | Age: 76
Discharge: HOME | End: 2021-10-06
Attending: INTERNAL MEDICINE
Payer: MEDICARE

## 2021-10-06 DIAGNOSIS — M48.061 SPINAL STENOSIS OF LUMBAR REGION, UNSPECIFIED WHETHER NEUROGENIC CLAUDICATION PRESENT: ICD-10-CM

## 2021-10-06 DIAGNOSIS — M48.061 LUMBAR FORAMINAL STENOSIS: ICD-10-CM

## 2021-10-06 PROCEDURE — 72148 MRI LUMBAR SPINE W/O DYE: CPT

## 2021-10-07 ENCOUNTER — TRANSCRIBE ORDER (OUTPATIENT)
Dept: SCHEDULING | Age: 76
End: 2021-10-07

## 2021-10-07 ENCOUNTER — OFFICE VISIT (OUTPATIENT)
Dept: INTERNAL MEDICINE CLINIC | Age: 76
End: 2021-10-07
Payer: MEDICARE

## 2021-10-07 ENCOUNTER — HOSPITAL ENCOUNTER (OUTPATIENT)
Dept: CT IMAGING | Age: 76
Discharge: HOME | End: 2021-10-07
Attending: INTERNAL MEDICINE
Payer: MEDICARE

## 2021-10-07 VITALS
RESPIRATION RATE: 18 BRPM | SYSTOLIC BLOOD PRESSURE: 122 MMHG | WEIGHT: 191 LBS | HEART RATE: 67 BPM | TEMPERATURE: 98 F | HEIGHT: 70 IN | BODY MASS INDEX: 27.35 KG/M2 | OXYGEN SATURATION: 97 % | DIASTOLIC BLOOD PRESSURE: 70 MMHG

## 2021-10-07 DIAGNOSIS — M19.022 ARTHRITIS OF LEFT ELBOW: ICD-10-CM

## 2021-10-07 DIAGNOSIS — N18.2 CONTROLLED TYPE 2 DIABETES MELLITUS WITH STAGE 2 CHRONIC KIDNEY DISEASE, WITHOUT LONG-TERM CURRENT USE OF INSULIN (HCC): ICD-10-CM

## 2021-10-07 DIAGNOSIS — C79.51 METASTATIC CARCINOMA TO BONE (HCC): Primary | ICD-10-CM

## 2021-10-07 DIAGNOSIS — C79.51 METASTATIC CARCINOMA TO BONE (HCC): ICD-10-CM

## 2021-10-07 DIAGNOSIS — R63.4 WEIGHT LOSS: ICD-10-CM

## 2021-10-07 DIAGNOSIS — M25.522 LEFT ELBOW PAIN: ICD-10-CM

## 2021-10-07 DIAGNOSIS — E11.22 CONTROLLED TYPE 2 DIABETES MELLITUS WITH STAGE 2 CHRONIC KIDNEY DISEASE, WITHOUT LONG-TERM CURRENT USE OF INSULIN (HCC): ICD-10-CM

## 2021-10-07 PROCEDURE — G8536 NO DOC ELDER MAL SCRN: HCPCS | Performed by: INTERNAL MEDICINE

## 2021-10-07 PROCEDURE — 74177 CT ABD & PELVIS W/CONTRAST: CPT

## 2021-10-07 PROCEDURE — 1101F PT FALLS ASSESS-DOCD LE1/YR: CPT | Performed by: INTERNAL MEDICINE

## 2021-10-07 PROCEDURE — 20605 DRAIN/INJ JOINT/BURSA W/O US: CPT | Performed by: INTERNAL MEDICINE

## 2021-10-07 PROCEDURE — G8754 DIAS BP LESS 90: HCPCS | Performed by: INTERNAL MEDICINE

## 2021-10-07 PROCEDURE — G8427 DOCREV CUR MEDS BY ELIG CLIN: HCPCS | Performed by: INTERNAL MEDICINE

## 2021-10-07 PROCEDURE — G8510 SCR DEP NEG, NO PLAN REQD: HCPCS | Performed by: INTERNAL MEDICINE

## 2021-10-07 PROCEDURE — 74011000636 HC RX REV CODE- 636: Performed by: INTERNAL MEDICINE

## 2021-10-07 PROCEDURE — 71260 CT THORAX DX C+: CPT

## 2021-10-07 PROCEDURE — G8752 SYS BP LESS 140: HCPCS | Performed by: INTERNAL MEDICINE

## 2021-10-07 PROCEDURE — 99215 OFFICE O/P EST HI 40 MIN: CPT | Performed by: INTERNAL MEDICINE

## 2021-10-07 PROCEDURE — G8417 CALC BMI ABV UP PARAM F/U: HCPCS | Performed by: INTERNAL MEDICINE

## 2021-10-07 RX ORDER — METHYLPREDNISOLONE ACETATE 40 MG/ML
40 INJECTION, SUSPENSION INTRA-ARTICULAR; INTRALESIONAL; INTRAMUSCULAR; SOFT TISSUE ONCE
Qty: 1 ML | Refills: 0
Start: 2021-10-07 | End: 2021-10-07

## 2021-10-07 RX ADMIN — IOPAMIDOL 100 ML: 755 INJECTION, SOLUTION INTRAVENOUS at 15:10

## 2021-10-07 RX ADMIN — IOHEXOL 50 ML: 240 INJECTION, SOLUTION INTRATHECAL; INTRAVASCULAR; INTRAVENOUS; ORAL at 15:10

## 2021-10-07 NOTE — PROGRESS NOTES
Needs appt to discuss this as has severe arthritis but also appears to have metastatic cancer. Discussed at visit 10-7-2021.

## 2021-10-07 NOTE — PROGRESS NOTES
Chief Complaint   Patient presents with    Results     discuss MRI       SUBJECTIVE:    Alvaro Valladares is a 66 y.o. male who is followed for hypertension, diabetes, hyperlipidemia, ASCVD, DJD, CKD stage II, obesity and other medical problems and was scheduled to be seen on a every 3-month basis based upon his multiple problems, he was seen by me in February of this year and scheduled to be seen in May at which time he is canceled his appointment and did not see me until September 13. At that point he had lost 29 pounds from his visit in February and it was suggested that he be evaluated further with a colonoscopy which she has set up but is not scheduled until sometime in November and he does not want to get it. He had also had some epistaxis and I recommended ENT appointment. Labs at that appointment on September 13 remarkable for slightly elevated alkaline phosphatase at 131 and a significant elevation of his blood sugar at 160 with a glycohemoglobin of 10.2 indicating poor diabetes control. He also had a urine that had no significant amount of cells but did have proteinuria and microalbumin showed 150. Thyroid studies were normal.  PSA was normal at 1.8. He returned in follow-up with me on the 30th complaining of some left hip pain, knee pain and elbow pain as well as low back pain and x-rays were obtained of all of these revealing DJD. Because of the severity of the DJD of the back I pursued this further with MRI which is returned consistent with metastatic carcinoma although he does have severe arthritic changes and some spinal and foraminal stenosis the more concerning aspect is a suggestion of metastatic cancer. He returns today in follow-up and notes that he does not have any appetite. His weight today is 191 pounds which is down 8 pounds from his September 13 weight and a total of 37 pounds from what he was in February. He notes no nausea vomiting.   He does have a GI appointment set up for next week and as noted has a colonoscopy set up for next month. He has no other current complaints on complete review of systems except continued pain in the joints although his knee feels better his elbow is the worst and he would like to get a cortisone shot in that today. Current Outpatient Medications   Medication Sig Dispense Refill    methylPREDNISolone acetate (DEPO-MEDROL) 40 mg/mL injection 1 mL by IntraMUSCular route once for 1 dose. 1 mL 0    glipiZIDE (GLUCOTROL) 10 mg tablet TAKE ONE TABLET BY MOUTH TWICE A  Tablet 3    empagliflozin (Jardiance) 10 mg tablet Take 1 Tablet by mouth daily. 30 Tablet 5    finasteride (PROSCAR) 5 mg tablet TAKE ONE TABLET BY MOUTH DAILY 90 Tablet PRN    lisinopriL (PRINIVIL, ZESTRIL) 40 mg tablet TAKE 1 TABLET BY MOUTH EVERY DAY 90 Tablet 3    tamsulosin (FLOMAX) 0.4 mg capsule TAKE TWO CAPSULES BY MOUTH DAILY 180 Cap PRN    pravastatin (PRAVACHOL) 80 mg tablet TAKE ONE TABLET BY MOUTH DAILY 90 Tab 1    amLODIPine (NORVASC) 10 mg tablet TAKE ONE TABLET BY MOUTH DAILY 90 Tab 3    Januvia 100 mg tablet TAKE ONE TABLET BY MOUTH DAILY 30 Tab 5    metFORMIN (GLUCOPHAGE) 500 mg tablet TAKE ONE TABLET BY MOUTH EVERY MORNING AND TAKE TWO TABLETS BY MOUTH EVERY EVENING WITH DINNER (Patient taking differently: Taking one table in the morning and one in the evening) 270 Tab 3    cloNIDine HCl (CATAPRES) 0.2 mg tablet Take 1 Tab by mouth two (2) times a day. 60 Tab 12    metoprolol tartrate (LOPRESSOR) 25 mg tablet Take 1 Tab by mouth every twelve (12) hours. 60 Tab 12    timolol (TIMOPTIC-XE) 0.5 % ophthalmic gel-forming       Omega-3-DHA-EPA-Fish Oil 1,000 mg (120 mg-180 mg) cap Take 1 Cap by mouth three (3) times daily.  aspirin delayed-release 81 mg tablet Take  by mouth daily.        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    Hyperlipidemia 8/2/2017    Hypertension 8/2/2017    Left carotid bruit 8/2/2017    Melanoma ([facility] 2021    Morbid obesity ([facility] 8/2/2017    On statin therapy 8/2/2017    Urticaria 8/2/2017     Past Surgical History:   Procedure Laterality Date    AK CARDIAC SURG PROCEDURE UNLIST  09/2019    4 stents placed     Allergies   Allergen Reactions    Adhesive Tape-Silicones Unknown (comments)    Chocolate Flavor Unknown (comments)       REVIEW OF SYSTEMS:  General: negative for - chills or fever, or weight loss or gain  ENT: negative for - headaches, nasal congestion or tinnitus  Eyes: no blurred or visual changes  Neck: No stiffness or swollen nodes  Respiratory: negative for - cough, hemoptysis, shortness of breath or wheezing  Cardiovascular : negative for - chest pain, edema, palpitations or shortness of breath  Gastrointestinal: negative for - abdominal pain, blood in stools, heartburn or nausea/vomiting  Genito-Urinary: no dysuria, trouble voiding, or hematuria  Musculoskeletal: negative for - gait disturbance, joint pain, joint stiffness or joint swelling except significant left elbow pain today and still has back pain but that is not any worse. .  Left elbow tender to palpation over the lateral aspect  Neurological: no TIA or stroke symptoms  Hematologic: no bruises, no bleeding  Lymphatic: no swollen glands  Integument: no lumps, mole changes, nail changes or rash  Endocrine:no malaise/lethargy poly uria or polydipsia or unexpected weight changes        Social History     Socioeconomic History    Marital status:      Spouse name: Not on file    Number of children: Not on file    Years of education: Not on file    Highest education level: Not on file   Tobacco Use    Smoking status: Never Smoker    Smokeless tobacco: Never Used   Vaping Use    Vaping Use: Never used   Substance and Sexual Activity    Alcohol use: Yes     Comment: social    Drug use: No   Other Topics Concern     Social Determinants of Health     Financial Resource Strain:     Difficulty of Paying Living Expenses:    Food Insecurity:     Worried About Running Out of Food in the Last Year:     [address] in the Last Year:    Transportation Needs:     Lack of Transportation (Medical):  Lack of Transportation (Non-Medical):    Physical Activity:     Days of Exercise per Week:     Minutes of Exercise per Session:    Stress:     Feeling of Stress :    Social Connections:     Frequency of Communication with Friends and Family:     Frequency of Social Gatherings with Friends and Family:     Attends Confucianist Services:     Active Member of Clubs or Organizations:     Attends Club or Organization Meetings:     Marital Status:      Family History   Problem Relation Age of Onset    Heart Disease Mother         murmor    Heart Disease Father        OBJECTIVE:     Visit Vitals  /70 (BP 1 Location: Left upper arm, BP Patient Position: Sitting, BP Cuff Size: Large adult)   Pulse 67   Temp 98 °F (36.7 °C) (Oral)   Resp 18   Ht 5' 10\" (1.778 m)   Wt 191 lb (86.6 kg)   SpO2 97%   BMI 27.41 kg/m²     CONSTITUTIONAL:   well nourished, appears age appropriate  EYES: sclera anicteric, PERRL, EOMI  ENMT:nares clear, moist mucous membranes, pharynx clear  NECK: supple.  Thyroid normal, No JVD or bruits  RESPIRATORY: Chest: clear to ascultation and percussion, normal inspiratory effort  CARDIOVASCULAR: Heart: regular rate and rhythm no murmurs, rubs or gallops, PMI not displaced, No thrills, no peripheral edema  GASTROINTESTINAL: Abdomen: non distended, soft, non tender, bowel sounds normal  HEMATOLOGIC: no purpura, petechiae or bruising  LYMPHATIC: No lymph node enlargemant  MUSCULOSKELETAL: Extremities: no active synovitis, pulse 1+. Left elbow tender to palpation over the lateral aspect with range of motion normal.  INTEGUMENT: No unusual rashes or suspicious skin lesions noted. Nails appear normal.  PERIPHERAL VASCULAR: normal pulses femoral, PT and DP  NEUROLOGIC: non-focal exam, A & O X 3  PSYCHIATRIC:, appropriate affect     ASSESSMENT:   1. 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 Left elbow pain    3. Arthritis of left elbow    4. Weight loss    5. Controlled type 2 diabetes mellitus with stage 2 chronic kidney disease, without long-term current use of insulin (HCC)      Impression  1. 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 I think we need to further evaluate as far as a source to rule out intra-abdominal process versus lung. Does not appear this would be prosthetic since his PSA is so normal.  I will schedule a CT of the abdomen, pelvis and chest to see if I can locate a area that is amenable to biopsy easier than the lumbar spine would be. I will recheck his metabolic panel and see if anything is change there noting alk phos was 131 we will see if that has gone higher. I would check some pancreatic enzymes and recheck a CBC which that was totally normal on his recent lab studies on September 13. I will recheck his urine today. 2.  Left elbow pain we discussed treatment options and per his request elected to go with injection. After informed consent and Betadine scrub the left elbow was entered laterally injected with Depo-Medrol 40 mg and a cc lidocaine which he tolerated quite well. 3.  Weight loss that is certainly concerning weight now down 37 pounds I recommended some Ensure or boost at least twice daily. 4.  Diabetes mellitus poorly controlled last A1c as noted of 10.2 and have stressed compliance with medications. We will see what the blood sugar looks like today on his CMP. High complexity patient.   I spent 45 minutes today in direct care of this patient not including time spent on procedures including review of his entire recent medical records as well as all lab and x-ray studies and all of this is discussed in detail with he and his wife present with him today. Greater than 50% in counseling coordination of care. Follow-up to be determined based upon the scheduling of the studies and results of the lab. PLAN:  .  Orders Placed This Encounter    DRAIN/INJECT LARGE JOINT/BURSA    CBC WITH AUTOMATED DIFF    METABOLIC PANEL, COMPREHENSIVE    LIPASE    AMYLASE    URINALYSIS W/ REFLEX CULTURE    REFERRAL TO INTERVENTIONAL RADIOLOGY    methylPREDNISolone acetate (DEPO-MEDROL) 40 mg/mL injection         ATTENTION:   This medical record was transcribed using an electronic medical records system. Although proofread, it may and can contain electronic and spelling errors. Other human spelling and other errors may be present. Corrections may be executed at a later time. Please feel free to contact us for any clarifications as needed. Follow-up and Dispositions    · Return TBD. Follow-up and Disposition History          Results for orders placed or performed during the hospital encounter of 10/07/21   CT CHEST W CONT    Narrative    EXAM: CT ABD PELV W CONT, CT CHEST W CONT    INDICATION: Metastatic carcinoma    COMPARISON: MRI dated 10/6/2021    CONTRAST: 100 mL of Isovue-370. TECHNIQUE:   Following the uneventful intravenous administration of contrast, thin axial  images were obtained through the chest, abdomen and pelvis. Coronal and sagittal  reconstructions were generated. Oral contrast was administered. CT dose  reduction was achieved through use of a standardized protocol tailored for this  examination and automatic exposure control for dose modulation. FINDINGS:     CHEST WALL:No axillary supra clavicular adenopathy. THYROID: No nodule. MEDIASTINUM: Mediastinal adenopathy is noted. There is a 1.7 cm right  paratracheal lymph node.  There is a 1.2 cm subcarinal lymph node. MANISH: Left hilar adenopathy is present with narrowing of the left upper lobe  bronchus  THORACIC AORTA: No dissection or aneurysm. MAIN PULMONARY ARTERY: Normal in caliber. TRACHEA/BRONCHI: Patent. ESOPHAGUS: No wall thickening or dilatation. HEART: Myocardium only. Coronary atherosclerotic disease  PLEURA: No effusion or pneumothorax. LUNGS: 3.0 x 3.5 cm mass in the left upper lobe series 4 image 13. Multiple  pulmonary nodules are scattered throughout the lungs. Representative examples  include a 1.2 cm nodule left lower lobe series series 4 image 55. 0.7 cm nodule  right lower lobe series 4 image 56. LIVER: Ill-defined lobulated hypodensity near the dome the liver measuring 4.5 x  3.1 cm series 2 image 40. Small hypodensity in the left lower lobe are also  likely metastatic disease. Small hypodensity right hepatic lobe series 2 image  66 nonspecific  GALLBLADDER: Unremarkable. BILIARY TREE:  SPLEEN: No mass. PANCREAS: No mass or ductal dilatation. ADRENALS: Right adrenal mass measuring 1.6 x 2.1 cm  KIDNEYS: No mass, calculus, or hydronephrosis. STOMACH: Unremarkable. SMALL BOWEL: No dilatation or wall thickening. COLON: No dilatation or wall thickening. APPENDIX: Not visualized  PERITONEUM: No ascites or pneumoperitoneum. RETROPERITONEUM: Mild atherosclerotic disease. No evidence of aneurysm or  lymphadenopathy  REPRODUCTIVE ORGANS: Enlarged prostate  URINARY BLADDER: No mass or calculus. BONES: Scattered lucencies throughout the spine  ADDITIONAL COMMENTS: N/A      Impression    1. Diffuse metastatic disease with mediastinal and left hilar adenopathy,  multiple pulmonary nodules, liver mass, right adrenal mass, and skeletal  metastases. Dominant mass in the left upper lobe which may represent metastasis  as well versus primary lesion.      CT ABD PELV W CONT    Narrative    EXAM: CT ABD PELV W CONT, CT CHEST W CONT    INDICATION: Metastatic carcinoma    COMPARISON: MRI dated 10/6/2021    CONTRAST: 100 mL of Isovue-370. TECHNIQUE:   Following the uneventful intravenous administration of contrast, thin axial  images were obtained through the chest, abdomen and pelvis. Coronal and sagittal  reconstructions were generated. Oral contrast was administered. CT dose  reduction was achieved through use of a standardized protocol tailored for this  examination and automatic exposure control for dose modulation. FINDINGS:     CHEST WALL:No axillary supra clavicular adenopathy. THYROID: No nodule. MEDIASTINUM: Mediastinal adenopathy is noted. There is a 1.7 cm right  paratracheal lymph node. There is a 1.2 cm subcarinal lymph node. MANISH: Left hilar adenopathy is present with narrowing of the left upper lobe  bronchus  THORACIC AORTA: No dissection or aneurysm. MAIN PULMONARY ARTERY: Normal in caliber. TRACHEA/BRONCHI: Patent. ESOPHAGUS: No wall thickening or dilatation. HEART: Myocardium only. Coronary atherosclerotic disease  PLEURA: No effusion or pneumothorax. LUNGS: 3.0 x 3.5 cm mass in the left upper lobe series 4 image 13. Multiple  pulmonary nodules are scattered throughout the lungs. Representative examples  include a 1.2 cm nodule left lower lobe series series 4 image 55. 0.7 cm nodule  right lower lobe series 4 image 56. LIVER: Ill-defined lobulated hypodensity near the dome the liver measuring 4.5 x  3.1 cm series 2 image 40. Small hypodensity in the left lower lobe are also  likely metastatic disease. Small hypodensity right hepatic lobe series 2 image  66 nonspecific  GALLBLADDER: Unremarkable. BILIARY TREE:  SPLEEN: No mass. PANCREAS: No mass or ductal dilatation. ADRENALS: Right adrenal mass measuring 1.6 x 2.1 cm  KIDNEYS: No mass, calculus, or hydronephrosis. STOMACH: Unremarkable. SMALL BOWEL: No dilatation or wall thickening. COLON: No dilatation or wall thickening. APPENDIX: Not visualized  PERITONEUM: No ascites or pneumoperitoneum.   RETROPERITONEUM: Mild atherosclerotic disease. No evidence of aneurysm or  lymphadenopathy  REPRODUCTIVE ORGANS: Enlarged prostate  URINARY BLADDER: No mass or calculus. BONES: Scattered lucencies throughout the spine  ADDITIONAL COMMENTS: N/A      Impression    1. Diffuse metastatic disease with mediastinal and left hilar adenopathy,  multiple pulmonary nodules, liver mass, right adrenal mass, and skeletal  metastases. Dominant mass in the left upper lobe which may represent metastasis  as well versus primary lesion. Amarjit Mckenzie MD    The patient verbalized understanding of the problems and plans as explained.

## 2021-10-07 NOTE — PATIENT INSTRUCTIONS
Arthritis: Care Instructions  Overview     Arthritis, also called osteoarthritis, is a breakdown of the cartilage that cushions your joints. When the cartilage wears down, your bones rub against each other. This causes pain and stiffness. Many people have some arthritis as they age. Arthritis most often affects the joints of the spine, hands, hips, knees, or feet. Arthritis never goes away completely. But medicine and home treatment can help reduce pain and help you stay active. Follow-up care is a key part of your treatment and safety. Be sure to make and go to all appointments, and call your doctor if you are having problems. It's also a good idea to know your test results and keep a list of the medicines you take. How can you care for yourself at home? · Stay at a healthy weight. Being overweight puts extra strain on your joints. · Talk to your doctor or physical therapist about exercises that will help ease joint pain. ? Stretch. You may enjoy gentle forms of yoga to help keep your joints and muscles flexible. ? Walk instead of jog. Other types of exercise that are less stressful on the joints include riding a bike, swimming, virgilio chi, or water exercise. ? Lift weights. Strong muscles help reduce stress on your joints. Stronger thigh muscles, for example, take some of the stress off of the knees and hips. Learn the right way to lift weights so you don't make joint pain worse. · Take your medicines exactly as prescribed. Call your doctor if you think you are having a problem with your medicine. · Take pain medicines exactly as directed. ? If the doctor gave you a prescription medicine for pain, take it as prescribed. ? If you are not taking a prescription pain medicine, ask your doctor if you can take an over-the-counter medicine. · Use a cane, crutch, walker, or another device if you need help to get around. These can help rest your joints.  You also can use other things to make life easier, such as a higher toilet seat and padded handles on kitchen utensils. · Do not sit in low chairs. They can make it hard to get up. · Put heat or cold on your sore joints as needed. Use whichever helps you most. You can also switch between hot and cold packs. ? Apply heat 2 or 3 times a day for 20 to 30 minutesusing a heating pad, hot shower, or hot packto relieve pain and stiffness. But don't use heat on a swollen joint. ? Put ice or a cold pack on your sore joint for 10 to 20 minutes at a time. Put a thin cloth between the ice and your skin. When should you call for help? Call your doctor now or seek immediate medical care if:    · You have sudden swelling, warmth, or pain in any joint.     · You have joint pain and a fever or rash.     · You have such bad pain that you cannot use a joint. Watch closely for changes in your health, and be sure to contact your doctor if:    · You have mild joint symptoms that continue even with more than 6 weeks of care at home.     · You have stomach pain or other problems with your medicine. Where can you learn more? Go to http://www.gray.com/  Enter B922 in the search box to learn more about \"Arthritis: Care Instructions. \"  Current as of: April 30, 2021               Content Version: 13.0  © 2239-5232 [employer]. Care instructions adapted under license by [employer] (which disclaims liability or warranty for this information). If you have questions about a medical condition or this instruction, always ask your healthcare professional. [place] [address] any warranty or liability for your use of this information.

## 2021-10-07 NOTE — PROGRESS NOTES
Chief Complaint   Patient presents with    Results     discuss MRI     Visit Vitals  /70 (BP 1 Location: Left upper arm, BP Patient Position: Sitting, BP Cuff Size: Large adult)   Pulse 67   Temp 98 °F (36.7 °C) (Oral)   Resp 18   Ht 5' 10\" (1.778 m)   Wt 191 lb (86.6 kg)   SpO2 97%   BMI 27.41 kg/m²     1. Have you been to the ER, urgent care clinic since your last visit? Hospitalized since your last visit? No    2. Have you seen or consulted any other health care providers outside of the [address] [place] [address] since your last visit? Include any pap smears or colon screening.  No

## 2021-10-07 NOTE — PROGRESS NOTES
CT of the chest, abdomen and pelvis showed no evidence of metastasis in the lung and question primary in the lung as well as metastasis in the liver and skeletal metastasis.   I have discussed with interventional radiology and we are arranging for a CT-guided biopsy of a lung mass

## 2021-10-08 LAB
ALBUMIN SERPL-MCNC: 2.9 G/DL (ref 3.5–5)
ALBUMIN/GLOB SERPL: 1 {RATIO} (ref 1.1–2.2)
ALP SERPL-CCNC: 109 U/L (ref 45–117)
ALT SERPL-CCNC: 15 U/L (ref 12–78)
AMYLASE SERPL-CCNC: 40 U/L (ref 25–115)
ANION GAP SERPL CALC-SCNC: 6 MMOL/L (ref 5–15)
APPEARANCE UR: CLEAR
AST SERPL-CCNC: 19 U/L (ref 15–37)
BACTERIA URNS QL MICRO: NEGATIVE /HPF
BASOPHILS # BLD: 0 K/UL (ref 0–0.1)
BASOPHILS NFR BLD: 0 % (ref 0–1)
BILIRUB SERPL-MCNC: 0.8 MG/DL (ref 0.2–1)
BILIRUB UR QL: NEGATIVE
BUN SERPL-MCNC: 12 MG/DL (ref 6–20)
BUN/CREAT SERPL: 13 (ref 12–20)
CALCIUM SERPL-MCNC: 9.1 MG/DL (ref 8.5–10.1)
CHLORIDE SERPL-SCNC: 102 MMOL/L (ref 97–108)
CO2 SERPL-SCNC: 28 MMOL/L (ref 21–32)
COLOR UR: ABNORMAL
CREAT SERPL-MCNC: 0.96 MG/DL (ref 0.7–1.3)
DIFFERENTIAL METHOD BLD: ABNORMAL
EOSINOPHIL # BLD: 0 K/UL (ref 0–0.4)
EOSINOPHIL NFR BLD: 0 % (ref 0–7)
EPITH CASTS URNS QL MICRO: ABNORMAL /LPF
ERYTHROCYTE [DISTWIDTH] IN BLOOD BY AUTOMATED COUNT: 14.5 % (ref 11.5–14.5)
GLOBULIN SER CALC-MCNC: 3 G/DL (ref 2–4)
GLUCOSE SERPL-MCNC: 222 MG/DL (ref 65–100)
GLUCOSE UR STRIP.AUTO-MCNC: >1000 MG/DL
HCT VFR BLD AUTO: 36.4 % (ref 36.6–50.3)
HGB BLD-MCNC: 11.7 G/DL (ref 12.1–17)
HGB UR QL STRIP: NEGATIVE
HYALINE CASTS URNS QL MICRO: ABNORMAL /LPF (ref 0–5)
IMM GRANULOCYTES # BLD AUTO: 0 K/UL (ref 0–0.04)
IMM GRANULOCYTES NFR BLD AUTO: 0 % (ref 0–0.5)
KETONES UR QL STRIP.AUTO: NEGATIVE MG/DL
LEUKOCYTE ESTERASE UR QL STRIP.AUTO: NEGATIVE
LIPASE SERPL-CCNC: 81 U/L (ref 73–393)
LYMPHOCYTES # BLD: 1.1 K/UL (ref 0.8–3.5)
LYMPHOCYTES NFR BLD: 21 % (ref 12–49)
MCH RBC QN AUTO: 25.3 PG (ref 26–34)
MCHC RBC AUTO-ENTMCNC: 32.1 G/DL (ref 30–36.5)
MCV RBC AUTO: 78.8 FL (ref 80–99)
MONOCYTES # BLD: 0.4 K/UL (ref 0–1)
MONOCYTES NFR BLD: 7 % (ref 5–13)
NEUTS SEG # BLD: 3.9 K/UL (ref 1.8–8)
NEUTS SEG NFR BLD: 71 % (ref 32–75)
NITRITE UR QL STRIP.AUTO: NEGATIVE
NRBC # BLD: 0 K/UL (ref 0–0.01)
NRBC BLD-RTO: 0 PER 100 WBC
PH UR STRIP: 6 [PH] (ref 5–8)
PLATELET # BLD AUTO: 293 K/UL (ref 150–400)
PMV BLD AUTO: 10 FL (ref 8.9–12.9)
POTASSIUM SERPL-SCNC: 3.4 MMOL/L (ref 3.5–5.1)
PROT SERPL-MCNC: 5.9 G/DL (ref 6.4–8.2)
PROT UR STRIP-MCNC: 100 MG/DL
RBC # BLD AUTO: 4.62 M/UL (ref 4.1–5.7)
RBC #/AREA URNS HPF: ABNORMAL /HPF (ref 0–5)
SODIUM SERPL-SCNC: 136 MMOL/L (ref 136–145)
SP GR UR REFRACTOMETRY: 1.01 (ref 1–1.03)
UA: UC IF INDICATED,UAUC: ABNORMAL
UROBILINOGEN UR QL STRIP.AUTO: 1 EU/DL (ref 0.2–1)
WBC # BLD AUTO: 5.5 K/UL (ref 4.1–11.1)
WBC URNS QL MICRO: ABNORMAL /HPF (ref 0–4)

## 2021-10-08 NOTE — PROGRESS NOTES
CT of the chest, abdomen and pelvis showed no evidence of metastasis in the lung and question primary in the lung as well as metastasis in the liver and skeletal metastasis. I have discussed with interventional radiology and we are arranging for a CT-guided biopsy of a lung mass. Scheduled 10-.

## 2021-10-09 DIAGNOSIS — I10 ESSENTIAL HYPERTENSION: ICD-10-CM

## 2021-10-11 ENCOUNTER — HOSPITAL ENCOUNTER (OUTPATIENT)
Dept: PREADMISSION TESTING | Age: 76
Discharge: HOME | End: 2021-10-11
Payer: MEDICARE

## 2021-10-11 PROCEDURE — U0005 INFEC AGEN DETEC AMPLI PROBE: HCPCS

## 2021-10-11 RX ORDER — AMLODIPINE BESYLATE 10 MG/1
TABLET ORAL
Qty: 90 TABLET | Refills: 3 | Status: SHIPPED | OUTPATIENT
Start: 2021-10-11 | End: 2022-03-11

## 2021-10-11 NOTE — TELEPHONE ENCOUNTER
RX refill request from the patient/pharmacy. Patient last seen 10- with labs, and next appt. scheduled for 12-  Requested Prescriptions     Pending Prescriptions Disp Refills    amLODIPine (NORVASC) 10 mg tablet [Pharmacy Med Name: amLODIPine BESYLATE 10 MG TAB] 90 Tablet 3     Sig: TAKE ONE TABLET BY MOUTH DAILY   .

## 2021-10-12 PROBLEM — Z00.00 MEDICARE ANNUAL WELLNESS VISIT, SUBSEQUENT: Status: RESOLVED | Noted: 2017-08-25 | Resolved: 2021-10-12

## 2021-10-12 PROBLEM — Z12.5 PROSTATE CANCER SCREENING: Status: RESOLVED | Noted: 2018-08-03 | Resolved: 2021-10-12

## 2021-10-12 LAB
SARS-COV-2, XPLCVT: NOT DETECTED
SOURCE, COVRS: NORMAL

## 2021-10-13 NOTE — PROGRESS NOTES
precall attempted - pt.'s home number states \"all circuits are busy\" and the cell number recording states \"hi you have reached [facility]\".

## 2021-10-14 ENCOUNTER — HOSPITAL ENCOUNTER (OUTPATIENT)
Dept: GENERAL RADIOLOGY | Age: 76
Discharge: HOME | End: 2021-10-14
Attending: STUDENT IN AN ORGANIZED HEALTH CARE EDUCATION/TRAINING PROGRAM
Payer: MEDICARE

## 2021-10-14 ENCOUNTER — HOSPITAL ENCOUNTER (OUTPATIENT)
Dept: CT IMAGING | Age: 76
Discharge: HOME | End: 2021-10-14
Attending: INTERNAL MEDICINE
Payer: MEDICARE

## 2021-10-14 VITALS
OXYGEN SATURATION: 93 % | SYSTOLIC BLOOD PRESSURE: 127 MMHG | RESPIRATION RATE: 18 BRPM | DIASTOLIC BLOOD PRESSURE: 56 MMHG | HEIGHT: 71 IN | TEMPERATURE: 97.7 F | HEART RATE: 75 BPM | WEIGHT: 185 LBS | BODY MASS INDEX: 25.9 KG/M2

## 2021-10-14 DIAGNOSIS — C79.51 METASTATIC CARCINOMA TO BONE (HCC): ICD-10-CM

## 2021-10-14 PROCEDURE — 88341 IMHCHEM/IMCYTCHM EA ADD ANTB: CPT

## 2021-10-14 PROCEDURE — 32408 CORE NDL BX LNG/MED PERQ: CPT

## 2021-10-14 PROCEDURE — 88333 PATH CONSLTJ SURG CYTO XM 1: CPT

## 2021-10-14 PROCEDURE — 88342 IMHCHEM/IMCYTCHM 1ST ANTB: CPT

## 2021-10-14 PROCEDURE — 32400 NEEDLE BIOPSY CHEST LINING: CPT

## 2021-10-14 PROCEDURE — 2709999900 HC NON-CHARGEABLE SUPPLY

## 2021-10-14 PROCEDURE — 71045 X-RAY EXAM CHEST 1 VIEW: CPT

## 2021-10-14 PROCEDURE — 88313 SPECIAL STAINS GROUP 2: CPT

## 2021-10-14 PROCEDURE — 88305 TISSUE EXAM BY PATHOLOGIST: CPT

## 2021-10-14 PROCEDURE — 99152 MOD SED SAME PHYS/QHP 5/>YRS: CPT

## 2021-10-14 PROCEDURE — 74011250636 HC RX REV CODE- 250/636: Performed by: STUDENT IN AN ORGANIZED HEALTH CARE EDUCATION/TRAINING PROGRAM

## 2021-10-14 RX ORDER — SODIUM CHLORIDE 9 MG/ML
25 INJECTION, SOLUTION INTRAVENOUS CONTINUOUS
Status: DISCONTINUED | OUTPATIENT
Start: 2021-10-14 | End: 2021-10-14

## 2021-10-14 RX ORDER — MIDAZOLAM HYDROCHLORIDE 1 MG/ML
5 INJECTION, SOLUTION INTRAMUSCULAR; INTRAVENOUS
Status: DISCONTINUED | OUTPATIENT
Start: 2021-10-14 | End: 2021-10-14

## 2021-10-14 RX ORDER — FENTANYL CITRATE 50 UG/ML
100 INJECTION, SOLUTION INTRAMUSCULAR; INTRAVENOUS
Status: DISCONTINUED | OUTPATIENT
Start: 2021-10-14 | End: 2021-10-14

## 2021-10-14 RX ADMIN — MIDAZOLAM 2 MG: 1 INJECTION INTRAMUSCULAR; INTRAVENOUS at 12:30

## 2021-10-14 RX ADMIN — SODIUM CHLORIDE 25 ML/HR: 9 INJECTION, SOLUTION INTRAVENOUS at 11:30

## 2021-10-14 RX ADMIN — FENTANYL CITRATE 50 MCG: 50 INJECTION INTRAMUSCULAR; INTRAVENOUS at 12:30

## 2021-10-14 NOTE — PROGRESS NOTES
Dr Zabrina Webb at bedside to obtain consent for lung biopsy. Procedure explained with opportunity to ask questions. Patient states understanding of procedure prior to signing consent.

## 2021-10-14 NOTE — PROGRESS NOTES
Name of procedure: left lung biopsy    Sedation medications given: Versed 2mg, Fentanyl 50mcg    Sedation tolerated: well    Total Sedation time: 16 mins    Vital Signs: stable    Fluids removed: none    Samples sent to lab: yes via cytology    Any complications related to procedure: none    Post Procedure Care Needed/order sets placed in connect care: routine discharge instructions

## 2021-10-14 NOTE — H&P
Radiology History and Physical    Patient: Robby Luna 66 y.o. male       Chief Complaint: No chief complaint on file.       History of Present Illness: Left lung mass bx    History:    Past Medical History:   Diagnosis Date    Aortic stenosis 8/2/2017    ASVD (arteriosclerotic vascular disease) 8/2/2017    Back pain 8/2/2017    BPH (benign prostatic hyperplasia) 8/2/2017    CKD (chronic kidney disease) 8/2/2017    Diabetes ([address] 8/2/2017    DJD (degenerative joint disease) 8/2/2017    ED (erectile dysfunction) 8/2/2017    Guillain-Terrell syndrome ([address] 8/2/2017    Hyperlipidemia 8/2/2017    Hypertension 8/2/2017    Left carotid bruit 8/2/2017    [facility]an[facility] 2021    Morbid obesity ([facility] [address] 8/2/2017    On statin therapy 8/2/2017    Urticaria 8/2/2017     Family History   Problem Relation Age of Onset    Heart Disease Mother         murmor    Heart Disease Father      Social History     Socioeconomic History    Marital status:      Spouse name: Not on file    Number of children: Not on file    Years of education: Not on file    Highest education level: Not on file   Occupational History    Not on file   Tobacco Use    Smoking status: Never Smoker    Smokeless tobacco: Never Used   Vaping Use    Vaping Use: Never used   Substance and Sexual Activity    Alcohol use: Yes     Comment: social    Drug use: No    Sexual activity: Not on file   Other Topics Concern     Service Not Asked    Blood Transfusions Not Asked    Caffeine Concern Not Asked    Occupational Exposure Not Asked    Hobby Hazards Not Asked    Sleep Concern Not Asked    Stress Concern Not Asked    Weight Concern Not Asked    Special Diet Not Asked    Back Care Not Asked    Exercise Not Asked    Bike Helmet Not Asked   [address] Not Asked    Self-Exams Not Asked   Social History Narrative    Not on file     Social Determinants of Health     Financial Resource Strain:     Difficulty of Paying Living Expenses:    Food Insecurity:     Worried About [facility] in the Last Year:     [facility] in the Last Year:    Transportation Needs:     Lack of Transportation (Medical):  Lack of Transportation (Non-Medical):    Physical Activity:     Days of Exercise per Week:     Minutes of Exercise per Session:    Stress:     Feeling of Stress :    Social Connections:     Frequency of Communication with Friends and Family:     Frequency of Social Gatherings with Friends and Family:     Attends [employer] Services:     Active Member of Clubs or Organizations:     Attends Club or Organization Meetings:     Marital Status:    Intimate Partner Violence:     Fear of Current or Ex-Partner:     Emotionally Abused:     Physically Abused:     Sexually Abused: Allergies: Allergies   Allergen Reactions    Adhesive Tape-Silicones Unknown (comments)    Chocolate Flavor Unknown (comments)       Current Medications:  Current Outpatient Medications   Medication Sig    amLODIPine (NORVASC) 10 mg tablet TAKE ONE TABLET BY MOUTH DAILY    glipiZIDE (GLUCOTROL) 10 mg tablet TAKE ONE TABLET BY MOUTH TWICE A DAY    empagliflozin (Jardiance) 10 mg tablet Take 1 Tablet by mouth daily.  finasteride (PROSCAR) 5 mg tablet TAKE ONE TABLET BY MOUTH DAILY    lisinopriL (PRINIVIL, ZESTRIL) 40 mg tablet TAKE 1 TABLET BY MOUTH EVERY DAY    tamsulosin (FLOMAX) 0.4 mg capsule TAKE TWO CAPSULES BY MOUTH DAILY    pravastatin (PRAVACHOL) 80 mg tablet TAKE ONE TABLET BY MOUTH DAILY    Januvia 100 mg tablet TAKE ONE TABLET BY MOUTH DAILY    metFORMIN (GLUCOPHAGE) 500 mg tablet TAKE ONE TABLET BY MOUTH EVERY MORNING AND TAKE TWO TABLETS BY MOUTH EVERY EVENING WITH DINNER (Patient taking differently: Taking one table in the morning and one in the evening)    cloNIDine HCl (CATAPRES) 0.2 mg tablet Take 1 Tab by mouth two (2) times a day.     metoprolol tartrate (LOPRESSOR) 25 mg tablet Take 1 Tab by mouth every twelve (12) hours.  timolol (TIMOPTIC-XE) 0.5 % ophthalmic gel-forming     Omega-3-DHA-EPA-Fish Oil 1,000 mg (120 mg-180 mg) cap Take 1 Cap by mouth three (3) times daily.  aspirin delayed-release 81 mg tablet Take  by mouth daily. No current facility-administered medications for this encounter. Physical Exam:  There were no vitals taken for this visit. GENERAL: alert, cooperative, no distress, appears stated age  LUNG: clear to auscultation bilaterally  HEART: regular rate and rhythm  ABD: Non tender, non distended. Alerts:    Hospital Problems  Date Reviewed: 10/7/2021    None          Laboratory:    No results for input(s): HGB, HCT, WBC, PLT, INR, BUN, CREA, K, CRCLT, HGBEXT, HCTEXT, PLTEXT, INREXT in the last 72 hours. No lab exists for component: PTT, PT      Plan of Care/Planned Procedure:  Risks, benefits, and alternatives reviewed with patient and he agrees to proceed with the procedure. Deemed appropriate for moderate sedation with versed and fentanyl.       Alene Primrose, MD

## 2021-10-14 NOTE — DISCHARGE INSTRUCTIONS
[address]  Special Procedures/Radiology Department    Radiologist:   Dr Jeevna Rodrigues    Date:   10/14/2021        Lung Biopsy Discharge Instruction    You may have an aching pain in the biopsy site tonight. Take Tylenol, as directed on the label, for pain or discomfort. Avoid ibuprofen (Advil, Motrin) and aspirin for the next 48 hours as these drugs may cause you to bleed. Resume your previous diet and follow the medication reconciliation form. Rest for the next 48 hours. No strenuous activity for the next 48 hours. You may notice some blood-tinged sputum when you cough. This is normal.  If you have any bright red blood, or if you cough up blood clots, call 911 and get to the nearest Emergency Room immediately. It may take up to 3 days for your test results to become available. Call your physician if you have not heard anything after 3 business days. If you have any questions or concerns, please call [phone] and ask to speak to the nurse on-call.

## 2021-10-14 NOTE — PROGRESS NOTES
Discharge instructions reviewed with patient. Patient verbalizes understanding. Discharged from IR via wheelchair in stable condition. Patient released with wife, Ellie Sheth.

## 2021-10-20 ENCOUNTER — APPOINTMENT (OUTPATIENT)
Dept: GENERAL RADIOLOGY | Age: 76
End: 2021-10-20
Attending: EMERGENCY MEDICINE
Payer: MEDICARE

## 2021-10-20 ENCOUNTER — HOSPITAL ENCOUNTER (EMERGENCY)
Age: 76
Discharge: HOME | End: 2021-10-20
Attending: EMERGENCY MEDICINE
Payer: MEDICARE

## 2021-10-20 VITALS
BODY MASS INDEX: 26.48 KG/M2 | RESPIRATION RATE: 19 BRPM | SYSTOLIC BLOOD PRESSURE: 144 MMHG | HEIGHT: 70 IN | TEMPERATURE: 97.3 F | OXYGEN SATURATION: 100 % | DIASTOLIC BLOOD PRESSURE: 66 MMHG | WEIGHT: 184.97 LBS | HEART RATE: 63 BPM

## 2021-10-20 DIAGNOSIS — R11.0 NAUSEA WITHOUT VOMITING: ICD-10-CM

## 2021-10-20 DIAGNOSIS — E86.0 DEHYDRATION, SEVERE: ICD-10-CM

## 2021-10-20 DIAGNOSIS — I95.1 ORTHOSTATIC HYPOTENSION: Primary | ICD-10-CM

## 2021-10-20 LAB
ABO + RH BLD: NORMAL
ALBUMIN SERPL-MCNC: 2.8 G/DL (ref 3.5–5)
ALBUMIN/GLOB SERPL: 0.8 {RATIO} (ref 1.1–2.2)
ALP SERPL-CCNC: 119 U/L (ref 45–117)
ALT SERPL-CCNC: 15 U/L (ref 12–78)
ANION GAP SERPL CALC-SCNC: 9 MMOL/L (ref 5–15)
AST SERPL-CCNC: 24 U/L (ref 15–37)
ATRIAL RATE: 102 BPM
BASOPHILS # BLD: 0 K/UL (ref 0–0.1)
BASOPHILS NFR BLD: 0 % (ref 0–1)
BILIRUB SERPL-MCNC: 1.1 MG/DL (ref 0.2–1)
BLOOD GROUP ANTIBODIES SERPL: NORMAL
BUN SERPL-MCNC: 14 MG/DL (ref 6–20)
BUN/CREAT SERPL: 15 (ref 12–20)
CALCIUM SERPL-MCNC: 9.4 MG/DL (ref 8.5–10.1)
CALCULATED R AXIS, ECG10: -34 DEGREES
CALCULATED T AXIS, ECG11: 160 DEGREES
CHLORIDE SERPL-SCNC: 99 MMOL/L (ref 97–108)
CO2 SERPL-SCNC: 26 MMOL/L (ref 21–32)
CREAT SERPL-MCNC: 0.94 MG/DL (ref 0.7–1.3)
DIAGNOSIS, 93000: NORMAL
DIFFERENTIAL METHOD BLD: ABNORMAL
EOSINOPHIL # BLD: 0 K/UL (ref 0–0.4)
EOSINOPHIL NFR BLD: 0 % (ref 0–7)
ERYTHROCYTE [DISTWIDTH] IN BLOOD BY AUTOMATED COUNT: 15.1 % (ref 11.5–14.5)
GLOBULIN SER CALC-MCNC: 3.6 G/DL (ref 2–4)
GLUCOSE SERPL-MCNC: 168 MG/DL (ref 65–100)
HCT VFR BLD AUTO: 35.4 % (ref 36.6–50.3)
HEMOCCULT STL QL: NEGATIVE
HGB BLD-MCNC: 11.7 G/DL (ref 12.1–17)
IMM GRANULOCYTES # BLD AUTO: 0 K/UL (ref 0–0.04)
IMM GRANULOCYTES NFR BLD AUTO: 1 % (ref 0–0.5)
INR PPP: 1.1 (ref 0.9–1.1)
LYMPHOCYTES # BLD: 1.4 K/UL (ref 0.8–3.5)
LYMPHOCYTES NFR BLD: 22 % (ref 12–49)
MCH RBC QN AUTO: 25 PG (ref 26–34)
MCHC RBC AUTO-ENTMCNC: 33.1 G/DL (ref 30–36.5)
MCV RBC AUTO: 75.6 FL (ref 80–99)
MONOCYTES # BLD: 0.5 K/UL (ref 0–1)
MONOCYTES NFR BLD: 8 % (ref 5–13)
NEUTS SEG # BLD: 4.5 K/UL (ref 1.8–8)
NEUTS SEG NFR BLD: 69 % (ref 32–75)
NRBC # BLD: 0 K/UL (ref 0–0.01)
NRBC BLD-RTO: 0 PER 100 WBC
PLATELET # BLD AUTO: 297 K/UL (ref 150–400)
PMV BLD AUTO: 9.6 FL (ref 8.9–12.9)
POTASSIUM SERPL-SCNC: 3.2 MMOL/L (ref 3.5–5.1)
PROT SERPL-MCNC: 6.4 G/DL (ref 6.4–8.2)
PROTHROMBIN TIME: 11.3 SEC (ref 9–11.1)
Q-T INTERVAL, ECG07: 508 MS
QRS DURATION, ECG06: 158 MS
QTC CALCULATION (BEZET), ECG08: 532 MS
RBC # BLD AUTO: 4.68 M/UL (ref 4.1–5.7)
SODIUM SERPL-SCNC: 134 MMOL/L (ref 136–145)
SPECIMEN EXP DATE BLD: NORMAL
TROPONIN-HIGH SENSITIVITY: 41 NG/L (ref 0–76)
TROPONIN-HIGH SENSITIVITY: 44 NG/L (ref 0–76)
VENTRICULAR RATE, ECG03: 66 BPM
WBC # BLD AUTO: 6.5 K/UL (ref 4.1–11.1)

## 2021-10-20 PROCEDURE — 74011250637 HC RX REV CODE- 250/637: Performed by: EMERGENCY MEDICINE

## 2021-10-20 PROCEDURE — 85025 COMPLETE CBC W/AUTO DIFF WBC: CPT

## 2021-10-20 PROCEDURE — 84484 ASSAY OF TROPONIN QUANT: CPT

## 2021-10-20 PROCEDURE — 82272 OCCULT BLD FECES 1-3 TESTS: CPT

## 2021-10-20 PROCEDURE — 71045 X-RAY EXAM CHEST 1 VIEW: CPT

## 2021-10-20 PROCEDURE — 86901 BLOOD TYPING SEROLOGIC RH(D): CPT

## 2021-10-20 PROCEDURE — 74011250636 HC RX REV CODE- 250/636: Performed by: EMERGENCY MEDICINE

## 2021-10-20 PROCEDURE — 96360 HYDRATION IV INFUSION INIT: CPT

## 2021-10-20 PROCEDURE — 93005 ELECTROCARDIOGRAM TRACING: CPT

## 2021-10-20 PROCEDURE — 80053 COMPREHEN METABOLIC PANEL: CPT

## 2021-10-20 PROCEDURE — 36415 COLL VENOUS BLD VENIPUNCTURE: CPT

## 2021-10-20 PROCEDURE — 85610 PROTHROMBIN TIME: CPT

## 2021-10-20 PROCEDURE — 99285 EMERGENCY DEPT VISIT HI MDM: CPT

## 2021-10-20 RX ORDER — ONDANSETRON 4 MG/1
4 TABLET, FILM COATED ORAL
Qty: 20 TABLET | Refills: 0 | Status: SHIPPED | OUTPATIENT
Start: 2021-10-20 | End: 2022-04-29 | Stop reason: ALTCHOICE

## 2021-10-20 RX ORDER — POTASSIUM CHLORIDE 750 MG/1
40 TABLET, FILM COATED, EXTENDED RELEASE ORAL
Status: COMPLETED | OUTPATIENT
Start: 2021-10-20 | End: 2021-10-20

## 2021-10-20 RX ADMIN — POTASSIUM CHLORIDE 40 MEQ: 750 TABLET, EXTENDED RELEASE ORAL at 16:46

## 2021-10-20 RX ADMIN — SODIUM CHLORIDE 1000 ML: 9 INJECTION, SOLUTION INTRAVENOUS at 15:29

## 2021-10-20 RX ADMIN — SODIUM CHLORIDE 1000 ML: 9 INJECTION, SOLUTION INTRAVENOUS at 16:37

## 2021-10-20 NOTE — ED NOTES
Patient arrives to ED room from triage with a cc of weakness, nausea and vomiting. Pt just recently got diagnosed with lung cancer and is not eating or drinking well. Patient has been using a walker when needed. Patient appears pale and weak. Patient is alert and oriented x 4, resting comfortably in stretcher with side rails up and call bell within reach.

## 2021-10-20 NOTE — ED PROVIDER NOTES
EMERGENCY DEPARTMENT HISTORY AND PHYSICAL EXAM      Date: 10/20/2021  Patient Name: Silver Butler  Patient Age and Sex: 66 y.o. male     History of Presenting Illness     Chief Complaint   Patient presents with    Dizziness     dizziness getting much worse when he stands up, feels faint. Recent diagnoses of lung cancer       History Provided By: Patient    HPI: Silver Butler is a 61-year-old male with a history of hypertension, lung cancer, presenting with lightheadedness. According to patient and wife, over the past couple of days has been having lightheadedness worse when he stands up. Has been feeling very very dizzy and fatigued as well. Denies any blood in his stool, hemoptysis, hematuria and is only on baby aspirin. Patient states that he does not have any chest pain or abdominal pain. Was just recently diagnosed with lung cancer and has yet to follow-up with an oncologist.  He has not been eating very well and has been very nauseated but no vomiting or diarrhea. Patient states that he does not have any nausea medicines at home. There are no other complaints, changes, or physical findings at this time. PCP: Lesley Dave MD    No current facility-administered medications on file prior to encounter. Current Outpatient Medications on File Prior to Encounter   Medication Sig Dispense Refill    amLODIPine (NORVASC) 10 mg tablet TAKE ONE TABLET BY MOUTH DAILY 90 Tablet 3    glipiZIDE (GLUCOTROL) 10 mg tablet TAKE ONE TABLET BY MOUTH TWICE A  Tablet 3    empagliflozin (Jardiance) 10 mg tablet Take 1 Tablet by mouth daily.  30 Tablet 5    finasteride (PROSCAR) 5 mg tablet TAKE ONE TABLET BY MOUTH DAILY 90 Tablet PRN    lisinopriL (PRINIVIL, ZESTRIL) 40 mg tablet TAKE 1 TABLET BY MOUTH EVERY DAY 90 Tablet 3    tamsulosin (FLOMAX) 0.4 mg capsule TAKE TWO CAPSULES BY MOUTH DAILY 180 Cap PRN    pravastatin (PRAVACHOL) 80 mg tablet TAKE ONE TABLET BY MOUTH DAILY 90 Tab 1    Januvia 100 mg tablet TAKE ONE TABLET BY MOUTH DAILY 30 Tab 5    metFORMIN (GLUCOPHAGE) 500 mg tablet TAKE ONE TABLET BY MOUTH EVERY MORNING AND TAKE TWO TABLETS BY MOUTH EVERY EVENING WITH DINNER (Patient taking differently: Taking one table in the morning and one in the evening) 270 Tab 3    cloNIDine HCl (CATAPRES) 0.2 mg tablet Take 1 Tab by mouth two (2) times a day. 60 Tab 12    metoprolol tartrate (LOPRESSOR) 25 mg tablet Take 1 Tab by mouth every twelve (12) hours. 60 Tab 12    timolol (TIMOPTIC-XE) 0.5 % ophthalmic gel-forming       Omega-3-DHA-EPA-Fish Oil 1,000 mg (120 mg-180 mg) cap Take 1 Cap by mouth three (3) times daily.  aspirin delayed-release 81 mg tablet Take  by mouth daily. Past History     Past Medical History:  Past Medical History:   Diagnosis Date    Aortic stenosis 8/2/2017    ASVD (arteriosclerotic vascular disease) 8/2/2017    Back pain 8/2/2017    BPH (benign prostatic hyperplasia) 8/2/2017    CKD (chronic kidney disease) 8/2/2017    Diabetes ([address] 8/2/2017    DJD (degenerative joint disease) 8/2/2017    ED (erectile dysfunction) 8/2/2017    Guillain-Walk[place] syndrome ([address] 8/2/2017    Hyperlipidemia 8/2/2017    Hypertension 8/2/2017    Left carotid bruit 8/2/2017    Melanoma ([address] 2021    Morbid obesity ([address] 8/2/2017    On statin therapy 8/2/2017    Urticaria 8/2/2017       Past Surgical History:  Past Surgical History:   Procedure Laterality Date    CT CARDIAC SURG PROCEDURE UNLIST  09/2019    4 stents placed       Family History:  Family History   Problem Relation Age of Onset    Heart Disease Mother         murmor    Heart Disease Father        Social History:  Social History     Tobacco Use    Smoking status: Never Smoker    Smokeless tobacco: Never Used   Vaping Use    Vaping Use: Never used   Substance Use Topics    Alcohol use: Yes     Comment: social    Drug use: No       Allergies:   Allergies   Allergen Reactions    Adhesive Tape-Silicones Unknown (comments)    Chocolate Flavor Unknown (comments)         Review of Systems   Review of Systems   Constitutional: Positive for fatigue. Negative for chills and fever. Respiratory: Negative for cough and shortness of breath. Cardiovascular: Negative for chest pain. Gastrointestinal: Negative for abdominal pain, constipation, diarrhea, nausea and vomiting. Genitourinary: Negative for dysuria, frequency and hematuria. Skin: Positive for pallor. Neurological: Positive for dizziness and light-headedness. Negative for weakness and numbness. All other systems reviewed and are negative. Physical Exam   Physical Exam  Vitals and nursing note reviewed. Constitutional:       Appearance: He is well-developed. HENT:      Head: Normocephalic and atraumatic. Nose: Nose normal.      Mouth/Throat:      Mouth: Mucous membranes are moist.   Eyes:      Extraocular Movements: Extraocular movements intact. Conjunctiva/sclera: Conjunctivae normal.   Cardiovascular:      Rate and Rhythm: Normal rate and regular rhythm. Pulmonary:      Effort: Pulmonary effort is normal. No respiratory distress. Breath sounds: Normal breath sounds. Abdominal:      General: There is no distension. Palpations: Abdomen is soft. Tenderness: There is no abdominal tenderness. Musculoskeletal:         General: Normal range of motion. Cervical back: Normal range of motion and neck supple. Skin:     General: Skin is warm and dry. Coloration: Skin is pale. Comments: Patient appears very pale on exam, subconjunctival pallor. Neurological:      General: No focal deficit present. Mental Status: He is alert and oriented to person, place, and time. Mental status is at baseline.    Psychiatric:         Mood and Affect: Mood normal.          Diagnostic Study Results     Labs -     Recent Results (from the past 12 hour(s))   CBC WITH AUTOMATED DIFF    Collection Time: 10/20/21  2:43 PM   Result Value Ref Range    WBC 6.5 4.1 - 11.1 K/uL    RBC 4.68 4.10 - 5.70 M/uL    HGB 11.7 (L) 12.1 - 17.0 g/dL    HCT 35.4 (L) 36.6 - 50.3 %    MCV 75.6 (L) 80.0 - 99.0 FL    MCH 25.0 (L) 26.0 - 34.0 PG    MCHC 33.1 30.0 - 36.5 g/dL    RDW 15.1 (H) 11.5 - 14.5 %    PLATELET 598 725 - 879 K/uL    MPV 9.6 8.9 - 12.9 FL    NRBC 0.0 0  WBC    ABSOLUTE NRBC 0.00 0.00 - 0.01 K/uL    NEUTROPHILS 69 32 - 75 %    LYMPHOCYTES 22 12 - 49 %    MONOCYTES 8 5 - 13 %    EOSINOPHILS 0 0 - 7 %    BASOPHILS 0 0 - 1 %    IMMATURE GRANULOCYTES 1 (H) 0.0 - 0.5 %    ABS. NEUTROPHILS 4.5 1.8 - 8.0 K/UL    ABS. LYMPHOCYTES 1.4 0.8 - 3.5 K/UL    ABS. MONOCYTES 0.5 0.0 - 1.0 K/UL    ABS. EOSINOPHILS 0.0 0.0 - 0.4 K/UL    ABS. BASOPHILS 0.0 0.0 - 0.1 K/UL    ABS. IMM. GRANS. 0.0 0.00 - 0.04 K/UL    DF AUTOMATED     METABOLIC PANEL, COMPREHENSIVE    Collection Time: 10/20/21  2:43 PM   Result Value Ref Range    Sodium 134 (L) 136 - 145 mmol/L    Potassium 3.2 (L) 3.5 - 5.1 mmol/L    Chloride 99 97 - 108 mmol/L    CO2 26 21 - 32 mmol/L    Anion gap 9 5 - 15 mmol/L    Glucose 168 (H) 65 - 100 mg/dL    BUN 14 6 - 20 MG/DL    Creatinine 0.94 0.70 - 1.30 MG/DL    BUN/Creatinine ratio 15 12 - 20      GFR est AA >60 >60 ml/min/1.73m2    GFR est non-AA >60 >60 ml/min/1.73m2    Calcium 9.4 8.5 - 10.1 MG/DL    Bilirubin, total 1.1 (H) 0.2 - 1.0 MG/DL    ALT (SGPT) 15 12 - 78 U/L    AST (SGOT) 24 15 - 37 U/L    Alk.  phosphatase 119 (H) 45 - 117 U/L    Protein, total 6.4 6.4 - 8.2 g/dL    Albumin 2.8 (L) 3.5 - 5.0 g/dL    Globulin 3.6 2.0 - 4.0 g/dL    A-G Ratio 0.8 (L) 1.1 - 2.2     TROPONIN-HIGH SENSITIVITY    Collection Time: 10/20/21  2:43 PM   Result Value Ref Range    Troponin-High Sensitivity 44 0 - 76 ng/L   EKG, 12 LEAD, INITIAL    Collection Time: 10/20/21  3:13 PM   Result Value Ref Range    Ventricular Rate 66 BPM    Atrial Rate 102 BPM    QRS Duration 158 ms    Q-T Interval 508 ms    QTC Calculation (Bezet) 532 ms Calculated R Axis -34 degrees    Calculated T Axis 160 degrees    Diagnosis       ** Poor data quality, interpretation may be adversely affected  Sinus rhythm  Left axis deviation  Left bundle branch block  When compared with ECG of 25-SEP-2019 15:29,  No significant change  Confirmed by Vilma Michaels ([id number]) on 10/20/2021 4:09:34 PM     PROTHROMBIN TIME + INR    Collection Time: 10/20/21  3:17 PM   Result Value Ref Range    INR 1.1 0.9 - 1.1      Prothrombin time 11.3 (H) 9.0 - 11.1 sec   OCCULT BLOOD, STOOL    Collection Time: 10/20/21  3:32 PM   Result Value Ref Range    Occult blood, stool Negative NEG         Radiologic Studies -   XR CHEST PORT    (Results Pending)     CT Results  (Last 48 hours)    None        CXR Results  (Last 48 hours)    None            Medical Decision Making   I am the first provider for this patient. I reviewed the vital signs, available nursing notes, past medical history, past surgical history, family history and social history. Vital Signs-Reviewed the patient's vital signs. Patient Vitals for the past 12 hrs:   Temp Pulse Resp BP SpO2   10/20/21 1610  66 22 (!) 123/55 99 %   10/20/21 1608  66 26 127/60 100 %   10/20/21 1607  63 17 (!) 158/70 100 %   10/20/21 1545  61 26 135/61 99 %   10/20/21 1522  75 (!) 34 (!) 78/46 100 %   10/20/21 1521  68 27 (!) 83/51 99 %   10/20/21 1519  66 21 110/63 99 %   10/20/21 1437 97.3 °F (36.3 °C) 74 15 (!) 98/51 100 %       Records Reviewed: Nursing Notes and Old Medical Records    Provider Notes (Medical Decision Making):   Patient presenting with hypotension, pallor, lightheadedness. High concern for anemia secondary to blood loss, chronic disease secondary to the lung cancer, hypotension due to dehydration or infection. ED Course:   Initial assessment performed. The patients presenting problems have been discussed, and they are in agreement with the care plan formulated and outlined with them.   I have encouraged them to ask questions as they arise throughout their visit. ED Course as of Oct 20 1625   Wed Oct 20, 2021   1530 EKG interpreted by me. Shows atrial fibrillation with signs of a left bundle branch block. Patient is not paced. Does have some elevation of the anterior leads and depressions of the lateral leads which does appear to be old. Troponin did order. [JS]   9481 Patient is significantly orthostatic on exam.  Blood pressure went from 110 on supine to 78 when standing. Also became very lightheaded. Fluids ordered. [JS]      ED Course User Index  [JS] Lia Calixto MD     Critical Care Time:   CRITICAL CARE NOTE :    3:23 PM    IMPENDING DETERIORATION -Cardiovascular and Renal  ASSOCIATED RISK FACTORS - Hypotension, Shock and Dehydration  MANAGEMENT- Bedside Assessment and Supervision of Care  INTERPRETATION -  ECG, Blood Pressure and Cardiac Output Measures   INTERVENTIONS - hemodynamic mngmt  CASE REVIEW - Nursing and Family  TREATMENT RESPONSE -Improved  PERFORMED BY - Self    NOTES   :    I have spent 50 minutes of critical care time involved in lab review, consultations with specialist, family decision- making, bedside attention and documentation. During this entire length of time I was immediately available to the patient . Disposition:    Discharge Note:  The patient has been re-evaluated and is ready for discharge. Reviewed available results with patient. Counseled patient on diagnosis and care plan. Patient has expressed understanding, and all questions have been answered. Patient agrees with plan and agrees to follow up as recommended, or to return to the ED if their symptoms worsen. Discharge instructions have been provided and explained to the patient, along with reasons to return to the ED.       PLAN:  Current Discharge Medication List      START taking these medications    Details   ondansetron hcl (Zofran) 4 mg tablet Take 1 Tablet by mouth every eight (8) hours as needed for Nausea. Qty: 20 Tablet, Refills: 0  Start date: 10/20/2021         1.   2.   Follow-up Information     Follow up With Specialties Details Why Contact Info    Kathy Palacio MD Internal Medicine  As needed [address]  [phone]          3. Return to ED if worse       Diagnosis     Clinical Impression:   1. Orthostatic hypotension    2. Dehydration, severe    3. Nausea without vomiting        Attestations:  Miah Jamison M.D. Please note that this dictation was completed with [employer], the [employer] voice recognition software. Quite often unanticipated grammatical, syntax, homophones, and other interpretive errors are inadvertently transcribed by the computer software. Please disregard these errors. Please excuse any errors that have escaped final proofreading. Thank you.

## 2021-10-21 ENCOUNTER — TRANSCRIBE ORDER (OUTPATIENT)
Dept: SCHEDULING | Age: 76
End: 2021-10-21

## 2021-10-21 DIAGNOSIS — C34.12 MALIGNANT NEOPLASM OF UPPER LOBE, LEFT BRONCHUS OR LUNG (HCC): Primary | ICD-10-CM

## 2021-10-21 DIAGNOSIS — C79.51 SECONDARY MALIGNANT NEOPLASM OF BONE (HCC): ICD-10-CM

## 2021-10-25 NOTE — PROGRESS NOTES
I discussed this with his wife. Didn't hear from Dr. Rosita Stevens today. Dr. Dago Victor has discussed with Dr. Rosita Stevens 10-.

## 2021-10-26 PROBLEM — C79.9 METASTATIC ADENOCARCINOMA (HCC): Status: ACTIVE | Noted: 2021-10-26

## 2021-10-26 NOTE — PROGRESS NOTES
Chief Complaint   Patient presents with    Medication Evaluation    Follow-up       SUBJECTIVE:    Hernan Dukes is a 66 y.o. 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 I have discussed this with Dr. Patrice Slaughter from oncology and he has been seen by her. More lab studies were obtained by her and those are still pending. He has been set up to see radiation oncology so they can do the radiation therapy to the area that involves the spine. He notes his pain is controlled. He was given a prescription for pain medicine by Dr. Lora Hughes as well as some Zofran for the nausea. He is not really developed a nausea at this point. He denies any chest pain but still has some shortness of breath and some cough. He notes no fevers or chills. He notes no night sweats. He notes no current GI or  complaints except his appetite is a little decreased. He denies any headaches, dizziness or neurologic complaints other than just generalized weakness. He does note that his arthritis is better particular the knee where he had the injection but the elbow still bothering him quite a bit. The remainder review of systems is negative. Current Outpatient Medications   Medication Sig Dispense Refill    metoprolol tartrate (LOPRESSOR) 25 mg tablet Take 1 Tablet by mouth every twelve (12) hours. 180 Tablet 3    ondansetron hcl (Zofran) 4 mg tablet Take 1 Tablet by mouth every eight (8) hours as needed for Nausea. 20 Tablet 0    amLODIPine (NORVASC) 10 mg tablet TAKE ONE TABLET BY MOUTH DAILY 90 Tablet 3    glipiZIDE (GLUCOTROL) 10 mg tablet TAKE ONE TABLET BY MOUTH TWICE A  Tablet 3    empagliflozin (Jardiance) 10 mg tablet Take 1 Tablet by mouth daily.  30 Tablet 5    finasteride (PROSCAR) 5 mg tablet TAKE ONE TABLET BY MOUTH DAILY 90 Tablet PRN    lisinopriL (PRINIVIL, ZESTRIL) 40 mg tablet TAKE 1 TABLET BY MOUTH EVERY DAY 90 Tablet 3    tamsulosin (FLOMAX) 0.4 mg capsule TAKE TWO CAPSULES BY MOUTH DAILY 180 Cap PRN    pravastatin (PRAVACHOL) 80 mg tablet TAKE ONE TABLET BY MOUTH DAILY 90 Tab 1    Januvia 100 mg tablet TAKE ONE TABLET BY MOUTH DAILY 30 Tab 5    metFORMIN (GLUCOPHAGE) 500 mg tablet TAKE ONE TABLET BY MOUTH EVERY MORNING AND TAKE TWO TABLETS BY MOUTH EVERY EVENING WITH DINNER (Patient taking differently: Taking one table in the morning and one in the evening) 270 Tab 3    cloNIDine HCl (CATAPRES) 0.2 mg tablet Take 1 Tab by mouth two (2) times a day. 60 Tab 12    timolol (TIMOPTIC-XE) 0.5 % ophthalmic gel-forming       Omega-3-DHA-EPA-Fish Oil 1,000 mg (120 mg-180 mg) cap Take 1 Cap by mouth three (3) times daily.  aspirin delayed-release 81 mg tablet Take  by mouth daily. 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   Positive for generalized weakness  ENT: negative for - headaches, nasal congestion or tinnitus  Eyes: no blurred or visual changes  Neck: No stiffness or swollen nodes  Respiratory: Positive for some cough and some shortness of breath/dyspnea on exertion. Negative for - hemoptysis or wheezing  Cardiovascular : negative for - chest pain, edema, palpitations or shortness of breath  Gastrointestinal: negative for - abdominal pain, blood in stools, heartburn or nausea/vomiting. Positive for decreased appetite  Genito-Urinary: no dysuria, trouble voiding, or hematuria  Musculoskeletal: negative for - gait disturbance, joint pain, joint stiffness or joint swelling  Neurological: no TIA or stroke symptoms  Hematologic: no bruises, no bleeding  Lymphatic: no swollen glands  Integument: no lumps, mole changes, nail changes or rash  Endocrine:no malaise/lethargy poly uria or polydipsia or unexpected weight changes        Social History     Socioeconomic History    Marital status:      Spouse name: Not on file    Number of children: Not on file    Years of education: Not on file    Highest education level: Not on file   Tobacco Use    Smoking status: Never Smoker    Smokeless tobacco: Never Used   Vaping Use    Vaping Use: Never used   Substance and Sexual Activity    Alcohol use: Yes     Comment: social    Drug use: No   Other Topics Concern     Social Determinants of Health     Financial Resource Strain:     Difficulty of Paying Living Expenses:    Food Insecurity:     Worried About Running Out of Food in the Last Year:     Ran Out of Food in the Last Year:    Transportation Needs:     Lack of Transportation (Medical):      Lack of Transportation (Non-Medical):    Physical Activity:     Days of Exercise per Week:     Minutes of Exercise per Session:    Stress:     Feeling of Stress :    Social Connections:     Frequency of Communication with Friends and Family:     Frequency of Social Gatherings with Friends and Family:     Attends Sabianism Services:     Active Member of Clubs or Organizations:     Attends Club or Organization Meetings:     Marital Status:      Family History   Problem Relation Age of Onset    Heart Disease Mother         murmor    Heart Disease Father        OBJECTIVE:     Visit Vitals  /64   Pulse 73   Temp 97.6 °F (36.4 °C) (Oral)   Resp 18   Ht 5' 10\" (1.778 m)   Wt 183 lb 3.2 oz (83.1 kg)   SpO2 98%   BMI 26.29 kg/m²     CONSTITUTIONAL:   well nourished, appears age appropriate  EYES: sclera anicteric, PERRL, EOMI  ENMT:nares clear, moist mucous membranes, pharynx clear  NECK: supple. Thyroid normal, No JVD or bruits  RESPIRATORY: Chest: clear to ascultation and percussion, normal inspiratory effort  CARDIOVASCULAR: Heart: regular rate and rhythm no murmurs, rubs or gallops, PMI not displaced, No thrills, no peripheral edema  GASTROINTESTINAL: Abdomen: non distended, soft, non tender, bowel sounds normal  HEMATOLOGIC: no purpura, petechiae or bruising  LYMPHATIC: No lymph node enlargemant  MUSCULOSKELETAL: Extremities: no active synovitis, pulse 1+   INTEGUMENT: No unusual rashes or suspicious skin lesions noted. Nails appear normal.  PERIPHERAL VASCULAR: normal pulses femoral, PT and DP  NEUROLOGIC: non-focal exam, A & O X 3  PSYCHIATRIC:, appropriate affect     ASSESSMENT:   1. 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 Needs flu shot      Impression  1. Metastatic carcinoma probably of lung primary with adenocarcinoma with metastasis to bone as well as to liver and multiple mets throughout both lungs as well as to the adrenal gland. I have discussed this case with Dr. Naya Lam of oncology who he is in the process of seeing and getting started on therapy. 2.  Metastasis to bone which was getting radiation therapy scheduled for. 3.  Diabetes mellitus we will continue his current treatment  4. Hypertension that is currently controlled although blood pressure is a little lower we will have to keep a close eye on that  5.   Weight loss secondary to #1 which we may need to increase medicines as time goes on with the treatment process. PLAN:  .  Orders Placed This Encounter    Influenza Vaccine, QUAD, 65 Yrs +  IM  (Fluad 87348 )    HYDROcodone-acetaminophen (NORCO) 5-325 mg per tablet    diclofenac EC (VOLTAREN) 50 mg EC tablet    LORazepam (ATIVAN) 0.5 mg tablet    metoprolol tartrate (LOPRESSOR) 25 mg tablet         ATTENTION:   This medical record was transcribed using an electronic medical records system. Although proofread, it may and can contain electronic and spelling errors. Other human spelling and other errors may be present. Corrections may be executed at a later time. Please feel free to contact us for any clarifications as needed. No results found for any visits on 10/27/21. Dilia Arechiga MD    The patient verbalized understanding of the problems and plans as explained.

## 2021-10-27 ENCOUNTER — OFFICE VISIT (OUTPATIENT)
Dept: INTERNAL MEDICINE CLINIC | Age: 76
End: 2021-10-27
Payer: MEDICARE

## 2021-10-27 VITALS
OXYGEN SATURATION: 98 % | RESPIRATION RATE: 18 BRPM | SYSTOLIC BLOOD PRESSURE: 116 MMHG | HEART RATE: 73 BPM | DIASTOLIC BLOOD PRESSURE: 64 MMHG | WEIGHT: 183.2 LBS | TEMPERATURE: 97.6 F | HEIGHT: 70 IN | BODY MASS INDEX: 26.23 KG/M2

## 2021-10-27 DIAGNOSIS — Z23 NEEDS FLU SHOT: ICD-10-CM

## 2021-10-27 DIAGNOSIS — R63.4 WEIGHT LOSS: ICD-10-CM

## 2021-10-27 DIAGNOSIS — C79.51 METASTATIC CARCINOMA TO BONE (HCC): Primary | ICD-10-CM

## 2021-10-27 DIAGNOSIS — N18.2 CONTROLLED TYPE 2 DIABETES MELLITUS WITH STAGE 2 CHRONIC KIDNEY DISEASE, WITHOUT LONG-TERM CURRENT USE OF INSULIN (HCC): ICD-10-CM

## 2021-10-27 DIAGNOSIS — I10 ESSENTIAL HYPERTENSION: ICD-10-CM

## 2021-10-27 DIAGNOSIS — E11.22 CONTROLLED TYPE 2 DIABETES MELLITUS WITH STAGE 2 CHRONIC KIDNEY DISEASE, WITHOUT LONG-TERM CURRENT USE OF INSULIN (HCC): ICD-10-CM

## 2021-10-27 PROCEDURE — 1101F PT FALLS ASSESS-DOCD LE1/YR: CPT | Performed by: INTERNAL MEDICINE

## 2021-10-27 PROCEDURE — G8754 DIAS BP LESS 90: HCPCS | Performed by: INTERNAL MEDICINE

## 2021-10-27 PROCEDURE — G8427 DOCREV CUR MEDS BY ELIG CLIN: HCPCS | Performed by: INTERNAL MEDICINE

## 2021-10-27 PROCEDURE — G8752 SYS BP LESS 140: HCPCS | Performed by: INTERNAL MEDICINE

## 2021-10-27 PROCEDURE — 99214 OFFICE O/P EST MOD 30 MIN: CPT | Performed by: INTERNAL MEDICINE

## 2021-10-27 PROCEDURE — 90694 VACC AIIV4 NO PRSRV 0.5ML IM: CPT | Performed by: INTERNAL MEDICINE

## 2021-10-27 PROCEDURE — G8536 NO DOC ELDER MAL SCRN: HCPCS | Performed by: INTERNAL MEDICINE

## 2021-10-27 PROCEDURE — G8510 SCR DEP NEG, NO PLAN REQD: HCPCS | Performed by: INTERNAL MEDICINE

## 2021-10-27 PROCEDURE — G8417 CALC BMI ABV UP PARAM F/U: HCPCS | Performed by: INTERNAL MEDICINE

## 2021-10-27 PROCEDURE — G0008 ADMIN INFLUENZA VIRUS VAC: HCPCS | Performed by: INTERNAL MEDICINE

## 2021-10-27 RX ORDER — HYDROCODONE BITARTRATE AND ACETAMINOPHEN 5; 325 MG/1; MG/1
TABLET ORAL
COMMUNITY
Start: 2021-10-22 | End: 2022-03-11

## 2021-10-27 RX ORDER — LORAZEPAM 0.5 MG/1
TABLET ORAL
COMMUNITY
Start: 2021-10-22 | End: 2022-03-11

## 2021-10-27 RX ORDER — METOPROLOL TARTRATE 25 MG/1
25 TABLET, FILM COATED ORAL EVERY 12 HOURS
Qty: 180 TABLET | Refills: 3 | Status: SHIPPED | OUTPATIENT
Start: 2021-10-27 | End: 2022-05-31 | Stop reason: ALTCHOICE

## 2021-10-27 RX ORDER — DICLOFENAC SODIUM 50 MG/1
50 TABLET, DELAYED RELEASE ORAL
COMMUNITY
Start: 2021-10-22 | End: 2022-05-31 | Stop reason: ALTCHOICE

## 2021-10-27 NOTE — PATIENT INSTRUCTIONS
Type 2 Diabetes: Care Instructions  Your Care Instructions     Type 2 diabetes is a disease that develops when the body's tissues cannot use insulin properly. Over time, the pancreas cannot make enough insulin. Insulin is a hormone that helps the body's cells use sugar (glucose) for energy. It also helps the body store extra sugar in muscle, fat, and liver cells. Without insulin, the sugar cannot get into the cells to do its work. It stays in the blood instead. This can cause high blood sugar levels. A person has diabetes when the blood sugar stays too high too much of the time. Over time, diabetes can lead to diseases of the heart, blood vessels, nerves, kidneys, and eyes. You may be able to control your blood sugar by losing weight, eating a healthy diet, and getting daily exercise. You may also have to take insulin or other diabetes medicine. Follow-up care is a key part of your treatment and safety. Be sure to make and go to all appointments. Call your doctor if you are having problems. It's also a good idea to know your test results and keep a list of the medicines you take. How can you care for yourself at home? · Keep your blood sugar at a target level (which you set with your doctor). ? Carbohydratethe body's main source of fuelaffects blood sugar more than any other nutrient. Carbohydrate is in fruits, vegetables, milk, and yogurt. It also is in breads, cereals, vegetables such as potatoes and corn, and sugary foods such as candy and cakes. Follow your meal plan to know how much carbohydrate to eat at each meal and snack. ? Aim for 30 minutes of exercise on most, preferably all, days of the week. Walking is a good choice. You also may want to do other activities, such as running, swimming, cycling, or playing tennis or team sports. Try to do muscle-strengthening exercises at least 2 times a week. ? Take your medicines exactly as prescribed.  Call your doctor if you think you are having a problem with your medicine. You will get more details on the specific medicines your doctor prescribes. · Check your blood sugar as often as your doctor recommends. It is important to keep track of any symptoms you have, such as low blood sugar. Also tell your doctor if you have any changes in your activities, diet, or insulin use. · Talk to your doctor before you start taking aspirin every day. Aspirin can help certain people lower their risk of a heart attack or stroke. But taking aspirin isn't right for everyone, because it can cause serious bleeding. · Do not smoke. If you need help quitting, talk to your doctor about stop-smoking programs and medicines. These can increase your chances of quitting for good. · Keep your cholesterol and blood pressure at normal levels. You may need to take one or more medicines to reach your goals. Take them exactly as directed. Do not stop or change a medicine without talking to your doctor first.  When should you call for help? Call 911 anytime you think you may need emergency care. For example, call if:    · You passed out (lost consciousness), or you suddenly become very sleepy or confused. (You may have very low blood sugar.)   Call your doctor now or seek immediate medical care if:    · Your blood sugar is 300 mg/dL or is higher than the level your doctor has set for you.     · You have symptoms of low blood sugar, such as:  ? Sweating. ? Feeling nervous, shaky, and weak. ? Extreme hunger and slight nausea. ? Dizziness and headache.  ? Blurred vision. ? Confusion. Watch closely for changes in your health, and be sure to contact your doctor if:    · You often have problems controlling your blood sugar.     · You have symptoms of long-term diabetes problems, such as:  ? New vision changes. ? New pain, numbness, or tingling in your hands or feet. ? Skin problems. Where can you learn more?   Go to http://www.gray.com/  Enter C553 in the search box to learn more about \"Type 2 Diabetes: Care Instructions. \"  Current as of: August 31, 2020               Content Version: 13.0  © 2006-2021 [employer]wise, [employer]. Care instructions adapted under license by [employer] (which disclaims liability or warranty for this information). If you have questions about a medical condition or this instruction, always ask your healthcare professional. [place] [address] any warranty or liability for your use of this information.

## 2021-10-27 NOTE — PROGRESS NOTES
Matt Ladd a 66 y.o. male who is present for routine immunizations. Prior to vaccine administration After obtaining verbal consent and per orders of Dr. Zain Branch, injection of flu shots in right deltoid given by Apollo Noe. Risks and adverse reactions were discussed. The patient was provided the VIS and they were given an opportunity to ask questions, all questions addressed. Order and injection/medication verified by second nurse/ma review by Merline Lawman rn. Patient tolerated procedure well. No reactions noted. Patient was advised to seek medical or call the office with any questions or concerns post vaccination. Patient verbalized understanding.  Apollo Noe

## 2021-10-27 NOTE — PROGRESS NOTES
HIPAA verified by two patient identifiers. Noam Alvarez is a 66 y.o. male    Chief Complaint   Patient presents with    Medication Evaluation    Follow-up       Visit Vitals  BP 95/60 (BP 1 Location: Left upper arm, BP Patient Position: Sitting, BP Cuff Size: Large adult)   Pulse 73   Temp 97.6 °F (36.4 °C) (Oral)   Resp 18   Ht 5' 10\" (1.778 m)   Wt 183 lb 3.2 oz (83.1 kg)   SpO2 98%   BMI 26.29 kg/m²       Pain Scale: 0 - No pain/10  Pain Location:       Health Maintenance Due   Topic Date Due    Hepatitis C Screening  Never done    DTaP/Tdap/Td series (1 - Tdap) Never done    Shingrix Vaccine Age 50> (1 of 2) Never done    Eye Exam Retinal or Dilated  06/20/2020    COVID-19 Vaccine (3 - Pfizer risk 3-dose series) 04/24/2021    Flu Vaccine (1) 09/01/2021         Coordination of Care Questionnaire:  :   1) Have you been to an emergency room, urgent care, or hospitalized since your last visit? If yes, where when, and reason for visit? no       2. Have seen or consulted any other health care provider since your last visit? If yes, where when, and reason for visit? NO      Patient is accompanied by wife I have received verbal consent from Noam Alvarez to discuss any/all medical information while they are present in the room.

## 2021-11-05 ENCOUNTER — HOSPITAL ENCOUNTER (OUTPATIENT)
Dept: NUCLEAR MEDICINE | Age: 76
Discharge: HOME | End: 2021-11-05
Attending: INTERNAL MEDICINE
Payer: MEDICARE

## 2021-11-05 ENCOUNTER — HOSPITAL ENCOUNTER (OUTPATIENT)
Dept: MRI IMAGING | Age: 76
Discharge: HOME | End: 2021-11-05
Attending: INTERNAL MEDICINE
Payer: MEDICARE

## 2021-11-05 DIAGNOSIS — C34.12 MALIGNANT NEOPLASM OF UPPER LOBE, LEFT BRONCHUS OR LUNG (HCC): ICD-10-CM

## 2021-11-05 DIAGNOSIS — C79.51 SECONDARY MALIGNANT NEOPLASM OF BONE (HCC): ICD-10-CM

## 2021-11-05 PROCEDURE — 78306 BONE IMAGING WHOLE BODY: CPT

## 2021-11-05 PROCEDURE — 70553 MRI BRAIN STEM W/O & W/DYE: CPT

## 2021-11-05 PROCEDURE — 74011250636 HC RX REV CODE- 250/636: Performed by: INTERNAL MEDICINE

## 2021-11-05 PROCEDURE — A9576 INJ PROHANCE MULTIPACK: HCPCS | Performed by: INTERNAL MEDICINE

## 2021-11-05 RX ADMIN — GADOTERIDOL 17 ML: 279.3 INJECTION, SOLUTION INTRAVENOUS at 10:15

## 2021-11-08 RX ORDER — METFORMIN HYDROCHLORIDE 500 MG/1
TABLET ORAL
Qty: 270 TABLET | Refills: 3 | Status: SHIPPED | OUTPATIENT
Start: 2021-11-08 | End: 2021-11-08 | Stop reason: SDUPTHER

## 2021-11-08 RX ORDER — METFORMIN HYDROCHLORIDE 500 MG/1
TABLET ORAL
Qty: 270 TABLET | Refills: 3 | Status: SHIPPED | OUTPATIENT
Start: 2021-11-08 | End: 2022-03-11

## 2021-11-08 NOTE — TELEPHONE ENCOUNTER
RX refill request from the patient/pharmacy. Patient last seen 10- with labs, and next appt. scheduled for 11-  Requested Prescriptions     Pending Prescriptions Disp Refills    metFORMIN (GLUCOPHAGE) 500 mg tablet [Pharmacy Med Name: metFORMIN  MG TABLET] 270 Tablet 3     Sig: TAKE ONE TABLET BY MOUTH EVERY MORNING AND TAKE TWO TABLETS BY MOUTH EVERY EVENING   .

## 2021-11-08 NOTE — TELEPHONE ENCOUNTER
RX refill request from the patient/pharmacy. Patient last seen 10- with labs, and next appt. scheduled for 11-  Requested Prescriptions     Pending Prescriptions Disp Refills    metFORMIN (GLUCOPHAGE) 500 mg tablet 270 Tablet 3     Sig: TAKE ONE TABLET BY MOUTH EVERY MORNING AND TAKE TWO TABLETS BY MOUTH EVERY EVENING   .

## 2021-11-10 ENCOUNTER — TRANSCRIBE ORDER (OUTPATIENT)
Dept: SCHEDULING | Age: 76
End: 2021-11-10

## 2021-11-10 DIAGNOSIS — R13.10 DYSPHAGIA: Primary | ICD-10-CM

## 2021-11-10 RX ORDER — OMEPRAZOLE 40 MG/1
40 CAPSULE, DELAYED RELEASE ORAL DAILY
Qty: 90 CAPSULE | Refills: 1 | Status: SHIPPED | OUTPATIENT
Start: 2021-11-10 | End: 2022-04-29 | Stop reason: ALTCHOICE

## 2021-11-11 DIAGNOSIS — F51.01 PRIMARY INSOMNIA: Primary | ICD-10-CM

## 2021-11-11 RX ORDER — TEMAZEPAM 15 MG/1
CAPSULE ORAL
Qty: 30 CAPSULE | Refills: 5 | Status: SHIPPED
Start: 2021-11-11 | End: 2021-11-17

## 2021-11-11 NOTE — TELEPHONE ENCOUNTER
PCP: Quincy Arce MD    Last appt: 10/27/2021  Future Appointments   Date Time Provider Martín Trujillo   11/17/2021 10:00 AM [facility]   11/17/2021  1:50 PM Quincy Arce MD [facility]   12/15/2021  9:00 AM Quincy Arce MD [facility]       Requested Prescriptions     Pending Prescriptions Disp Refills    temazepam (RESTORIL) 15 mg capsule [Pharmacy Med Name: TEMAZEPAM 15 MG CAPSULE] 30 Capsule      Sig: TAKE ONE CAPSULE BY MOUTH NIGHTLY AS NEEDED FOR SLEEP         Other Comments:

## 2021-11-16 NOTE — PROGRESS NOTES
Chief Complaint   Patient presents with    Hypertension     3 weeks    Diabetes       SUBJECTIVE:    Edel Stephens is a 64 y.o. male who returns in follow-up regarding his hypertension, diabetes, ASCVD, metastatic lung carcinoma, CKD stage II, weight loss associate with his lung cancer and other medical problems. On the last visit with me on October 27 which was 3 weeks ago he was to be set up for beginning of radiation therapy which has finally been arranged and he is to start 10 days of radiation therapy starting tomorrow. Apparently his insurance is finally approved is chemotherapeutic agent which again has not been started yet thus at the present time he has received no treatment for his cancer. His main complaint today is he is not able to sleep seen he does not have anything to get him to sleep although I previously prescribed temazepam apparently he never picked it up at the pharmacy for unknown reasons. He denies any chest pain or increased shortness of breath and denies any palpitations, PND, orthopnea or cardiac or respiratory complaints. He notes no current GI or  complaints. He notes no headaches, dizziness or neurologic complaints. He has no change of his chronic arthritic complaints and and no other complaints on complete review of systems. Current Outpatient Medications   Medication Sig Dispense Refill    omeprazole (PRILOSEC) 40 mg capsule Take 1 Capsule by mouth daily. 90 Capsule 1    metFORMIN (GLUCOPHAGE) 500 mg tablet TAKE ONE TABLET BY MOUTH EVERY MORNING AND TAKE TWO TABLETS BY MOUTH EVERY EVENING 270 Tablet 3    HYDROcodone-acetaminophen (NORCO) 5-325 mg per tablet       diclofenac EC (VOLTAREN) 50 mg EC tablet       LORazepam (ATIVAN) 0.5 mg tablet       metoprolol tartrate (LOPRESSOR) 25 mg tablet Take 1 Tablet by mouth every twelve (12) hours. 180 Tablet 3    ondansetron hcl (Zofran) 4 mg tablet Take 1 Tablet by mouth every eight (8) hours as needed for Nausea. 20 Tablet 0    amLODIPine (NORVASC) 10 mg tablet TAKE ONE TABLET BY MOUTH DAILY 90 Tablet 3    glipiZIDE (GLUCOTROL) 10 mg tablet TAKE ONE TABLET BY MOUTH TWICE A  Tablet 3    empagliflozin (Jardiance) 10 mg tablet Take 1 Tablet by mouth daily. 30 Tablet 5    finasteride (PROSCAR) 5 mg tablet TAKE ONE TABLET BY MOUTH DAILY 90 Tablet PRN    lisinopriL (PRINIVIL, ZESTRIL) 40 mg tablet TAKE 1 TABLET BY MOUTH EVERY DAY 90 Tablet 3    tamsulosin (FLOMAX) 0.4 mg capsule TAKE TWO CAPSULES BY MOUTH DAILY 180 Cap PRN    pravastatin (PRAVACHOL) 80 mg tablet TAKE ONE TABLET BY MOUTH DAILY 90 Tab 1    cloNIDine HCl (CATAPRES) 0.2 mg tablet Take 1 Tab by mouth two (2) times a day. 60 Tab 12    timolol (TIMOPTIC-XE) 0.5 % ophthalmic gel-forming       Omega-3-DHA-EPA-Fish Oil 1,000 mg (120 mg-180 mg) cap Take 1 Cap by mouth three (3) times daily.  aspirin delayed-release 81 mg tablet Take  by mouth daily.  doxycycline (ADOXA) 100 mg tablet Take 100 mg by mouth two (2) times a day.      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/2017    Hyperlipidemia 8/2/2017    Hypertension 8/2/2017    Left carotid bruit 8/2/2017    Melanoma ([address] 2021    Morbid obesity ([address] 8/2/2017    On statin therapy 8/2/2017    Urticaria 8/2/2017     Past Surgical History:   Procedure Laterality Date    RI CARDIAC SURG PROCEDURE UNLIST  09/2019    4 stents placed     Allergies   Allergen Reactions    Adhesive Tape-Silicones Unknown (comments)    Chocolate Flavor Unknown (comments)       REVIEW OF SYSTEMS:  General: negative for - chills or fever, or weight loss or gain  ENT: negative for - headaches, nasal congestion or tinnitus  Eyes: no blurred or visual changes  Neck: No stiffness or swollen nodes  Respiratory: negative for - cough, hemoptysis, shortness of breath or wheezing  Cardiovascular : negative for - chest pain, edema, palpitations or shortness of breath  Gastrointestinal: negative for - abdominal pain, blood in stools, heartburn or nausea/vomiting  Genito-Urinary: no dysuria, trouble voiding, or hematuria  Musculoskeletal: negative for - gait disturbance, joint pain, joint stiffness or joint swelling  Neurological: no TIA or stroke symptoms  Hematologic: no bruises, no bleeding  Lymphatic: no swollen glands  Integument: no lumps, mole changes, nail changes or rash  Endocrine:no malaise/lethargy poly uria or polydipsia or unexpected weight changes        Social History     Socioeconomic History    Marital status:    Tobacco Use    Smoking status: Never Smoker    Smokeless tobacco: Never Used   Vaping Use    Vaping Use: Never used   Substance and Sexual Activity    Alcohol use: Yes     Comment: social    Drug use: No     Family History   Problem Relation Age of Onset    Heart Disease Mother         murmor    Heart Disease Father        OBJECTIVE:     Visit Vitals  /84   Pulse 84   Temp 98 °F (36.7 °C) (Oral)   Resp 18   Ht 5' 10\" (1.778 m)   Wt 174 lb 11.2 oz (79.2 kg)   SpO2 98%   BMI 25.07 kg/m²     CONSTITUTIONAL:   well nourished, appears age appropriate  EYES: sclera anicteric, PERRL, EOMI  ENMT:nares clear, moist mucous membranes, pharynx clear  NECK: supple.  Thyroid normal, No JVD or bruits  RESPIRATORY: Chest: clear to ascultation and percussion, normal inspiratory effort  CARDIOVASCULAR: Heart: regular rate and rhythm no murmurs, rubs or gallops, PMI not displaced, No thrills, no peripheral edema  GASTROINTESTINAL: Abdomen: non distended, soft, non tender, bowel sounds normal  HEMATOLOGIC: no purpura, petechiae or bruising  LYMPHATIC: No lymph node enlargemant  MUSCULOSKELETAL: Extremities: no active synovitis, pulse 1+   INTEGUMENT: No unusual rashes or suspicious skin lesions noted. Nails appear normal.  PERIPHERAL VASCULAR: normal pulses femoral, PT and DP  NEUROLOGIC: non-focal exam, A & O X 3  PSYCHIATRIC:, appropriate affect     ASSESSMENT:   1. Metastatic adenocarcinoma ([facility]    2. Metastatic carcinoma to bone ([facility]    3. Essential hypertension    4. Controlled type 2 diabetes mellitus with stage 2 chronic kidney disease, without long-term current use of insulin ([facility]    5. Weight loss      Impression  1. Metastatic adenocarcinoma with metastasis to bone getting ready to start radiation therapy to his spine for 10 treatments. Chemotherapy to be started by Dr. Hyman Spatz once final approval through his insurance and apparently that is near completion. 2.  Hypertension I rechecked his blood pressure myself and at this point I got 049/74 so he certainly not low enough that we can stop any blood pressure medication. 3.  Diabetes mellitus we will see what his blood sugar looks like on today's BMP   4. Weight loss I note that diagnosis of lung cancer was made around October 7 at which time he had already lost 27 pounds and since then he has lost additional 17 pounds. We want to follow that closely. Follow-up with me again in about a month or sooner should the be a problem. PLAN:  .  Orders Placed This Encounter    METABOLIC PANEL, BASIC    doxycycline (ADOXA) 100 mg tablet         ATTENTION:   This medical record was transcribed using an electronic medical records system. Although proofread, it may and can contain electronic and spelling errors. Other human spelling and other errors may be present. Corrections may be executed at a later time. Please feel free to contact us for any clarifications as needed.       Follow-up and Dispositions · Return in about 4 weeks (around 12/15/2021). Results for orders placed or performed during the hospital encounter of 11/17/21   XR FEMUR LT 2 V    Narrative    EXAM: XR FEMUR LT 2 V    INDICATION: . Metastatic disease suggested in the left proximal femur on  radionuclide bone scan. COMPARISON: None. FINDINGS: Two views of the left femur demonstrate no fracture or other acute  osseous, articular or soft tissue abnormality. Mild patchy sclerosis is present  throughout the left proximal femur but is mild and nonspecific. There is no  associated pathologic fracture, cortical destruction or associated soft tissue  mass. Impression    No acute abnormality. Mild sclerosis left proximal femur,  corresponding with radionuclide bone scan. Results for orders placed or performed during the hospital encounter of 11/17/21   XR BA SWALLOW ESOPHOGRAM    Narrative    EXAM: XR BA SWALLOW ESOPHOGRAM    INDICATION: Dysphagia. COMPARISON: C chest x-ray 10/20/2021 and CT biopsy images of 10/14/2021, ax  10/7/2021. Fluoroscopy dose (air kerma): 55.22 mGy     FINDINGS: A biphasic examination was performed. The patient swallowed  effervescent granules followed by barium without difficulty. The preliminary radiograph of the thorax demonstrates left upper lobe pulmonary  mass at site of previously biopsied known adenocarcinoma. The esophagus is normal in caliber and contour with no mass, constricting lesion  or mucosal abnormality. There is no hiatal hernia or gastroesophageal reflux. The esophageal motility is  mildly abnormal with weakening of the primary wave at the mid esophageal level  allowing intermittent escape. Rapid sequence images of the cervical esophagus in the frontal and lateral  projections demonstrate no abnormality. The patient swallowed a 12 mm barium tablet which passed through to the stomach  without difficulty.       Impression    Mild esophageal dysmotility with no other significant esophageal  abnormality. Known left upper lobe lung neoplasm. Oralee MD Stuart    The patient verbalized understanding of the problems and plans as explained.

## 2021-11-17 ENCOUNTER — OFFICE VISIT (OUTPATIENT)
Dept: INTERNAL MEDICINE CLINIC | Age: 76
End: 2021-11-17
Payer: MEDICARE

## 2021-11-17 ENCOUNTER — HOSPITAL ENCOUNTER (OUTPATIENT)
Dept: GENERAL RADIOLOGY | Age: 76
Discharge: HOME | End: 2021-11-17
Attending: NURSE PRACTITIONER
Payer: MEDICARE

## 2021-11-17 ENCOUNTER — TRANSCRIBE ORDER (OUTPATIENT)
Dept: REGISTRATION | Age: 76
End: 2021-11-17

## 2021-11-17 VITALS
OXYGEN SATURATION: 98 % | DIASTOLIC BLOOD PRESSURE: 84 MMHG | BODY MASS INDEX: 25.01 KG/M2 | TEMPERATURE: 98 F | RESPIRATION RATE: 18 BRPM | WEIGHT: 174.7 LBS | SYSTOLIC BLOOD PRESSURE: 138 MMHG | HEIGHT: 70 IN | HEART RATE: 84 BPM

## 2021-11-17 DIAGNOSIS — R63.4 WEIGHT LOSS: ICD-10-CM

## 2021-11-17 DIAGNOSIS — C79.51 BONE NEOPLASM SECONDARY (HCC): ICD-10-CM

## 2021-11-17 DIAGNOSIS — E11.22 CONTROLLED TYPE 2 DIABETES MELLITUS WITH STAGE 2 CHRONIC KIDNEY DISEASE, WITHOUT LONG-TERM CURRENT USE OF INSULIN (HCC): ICD-10-CM

## 2021-11-17 DIAGNOSIS — C79.9 METASTATIC ADENOCARCINOMA (HCC): Primary | ICD-10-CM

## 2021-11-17 DIAGNOSIS — C79.51 BONE NEOPLASM SECONDARY (HCC): Primary | ICD-10-CM

## 2021-11-17 DIAGNOSIS — C79.51 METASTATIC CARCINOMA TO BONE (HCC): ICD-10-CM

## 2021-11-17 DIAGNOSIS — R13.10 DYSPHAGIA: ICD-10-CM

## 2021-11-17 DIAGNOSIS — N18.2 CONTROLLED TYPE 2 DIABETES MELLITUS WITH STAGE 2 CHRONIC KIDNEY DISEASE, WITHOUT LONG-TERM CURRENT USE OF INSULIN (HCC): ICD-10-CM

## 2021-11-17 DIAGNOSIS — I10 ESSENTIAL HYPERTENSION: ICD-10-CM

## 2021-11-17 PROCEDURE — 74220 X-RAY XM ESOPHAGUS 1CNTRST: CPT

## 2021-11-17 PROCEDURE — G8754 DIAS BP LESS 90: HCPCS | Performed by: INTERNAL MEDICINE

## 2021-11-17 PROCEDURE — G8536 NO DOC ELDER MAL SCRN: HCPCS | Performed by: INTERNAL MEDICINE

## 2021-11-17 PROCEDURE — 99213 OFFICE O/P EST LOW 20 MIN: CPT | Performed by: INTERNAL MEDICINE

## 2021-11-17 PROCEDURE — 73552 X-RAY EXAM OF FEMUR 2/>: CPT

## 2021-11-17 PROCEDURE — G8417 CALC BMI ABV UP PARAM F/U: HCPCS | Performed by: INTERNAL MEDICINE

## 2021-11-17 PROCEDURE — 1101F PT FALLS ASSESS-DOCD LE1/YR: CPT | Performed by: INTERNAL MEDICINE

## 2021-11-17 PROCEDURE — G8427 DOCREV CUR MEDS BY ELIG CLIN: HCPCS | Performed by: INTERNAL MEDICINE

## 2021-11-17 PROCEDURE — G8752 SYS BP LESS 140: HCPCS | Performed by: INTERNAL MEDICINE

## 2021-11-17 PROCEDURE — G8510 SCR DEP NEG, NO PLAN REQD: HCPCS | Performed by: INTERNAL MEDICINE

## 2021-11-17 RX ORDER — DOXYCYCLINE 100 MG/1
100 TABLET ORAL 2 TIMES DAILY
COMMUNITY
Start: 2021-11-10 | End: 2022-04-29 | Stop reason: ALTCHOICE

## 2021-11-17 NOTE — PROGRESS NOTES
Pt's wife contacted and confirmed Senthil Llanos for pre-procedure covid-19 test on Monday 11/29/21.

## 2021-11-17 NOTE — PROGRESS NOTES
HIPAA verified by two patient identifiers. Mike Lance is a 64 y.o. male    Chief Complaint   Patient presents with    Hypertension     3 weeks    Diabetes       Visit Vitals  /74 (BP 1 Location: Left upper arm, BP Patient Position: Sitting, BP Cuff Size: Large adult)   Pulse 84   Temp 98 °F (36.7 °C) (Oral)   Resp 18   Ht 5' 10\" (1.778 m)   Wt 174 lb 11.2 oz (79.2 kg)   SpO2 98%   BMI 25.07 kg/m²       Pain Scale: 0 - No pain/10  Pain Location:       Health Maintenance Due   Topic Date Due    Hepatitis C Screening  Never done    DTaP/Tdap/Td series (1 - Tdap) Never done    Shingrix Vaccine Age 50> (1 of 2) Never done    Eye Exam Retinal or Dilated  06/20/2020    COVID-19 Vaccine (3 - Pfizer risk 4-dose series) 04/24/2021         Coordination of Care Questionnaire:  :   1) Have you been to an emergency room, urgent care, or hospitalized since your last visit? If yes, where when, and reason for visit? no       2. Have seen or consulted any other health care provider since your last visit? If yes, where when, and reason for visit? NO      Patient is accompanied by self I have received verbal consent from Mike Lance to discuss any/all medical information while they are present in the room.

## 2021-11-17 NOTE — PATIENT INSTRUCTIONS
Arthritis: Care Instructions  Overview     Arthritis, also called osteoarthritis, is a breakdown of the cartilage that cushions your joints. When the cartilage wears down, your bones rub against each other. This causes pain and stiffness. Many people have some arthritis as they age. Arthritis most often affects the joints of the spine, hands, hips, knees, or feet. Arthritis never goes away completely. But medicine and home treatment can help reduce pain and help you stay active. Follow-up care is a key part of your treatment and safety. Be sure to make and go to all appointments, and call your doctor if you are having problems. It's also a good idea to know your test results and keep a list of the medicines you take. How can you care for yourself at home? · Stay at a healthy weight. Being overweight puts extra strain on your joints. · Talk to your doctor or physical therapist about exercises that will help ease joint pain. ? Stretch. You may enjoy gentle forms of yoga to help keep your joints and muscles flexible. ? Walk instead of jog. Other types of exercise that are less stressful on the joints include riding a bike, swimming, virgilio chi, or water exercise. ? Lift weights. Strong muscles help reduce stress on your joints. Stronger thigh muscles, for example, take some of the stress off of the knees and hips. Learn the right way to lift weights so you don't make joint pain worse. · Take your medicines exactly as prescribed. Call your doctor if you think you are having a problem with your medicine. · Take pain medicines exactly as directed. ? If the doctor gave you a prescription medicine for pain, take it as prescribed. ? If you are not taking a prescription pain medicine, ask your doctor if you can take an over-the-counter medicine. · Use a cane, crutch, walker, or another device if you need help to get around. These can help rest your joints.  You also can use other things to make life easier, such as a higher toilet seat and padded handles on kitchen utensils. · Do not sit in low chairs. They can make it hard to get up. · Put heat or cold on your sore joints as needed. Use whichever helps you most. You can also switch between hot and cold packs. ? Apply heat 2 or 3 times a day for 20 to 30 minutesusing a heating pad, hot shower, or hot packto relieve pain and stiffness. But don't use heat on a swollen joint. ? Put ice or a cold pack on your sore joint for 10 to 20 minutes at a time. Put a thin cloth between the ice and your skin. When should you call for help? Call your doctor now or seek immediate medical care if:    · You have sudden swelling, warmth, or pain in any joint.     · You have joint pain and a fever or rash.     · You have such bad pain that you cannot use a joint. Watch closely for changes in your health, and be sure to contact your doctor if:    · You have mild joint symptoms that continue even with more than 6 weeks of care at home.     · You have stomach pain or other problems with your medicine. Where can you learn more? Go to http://www.gray.com/  Enter Z586 in the search box to learn more about \"Arthritis: Care Instructions. \"  Current as of: April 30, 2021               Content Version: 13.0  © 6553-9760 [employer]. Care instructions adapted under license by [employer] (which disclaims liability or warranty for this information). If you have questions about a medical condition or this instruction, always ask your healthcare professional. [place] [address] any warranty or liability for your use of this information.

## 2021-11-18 LAB
ANION GAP SERPL CALC-SCNC: 11 MMOL/L (ref 5–15)
BUN SERPL-MCNC: 16 MG/DL (ref 6–20)
BUN/CREAT SERPL: 18 (ref 12–20)
CALCIUM SERPL-MCNC: 8.7 MG/DL (ref 8.5–10.1)
CHLORIDE SERPL-SCNC: 101 MMOL/L (ref 97–108)
CO2 SERPL-SCNC: 24 MMOL/L (ref 21–32)
CREAT SERPL-MCNC: 0.88 MG/DL (ref 0.7–1.3)
GLUCOSE SERPL-MCNC: 113 MG/DL (ref 65–100)
POTASSIUM SERPL-SCNC: 4.1 MMOL/L (ref 3.5–5.1)
SODIUM SERPL-SCNC: 136 MMOL/L (ref 136–145)

## 2021-11-29 ENCOUNTER — HOSPITAL ENCOUNTER (OUTPATIENT)
Dept: PREADMISSION TESTING | Age: 76
Discharge: HOME | End: 2021-11-29
Payer: MEDICARE

## 2021-11-29 PROCEDURE — U0005 INFEC AGEN DETEC AMPLI PROBE: HCPCS

## 2021-11-30 LAB
SARS-COV-2, XPLCVT: NOT DETECTED
SOURCE, COVRS: NORMAL

## 2021-12-02 ENCOUNTER — ANESTHESIA EVENT (OUTPATIENT)
Dept: ENDOSCOPY | Age: 76
End: 2021-12-02
Payer: MEDICARE

## 2021-12-02 ENCOUNTER — ANESTHESIA (OUTPATIENT)
Dept: ENDOSCOPY | Age: 76
End: 2021-12-02
Payer: MEDICARE

## 2021-12-02 ENCOUNTER — HOSPITAL ENCOUNTER (OUTPATIENT)
Age: 76
Setting detail: OUTPATIENT SURGERY
Discharge: HOME | End: 2021-12-02
Attending: INTERNAL MEDICINE | Admitting: INTERNAL MEDICINE
Payer: MEDICARE

## 2021-12-02 VITALS
TEMPERATURE: 98 F | WEIGHT: 179 LBS | HEIGHT: 71 IN | HEART RATE: 93 BPM | DIASTOLIC BLOOD PRESSURE: 53 MMHG | RESPIRATION RATE: 22 BRPM | SYSTOLIC BLOOD PRESSURE: 108 MMHG | BODY MASS INDEX: 25.06 KG/M2 | OXYGEN SATURATION: 98 %

## 2021-12-02 LAB
GLUCOSE BLD STRIP.AUTO-MCNC: 116 MG/DL (ref 65–117)
SERVICE CMNT-IMP: NORMAL

## 2021-12-02 PROCEDURE — 74011250636 HC RX REV CODE- 250/636: Performed by: NURSE ANESTHETIST, CERTIFIED REGISTERED

## 2021-12-02 PROCEDURE — 74011000250 HC RX REV CODE- 250: Performed by: NURSE ANESTHETIST, CERTIFIED REGISTERED

## 2021-12-02 PROCEDURE — 2709999900 HC NON-CHARGEABLE SUPPLY: Performed by: INTERNAL MEDICINE

## 2021-12-02 PROCEDURE — 74011250636 HC RX REV CODE- 250/636: Performed by: INTERNAL MEDICINE

## 2021-12-02 PROCEDURE — 76060000031 HC ANESTHESIA FIRST 0.5 HR: Performed by: INTERNAL MEDICINE

## 2021-12-02 PROCEDURE — 77030019988 HC FCPS ENDOSC DISP BSC -B: Performed by: INTERNAL MEDICINE

## 2021-12-02 PROCEDURE — 82962 GLUCOSE BLOOD TEST: CPT

## 2021-12-02 PROCEDURE — 76040000019: Performed by: INTERNAL MEDICINE

## 2021-12-02 RX ORDER — FLUMAZENIL 0.1 MG/ML
0.2 INJECTION INTRAVENOUS
Status: DISCONTINUED | OUTPATIENT
Start: 2021-12-02 | End: 2021-12-02 | Stop reason: HOSPADM

## 2021-12-02 RX ORDER — SODIUM CHLORIDE 9 MG/ML
75 INJECTION, SOLUTION INTRAVENOUS CONTINUOUS
Status: DISCONTINUED | OUTPATIENT
Start: 2021-12-02 | End: 2021-12-02 | Stop reason: HOSPADM

## 2021-12-02 RX ORDER — SODIUM CHLORIDE 0.9 % (FLUSH) 0.9 %
5-40 SYRINGE (ML) INJECTION AS NEEDED
Status: DISCONTINUED | OUTPATIENT
Start: 2021-12-02 | End: 2021-12-02 | Stop reason: HOSPADM

## 2021-12-02 RX ORDER — EPINEPHRINE 0.1 MG/ML
1 INJECTION INTRACARDIAC; INTRAVENOUS
Status: DISCONTINUED | OUTPATIENT
Start: 2021-12-02 | End: 2021-12-02 | Stop reason: HOSPADM

## 2021-12-02 RX ORDER — DEXTROMETHORPHAN/PSEUDOEPHED 2.5-7.5/.8
1.2 DROPS ORAL
Status: DISCONTINUED | OUTPATIENT
Start: 2021-12-02 | End: 2021-12-02 | Stop reason: HOSPADM

## 2021-12-02 RX ORDER — SODIUM CHLORIDE 0.9 % (FLUSH) 0.9 %
5-40 SYRINGE (ML) INJECTION EVERY 8 HOURS
Status: DISCONTINUED | OUTPATIENT
Start: 2021-12-02 | End: 2021-12-02 | Stop reason: HOSPADM

## 2021-12-02 RX ORDER — PROPOFOL 10 MG/ML
INJECTION, EMULSION INTRAVENOUS AS NEEDED
Status: DISCONTINUED | OUTPATIENT
Start: 2021-12-02 | End: 2021-12-02 | Stop reason: HOSPADM

## 2021-12-02 RX ORDER — LIDOCAINE HYDROCHLORIDE 20 MG/ML
INJECTION, SOLUTION EPIDURAL; INFILTRATION; INTRACAUDAL; PERINEURAL AS NEEDED
Status: DISCONTINUED | OUTPATIENT
Start: 2021-12-02 | End: 2021-12-02 | Stop reason: HOSPADM

## 2021-12-02 RX ORDER — ATROPINE SULFATE 0.1 MG/ML
0.5 INJECTION INTRAVENOUS
Status: DISCONTINUED | OUTPATIENT
Start: 2021-12-02 | End: 2021-12-02 | Stop reason: HOSPADM

## 2021-12-02 RX ORDER — DEXMEDETOMIDINE HYDROCHLORIDE 100 UG/ML
INJECTION, SOLUTION INTRAVENOUS AS NEEDED
Status: DISCONTINUED | OUTPATIENT
Start: 2021-12-02 | End: 2021-12-02 | Stop reason: HOSPADM

## 2021-12-02 RX ORDER — NALOXONE HYDROCHLORIDE 0.4 MG/ML
0.4 INJECTION, SOLUTION INTRAMUSCULAR; INTRAVENOUS; SUBCUTANEOUS
Status: DISCONTINUED | OUTPATIENT
Start: 2021-12-02 | End: 2021-12-02 | Stop reason: HOSPADM

## 2021-12-02 RX ADMIN — PROPOFOL 50 MG: 10 INJECTION, EMULSION INTRAVENOUS at 11:01

## 2021-12-02 RX ADMIN — SODIUM CHLORIDE 75 ML/HR: 9 INJECTION, SOLUTION INTRAVENOUS at 10:26

## 2021-12-02 RX ADMIN — DEXMEDETOMIDINE HYDROCHLORIDE 4 MCG: 100 INJECTION, SOLUTION, CONCENTRATE INTRAVENOUS at 11:01

## 2021-12-02 RX ADMIN — LIDOCAINE HYDROCHLORIDE 60 MG: 20 INJECTION, SOLUTION EPIDURAL; INFILTRATION; INTRACAUDAL; PERINEURAL at 11:01

## 2021-12-02 NOTE — ANESTHESIA POSTPROCEDURE EVALUATION
Procedure(s):  ESOPHAGOGASTRODUODENOSCOPY (EGD). total IV anesthesia    Anesthesia Post Evaluation        Patient location during evaluation: PACU  Note status: Adequate. Level of consciousness: responsive to verbal stimuli and sleepy but conscious  Pain management: satisfactory to patient  Airway patency: patent  Anesthetic complications: no  Cardiovascular status: acceptable  Respiratory status: acceptable  Hydration status: acceptable  Comments: +Post-Anesthesia Evaluation and Assessment    Patient: Mike Lance MRN: [id number]  SSN: [id number]   YOB: 1945  Age: 64 y.o. Sex: male      Cardiovascular Function/Vital Signs    BP (!) 96/50   Pulse 70   Temp 36.7 °C (98 °F)   Resp 17   Ht 5' 10.5\" (1.791 m)   Wt 81.2 kg (179 lb)   SpO2 97%   BMI 25.32 kg/m²     Patient is status post Procedure(s):  ESOPHAGOGASTRODUODENOSCOPY (EGD). Nausea/Vomiting: Controlled. Postoperative hydration reviewed and adequate. Pain:  Pain Scale 1: Numeric (0 - 10) (12/02/21 1118)  Pain Intensity 1: 0 (12/02/21 1118)   Managed. Neurological Status: At baseline. Mental Status and Level of Consciousness: Arousable. Pulmonary Status:   O2 Device: None (Room air) (12/02/21 1118)   Adequate oxygenation and airway patent. Complications related to anesthesia: None    Post-anesthesia assessment completed. No concerns.     Signed By: Dinah Starks MD    12/2/2021  Post anesthesia nausea and vomiting:  controlled      INITIAL Post-op Vital signs:   Vitals Value Taken Time   BP 96/50 12/02/21 1118   Temp 36.7 °C (98 °F) 12/02/21 1118   Pulse 70 12/02/21 1118   Resp 17 12/02/21 1118   SpO2 97 % 12/02/21 1118

## 2021-12-02 NOTE — ROUTINE PROCESS
Aly Garzon  1945  [id number]    Situation:  Verbal report received from: Clive Venturating  Procedure: Procedure(s):  ESOPHAGOGASTRODUODENOSCOPY (EGD)    Background:    Preoperative diagnosis: DYSPHAGIA, HEMATEMESIS  Postoperative diagnosis: Normal EGD    :  Dr. Ayla Knowles  Assistant(s): Endoscopy Technician-1: Oni Beach  Endoscopy RN-1: Kelvin Childs RN    Specimens: * No specimens in log *  H. Pylori  no    Assessment:  Intra-procedure medications     Anesthesia gave intra-procedure sedation and medications, see anesthesia flow sheet yes    Intravenous fluids: NS@ KVO     Vital signs stable     Abdominal assessment: round and soft     Recommendation:  Discharge patient per MD order. Family   Permission to share finding with family or friend yes    Endoscopy discharge instructions have been reviewed and given to patient. The patient verbalized understanding and acceptance of instructions.

## 2021-12-02 NOTE — DISCHARGE INSTRUCTIONS
Ivett Holm  [id number]  1945    EGD DISCHARGE INSTRUCTIONS  Discomfort:  Sore throat- throat lozenges or warm salt water gargle  redness at IV site- apply warm compress to area; if redness or soreness persist- contact your physician  Gaseous discomfort- walking, belching will help relieve any discomfort  You may not operate a vehicle for 12 hours  You may not engage in an occupation involving machinery or appliances for rest of today  You may not drink alcoholic beverages for at least 12 hours  Avoid making any critical decisions for at least 24 hour  DIET  You may resume your regular diet - however -  remember your colon is empty and a heavy meal will produce gas. Avoid these foods:  vegetables, fried / greasy foods, carbonated drinks    MEDICATIONS:  Per Medication Reconciliation      ACTIVITY  You may resume your normal daily activities until tomorrow AM;  Spend the remainder of the day resting -  avoid any strenuous activity. CALL M.D. ANY SIGN OF   Increasing pain, nausea, vomiting  Abdominal distension (swelling)  New increased bleeding (oral or rectal)  Fever (chills)  Pain in chest area  Bloody discharge from nose or mouth  Shortness of breath    You may not take any Advil, Aspirin, Ibuprofen, Motrin, Aleve, or Goodys for 10 days, ONLY  Tylenol as needed for pain. IMPRESSION:  Impression:    1. Blood seen on vallecula clearly of tracheal/bronchial origin  2. Normal esophagus  3. Normal stomach  4. Normal duodenum    Patient clearly with hemoptysis from known lung adenocarcinoma, not hematemesis    Recommendations:  1.  Pulmonary follow up    Follow-up Instructions:   Telephone # [phone]    Kellen Miranda MD

## 2021-12-02 NOTE — ANESTHESIA PREPROCEDURE EVALUATION
Relevant Problems   CARDIOVASCULAR   (+) Aortic stenosis   (+) Essential hypertension   (+) NSTEMI (non-ST elevated myocardial infarction) (HCC)   (+) PAF (paroxysmal atrial fibrillation) (HCC)      RENAL FAILURE   (+) Stage 2 chronic kidney disease      ENDOCRINE   (+) Arthritis of left elbow   (+) Controlled type 2 diabetes mellitus with stage 2 chronic kidney disease, without long-term current use of insulin (HCC)   (+) Morbid obesity (HCC)      PERSONAL HX & FAMILY HX OF CANCER   (+) Metastatic adenocarcinoma (HCC)   (+) Metastatic carcinoma to bone [facility])       Anesthetic History   No history of anesthetic complications            Review of Systems / Medical History  Patient summary reviewed, nursing notes reviewed and pertinent labs reviewed    Pulmonary  Within defined limits                 Neuro/Psych   Within defined limits           Cardiovascular    Hypertension        Dysrhythmias   CAD    Exercise tolerance: >4 METS     GI/Hepatic/Renal                Endo/Other    Diabetes    Arthritis     Other Findings   Comments: Guillain-[place] syndrome 2017           Physical Exam    Airway  Mallampati: II    Neck ROM: normal range of motion   Mouth opening: Normal     Cardiovascular  Regular rate and rhythm,  S1 and S2 normal,  no murmur, click, rub, or gallop             Dental      Comments: Chipped R upper incisor.   Multiple missing teeth   Pulmonary  Breath sounds clear to auscultation               Abdominal  GI exam deferred       Other Findings            Anesthetic Plan    ASA: 3  Anesthesia type: total IV anesthesia          Induction: Intravenous  Anesthetic plan and risks discussed with: Patient

## 2021-12-02 NOTE — H&P
Gastroenterology Outpatient History and Physical    Patient: Marta Regina    Physician: Maximiliano Osman MD    Chief Complaint: Hematemesis  History of Present Illness: No other complaints    History:  Past Medical History:   Diagnosis Date    Aortic stenosis 8/2/2017    ASVD (arteriosclerotic vascular disease) 8/2/2017    Back pain 8/2/2017    BPH (benign prostatic hyperplasia) 8/2/2017    CKD (chronic kidney disease) 8/2/2017    Diabetes ([address] 8/2/2017    DJD (degenerative joint disease) 8/2/2017    ED (erectile dysfunction) 8/2/2017    Guillain-Denbo syndrome ([address] 8/2/2017    Hyperlipidemia 8/2/2017    Hypertension 8/2/2017    Left carotid bruit 8/2/2017    Melanoma ([facility] 2021    Morbid obesity ([facility] 8/2/2017    On statin therapy 8/2/2017    Urticaria 8/2/2017      Past Surgical History:   Procedure Laterality Date    GA CARDIAC SURG PROCEDURE UNLIST  09/2019    4 stents placed      Social History     Socioeconomic History    Marital status:    Tobacco Use    Smoking status: Never Smoker    Smokeless tobacco: Never Used   Vaping Use    Vaping Use: Never used   Substance and Sexual Activity    Alcohol use: Yes     Comment: social    Drug use: No      Family History   Problem Relation Age of Onset    Heart Disease Mother         murmor    Heart Disease Father       Patient Active Problem List   Diagnosis Code    Stenosis of both internal carotid arteries I65.23    ED (erectile dysfunction) N52.9    Guillain-Denbo syndrome (HCC) G61.0    BPH (benign prostatic hyperplasia) N40.0    ASVD (arteriosclerotic vascular disease) I70.90    Urticaria L50.9    Aortic stenosis I35.0    Primary osteoarthritis involving multiple joints M89.49    Morbid obesity (HCC) E66.01    Essential hypertension I10    Stage 2 chronic kidney disease N18.2    Left carotid bruit R09.89    Mixed hyperlipidemia E78.2    Back pain M54.9    Neoplasm of uncertain behavior of skin D48.5    Controlled type 2 diabetes mellitus with stage 2 chronic kidney disease, without long-term current use of insulin ([facility]) E11.22, N18.2    Hematuria R31.9    NSTEMI (non-ST elevated myocardial infarction) ([facility]) I21.4    ASCVD (arteriosclerotic cardiovascular disease) I25.10    PAF (paroxysmal atrial fibrillation) ([facility]) I48.0    Primary insomnia B21.23    Diastolic CHF, chronic ([facility]) I50.32    Alcohol screening Z13.39    Primary osteoarthritis of left knee M17.12    Weight loss R63.4    Epistaxis R04.0    Chronic pain of left knee M25.562, G89.29    Hip pain M25.559    Chronic midline low back pain without sciatica M54.50, G89.29    Left elbow pain M25.522    Metastatic carcinoma to bone ([facility]) C79.51    Arthritis of left elbow M19.022    Metastatic adenocarcinoma ([facility]) C79.9       Allergies: Allergies   Allergen Reactions    Adhesive Tape-Silicones Unknown (comments)    Chocolate Flavor Unknown (comments)     Medications:   Prior to Admission medications    Medication Sig Start Date End Date Taking? Authorizing Provider   doxycycline (ADOXA) 100 mg tablet Take 100 mg by mouth two (2) times a day. 11/10/21  Yes Provider, Historical   omeprazole (PRILOSEC) 40 mg capsule Take 1 Capsule by mouth daily. 11/10/21  Yes Alisia Shannon NP   metFORMIN (GLUCOPHAGE) 500 mg tablet TAKE ONE TABLET BY MOUTH EVERY MORNING AND TAKE TWO TABLETS BY MOUTH EVERY EVENING 11/8/21  Yes Randolph Kauffman MD   HYDROcodone-acetaminophen [facility]) 5-325 mg per tablet  10/22/21  Yes Provider, Historical   LORazepam (ATIVAN) 0.5 mg tablet  10/22/21  Yes Provider, Historical   metoprolol tartrate (LOPRESSOR) 25 mg tablet Take 1 Tablet by mouth every twelve (12) hours. 10/27/21  Yes Randolph Kauffman MD   ondansetron hcl (Zofran) 4 mg tablet Take 1 Tablet by mouth every eight (8) hours as needed for Nausea.  10/20/21  Yes Edin Montero MD   amLODIPine (NORVASC) 10 mg tablet TAKE ONE TABLET BY MOUTH DAILY 10/11/21  Yes Brett Mathsi MD   glipiZIDE (GLUCOTROL) 10 mg tablet TAKE ONE TABLET BY MOUTH TWICE A DAY 10/1/21  Yes Brett Mathis MD   empagliflozin (Jardiance) 10 mg tablet Take 1 Tablet by mouth daily. 9/21/21  Yes Brett Mathis MD   finasteride (PROSCAR) 5 mg tablet TAKE ONE TABLET BY MOUTH DAILY 9/21/21  Yes Brett Mathis MD   lisinopriL (PRINIVIL, ZESTRIL) 40 mg tablet TAKE 1 TABLET BY MOUTH EVERY DAY 9/7/21  Yes Brett Mathsi MD   tamsulosin (FLOMAX) 0.4 mg capsule TAKE TWO CAPSULES BY MOUTH DAILY 3/15/21  Yes Brett Mathis MD   pravastatin (PRAVACHOL) 80 mg tablet TAKE ONE TABLET BY MOUTH DAILY 2/9/21  Yes Brett Mathis MD   Januvia 100 mg tablet TAKE ONE TABLET BY MOUTH DAILY 5/26/20  Yes Brett Mathis MD   cloNIDine HCl (CATAPRES) 0.2 mg tablet Take 1 Tab by mouth two (2) times a day. 9/26/19  Yes Angela Arroyo MD   timolol (TIMOPTIC-XE) 0.5 % ophthalmic gel-forming  9/19/18  Yes Provider, Historical   Omega-3-DHA-EPA-Fish Oil 1,000 mg (120 mg-180 mg) cap Take 1 Cap by mouth three (3) times daily. Yes Provider, Historical   aspirin delayed-release 81 mg tablet Take  by mouth daily. Yes Provider, Historical   diclofenac EC (VOLTAREN) 50 mg EC tablet  10/22/21   Provider, Historical     Physical Exam:   Vital Signs: Blood pressure (!) 106/53, pulse 79, temperature 97.7 °F (36.5 °C), resp. rate 20, height 5' 10.5\" (1.791 m), weight 81.2 kg (179 lb), SpO2 100 %.   General: well developed, well nourished   HEENT: unremarkable   Heart: regular rhythm no mumur    Lungs: clear   Abdominal:  benign   Neurological: unremarkable   Extremities: no edema     Findings/Diagnosis: Hematemesis  Plan of Care/Planned Procedure: EGD with conscious/deep sedation    Signed:  Himanshu Jerry MD 12/2/2021

## 2021-12-02 NOTE — PROGRESS NOTES
1107:  Endoscope was pre-cleaned at the bedside immediately following procedure by Tonie Hooker. 1110: Anesthesia reports 50 mg Propofol, 60 mg Lidocaine and 250 mL NS, 4 mcg Precedex given during procedure. Received report from anesthesia staff on vital signs and status of patient.

## 2021-12-02 NOTE — PROCEDURES
NAME:  Mike Lance   :   1945   MRN:   [id number]     Date/Time:  2021 11:07 AM    Esophagogastroduodenoscopy (EGD) Procedure Note    Procedure: Esophagogastroduodenoscopy --diagnostic    Indication:  Hematemesis  Pre-operative Diagnosis: see indication above  Post-operative Diagnosis: see findings below  :  Maged Severino MD  Referring Provider:   --Harper Vasquez MD    Exam:  Airway: clear, no airway problems anticipated  Heart: RRR, without gallops or rubs  Lungs: clear bilaterally without wheezes, crackles, or rhonchi  Abdomen: soft, nontender, nondistended, bowel sounds present  Mental Status: awake, alert and oriented to person, place and time     Anethesia/Sedation:  MAC anesthesia Propofol  Procedure Details   After informed consent was obtained for the procedure, with all risks and benefits of procedure explained the patient was taken to the endoscopy suite and placed in the left lateral decubitus position. Following sequential administration of sedation as per above, the [id number] gastroscope was inserted into the mouth and advanced under direct vision to third portion of the duodenum. A careful inspection was made as the gastroscope was withdrawn, including a retroflexed view of the proximal stomach; findings and interventions are described below. Findings:  1. Blood seen on vallecula clearly of tracheal origin  2. Normal esophagus  3. Normal stomach  4. Normal duodenum    Therapies:  None    Specimens: None    EBL:  None. Complications:   None; patient tolerated the procedure well. Impression:    1. Blood seen on vallecula clearly of tracheal/bronchial origin  2. Normal esophagus  3. Normal stomach  4. Normal duodenum    Patient clearly with hemoptysis from known lung adenocarcinoma, not hematemesis    Recommendations:  1.  Pulmonary follow up    Discharge disposition:  Home in the company of  when able to ambulate    Tad Niece Evelio Baig MD

## 2021-12-03 DIAGNOSIS — E78.5 HYPERLIPIDEMIA, UNSPECIFIED HYPERLIPIDEMIA TYPE: ICD-10-CM

## 2021-12-06 RX ORDER — PRAVASTATIN SODIUM 80 MG/1
TABLET ORAL
Qty: 90 TABLET | Refills: 1 | Status: SHIPPED | OUTPATIENT
Start: 2021-12-06 | End: 2022-03-11

## 2021-12-06 NOTE — TELEPHONE ENCOUNTER
RX refill request from the patient/pharmacy. Patient last seen 11- with labs, and next appt. scheduled for 12-  Requested Prescriptions     Pending Prescriptions Disp Refills    pravastatin (PRAVACHOL) 80 mg tablet [Pharmacy Med Name: PRAVASTATIN SODIUM 80 MG TAB] 90 Tablet 1     Sig: TAKE ONE TABLET BY MOUTH DAILY   .

## 2021-12-14 NOTE — PROGRESS NOTES
Chief Complaint   Patient presents with    Hypertension     3 month follow up    CHF    Diabetes       SUBJECTIVE:    Namrata Arshad is a 64 y.o. male control of his medical problems include hypertension, diabetes, hyperlipidemia, ASCVD, recent diagnosis of metastatic lung cancer, CKD stage II and other multiple medical problems. He has completed his 10 courses of radiation therapy and is now on the pill for treatment of his lung cancer. He seems to be tolerating it okay. He is generally very weak. He notes no nausea vomiting but appetite seems to be coming back. He denies any chest pain, shortness of breath, palpitations, PND, orthopnea or other cardiac or respiratory complaints. He notes no GI or  complaints. He notes no headaches, dizziness or neurologic complaints except generalized weakness which is nonfocal.  There is no change of his chronic arthritic complaints and no other complaints are noted. Current Outpatient Medications   Medication Sig Dispense Refill    osimertinib (Tagrisso) 80 mg tablet Take 80 mg by mouth daily.  pravastatin (PRAVACHOL) 80 mg tablet TAKE ONE TABLET BY MOUTH DAILY 90 Tablet 1    doxycycline (ADOXA) 100 mg tablet Take 100 mg by mouth two (2) times a day.  omeprazole (PRILOSEC) 40 mg capsule Take 1 Capsule by mouth daily. 90 Capsule 1    metFORMIN (GLUCOPHAGE) 500 mg tablet TAKE ONE TABLET BY MOUTH EVERY MORNING AND TAKE TWO TABLETS BY MOUTH EVERY EVENING 270 Tablet 3    HYDROcodone-acetaminophen (NORCO) 5-325 mg per tablet       diclofenac EC (VOLTAREN) 50 mg EC tablet       LORazepam (ATIVAN) 0.5 mg tablet       metoprolol tartrate (LOPRESSOR) 25 mg tablet Take 1 Tablet by mouth every twelve (12) hours. 180 Tablet 3    ondansetron hcl (Zofran) 4 mg tablet Take 1 Tablet by mouth every eight (8) hours as needed for Nausea.  20 Tablet 0    amLODIPine (NORVASC) 10 mg tablet TAKE ONE TABLET BY MOUTH DAILY 90 Tablet 3    glipiZIDE (GLUCOTROL) 10 mg tablet TAKE ONE TABLET BY MOUTH TWICE A  Tablet 3    empagliflozin (Jardiance) 10 mg tablet Take 1 Tablet by mouth daily. 30 Tablet 5    finasteride (PROSCAR) 5 mg tablet TAKE ONE TABLET BY MOUTH DAILY 90 Tablet PRN    lisinopriL (PRINIVIL, ZESTRIL) 40 mg tablet TAKE 1 TABLET BY MOUTH EVERY DAY 90 Tablet 3    tamsulosin (FLOMAX) 0.4 mg capsule TAKE TWO CAPSULES BY MOUTH DAILY 180 Cap PRN    Januvia 100 mg tablet TAKE ONE TABLET BY MOUTH DAILY 30 Tab 5    cloNIDine HCl (CATAPRES) 0.2 mg tablet Take 1 Tab by mouth two (2) times a day. 60 Tab 12    timolol (TIMOPTIC-XE) 0.5 % ophthalmic gel-forming       Omega-3-DHA-EPA-Fish Oil 1,000 mg (120 mg-180 mg) cap Take 1 Cap by mouth three (3) times daily.  aspirin delayed-release 81 mg tablet Take  by mouth daily.        Past Medical History:   Diagnosis Date    Aortic stenosis 8/2/2017    ASVD (arteriosclerotic vascular disease) 8/2/2017    Back pain 8/2/2017    BPH (benign prostatic hyperplasia) 8/2/2017    CKD (chronic kidney disease) 8/2/2017    Diabetes ([address] 8/2/2017    DJD (degenerative joint disease) 8/2/2017    ED (erectile dysfunction) 8/2/2017    Guillain-Gladbrook syndrome ([address] 8/2/2017    Hyperlipidemia 8/2/2017    Hypertension 8/2/2017    Left carotid bruit 8/2/2017    Melanoma ([address] 2021    Morbid obesity ([address] 8/2/2017    On statin therapy 8/2/2017    Urticaria 8/2/2017     Past Surgical History:   Procedure Laterality Date    HX ENDOSCOPY  12/02/2021    [place] CARDIAC SURG PROCEDURE UNLIST  09/2019    4 stents placed     Allergies   Allergen Reactions    Adhesive Tape-Silicones Unknown (comments)    Chocolate Flavor Unknown (comments)       REVIEW OF SYSTEMS:  General: negative for - chills or fever, or weight loss or gain  ENT: negative for - headaches, nasal congestion or tinnitus  Eyes: no blurred or visual changes  Neck: No stiffness or swollen nodes  Respiratory: negative for - cough, hemoptysis, shortness of breath or wheezing  Cardiovascular : negative for - chest pain, edema, palpitations or shortness of breath  Gastrointestinal: negative for - abdominal pain, blood in stools, heartburn or nausea/vomiting  Genito-Urinary: no dysuria, trouble voiding, or hematuria  Musculoskeletal: negative for - gait disturbance, joint pain, joint stiffness or joint swelling  Neurological: no TIA or stroke symptoms  Hematologic: no bruises, no bleeding  Lymphatic: no swollen glands  Integument: no lumps, mole changes, nail changes or rash  Endocrine:no malaise/lethargy poly uria or polydipsia or unexpected weight changes        Social History     Socioeconomic History    Marital status:    Tobacco Use    Smoking status: Never Smoker    Smokeless tobacco: Never Used   Vaping Use    Vaping Use: Never used   Substance and Sexual Activity    Alcohol use: Yes     Comment: social    Drug use: No     Family History   Problem Relation Age of Onset    Heart Disease Mother         murmor    Heart Disease Father        OBJECTIVE:     Visit Vitals  /68 (BP 1 Location: Left upper arm, BP Patient Position: Sitting, BP Cuff Size: Adult)   Pulse 70   Temp 97.6 °F (36.4 °C) (Oral)   Resp 18   Ht 5' 10\" (1.778 m)   Wt 166 lb 3.2 oz (75.4 kg)   SpO2 99%   BMI 23.85 kg/m²     CONSTITUTIONAL:   well nourished, appears age appropriate  EYES: sclera anicteric, PERRL, EOMI  ENMT:nares clear, moist mucous membranes, pharynx clear  NECK: supple.  Thyroid normal, No JVD or bruits  RESPIRATORY: Chest: clear to ascultation and percussion, normal inspiratory effort  CARDIOVASCULAR: Heart: regular rate and rhythm no murmurs, rubs or gallops, PMI not displaced, No thrills, no peripheral edema  GASTROINTESTINAL: Abdomen: non distended, soft, non tender, bowel sounds normal  HEMATOLOGIC: no purpura, petechiae or bruising  LYMPHATIC: No lymph node enlargemant  MUSCULOSKELETAL: Extremities: no active synovitis, pulse 1+   INTEGUMENT: No unusual rashes or suspicious skin lesions noted. Nails appear normal.  PERIPHERAL VASCULAR: normal pulses femoral, PT and DP  NEUROLOGIC: non-focal exam, A & O X 3  PSYCHIATRIC:, appropriate affect     ASSESSMENT:   1. Essential hypertension    2. Controlled type 2 diabetes mellitus with stage 2 chronic kidney disease, without long-term current use of insulin ([facility]    3. Mixed hyperlipidemia    4. Primary osteoarthritis involving multiple joints    5. Stage 2 chronic kidney disease    6. Morbid obesity ([address]    7. ASCVD (arteriosclerotic cardiovascular disease)    8. Diastolic CHF, chronic (HCC)    9. PAF (paroxysmal atrial fibrillation) ([facility]    10. Metastatic adenocarcinoma ([facility]      Impression  1. Hypertension that is controlled so we will continue current therapy reviewed with him. 2.  Diabetes repeat status is pending prior lab review not make adjustments if necessary. 3.  Hyperlipidemia prior lab reviewed repeat status pending and I will adjust if needed. 4. DJD chronic but stable  5. CKD stage II we will see what that status is today. 6.  Obesity weight has slowly gone down since her diagnosis of the lung cancer. He and his wife say that he has stabilized as far as his diet now so we will see how that pans out. 7.  ASCVD clinically stable  8. Diastolic CHF compensated  9. Paroxysmal atrial fibrillation controlled with appears to be sinus rhythm today. 10.  Metastatic adenocarcinoma of lung currently on the chemotherapy. He is seeing the oncologist regularly. Since he is relatively stable from my standpoint the lab is okay I will see him again myself in 3 months with a clear understanding I will see him sooner should problems arise. I discussed this with the and his wife present with him today.     PLAN:  .  Orders Placed This Encounter    METABOLIC PANEL, COMPREHENSIVE    LIPID PANEL    CK    HEMOGLOBIN A1C WITH EAG    CBC WITH AUTOMATED DIFF    osimertinib (Tagrisso) 80 mg tablet         ATTENTION: This medical record was transcribed using an electronic medical records system. Although proofread, it may and can contain electronic and spelling errors. Other human spelling and other errors may be present. Corrections may be executed at a later time. Please feel free to contact us for any clarifications as needed. Follow-up and Dispositions    · Return in about 3 months (around 3/15/2022). No results found for any visits on 12/15/21. Hardin Mohs, MD    The patient verbalized understanding of the problems and plans as explained.

## 2021-12-15 ENCOUNTER — OFFICE VISIT (OUTPATIENT)
Dept: INTERNAL MEDICINE CLINIC | Age: 76
End: 2021-12-15
Payer: MEDICARE

## 2021-12-15 VITALS
OXYGEN SATURATION: 99 % | DIASTOLIC BLOOD PRESSURE: 68 MMHG | WEIGHT: 166.2 LBS | HEART RATE: 70 BPM | SYSTOLIC BLOOD PRESSURE: 124 MMHG | BODY MASS INDEX: 23.79 KG/M2 | RESPIRATION RATE: 18 BRPM | HEIGHT: 70 IN | TEMPERATURE: 97.6 F

## 2021-12-15 DIAGNOSIS — I48.0 PAF (PAROXYSMAL ATRIAL FIBRILLATION) (HCC): ICD-10-CM

## 2021-12-15 DIAGNOSIS — M15.9 PRIMARY OSTEOARTHRITIS INVOLVING MULTIPLE JOINTS: ICD-10-CM

## 2021-12-15 DIAGNOSIS — E66.01 MORBID OBESITY (HCC): ICD-10-CM

## 2021-12-15 DIAGNOSIS — N18.2 CONTROLLED TYPE 2 DIABETES MELLITUS WITH STAGE 2 CHRONIC KIDNEY DISEASE, WITHOUT LONG-TERM CURRENT USE OF INSULIN (HCC): ICD-10-CM

## 2021-12-15 DIAGNOSIS — E11.22 CONTROLLED TYPE 2 DIABETES MELLITUS WITH STAGE 2 CHRONIC KIDNEY DISEASE, WITHOUT LONG-TERM CURRENT USE OF INSULIN (HCC): ICD-10-CM

## 2021-12-15 DIAGNOSIS — I10 ESSENTIAL HYPERTENSION: Primary | ICD-10-CM

## 2021-12-15 DIAGNOSIS — I50.32 DIASTOLIC CHF, CHRONIC (HCC): ICD-10-CM

## 2021-12-15 DIAGNOSIS — E78.2 MIXED HYPERLIPIDEMIA: ICD-10-CM

## 2021-12-15 DIAGNOSIS — I25.10 ASCVD (ARTERIOSCLEROTIC CARDIOVASCULAR DISEASE): ICD-10-CM

## 2021-12-15 DIAGNOSIS — C79.9 METASTATIC ADENOCARCINOMA (HCC): ICD-10-CM

## 2021-12-15 DIAGNOSIS — N18.2 STAGE 2 CHRONIC KIDNEY DISEASE: ICD-10-CM

## 2021-12-15 LAB
ALBUMIN SERPL-MCNC: 3.1 G/DL (ref 3.5–5)
ALBUMIN/GLOB SERPL: 1.2 {RATIO} (ref 1.1–2.2)
ALP SERPL-CCNC: 136 U/L (ref 45–117)
ALT SERPL-CCNC: 11 U/L (ref 12–78)
ANION GAP SERPL CALC-SCNC: 8 MMOL/L (ref 5–15)
AST SERPL-CCNC: 13 U/L (ref 15–37)
BASOPHILS # BLD: 0 K/UL (ref 0–0.1)
BASOPHILS NFR BLD: 0 % (ref 0–1)
BILIRUB SERPL-MCNC: 0.8 MG/DL (ref 0.2–1)
BUN SERPL-MCNC: 9 MG/DL (ref 6–20)
BUN/CREAT SERPL: 13 (ref 12–20)
CALCIUM SERPL-MCNC: 8.2 MG/DL (ref 8.5–10.1)
CHLORIDE SERPL-SCNC: 101 MMOL/L (ref 97–108)
CHOLEST SERPL-MCNC: 146 MG/DL
CK SERPL-CCNC: 44 U/L (ref 39–308)
CO2 SERPL-SCNC: 26 MMOL/L (ref 21–32)
CREAT SERPL-MCNC: 0.7 MG/DL (ref 0.7–1.3)
DIFFERENTIAL METHOD BLD: ABNORMAL
EOSINOPHIL # BLD: 0 K/UL (ref 0–0.4)
EOSINOPHIL NFR BLD: 1 % (ref 0–7)
ERYTHROCYTE [DISTWIDTH] IN BLOOD BY AUTOMATED COUNT: 16.4 % (ref 11.5–14.5)
EST. AVERAGE GLUCOSE BLD GHB EST-MCNC: 114 MG/DL
GLOBULIN SER CALC-MCNC: 2.6 G/DL (ref 2–4)
GLUCOSE SERPL-MCNC: 101 MG/DL (ref 65–100)
HBA1C MFR BLD: 5.6 % (ref 4–5.6)
HCT VFR BLD AUTO: 35.6 % (ref 36.6–50.3)
HDLC SERPL-MCNC: 54 MG/DL
HDLC SERPL: 2.7 {RATIO} (ref 0–5)
HGB BLD-MCNC: 10.9 G/DL (ref 12.1–17)
IMM GRANULOCYTES # BLD AUTO: 0 K/UL (ref 0–0.04)
IMM GRANULOCYTES NFR BLD AUTO: 0 % (ref 0–0.5)
LDLC SERPL CALC-MCNC: 65.4 MG/DL (ref 0–100)
LYMPHOCYTES # BLD: 0.6 K/UL (ref 0.8–3.5)
LYMPHOCYTES NFR BLD: 12 % (ref 12–49)
MCH RBC QN AUTO: 24.7 PG (ref 26–34)
MCHC RBC AUTO-ENTMCNC: 30.6 G/DL (ref 30–36.5)
MCV RBC AUTO: 80.7 FL (ref 80–99)
MONOCYTES # BLD: 0.3 K/UL (ref 0–1)
MONOCYTES NFR BLD: 7 % (ref 5–13)
NEUTS SEG # BLD: 3.7 K/UL (ref 1.8–8)
NEUTS SEG NFR BLD: 80 % (ref 32–75)
NRBC # BLD: 0 K/UL (ref 0–0.01)
NRBC BLD-RTO: 0 PER 100 WBC
PLATELET # BLD AUTO: ABNORMAL K/UL (ref 150–400)
POTASSIUM SERPL-SCNC: 3.6 MMOL/L (ref 3.5–5.1)
PROT SERPL-MCNC: 5.7 G/DL (ref 6.4–8.2)
RBC # BLD AUTO: 4.41 M/UL (ref 4.1–5.7)
RBC MORPH BLD: ABNORMAL
RBC MORPH BLD: ABNORMAL
SODIUM SERPL-SCNC: 135 MMOL/L (ref 136–145)
TRIGL SERPL-MCNC: 133 MG/DL (ref ?–150)
VLDLC SERPL CALC-MCNC: 26.6 MG/DL
WBC # BLD AUTO: 4.6 K/UL (ref 4.1–11.1)

## 2021-12-15 PROCEDURE — G8420 CALC BMI NORM PARAMETERS: HCPCS | Performed by: INTERNAL MEDICINE

## 2021-12-15 PROCEDURE — 99214 OFFICE O/P EST MOD 30 MIN: CPT | Performed by: INTERNAL MEDICINE

## 2021-12-15 PROCEDURE — 1101F PT FALLS ASSESS-DOCD LE1/YR: CPT | Performed by: INTERNAL MEDICINE

## 2021-12-15 PROCEDURE — G8754 DIAS BP LESS 90: HCPCS | Performed by: INTERNAL MEDICINE

## 2021-12-15 PROCEDURE — G8427 DOCREV CUR MEDS BY ELIG CLIN: HCPCS | Performed by: INTERNAL MEDICINE

## 2021-12-15 PROCEDURE — G8752 SYS BP LESS 140: HCPCS | Performed by: INTERNAL MEDICINE

## 2021-12-15 PROCEDURE — G8536 NO DOC ELDER MAL SCRN: HCPCS | Performed by: INTERNAL MEDICINE

## 2021-12-15 PROCEDURE — G8510 SCR DEP NEG, NO PLAN REQD: HCPCS | Performed by: INTERNAL MEDICINE

## 2021-12-15 NOTE — PROGRESS NOTES
Chief Complaint   Patient presents with    Hypertension     3 month follow up    CHF    Diabetes     Visit Vitals  /68 (BP 1 Location: Left upper arm, BP Patient Position: Sitting, BP Cuff Size: Adult)   Pulse 70   Temp 97.6 °F (36.4 °C) (Oral)   Resp 18   Ht 5' 10\" (1.778 m)   Wt 166 lb 3.2 oz (75.4 kg)   SpO2 99%   BMI 23.85 kg/m²     1. Have you been to the ER, urgent care clinic since your last visit? Hospitalized since your last visit? ED [facility] 12/2/21 for endoscopy    2. Have you seen or consulted any other health care providers outside of the [address] [place] [place] [address] since your last visit? Include any pap smears or colon screening.  Dr. Joseph Weiner

## 2021-12-15 NOTE — PATIENT INSTRUCTIONS
Arthritis: Care Instructions  Overview     Arthritis, also called osteoarthritis, is a breakdown of the cartilage that cushions your joints. When the cartilage wears down, your bones rub against each other. This causes pain and stiffness. Many people have some arthritis as they age. Arthritis most often affects the joints of the spine, hands, hips, knees, or feet. Arthritis never goes away completely. But medicine and home treatment can help reduce pain and help you stay active. Follow-up care is a key part of your treatment and safety. Be sure to make and go to all appointments, and call your doctor if you are having problems. It's also a good idea to know your test results and keep a list of the medicines you take. How can you care for yourself at home? · Stay at a healthy weight. Being overweight puts extra strain on your joints. · Talk to your doctor or physical therapist about exercises that will help ease joint pain. ? Stretch. You may enjoy gentle forms of yoga to help keep your joints and muscles flexible. ? Walk instead of jog. Other types of exercise that are less stressful on the joints include riding a bike, swimming, virgilio chi, or water exercise. ? Lift weights. Strong muscles help reduce stress on your joints. Stronger thigh muscles, for example, take some of the stress off of the knees and hips. Learn the right way to lift weights so you don't make joint pain worse. · Take your medicines exactly as prescribed. Call your doctor if you think you are having a problem with your medicine. · Take pain medicines exactly as directed. ? If the doctor gave you a prescription medicine for pain, take it as prescribed. ? If you are not taking a prescription pain medicine, ask your doctor if you can take an over-the-counter medicine. · Use a cane, crutch, walker, or another device if you need help to get around. These can help rest your joints.  You also can use other things to make life easier, such as a higher toilet seat and padded handles on kitchen utensils. · Do not sit in low chairs. They can make it hard to get up. · Put heat or cold on your sore joints as needed. Use whichever helps you most. You can also switch between hot and cold packs. ? Apply heat 2 or 3 times a day for 20 to 30 minutesusing a heating pad, hot shower, or hot packto relieve pain and stiffness. But don't use heat on a swollen joint. ? Put ice or a cold pack on your sore joint for 10 to 20 minutes at a time. Put a thin cloth between the ice and your skin. When should you call for help? Call your doctor now or seek immediate medical care if:    · You have sudden swelling, warmth, or pain in any joint.     · You have joint pain and a fever or rash.     · You have such bad pain that you cannot use a joint. Watch closely for changes in your health, and be sure to contact your doctor if:    · You have mild joint symptoms that continue even with more than 6 weeks of care at home.     · You have stomach pain or other problems with your medicine. Where can you learn more? Go to http://www.gray.com/  Enter K872 in the search box to learn more about \"Arthritis: Care Instructions. \"  Current as of: April 30, 2021               Content Version: 13.0  © 4878-0534 [employer]. Care instructions adapted under license by [employer] (which disclaims liability or warranty for this information). If you have questions about a medical condition or this instruction, always ask your healthcare professional. [place] [address] any warranty or liability for your use of this information.

## 2022-01-18 ENCOUNTER — TRANSCRIBE ORDER (OUTPATIENT)
Dept: SCHEDULING | Age: 77
End: 2022-01-18

## 2022-01-18 DIAGNOSIS — C79.51 SECONDARY MALIGNANT NEOPLASM OF BONE AND BONE MARROW (HCC): ICD-10-CM

## 2022-01-18 DIAGNOSIS — Z79.899 OTHER LONG TERM (CURRENT) DRUG THERAPY: ICD-10-CM

## 2022-01-18 DIAGNOSIS — G89.3 NEOPLASM RELATED PAIN: ICD-10-CM

## 2022-01-18 DIAGNOSIS — C79.52 SECONDARY MALIGNANT NEOPLASM OF BONE AND BONE MARROW (HCC): ICD-10-CM

## 2022-01-18 DIAGNOSIS — C34.12 MALIGNANT NEOPLASM OF LINGULA OF LEFT LUNG (HCC): Primary | ICD-10-CM

## 2022-03-10 ENCOUNTER — TELEPHONE (OUTPATIENT)
Dept: INTERNAL MEDICINE CLINIC | Age: 77
End: 2022-03-10

## 2022-03-10 NOTE — TELEPHONE ENCOUNTER
Patient's wife called in. .    States Geremias Caraballo needs a walker but the insurance will not pay for it unless it prescribed by Dr Bertin Chan.     Needs a prescription for the walker      [phone]

## 2022-03-11 ENCOUNTER — APPOINTMENT (OUTPATIENT)
Dept: CT IMAGING | Age: 77
End: 2022-03-11
Attending: EMERGENCY MEDICINE
Payer: MEDICARE

## 2022-03-11 ENCOUNTER — APPOINTMENT (OUTPATIENT)
Dept: GENERAL RADIOLOGY | Age: 77
End: 2022-03-11
Attending: EMERGENCY MEDICINE
Payer: MEDICARE

## 2022-03-11 ENCOUNTER — HOSPITAL ENCOUNTER (OUTPATIENT)
Age: 77
Setting detail: OBSERVATION
Discharge: HOME | End: 2022-03-12
Attending: EMERGENCY MEDICINE | Admitting: INTERNAL MEDICINE
Payer: MEDICARE

## 2022-03-11 DIAGNOSIS — E16.0 HYPOGLYCEMIA SECONDARY TO SULFONYLUREA, ACCIDENTAL OR UNINTENTIONAL, INITIAL ENCOUNTER: Primary | ICD-10-CM

## 2022-03-11 DIAGNOSIS — T38.3X1A HYPOGLYCEMIA SECONDARY TO SULFONYLUREA, ACCIDENTAL OR UNINTENTIONAL, INITIAL ENCOUNTER: Primary | ICD-10-CM

## 2022-03-11 PROBLEM — E16.2 HYPOGLYCEMIA: Status: ACTIVE | Noted: 2022-03-11

## 2022-03-11 PROBLEM — C34.90 LUNG CANCER (HCC): Status: ACTIVE | Noted: 2022-03-11

## 2022-03-11 LAB
ALBUMIN SERPL-MCNC: 3 G/DL (ref 3.5–5)
ALBUMIN/GLOB SERPL: 1 {RATIO} (ref 1.1–2.2)
ALP SERPL-CCNC: 85 U/L (ref 45–117)
ALT SERPL-CCNC: 20 U/L (ref 12–78)
ANION GAP SERPL CALC-SCNC: 6 MMOL/L (ref 5–15)
APPEARANCE UR: CLEAR
AST SERPL-CCNC: 15 U/L (ref 15–37)
ATRIAL RATE: 70 BPM
BACTERIA URNS QL MICRO: NEGATIVE /HPF
BASOPHILS # BLD: 0 K/UL (ref 0–0.1)
BASOPHILS NFR BLD: 0 % (ref 0–1)
BILIRUB SERPL-MCNC: 1 MG/DL (ref 0.2–1)
BILIRUB UR QL: NEGATIVE
BUN SERPL-MCNC: 13 MG/DL (ref 6–20)
BUN/CREAT SERPL: 21 (ref 12–20)
CALCIUM SERPL-MCNC: 6.6 MG/DL (ref 8.5–10.1)
CALCULATED P AXIS, ECG09: 113 DEGREES
CALCULATED R AXIS, ECG10: -43 DEGREES
CALCULATED T AXIS, ECG11: 128 DEGREES
CHLORIDE SERPL-SCNC: 103 MMOL/L (ref 97–108)
CO2 SERPL-SCNC: 26 MMOL/L (ref 21–32)
COLOR UR: ABNORMAL
CREAT SERPL-MCNC: 0.63 MG/DL (ref 0.7–1.3)
DIAGNOSIS, 93000: NORMAL
DIFFERENTIAL METHOD BLD: ABNORMAL
EOSINOPHIL # BLD: 0 K/UL (ref 0–0.4)
EOSINOPHIL NFR BLD: 0 % (ref 0–7)
EPITH CASTS URNS QL MICRO: ABNORMAL /LPF
ERYTHROCYTE [DISTWIDTH] IN BLOOD BY AUTOMATED COUNT: 15.9 % (ref 11.5–14.5)
GLOBULIN SER CALC-MCNC: 3.1 G/DL (ref 2–4)
GLUCOSE BLD STRIP.AUTO-MCNC: 102 MG/DL (ref 65–117)
GLUCOSE BLD STRIP.AUTO-MCNC: 123 MG/DL (ref 65–117)
GLUCOSE BLD STRIP.AUTO-MCNC: 129 MG/DL (ref 65–117)
GLUCOSE BLD STRIP.AUTO-MCNC: 67 MG/DL (ref 65–117)
GLUCOSE BLD STRIP.AUTO-MCNC: 73 MG/DL (ref 65–117)
GLUCOSE BLD STRIP.AUTO-MCNC: 79 MG/DL (ref 65–117)
GLUCOSE BLD STRIP.AUTO-MCNC: 80 MG/DL (ref 65–117)
GLUCOSE BLD STRIP.AUTO-MCNC: 81 MG/DL (ref 65–117)
GLUCOSE BLD STRIP.AUTO-MCNC: 83 MG/DL (ref 65–117)
GLUCOSE BLD STRIP.AUTO-MCNC: 87 MG/DL (ref 65–117)
GLUCOSE SERPL-MCNC: 93 MG/DL (ref 65–100)
GLUCOSE UR STRIP.AUTO-MCNC: >1000 MG/DL
HCT VFR BLD AUTO: 36.6 % (ref 36.6–50.3)
HGB BLD-MCNC: 11.8 G/DL (ref 12.1–17)
HGB UR QL STRIP: NEGATIVE
HYALINE CASTS URNS QL MICRO: ABNORMAL /LPF (ref 0–5)
IMM GRANULOCYTES # BLD AUTO: 0 K/UL (ref 0–0.04)
IMM GRANULOCYTES NFR BLD AUTO: 0 % (ref 0–0.5)
KETONES UR QL STRIP.AUTO: 40 MG/DL
LEUKOCYTE ESTERASE UR QL STRIP.AUTO: NEGATIVE
LYMPHOCYTES # BLD: 0.5 K/UL (ref 0.8–3.5)
LYMPHOCYTES NFR BLD: 10 % (ref 12–49)
MCH RBC QN AUTO: 24.4 PG (ref 26–34)
MCHC RBC AUTO-ENTMCNC: 32.2 G/DL (ref 30–36.5)
MCV RBC AUTO: 75.8 FL (ref 80–99)
MONOCYTES # BLD: 0.3 K/UL (ref 0–1)
MONOCYTES NFR BLD: 6 % (ref 5–13)
NEUTS SEG # BLD: 3.7 K/UL (ref 1.8–8)
NEUTS SEG NFR BLD: 84 % (ref 32–75)
NITRITE UR QL STRIP.AUTO: NEGATIVE
NRBC # BLD: 0 K/UL (ref 0–0.01)
NRBC BLD-RTO: 0 PER 100 WBC
P-R INTERVAL, ECG05: 118 MS
PH UR STRIP: 6 [PH] (ref 5–8)
PLATELET # BLD AUTO: 98 K/UL (ref 150–400)
POTASSIUM SERPL-SCNC: 3.2 MMOL/L (ref 3.5–5.1)
PROT SERPL-MCNC: 6.1 G/DL (ref 6.4–8.2)
PROT UR STRIP-MCNC: 100 MG/DL
Q-T INTERVAL, ECG07: 508 MS
QRS DURATION, ECG06: 144 MS
QTC CALCULATION (BEZET), ECG08: 548 MS
RBC # BLD AUTO: 4.83 M/UL (ref 4.1–5.7)
RBC #/AREA URNS HPF: ABNORMAL /HPF (ref 0–5)
RBC MORPH BLD: ABNORMAL
RBC MORPH BLD: ABNORMAL
SERVICE CMNT-IMP: ABNORMAL
SERVICE CMNT-IMP: ABNORMAL
SERVICE CMNT-IMP: NORMAL
SODIUM SERPL-SCNC: 135 MMOL/L (ref 136–145)
SP GR UR REFRACTOMETRY: 1.03 (ref 1–1.03)
UA: UC IF INDICATED,UAUC: ABNORMAL
UROBILINOGEN UR QL STRIP.AUTO: 1 EU/DL (ref 0.2–1)
VENTRICULAR RATE, ECG03: 70 BPM
WBC # BLD AUTO: 4.5 K/UL (ref 4.1–11.1)
WBC URNS QL MICRO: ABNORMAL /HPF (ref 0–4)

## 2022-03-11 PROCEDURE — 36415 COLL VENOUS BLD VENIPUNCTURE: CPT

## 2022-03-11 PROCEDURE — 99285 EMERGENCY DEPT VISIT HI MDM: CPT

## 2022-03-11 PROCEDURE — 74011000250 HC RX REV CODE- 250: Performed by: INTERNAL MEDICINE

## 2022-03-11 PROCEDURE — 74011250636 HC RX REV CODE- 250/636: Performed by: INTERNAL MEDICINE

## 2022-03-11 PROCEDURE — 81001 URINALYSIS AUTO W/SCOPE: CPT

## 2022-03-11 PROCEDURE — 70450 CT HEAD/BRAIN W/O DYE: CPT

## 2022-03-11 PROCEDURE — 80053 COMPREHEN METABOLIC PANEL: CPT

## 2022-03-11 PROCEDURE — G0378 HOSPITAL OBSERVATION PER HR: HCPCS

## 2022-03-11 PROCEDURE — 93005 ELECTROCARDIOGRAM TRACING: CPT

## 2022-03-11 PROCEDURE — 96374 THER/PROPH/DIAG INJ IV PUSH: CPT

## 2022-03-11 PROCEDURE — 96375 TX/PRO/DX INJ NEW DRUG ADDON: CPT

## 2022-03-11 PROCEDURE — 71045 X-RAY EXAM CHEST 1 VIEW: CPT

## 2022-03-11 PROCEDURE — 74011250637 HC RX REV CODE- 250/637: Performed by: INTERNAL MEDICINE

## 2022-03-11 PROCEDURE — 82962 GLUCOSE BLOOD TEST: CPT

## 2022-03-11 PROCEDURE — 85025 COMPLETE CBC W/AUTO DIFF WBC: CPT

## 2022-03-11 RX ORDER — FINASTERIDE 5 MG/1
5 TABLET, FILM COATED ORAL DAILY
Status: DISCONTINUED | OUTPATIENT
Start: 2022-03-12 | End: 2022-03-12 | Stop reason: HOSPADM

## 2022-03-11 RX ORDER — TAMSULOSIN HYDROCHLORIDE 0.4 MG/1
0.8 CAPSULE ORAL DAILY
Status: DISCONTINUED | OUTPATIENT
Start: 2022-03-12 | End: 2022-03-12 | Stop reason: HOSPADM

## 2022-03-11 RX ORDER — PANTOPRAZOLE SODIUM 40 MG/1
40 TABLET, DELAYED RELEASE ORAL
Status: DISCONTINUED | OUTPATIENT
Start: 2022-03-12 | End: 2022-03-12 | Stop reason: HOSPADM

## 2022-03-11 RX ORDER — DEXTROSE MONOHYDRATE 100 MG/ML
0-250 INJECTION, SOLUTION INTRAVENOUS AS NEEDED
Status: DISCONTINUED | OUTPATIENT
Start: 2022-03-11 | End: 2022-03-12 | Stop reason: HOSPADM

## 2022-03-11 RX ORDER — FINASTERIDE 5 MG/1
5 TABLET, FILM COATED ORAL DAILY
COMMUNITY

## 2022-03-11 RX ORDER — METFORMIN HYDROCHLORIDE 500 MG/1
TABLET ORAL
COMMUNITY
End: 2022-03-12

## 2022-03-11 RX ORDER — PRAVASTATIN SODIUM 80 MG/1
80 TABLET ORAL DAILY
COMMUNITY
End: 2022-06-09

## 2022-03-11 RX ORDER — INSULIN LISPRO 100 [IU]/ML
INJECTION, SOLUTION INTRAVENOUS; SUBCUTANEOUS
Status: DISCONTINUED | OUTPATIENT
Start: 2022-03-11 | End: 2022-03-12 | Stop reason: HOSPADM

## 2022-03-11 RX ORDER — SODIUM CHLORIDE 0.9 % (FLUSH) 0.9 %
5-40 SYRINGE (ML) INJECTION AS NEEDED
Status: DISCONTINUED | OUTPATIENT
Start: 2022-03-11 | End: 2022-03-12 | Stop reason: HOSPADM

## 2022-03-11 RX ORDER — ONDANSETRON 4 MG/1
4 TABLET, ORALLY DISINTEGRATING ORAL
Status: DISCONTINUED | OUTPATIENT
Start: 2022-03-11 | End: 2022-03-12 | Stop reason: HOSPADM

## 2022-03-11 RX ORDER — METOPROLOL TARTRATE 25 MG/1
25 TABLET, FILM COATED ORAL EVERY 12 HOURS
Status: DISCONTINUED | OUTPATIENT
Start: 2022-03-11 | End: 2022-03-12 | Stop reason: HOSPADM

## 2022-03-11 RX ORDER — AMLODIPINE BESYLATE 5 MG/1
10 TABLET ORAL DAILY
Status: DISCONTINUED | OUTPATIENT
Start: 2022-03-12 | End: 2022-03-12 | Stop reason: HOSPADM

## 2022-03-11 RX ORDER — POLYETHYLENE GLYCOL 3350 17 G/17G
17 POWDER, FOR SOLUTION ORAL DAILY PRN
Status: DISCONTINUED | OUTPATIENT
Start: 2022-03-11 | End: 2022-03-12 | Stop reason: HOSPADM

## 2022-03-11 RX ORDER — TAMSULOSIN HYDROCHLORIDE 0.4 MG/1
0.8 CAPSULE ORAL DAILY
COMMUNITY
End: 2022-04-20

## 2022-03-11 RX ORDER — LISINOPRIL 20 MG/1
40 TABLET ORAL DAILY
Status: DISCONTINUED | OUTPATIENT
Start: 2022-03-12 | End: 2022-03-12 | Stop reason: HOSPADM

## 2022-03-11 RX ORDER — ONDANSETRON 2 MG/ML
4 INJECTION INTRAMUSCULAR; INTRAVENOUS
Status: DISCONTINUED | OUTPATIENT
Start: 2022-03-11 | End: 2022-03-12 | Stop reason: HOSPADM

## 2022-03-11 RX ORDER — SODIUM CHLORIDE 0.9 % (FLUSH) 0.9 %
5-40 SYRINGE (ML) INJECTION EVERY 8 HOURS
Status: DISCONTINUED | OUTPATIENT
Start: 2022-03-11 | End: 2022-03-12 | Stop reason: HOSPADM

## 2022-03-11 RX ORDER — PRAVASTATIN SODIUM 40 MG/1
80 TABLET ORAL DAILY
Status: DISCONTINUED | OUTPATIENT
Start: 2022-03-12 | End: 2022-03-12 | Stop reason: HOSPADM

## 2022-03-11 RX ORDER — AMLODIPINE BESYLATE 10 MG/1
10 TABLET ORAL DAILY
COMMUNITY
End: 2022-04-29 | Stop reason: SINTOL

## 2022-03-11 RX ORDER — GLIPIZIDE 10 MG/1
10 TABLET ORAL 2 TIMES DAILY
COMMUNITY
End: 2022-03-12

## 2022-03-11 RX ORDER — DEXTROSE MONOHYDRATE 50 MG/ML
75 INJECTION, SOLUTION INTRAVENOUS CONTINUOUS
Status: DISCONTINUED | OUTPATIENT
Start: 2022-03-11 | End: 2022-03-12 | Stop reason: HOSPADM

## 2022-03-11 RX ORDER — LISINOPRIL 40 MG/1
40 TABLET ORAL DAILY
COMMUNITY
End: 2022-04-29 | Stop reason: ALTCHOICE

## 2022-03-11 RX ORDER — ACETAMINOPHEN 325 MG/1
650 TABLET ORAL
Status: DISCONTINUED | OUTPATIENT
Start: 2022-03-11 | End: 2022-03-12 | Stop reason: HOSPADM

## 2022-03-11 RX ORDER — MAGNESIUM SULFATE 100 %
4 CRYSTALS MISCELLANEOUS AS NEEDED
Status: DISCONTINUED | OUTPATIENT
Start: 2022-03-11 | End: 2022-03-12 | Stop reason: HOSPADM

## 2022-03-11 RX ORDER — ACETAMINOPHEN 650 MG/1
650 SUPPOSITORY RECTAL
Status: DISCONTINUED | OUTPATIENT
Start: 2022-03-11 | End: 2022-03-12 | Stop reason: HOSPADM

## 2022-03-11 RX ADMIN — METOPROLOL TARTRATE 25 MG: 25 TABLET, FILM COATED ORAL at 20:52

## 2022-03-11 RX ADMIN — METOPROLOL TARTRATE 25 MG: 25 TABLET, FILM COATED ORAL at 17:22

## 2022-03-11 RX ADMIN — DEXTROSE MONOHYDRATE 75 ML/HR: 5 INJECTION, SOLUTION INTRAVENOUS at 13:43

## 2022-03-11 RX ADMIN — SODIUM CHLORIDE, PRESERVATIVE FREE 10 ML: 5 INJECTION INTRAVENOUS at 21:00

## 2022-03-11 RX ADMIN — SODIUM CHLORIDE, PRESERVATIVE FREE 10 ML: 5 INJECTION INTRAVENOUS at 17:23

## 2022-03-11 NOTE — ACP (ADVANCE CARE PLANNING)
Advance Care Planning Note      NAME: Arin Eaton   :  1945   MRN:  [id number]     Date/Time:  3/11/2022 3:28 PM    Active Diagnoses:  Hospital Problems  Date Reviewed: 12/15/2021          Codes Class Noted POA    Hypoglycemia ICD-10-CM: E16.2  ICD-9-CM: 251.2  3/11/2022 Unknown        Lung cancer ([facility] ICD-10-CM: C34.90  ICD-9-CM: 162.9  3/11/2022 Yes              These active diagnoses are of sufficient risk that focused discussion on advance care planning is indicated in order to allow the patient to thoughtfully consider personal goals of care, and if situations arise that prevent the ability to personally give input, to ensure appropriate representation of their personal desires for different levels and aggressiveness of care. Discussion:   Code status addressed and wants to be a Full Code. Patient wants central line and vasopressors if needed. Patient  would like to assign his wife   as the surrogate decision maker. Persons present and participating in discussion: Balta Hart MD      Time Spent:   Total time spent face-to-face in education and discussion: 16  minutes.          Balta Clements MD   Hospitalist

## 2022-03-11 NOTE — PROGRESS NOTES
Pharmacy Medication Reconciliation     The patient was interviewed regarding current PTA medication list, use and drug allergies; patient present in room and obtained permission from patient to discuss drug regimen with wife, Roscoe Carlos present. The patient was questioned regarding use of any other inhalers, topical products, over the counter medications, herbal medications, vitamin products or ophthalmic/nasal/otic medication use. Allergy Update: Adhesive tape-silicones and Chocolate flavor    Recommendations/Findings: The following amendments were made to the patient's active medication list on file at [facility]:   1) Additions:  None  2) Deletions:   Aspirin 81 mg tablet  Clonidine 0.2 mg tablet  Hydrocodone-APAP 5-325 mg tablet  Lorazepam 0.5 mg tablet  Timolol 0.5%  3) Changes:   Diclofenac EC 50 mg: no sig - > 1 tablet po tid as needed for pain    Pertinent Findings: Patient's wife, Roscoe Carlos brought in his medications and says that he takes Doxycyline with his chemo medication, Gayle Heriberto. Clarified PTA med list with patient and patient's wife, Roscoe Carlos. PTA medication list was corrected to the following:     Prior to Admission Medications   Prescriptions Last Dose Informant Taking? Omega-3-DHA-EPA-Fish Oil 1,000 mg (120 mg-180 mg) cap  Self Yes   Sig: Take 1 Cap by mouth three (3) times daily. SITagliptin (Januvia) 100 mg tablet  Self Yes   Sig: Take 100 mg by mouth daily. amLODIPine (NORVASC) 10 mg tablet  Self Yes   Sig: Take 10 mg by mouth daily. diclofenac EC (VOLTAREN) 50 mg EC tablet  Self Yes   Sig: Take 50 mg by mouth three (3) times daily as needed for Pain. doxycycline (ADOXA) 100 mg tablet  Self Yes   Sig: Take 100 mg by mouth two (2) times a day. empagliflozin (Jardiance) 10 mg tablet  Self Yes   Sig: Take 1 Tablet by mouth daily. finasteride (PROSCAR) 5 mg tablet  Self Yes   Sig: Take 5 mg by mouth daily.    glipiZIDE (GLUCOTROL) 10 mg tablet  Self Yes   Sig: Take 10 mg by mouth two (2) times a day. lisinopriL (PRINIVIL, ZESTRIL) 40 mg tablet  Self Yes   Sig: Take 40 mg by mouth daily. metFORMIN (GLUCOPHAGE) 500 mg tablet  Self Yes   Sig: Take one tablet by mouth every morning and take two tablets by mouth every evening. metoprolol tartrate (LOPRESSOR) 25 mg tablet  Self Yes   Sig: Take 1 Tablet by mouth every twelve (12) hours. omeprazole (PRILOSEC) 40 mg capsule  Self Yes   Sig: Take 1 Capsule by mouth daily. ondansetron hcl (Zofran) 4 mg tablet  Self Yes   Sig: Take 1 Tablet by mouth every eight (8) hours as needed for Nausea. osimertinib (Tagrisso) 80 mg tablet  Self Yes   Sig: Take 80 mg by mouth daily. pravastatin (PRAVACHOL) 80 mg tablet  Self Yes   Sig: Take 80 mg by mouth daily. tamsulosin (FLOMAX) 0.4 mg capsule  Self Yes   Sig: Take 0.8 mg by mouth daily.  2 capsules by mouth daily      Facility-Administered Medications: None        Thank you,  Tushar Sweet

## 2022-03-11 NOTE — PROGRESS NOTES
Transition of Care Plan:    RUR: N/A Observation Status   Disposition: Home with Spouse   Follow up appointments:PCP  DME needed: RW-  Order placed for RW from [facility]; RW delivered to patient bedside   Transportation at Discharge: Wife to transport  [place] or means to access home:      Wife has access  IM Medicare Letter: N/A observation status- Notice delivered 3/11  Is patient a BCPI-A Bundle:     Bundle letter will be distributed by unit CM if pt meets bundle criteria. If yes, was Bundle Letter given?:    Is patient a  and connected with the  [employer] ? N/A            If yes, was [employer] transfer form completed and [facility] notified? Caregiver Contact: Wife Belynda Dance [phone]  Discharge Caregiver contacted prior to discharge? Unit CM to follow up before discharge  Care Conference needed?:                   CM delivered Medicare Outpatient Observation Notice to patient on 3/11/2022. Reason for Admission:  Hypoglycemia                       RUR Score:         N/A            Plan for utilizing home health:   Pt does not have any [address]      PCP: First and Last name:  Audra Hou MD     Name of Practice: [facility]    Are you a current patient: Yes/No: Yes   Approximate date of last visit: 12/15/21   Can you participate in a virtual visit with your PCP: No, prefers in patient visits                    Current Advanced Directive/Advance Care Plan: Full Code  [address]. Decision Maker:       Primary Decision Maker: Geri Foote - Spouse - [phone]    Click here to complete [address] including selection of the [address] Relationship (ie \"Primary\")  Today we documented Decision Maker(s) consistent with Legal Next of Kin hierarchy. Transition of Care Plan:                    CM met with patient and his wife  at the bedside to discuss discharge planning.   Patient name, , and demographics all verified in chart. Patient lives in a one story home [address] with his wife. Patient reports being independent of his ADLS/IDLS. Patient does not drive anymore since increased weakness associated with his cancer diagnosis. Patient's wife provides transportation. Patient reports being in need of a RW. Per patient and his wife they had inquired about a RW from patient's PCP. CM sent [employer] message to Attending hospitalist Dr. Sudha prieto inquire if order for RW can be placed. Order placed and RW obtained from [facility]. RW provided by [facility]. Order forms signed by patient and uploaded to [facility] for review. RW provided to patient at the bedside. Patient preferred pharmacy is [facility]. Patient has no SNF, HH, or IPR history. Patient is active with his PCP as well as his oncologist Dr. Darion Jackson. Patient underwent chemotherapy for lung cancer and is currently taking oral chemotherapy medication. Patient is also active with his cardiologist Dr. Nasrin Avalos. Care Management Interventions  PCP Verified by CM: Yes  Mode of Transport at Discharge: Other (see comment) (Wife to transport )  Transition of Care Consult (CM Consult): Discharge Planning,DME/Supply Assistance  Discharge Durable Medical Equipment: Yes (Monica [place] ordered from [facility] walker delivered to patient bedside )  Support Systems: Spouse/Significant Other  Confirm Follow Up Transport: Family (Spouse to transport)  Discharge Location  Patient Expects to be Discharged to[de-identified] Home with family assistance    Unit CM to continue to follow for discharge needs and planning.     SAMMY ChurchN, RN, BSW   RN Care Manager

## 2022-03-11 NOTE — ED PROVIDER NOTES
EMERGENCY DEPARTMENT HISTORY AND PHYSICAL EXAM      Date: 3/11/2022  Patient Name: Rocio Reyez    History of Presenting Illness     Chief Complaint   Patient presents with    Low Blood Sugar       History Provided By: Patient and Patient's Wife    HPI: Rocio Reyez, 64 y.o. male with history of metastatic lung cancer, type 2 diabetes on oral hypoglycemic medication presents to the ED with cc of hypoglycemia. Patient was altered this morning per wife, he was difficult to arouse and unable to speak or get out of bed. EMS arrived and found patient hypoglycemic with blood glucose 60, they administered dextrose and patient had resolution of mental status. He feels well upon arrival in the ED. He endorses normal p.o. intake but does endorse generalized weakness over the past few days which they have attributed to his cancer progression and he required visit to Dr. Maria Eugenia Larsen office and received IV fluids yesterday. He endorses taking his sulfonylurea medication as normal.  Denies any missed doses or change in his this medication, however he does endorse recent 70 pound weight loss due to his cancer progression. He has no other complaints today. There are no other complaints, changes, or physical findings at this time. PCP: Madison Almanza MD    No current facility-administered medications on file prior to encounter. Current Outpatient Medications on File Prior to Encounter   Medication Sig Dispense Refill    amLODIPine (NORVASC) 10 mg tablet Take 10 mg by mouth daily.  finasteride (PROSCAR) 5 mg tablet Take 5 mg by mouth daily.  glipiZIDE (GLUCOTROL) 10 mg tablet Take 10 mg by mouth two (2) times a day.  SITagliptin (Januvia) 100 mg tablet Take 100 mg by mouth daily.  lisinopriL (PRINIVIL, ZESTRIL) 40 mg tablet Take 40 mg by mouth daily.  metFORMIN (GLUCOPHAGE) 500 mg tablet Take one tablet by mouth every morning and take two tablets by mouth every evening.       pravastatin (PRAVACHOL) 80 mg tablet Take 80 mg by mouth daily.  tamsulosin (FLOMAX) 0.4 mg capsule Take 0.8 mg by mouth daily. 2 capsules by mouth daily      osimertinib (Tagrisso) 80 mg tablet Take 80 mg by mouth daily.  doxycycline (ADOXA) 100 mg tablet Take 100 mg by mouth two (2) times a day.  omeprazole (PRILOSEC) 40 mg capsule Take 1 Capsule by mouth daily. 90 Capsule 1    diclofenac EC (VOLTAREN) 50 mg EC tablet Take 50 mg by mouth three (3) times daily as needed for Pain.  metoprolol tartrate (LOPRESSOR) 25 mg tablet Take 1 Tablet by mouth every twelve (12) hours. 180 Tablet 3    ondansetron hcl (Zofran) 4 mg tablet Take 1 Tablet by mouth every eight (8) hours as needed for Nausea. 20 Tablet 0    empagliflozin (Jardiance) 10 mg tablet Take 1 Tablet by mouth daily. 30 Tablet 5    Omega-3-DHA-EPA-Fish Oil 1,000 mg (120 mg-180 mg) cap Take 1 Cap by mouth three (3) times daily.  [DISCONTINUED] pravastatin (PRAVACHOL) 80 mg tablet TAKE ONE TABLET BY MOUTH DAILY 90 Tablet 1    [DISCONTINUED] metFORMIN (GLUCOPHAGE) 500 mg tablet TAKE ONE TABLET BY MOUTH EVERY MORNING AND TAKE TWO TABLETS BY MOUTH EVERY EVENING (Patient taking differently: Take 500 mg by mouth daily (with breakfast).  TAKE ONE TABLET BY MOUTH EVERY MORNING AND TAKE TWO TABLETS BY MOUTH EVERY EVENING) 270 Tablet 3    [DISCONTINUED] HYDROcodone-acetaminophen (NORCO) 5-325 mg per tablet       [DISCONTINUED] LORazepam (ATIVAN) 0.5 mg tablet       [DISCONTINUED] amLODIPine (NORVASC) 10 mg tablet TAKE ONE TABLET BY MOUTH DAILY 90 Tablet 3    [DISCONTINUED] glipiZIDE (GLUCOTROL) 10 mg tablet TAKE ONE TABLET BY MOUTH TWICE A  Tablet 3    [DISCONTINUED] finasteride (PROSCAR) 5 mg tablet TAKE ONE TABLET BY MOUTH DAILY 90 Tablet PRN    [DISCONTINUED] lisinopriL (PRINIVIL, ZESTRIL) 40 mg tablet TAKE 1 TABLET BY MOUTH EVERY DAY 90 Tablet 3    [DISCONTINUED] tamsulosin (FLOMAX) 0.4 mg capsule TAKE TWO CAPSULES BY MOUTH DAILY 180 Cap PRN    [DISCONTINUED] Januvia 100 mg tablet TAKE ONE TABLET BY MOUTH DAILY 30 Tab 5    [DISCONTINUED] cloNIDine HCl (CATAPRES) 0.2 mg tablet Take 1 Tab by mouth two (2) times a day. 60 Tab 12    [DISCONTINUED] timolol (TIMOPTIC-XE) 0.5 % ophthalmic gel-forming       [DISCONTINUED] aspirin delayed-release 81 mg tablet Take  by mouth daily. 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    Hyperlipidemia 8/2/2017    Hypertension 8/2/2017    Left carotid bruit 8/2/2017    Melanoma ([address] 2021    Morbid obesity ([address] 8/2/2017    On statin therapy 8/2/2017    Urticaria 8/2/2017       Past Surgical History:  Past Surgical History:   Procedure Laterality Date    HX ENDOSCOPY  12/02/2021    MD CARDIAC SURG PROCEDURE UNLIST  09/2019    4 stents placed       Family History:  Family History   Problem Relation Age of Onset    Heart Disease Mother         murmor    Heart Disease Father        Social History:  Social History     Tobacco Use    Smoking status: Never Smoker    Smokeless tobacco: Never Used   Vaping Use    Vaping Use: Never used   Substance Use Topics    Alcohol use: Yes     Comment: social    Drug use: No       Allergies: Allergies   Allergen Reactions    Adhesive Tape-Silicones Unknown (comments)    Chocolate Flavor Unknown (comments)         Review of Systems   Review of Systems   Constitutional: Positive for activity change, appetite change and unexpected weight change. Negative for chills and fever. Eyes: Negative for visual disturbance.    Respiratory: Negative for cough and shortness of breath. Cardiovascular: Negative for chest pain and leg swelling. Gastrointestinal: Negative for abdominal pain, nausea and vomiting. Genitourinary: Negative. Musculoskeletal: Negative for back pain and gait problem. Skin: Negative for color change and rash. Neurological: Positive for weakness. Negative for dizziness, light-headedness and headaches. All other systems reviewed and are negative. Physical Exam   Physical Exam  Vitals and nursing note reviewed. Constitutional:       General: He is not in acute distress. Appearance: Normal appearance. He is not ill-appearing or toxic-appearing. Comments: Appears thin and frail   HENT:      Head: Normocephalic and atraumatic. Nose: Nose normal.      Mouth/Throat:      Mouth: Mucous membranes are moist.   Eyes:      Extraocular Movements: Extraocular movements intact. Pupils: Pupils are equal, round, and reactive to light. Cardiovascular:      Rate and Rhythm: Normal rate and regular rhythm. Heart sounds: No murmur heard. Pulmonary:      Effort: Pulmonary effort is normal. No respiratory distress. Breath sounds: Normal breath sounds. No wheezing. Abdominal:      General: There is no distension. Palpations: Abdomen is soft. Tenderness: There is no abdominal tenderness. There is no guarding or rebound. Musculoskeletal:         General: No swelling or tenderness. Normal range of motion. Cervical back: Normal range of motion and neck supple. Right lower leg: No edema. Left lower leg: No edema. Skin:     General: Skin is warm and dry. Coloration: Skin is not pale. Findings: No erythema. Neurological:      General: No focal deficit present. Mental Status: He is alert and oriented to person, place, and time.          Diagnostic Study Results     Labs -     Recent Results (from the past 12 hour(s))   GLUCOSE, POC    Collection Time: 03/11/22  9:25 AM   Result Value Ref Range Glucose (POC) 102 65 - 117 mg/dL    Performed by Elizabeth GARNICA    EKG, 12 LEAD, INITIAL    Collection Time: 03/11/22  9:26 AM   Result Value Ref Range    Ventricular Rate 70 BPM    Atrial Rate 70 BPM    P-R Interval 118 ms    QRS Duration 144 ms    Q-T Interval 508 ms    QTC Calculation (Bezet) 548 ms    Calculated P Axis 113 degrees    Calculated R Axis -43 degrees    Calculated T Axis 128 degrees    Diagnosis       Sinus rhythm with premature supraventricular complexes and with occasional   premature ventricular complexes  Left axis deviation  Left bundle branch block  When compared with ECG of 20-OCT-2021 15:13,  premature ventricular complexes are now present  premature supraventricular complexes are now present     CBC WITH AUTOMATED DIFF    Collection Time: 03/11/22  9:31 AM   Result Value Ref Range    WBC 4.5 4.1 - 11.1 K/uL    RBC 4.83 4.10 - 5.70 M/uL    HGB 11.8 (L) 12.1 - 17.0 g/dL    HCT 36.6 36.6 - 50.3 %    MCV 75.8 (L) 80.0 - 99.0 FL    MCH 24.4 (L) 26.0 - 34.0 PG    MCHC 32.2 30.0 - 36.5 g/dL    RDW 15.9 (H) 11.5 - 14.5 %    PLATELET 98 (L) 602 - 400 K/uL    NRBC 0.0 0  WBC    ABSOLUTE NRBC 0.00 0.00 - 0.01 K/uL    NEUTROPHILS 84 (H) 32 - 75 %    LYMPHOCYTES 10 (L) 12 - 49 %    MONOCYTES 6 5 - 13 %    EOSINOPHILS 0 0 - 7 %    BASOPHILS 0 0 - 1 %    IMMATURE GRANULOCYTES 0 0.0 - 0.5 %    ABS. NEUTROPHILS 3.7 1.8 - 8.0 K/UL    ABS. LYMPHOCYTES 0.5 (L) 0.8 - 3.5 K/UL    ABS. MONOCYTES 0.3 0.0 - 1.0 K/UL    ABS. EOSINOPHILS 0.0 0.0 - 0.4 K/UL    ABS. BASOPHILS 0.0 0.0 - 0.1 K/UL    ABS. IMM.  GRANS. 0.0 0.00 - 0.04 K/UL    DF SMEAR SCANNED      RBC COMMENTS MICROCYTOSIS  1+        RBC COMMENTS ANISOCYTOSIS  PRESENT       URINALYSIS W/ REFLEX CULTURE    Collection Time: 03/11/22  9:31 AM    Specimen: Urine   Result Value Ref Range    Color YELLOW/STRAW      Appearance CLEAR CLEAR      Specific gravity 1.028 1.003 - 1.030      pH (UA) 6.0 5.0 - 8.0      Protein 100 (A) NEG mg/dL    Glucose >1,000 (A) NEG mg/dL    Ketone 40 (A) NEG mg/dL    Bilirubin Negative NEG      Blood Negative NEG      Urobilinogen 1.0 0.2 - 1.0 EU/dL    Nitrites Negative NEG      Leukocyte Esterase Negative NEG      WBC 0-4 0 - 4 /hpf    RBC 0-5 0 - 5 /hpf    Epithelial cells FEW FEW /lpf    Bacteria Negative NEG /hpf    UA:UC IF INDICATED CULTURE NOT INDICATED BY UA RESULT CNI      Hyaline cast 2-5 0 - 5 /lpf   GLUCOSE, POC    Collection Time: 03/11/22 10:07 AM   Result Value Ref Range    Glucose (POC) 81 65 - 117 mg/dL    Performed by Felecia Heath (TRV RN)    METABOLIC PANEL, COMPREHENSIVE    Collection Time: 03/11/22 10:22 AM   Result Value Ref Range    Sodium 135 (L) 136 - 145 mmol/L    Potassium 3.2 (L) 3.5 - 5.1 mmol/L    Chloride 103 97 - 108 mmol/L    CO2 26 21 - 32 mmol/L    Anion gap 6 5 - 15 mmol/L    Glucose 93 65 - 100 mg/dL    BUN 13 6 - 20 MG/DL    Creatinine 0.63 (L) 0.70 - 1.30 MG/DL    BUN/Creatinine ratio 21 (H) 12 - 20      GFR est AA >60 >60 ml/min/1.73m2    GFR est non-AA >60 >60 ml/min/1.73m2    Calcium 6.6 (L) 8.5 - 10.1 MG/DL    Bilirubin, total 1.0 0.2 - 1.0 MG/DL    ALT (SGPT) 20 12 - 78 U/L    AST (SGOT) 15 15 - 37 U/L    Alk.  phosphatase 85 45 - 117 U/L    Protein, total 6.1 (L) 6.4 - 8.2 g/dL    Albumin 3.0 (L) 3.5 - 5.0 g/dL    Globulin 3.1 2.0 - 4.0 g/dL    A-G Ratio 1.0 (L) 1.1 - 2.2     GLUCOSE, POC    Collection Time: 03/11/22 11:31 AM   Result Value Ref Range    Glucose (POC) 83 65 - 117 mg/dL    Performed by Jane GARNICA    GLUCOSE, POC    Collection Time: 03/11/22 12:15 PM   Result Value Ref Range    Glucose (POC) 73 65 - 117 mg/dL    Performed by Milford Agusto EDT    GLUCOSE, POC    Collection Time: 03/11/22 12:55 PM   Result Value Ref Range    Glucose (POC) 67 65 - 117 mg/dL    Performed by [employer] GARNICA    GLUCOSE, POC    Collection Time: 03/11/22  1:23 PM   Result Value Ref Range    Glucose (POC) 80 65 - 117 mg/dL    Performed by Felecia Heath (JOSE RN)    GLUCOSE, POC    Collection Time: 03/11/22  2:01 PM   Result Value Ref Range    Glucose (POC) 123 (H) 65 - 117 mg/dL    Performed by Felecia Heath (JOSE RN)        Radiologic Studies -   CT HEAD WO CONT   Final Result   No acute abnormality. XR CHEST PORT   Final Result   No pneumonia   2. Decrease in size of nodular density in left upper lobe now with a bandlike   appearance        CT Results  (Last 48 hours)               03/11/22 1044  CT HEAD WO CONT Final result    Impression:  No acute abnormality. Narrative:  EXAM: CT HEAD WO CONT       INDICATION: AMS, hypoglycemia       COMPARISON: MRI brain 11/5/2021. CONTRAST: None. TECHNIQUE: Unenhanced CT of the head was performed using 5 mm images. Brain and   bone windows were generated. Coronal and sagittal reformats. CT dose reduction   was achieved through use of a standardized protocol tailored for this   examination and automatic exposure control for dose modulation. FINDINGS:   Generalized volume loss. Scattered white matter hypodensities, may reflect   chronic microangiopathic change. There is no intracranial hemorrhage,   extra-axial collection, or mass effect. The basilar cisterns are open. No CT   evidence of acute infarct. The bone windows demonstrate no abnormalities. The visualized portions of the   paranasal sinuses and mastoid air cells are clear. Bilateral lens replacements. CXR Results  (Last 48 hours)               03/11/22 1009  XR CHEST PORT Final result    Impression:  No pneumonia   2. Decrease in size of nodular density in left upper lobe now with a bandlike   appearance       Narrative:  EXAM: XR CHEST PORT       INDICATION: AMS       COMPARISON: 10/20/2021       FINDINGS: A portable AP radiograph of the chest was obtained at 1000 hours. The   patient is on a cardiac monitor. The heart size is normal. Lungs are well   aerated. No pneumonia.  Bandlike parenchymal opacity in the left upper lobe has   decreased. This is the known malignancy. This has decreased in size in the   interval.                 Medical Decision Making   I am the first provider for this patient. I reviewed the vital signs, available nursing notes, past medical history, past surgical history, family history and social history. Vital Signs-Reviewed the patient's vital signs. Patient Vitals for the past 12 hrs:   Temp Pulse Resp BP SpO2   03/11/22 1403 -- 80 21 124/61 99 %   03/11/22 1330 -- 73 18 (!) 162/60 98 %   03/11/22 1318 -- (!) 101 17 (!) 151/49 97 %   03/11/22 1233 -- 81 21 138/64 97 %   03/11/22 1203 -- 78 19 (!) 150/63 98 %   03/11/22 1133 -- 73 18 (!) 120/58 97 %   03/11/22 1103 -- 69 16 (!) 131/59 96 %   03/11/22 1048 -- -- -- (!) 129/57 --   03/11/22 1033 -- 70 19 (!) 139/58 96 %   03/11/22 1003 -- 70 17 136/64 95 %   03/11/22 0948 -- 75 16 -- 97 %   03/11/22 0933 -- 73 10 (!) 157/60 95 %   03/11/22 0918 97.8 °F (36.6 °C) 71 16 (!) 157/75 96 %     Records Reviewed: Nursing Notes    Provider Notes (Medical Decision Making):   68-year-old male presenting with recurrent hypoglycemia in setting of sulfonylurea use. He is afebrile and vital signs are stable. No sign of infection. CT head negative for acute process or mass. No renal dysfunction or electrolyte abnormality. He is noted to have new thrombocytopenia but no need for transfusion today. Hyperglycemia may be related to failure to thrive, poor p.o. intake secondary to his malignancy. However I suspect that 70 pound weight loss without recent change in the dosage of his sulfonylurea may result in higher plasma levels. Patient was observed in the ED and allowed to tolerate p.o. and still has dropping glucose levels back down to the 60 level and he becomes symptomatic at that time. Will discuss with hospitalist for admission. ED Course:   Initial assessment performed.  The patients presenting problems have been discussed, and they are in agreement with the care plan formulated and outlined with them. I have encouraged them to ask questions as they arise throughout their visit. ED Course as of 03/11/22 1511   Fri Mar 11, 2022   0934 EKG per my interpretation normal sinus rhythm with occasional PVC, rate 70 bpm, left axis deviation, left bundle branch block present, no acute ischemic changes or interval changes. [AK]   1019 PLATELET(!): 98  Thrombocytopenia is a new process [AK]      ED Course User Index  [AK] Maurene Heimlich, MD     Admission Note:  Patient is being admitted to the hospital by Dr. Raymond Dumont, Service: Hospitalist.  The results of their tests and reasons for their admission have been discussed with them and available family. They convey agreement and understanding for the need to be admitted and for their admission diagnosis. Disposition:  Admit    Diagnosis     Clinical Impression:   1. Hypoglycemia secondary to sulfonylurea, accidental or unintentional, initial encounter        Attestations:  I am the first and primary provider of record for this patient's ED encounter. I personally performed the services described above in this documentation. Lucia Matias MD    Please note that this dictation was completed with [employer], the [employer] voice recognition software. Quite often unanticipated grammatical, syntax, homophones, and other interpretive errors are inadvertently transcribed by the computer software. Please disregard these errors. Please excuse any errors that have escaped final proofreading. Thank you.

## 2022-03-11 NOTE — PROGRESS NOTES
End of Shift Note    Bedside shift change report given to  (oncoming nurse) by Jeffery Alarcon RN (offgoing nurse). Report included the following information SBAR, Kardex, ED Summary, Intake/Output and Recent Results    Shift worked: day     Shift summary and any significant changes:    Admitted, BG stable on D5 gtt   Concerns for physician to address: none   Zone phone for oncoming shift:       Activity:  Activity Level: Up with Assistance  Number times ambulated in hallways past shift: 0  Number of times OOB to chair past shift: 0    Cardiac:   Cardiac Monitoring: Yes      Cardiac Rhythm: Sinus Rhythm    Access:   Current line(s): PIV     Genitourinary:   Urinary status: voiding    Respiratory:      Chronic home O2 use?: NO  Incentive spirometer at bedside: NO       GI:     Current diet:  ADULT DIET Regular; 4 carb choices (60 gm/meal)  ADULT ORAL NUTRITION SUPPLEMENT Breakfast; Diabetic Supplement  Passing flatus: YES  Tolerating current diet: YES       Pain Management:   Patient states pain is manageable on current regimen: YES    Skin:  Calderon Score: 19  Interventions: turn team, float heels, increase time out of bed and nutritional support     Patient Safety:  Fall Score:  Total Score: 3  Interventions: bed/chair alarm, gripper socks and pt to call before getting OOB  High Fall Risk: Yes    Length of Stay:  Expected LOS: - - -  Actual LOS: 0      Jeffery Alarcon RN

## 2022-03-11 NOTE — ED TRIAGE NOTES
Pt presents via EMS for hypoglycemia. Pt was altered this morning and had an initial BG of 60.  Pt was given 25 grams of D10 PTA and BG is 126 upon arrival.

## 2022-03-11 NOTE — H&P
Hospitalist Admission Note    NAME: Myrna Kunz   :  1945   MRN:  [id number]     Date/Time:  3/11/2022 3:18 PM    Patient PCP: Myles Sandoval MD  ______________________________________________________________________  Given the patient's current clinical presentation, I have a high level of concern for decompensation if discharged from the emergency department. Complex decision making was performed, which includes reviewing the patient's available past medical records, laboratory results, and x-ray films. My assessment of this patient's clinical condition and my plan of care is as follows. Assessment / Plan: Altered mental status secondary to symptomatic hypoglycemia  We will admit to stepdown unit under observation, check blood sugar every 1 hour  Hold all oral diabetic meds. Patient likely will not need diabetic meds as he has had significant weight loss of almost 70 pounds. We will continue with D5 water. HbA1c in December was 5.6  Check another HbA1c    Thrombocytopenia  Repeat CBC  Hypertension  CAD status post stent  Continue with home meds      Code Status: Full code  Surrogate Decision Maker: Wife    DVT Prophylaxis: SCD  GI Prophylaxis: not indicated    Baseline: Ambulatory      Subjective:   CHIEF COMPLAINT: Altered mental status    HISTORY OF PRESENT ILLNESS:     Jayden Gleason is a 64 y.o.  male with history of metastatic lung cancer status post chemo, hypertension, diabetes, hyperlipidemia who presents with altered mental status. He was found to be hypoglycemic with blood sugar in the 60s, he received dextrose by EMS in route to the ED, and in the ED he received another dextrose because of persistent hypoglycemia. Patient reported significant weight loss since his cancer but continues to take his oral diabetic meds.   He denies any chest pain, shortness of breath or syncopal episode  Vital signs indicate be showed that he was hypertensive, his labs revealed thrombocytopenia  CT of the brain was within normal limit  Chest x-ray did not show any acute pathology  We were asked to admit for work up and evaluation of the above problems. Past Medical History:   Diagnosis Date    Aortic stenosis 8/2/2017    ASVD (arteriosclerotic vascular disease) 8/2/2017    Back pain 8/2/2017    BPH (benign prostatic hyperplasia) 8/2/2017    CKD (chronic kidney disease) 8/2/2017    Diabetes ([facility] [address] 8/2/2017    DJD (degenerative joint disease) 8/2/2017    ED (erectile dysfunction) 8/2/2017    Guillain-[place] syndrome ([address] 8/2/2017    Hyperlipidemia 8/2/2017    Hypertension 8/2/2017    Left carotid bruit 8/2/2017    Melanoma ([facility] [address] 2021    Morbid obesity ([facility] [address] 8/2/2017    On statin therapy 8/2/2017    Urticaria 8/2/2017        Past Surgical History:   Procedure Laterality Date    HX ENDOSCOPY  12/02/2021    PA CARDIAC SURG PROCEDURE UNLIST  09/2019    4 stents placed       Social History     Tobacco Use    Smoking status: Never Smoker    Smokeless tobacco: Never Used   Substance Use Topics    Alcohol use: Yes     Comment: social        Family History   Problem Relation Age of Onset    Heart Disease Mother         murmor    Heart Disease Father      Allergies   Allergen Reactions    Adhesive Tape-Silicones Unknown (comments)    Chocolate Flavor Unknown (comments)        Prior to Admission medications    Medication Sig Start Date End Date Taking? Authorizing Provider   amLODIPine (NORVASC) 10 mg tablet Take 10 mg by mouth daily. Yes Provider, Historical   finasteride (PROSCAR) 5 mg tablet Take 5 mg by mouth daily. Yes Provider, Historical   glipiZIDE (GLUCOTROL) 10 mg tablet Take 10 mg by mouth two (2) times a day. Yes Provider, Historical   SITagliptin (Januvia) 100 mg tablet Take 100 mg by mouth daily. Yes Provider, Historical   lisinopriL (PRINIVIL, ZESTRIL) 40 mg tablet Take 40 mg by mouth daily.    Yes Provider, Historical   metFORMIN (GLUCOPHAGE) 500 mg tablet Take one tablet by mouth every morning and take two tablets by mouth every evening. Yes Provider, Historical   pravastatin (PRAVACHOL) 80 mg tablet Take 80 mg by mouth daily. Yes Provider, Historical   tamsulosin (FLOMAX) 0.4 mg capsule Take 0.8 mg by mouth daily. 2 capsules by mouth daily   Yes Provider, Historical   osimertinib (Tagrisso) 80 mg tablet Take 80 mg by mouth daily. Yes Provider, Historical   doxycycline (ADOXA) 100 mg tablet Take 100 mg by mouth two (2) times a day. 11/10/21  Yes Provider, Historical   omeprazole (PRILOSEC) 40 mg capsule Take 1 Capsule by mouth daily. 11/10/21  Yes Alisia Shannon NP   diclofenac EC (VOLTAREN) 50 mg EC tablet Take 50 mg by mouth three (3) times daily as needed for Pain. 10/22/21  Yes Provider, Historical   metoprolol tartrate (LOPRESSOR) 25 mg tablet Take 1 Tablet by mouth every twelve (12) hours. 10/27/21  Yes Marcella Leonardo MD   ondansetron hcl (Zofran) 4 mg tablet Take 1 Tablet by mouth every eight (8) hours as needed for Nausea. 10/20/21  Yes Aram Peck MD   empagliflozin (Jardiance) 10 mg tablet Take 1 Tablet by mouth daily. 9/21/21  Yes Marcella Leonardo MD   Mtxyf-0-IVX-EPA-Fish Oil 1,000 mg (120 mg-180 mg) cap Take 1 Cap by mouth three (3) times daily. Yes Provider, Historical       REVIEW OF SYSTEMS:     I am not able to complete the review of systems because:    The patient is intubated and sedated    The patient has altered mental status due to his acute medical problems    The patient has baseline aphasia from prior stroke(s)    The patient has baseline dementia and is not reliable historian    The patient is in acute medical distress and unable to provide information           Total of 12 systems reviewed as follows:       POSITIVE= underlined text  Negative = text not underlined  General:  fever, chills, sweats, generalized weakness, weight loss/gain,      loss of appetite   Eyes:    blurred vision, eye pain, loss of vision, double vision  ENT:    rhinorrhea, pharyngitis   Respiratory:   cough, sputum production, SOB, VILLALTA, wheezing, pleuritic pain   Cardiology:   chest pain, palpitations, orthopnea, PND, edema, syncope   Gastrointestinal:  abdominal pain , N/V, diarrhea, dysphagia, constipation, bleeding   Genitourinary:  frequency, urgency, dysuria, hematuria, incontinence   Muskuloskeletal :  arthralgia, myalgia, back pain  Hematology:  easy bruising, nose or gum bleeding, lymphadenopathy   Dermatological: rash, ulceration, pruritis, color change / jaundice  Endocrine:   hot flashes or polydipsia   Neurological:  headache, dizziness, confusion, focal weakness, paresthesia,     Speech difficulties, memory loss, gait difficulty  Psychological: Feelings of anxiety, depression, agitation    Objective:   VITALS:    Visit Vitals  /61   Pulse 80   Temp 97.8 °F (36.6 °C)   Resp 21   SpO2 99%       PHYSICAL EXAM:    General:    Alert, cooperative, no distress, appears stated age. HEENT: Atraumatic, anicteric sclerae, pink conjunctivae     No oral ulcers, mucosa moist, throat clear, dentition fair  Neck:  Supple, symmetrical,  thyroid: non tender  Lungs:   Clear to auscultation bilaterally. No Wheezing or Rhonchi. No rales. Chest wall:  No tenderness  No Accessory muscle use. Heart:   Regular  rhythm,  No  murmur   No edema  Abdomen:   Soft, non-tender. Not distended. Bowel sounds normal  Extremities: No cyanosis. No clubbing,      Skin turgor normal, Capillary refill normal, Radial dial pulse 2+  Skin:     Not pale. Not Jaundiced  No rashes   Psych:  Good insight. Not depressed. Not anxious or agitated. Neurologic: EOMs intact. No facial asymmetry. No aphasia or slurred speech. Symmetrical strength, Sensation grossly intact.  Alert and oriented X 4.     _______________________________________________________________________  Care Plan discussed with:    Comments   Patient x    Family      RN x Care Manager                    Consultant:      _______________________________________________________________________  Expected  Disposition:   Home with Family    HH/PT/OT/RN    SNF/LTC    [facility]    ________________________________________________________________________  TOTAL TIME: 65Minutes    Critical Care Provided     Minutes non procedure based      Comments     Reviewed previous records   >50% of visit spent in counseling and coordination of care  Discussion with patient and/or family and questions answered       ________________________________________________________________________  Signed: Shashi Piña MD    Procedures: see electronic medical records for all procedures/Xrays and details which were not copied into this note but were reviewed prior to creation of Plan.     LAB DATA REVIEWED:    Recent Results (from the past 24 hour(s))   GLUCOSE, POC    Collection Time: 03/11/22  9:25 AM   Result Value Ref Range    Glucose (POC) 102 65 - 117 mg/dL    Performed by Linda GARNICA    EKG, 12 LEAD, INITIAL    Collection Time: 03/11/22  9:26 AM   Result Value Ref Range    Ventricular Rate 70 BPM    Atrial Rate 70 BPM    P-R Interval 118 ms    QRS Duration 144 ms    Q-T Interval 508 ms    QTC Calculation (Bezet) 548 ms    Calculated P Axis 113 degrees    Calculated R Axis -43 degrees    Calculated T Axis 128 degrees    Diagnosis       Sinus rhythm with premature supraventricular complexes and with occasional   premature ventricular complexes  Left axis deviation  Left bundle branch block  When compared with ECG of 20-OCT-2021 15:13,  premature ventricular complexes are now present  premature supraventricular complexes are now present     CBC WITH AUTOMATED DIFF    Collection Time: 03/11/22  9:31 AM   Result Value Ref Range    WBC 4.5 4.1 - 11.1 K/uL    RBC 4.83 4.10 - 5.70 M/uL    HGB 11.8 (L) 12.1 - 17.0 g/dL    HCT 36.6 36.6 - 50.3 %    MCV 75.8 (L) 80.0 - 99.0 FL    MCH 24.4 (L) 26.0 - 34.0 PG MCHC 32.2 30.0 - 36.5 g/dL    RDW 15.9 (H) 11.5 - 14.5 %    PLATELET 98 (L) 788 - 400 K/uL    NRBC 0.0 0  WBC    ABSOLUTE NRBC 0.00 0.00 - 0.01 K/uL    NEUTROPHILS 84 (H) 32 - 75 %    LYMPHOCYTES 10 (L) 12 - 49 %    MONOCYTES 6 5 - 13 %    EOSINOPHILS 0 0 - 7 %    BASOPHILS 0 0 - 1 %    IMMATURE GRANULOCYTES 0 0.0 - 0.5 %    ABS. NEUTROPHILS 3.7 1.8 - 8.0 K/UL    ABS. LYMPHOCYTES 0.5 (L) 0.8 - 3.5 K/UL    ABS. MONOCYTES 0.3 0.0 - 1.0 K/UL    ABS. EOSINOPHILS 0.0 0.0 - 0.4 K/UL    ABS. BASOPHILS 0.0 0.0 - 0.1 K/UL    ABS. IMM.  GRANS. 0.0 0.00 - 0.04 K/UL    DF SMEAR SCANNED      RBC COMMENTS MICROCYTOSIS  1+        RBC COMMENTS ANISOCYTOSIS  PRESENT       URINALYSIS W/ REFLEX CULTURE    Collection Time: 03/11/22  9:31 AM    Specimen: Urine   Result Value Ref Range    Color YELLOW/STRAW      Appearance CLEAR CLEAR      Specific gravity 1.028 1.003 - 1.030      pH (UA) 6.0 5.0 - 8.0      Protein 100 (A) NEG mg/dL    Glucose >1,000 (A) NEG mg/dL    Ketone 40 (A) NEG mg/dL    Bilirubin Negative NEG      Blood Negative NEG      Urobilinogen 1.0 0.2 - 1.0 EU/dL    Nitrites Negative NEG      Leukocyte Esterase Negative NEG      WBC 0-4 0 - 4 /hpf    RBC 0-5 0 - 5 /hpf    Epithelial cells FEW FEW /lpf    Bacteria Negative NEG /hpf    UA:UC IF INDICATED CULTURE NOT INDICATED BY UA RESULT CNI      Hyaline cast 2-5 0 - 5 /lpf   GLUCOSE, POC    Collection Time: 03/11/22 10:07 AM   Result Value Ref Range    Glucose (POC) 81 65 - 117 mg/dL    Performed by Felecia Heath (JOSE RN)    METABOLIC PANEL, COMPREHENSIVE    Collection Time: 03/11/22 10:22 AM   Result Value Ref Range    Sodium 135 (L) 136 - 145 mmol/L    Potassium 3.2 (L) 3.5 - 5.1 mmol/L    Chloride 103 97 - 108 mmol/L    CO2 26 21 - 32 mmol/L    Anion gap 6 5 - 15 mmol/L    Glucose 93 65 - 100 mg/dL    BUN 13 6 - 20 MG/DL    Creatinine 0.63 (L) 0.70 - 1.30 MG/DL    BUN/Creatinine ratio 21 (H) 12 - 20      GFR est AA >60 >60 ml/min/1.73m2    GFR est non-AA >60 >60 ml/min/1.73m2    Calcium 6.6 (L) 8.5 - 10.1 MG/DL    Bilirubin, total 1.0 0.2 - 1.0 MG/DL    ALT (SGPT) 20 12 - 78 U/L    AST (SGOT) 15 15 - 37 U/L    Alk.  phosphatase 85 45 - 117 U/L    Protein, total 6.1 (L) 6.4 - 8.2 g/dL    Albumin 3.0 (L) 3.5 - 5.0 g/dL    Globulin 3.1 2.0 - 4.0 g/dL    A-G Ratio 1.0 (L) 1.1 - 2.2     GLUCOSE, POC    Collection Time: 03/11/22 11:31 AM   Result Value Ref Range    Glucose (POC) 83 65 - 117 mg/dL    Performed by Isaura GARNICA    GLUCOSE, POC    Collection Time: 03/11/22 12:15 PM   Result Value Ref Range    Glucose (POC) 73 65 - 117 mg/dL    Performed by Bobby GARNICA    GLUCOSE, POC    Collection Time: 03/11/22 12:55 PM   Result Value Ref Range    Glucose (POC) 67 65 - 117 mg/dL    Performed by Bobby GARNICA    GLUCOSE, POC    Collection Time: 03/11/22  1:23 PM   Result Value Ref Range    Glucose (POC) 80 65 - 117 mg/dL    Performed by Felecia Heath (JOSE RN)    GLUCOSE, POC    Collection Time: 03/11/22  2:01 PM   Result Value Ref Range    Glucose (POC) 123 (H) 65 - 117 mg/dL    Performed by Felecia Heath (JOSE RN)

## 2022-03-12 VITALS
TEMPERATURE: 97.2 F | WEIGHT: 157 LBS | BODY MASS INDEX: 23.25 KG/M2 | HEART RATE: 84 BPM | HEIGHT: 69 IN | OXYGEN SATURATION: 98 % | DIASTOLIC BLOOD PRESSURE: 86 MMHG | SYSTOLIC BLOOD PRESSURE: 143 MMHG | RESPIRATION RATE: 20 BRPM

## 2022-03-12 LAB
ANION GAP SERPL CALC-SCNC: 3 MMOL/L (ref 5–15)
BASOPHILS # BLD: 0 K/UL (ref 0–0.1)
BASOPHILS NFR BLD: 0 % (ref 0–1)
BUN SERPL-MCNC: 13 MG/DL (ref 6–20)
BUN/CREAT SERPL: 21 (ref 12–20)
CALCIUM SERPL-MCNC: 6.9 MG/DL (ref 8.5–10.1)
CHLORIDE SERPL-SCNC: 105 MMOL/L (ref 97–108)
CO2 SERPL-SCNC: 27 MMOL/L (ref 21–32)
CREAT SERPL-MCNC: 0.61 MG/DL (ref 0.7–1.3)
DIFFERENTIAL METHOD BLD: ABNORMAL
EOSINOPHIL # BLD: 0 K/UL (ref 0–0.4)
EOSINOPHIL NFR BLD: 0 % (ref 0–7)
ERYTHROCYTE [DISTWIDTH] IN BLOOD BY AUTOMATED COUNT: 15.8 % (ref 11.5–14.5)
EST. AVERAGE GLUCOSE BLD GHB EST-MCNC: 91 MG/DL
GLUCOSE BLD STRIP.AUTO-MCNC: 102 MG/DL (ref 65–117)
GLUCOSE BLD STRIP.AUTO-MCNC: 145 MG/DL (ref 65–117)
GLUCOSE BLD STRIP.AUTO-MCNC: 84 MG/DL (ref 65–117)
GLUCOSE BLD STRIP.AUTO-MCNC: 87 MG/DL (ref 65–117)
GLUCOSE SERPL-MCNC: 90 MG/DL (ref 65–100)
HBA1C MFR BLD: 4.8 % (ref 4–5.6)
HCT VFR BLD AUTO: 33.3 % (ref 36.6–50.3)
HGB BLD-MCNC: 10.7 G/DL (ref 12.1–17)
IMM GRANULOCYTES # BLD AUTO: 0 K/UL (ref 0–0.04)
IMM GRANULOCYTES NFR BLD AUTO: 0 % (ref 0–0.5)
LYMPHOCYTES # BLD: 0.7 K/UL (ref 0.8–3.5)
LYMPHOCYTES NFR BLD: 20 % (ref 12–49)
MCH RBC QN AUTO: 24.1 PG (ref 26–34)
MCHC RBC AUTO-ENTMCNC: 32.1 G/DL (ref 30–36.5)
MCV RBC AUTO: 75 FL (ref 80–99)
MONOCYTES # BLD: 0.3 K/UL (ref 0–1)
MONOCYTES NFR BLD: 8 % (ref 5–13)
NEUTS SEG # BLD: 2.6 K/UL (ref 1.8–8)
NEUTS SEG NFR BLD: 72 % (ref 32–75)
NRBC # BLD: 0 K/UL (ref 0–0.01)
NRBC BLD-RTO: 0 PER 100 WBC
PLATELET # BLD AUTO: 105 K/UL (ref 150–400)
PMV BLD AUTO: 9.9 FL (ref 8.9–12.9)
POTASSIUM SERPL-SCNC: 3.4 MMOL/L (ref 3.5–5.1)
RBC # BLD AUTO: 4.44 M/UL (ref 4.1–5.7)
SERVICE CMNT-IMP: ABNORMAL
SERVICE CMNT-IMP: NORMAL
SODIUM SERPL-SCNC: 135 MMOL/L (ref 136–145)
WBC # BLD AUTO: 3.6 K/UL (ref 4.1–11.1)

## 2022-03-12 PROCEDURE — G0378 HOSPITAL OBSERVATION PER HR: HCPCS

## 2022-03-12 PROCEDURE — 83036 HEMOGLOBIN GLYCOSYLATED A1C: CPT

## 2022-03-12 PROCEDURE — 74011250636 HC RX REV CODE- 250/636: Performed by: INTERNAL MEDICINE

## 2022-03-12 PROCEDURE — 85025 COMPLETE CBC W/AUTO DIFF WBC: CPT

## 2022-03-12 PROCEDURE — 96376 TX/PRO/DX INJ SAME DRUG ADON: CPT

## 2022-03-12 PROCEDURE — 74011000250 HC RX REV CODE- 250: Performed by: INTERNAL MEDICINE

## 2022-03-12 PROCEDURE — 74011250637 HC RX REV CODE- 250/637: Performed by: INTERNAL MEDICINE

## 2022-03-12 PROCEDURE — 80048 BASIC METABOLIC PNL TOTAL CA: CPT

## 2022-03-12 PROCEDURE — 36415 COLL VENOUS BLD VENIPUNCTURE: CPT

## 2022-03-12 PROCEDURE — 99239 HOSP IP/OBS DSCHRG MGMT >30: CPT | Performed by: INTERNAL MEDICINE

## 2022-03-12 PROCEDURE — 82962 GLUCOSE BLOOD TEST: CPT

## 2022-03-12 RX ADMIN — METOPROLOL TARTRATE 25 MG: 25 TABLET, FILM COATED ORAL at 08:37

## 2022-03-12 RX ADMIN — TAMSULOSIN HYDROCHLORIDE 0.8 MG: 0.4 CAPSULE ORAL at 08:37

## 2022-03-12 RX ADMIN — AMLODIPINE BESYLATE 10 MG: 5 TABLET ORAL at 08:37

## 2022-03-12 RX ADMIN — DEXTROSE MONOHYDRATE 75 ML/HR: 5 INJECTION, SOLUTION INTRAVENOUS at 03:52

## 2022-03-12 RX ADMIN — SODIUM CHLORIDE, PRESERVATIVE FREE 10 ML: 5 INJECTION INTRAVENOUS at 06:21

## 2022-03-12 RX ADMIN — FINASTERIDE 5 MG: 5 TABLET, FILM COATED ORAL at 08:37

## 2022-03-12 RX ADMIN — PRAVASTATIN SODIUM 80 MG: 40 TABLET ORAL at 08:37

## 2022-03-12 RX ADMIN — LISINOPRIL 40 MG: 20 TABLET ORAL at 08:37

## 2022-03-12 RX ADMIN — PANTOPRAZOLE SODIUM 40 MG: 40 TABLET, DELAYED RELEASE ORAL at 08:37

## 2022-03-12 NOTE — DISCHARGE SUMMARY
Physician Discharge Summary     Patient ID:  Spencer Hensley  [id number]  68 y.o.  1945    Admit date: 3/11/2022    Discharge date: 3/12/2022    Admission Diagnoses: Hypoglycemia [E16.2]    Discharge Diagnoses:  Principal Diagnosis                                               Active Problems:    Hypoglycemia (3/11/2022)      Lung cancer ([facility] [address] (3/11/2022)       Consults: None    Significant Diagnostic Studies:     CT head: No acute abnormality. XR chest: 1. No pneumonia  2. Decrease in size of nodular density in left upper lobe now with a bandlike  appearance    Hospital Course: Mr. Spencer Hensley is a patient of Orlin Fowler MD who presented with the problems above. See the initial H and P for full details. CHIEF COMPLAINT: Altered mental status     HISTORY OF PRESENT ILLNESS:     Mane Valdez is a 64 y.o.  male with history of metastatic lung cancer status post chemo, hypertension, diabetes, hyperlipidemia who presents with altered mental status. He was found to be hypoglycemic with blood sugar in the 60s, he received dextrose by EMS in route to the ED, and in the ED he received another dextrose because of persistent hypoglycemia. Patient reported significant weight loss since his cancer but continues to take his oral diabetic meds. He denies any chest pain, shortness of breath or syncopal episode  Vital signs indicate be showed that he was hypertensive, his labs revealed thrombocytopenia  CT of the brain was within normal limit  Chest x-ray did not show any acute pathology    By problem. .. Hypoglycemia with altered mental state due to this: He was admitted to observation status, and treated with IV dextrose. His diabetes medications were held. This morning he is awake and alert, eating breakfast, and eager to go home. His blood sugars have normalized. Given his weight loss of 70 lb, he may no longer need diabetic medications. We will have him stop these for now. Lung cancer: metastatic, s/p chemotherapy. He will resume follow up with his oncologist, Rosey Odom. Weight loss: Wt Readings from Last 3 Encounters:   03/11/22 157 lb (71.2 kg)   12/15/21 166 lb 3.2 oz (75.4 kg)   12/02/21 179 lb (81.2 kg)       Thrombocytopenia: He is noted to have low platelets. Follow up with Dr. Leonel Rush. Hypertension: Resume usual home medications. CAD with history of stent: No chest pain at this time. Discharge Exam:    Visit Vitals  BP (!) 143/86 (BP 1 Location: Left upper arm, BP Patient Position: At rest)   Pulse 84   Temp 97.2 °F (36.2 °C)   Resp 20   Ht 5' 9\" (1.753 m)   Wt 157 lb (71.2 kg)   SpO2 98%   BMI 23.18 kg/m²     Gen: Pleasant 64 y.o.  male in NAD.    HEART: RRR, No M/G/R.   LUNGS: CTAB No W/R.   ABDOMEN: S, NT, ND, BS+.   EXTREMITIES: Warm. No C/C/E. MUSCULOSKELETAL: Normal ROM, muscle strength 5/5 all groups. NEURO: Alert and oriented x 3.  Cranial nerves grossly intact.  No focal sensory or motor deficits noted. SKIN: Warm. Dry. No rashes or other lesions noted. Disposition: home    Patient Instructions:   Current Discharge Medication List      CONTINUE these medications which have NOT CHANGED    Details   amLODIPine (NORVASC) 10 mg tablet Take 10 mg by mouth daily. finasteride (PROSCAR) 5 mg tablet Take 5 mg by mouth daily. lisinopriL (PRINIVIL, ZESTRIL) 40 mg tablet Take 40 mg by mouth daily. pravastatin (PRAVACHOL) 80 mg tablet Take 80 mg by mouth daily. tamsulosin (FLOMAX) 0.4 mg capsule Take 0.8 mg by mouth daily. 2 capsules by mouth daily      osimertinib (Tagrisso) 80 mg tablet Take 80 mg by mouth daily. doxycycline (ADOXA) 100 mg tablet Take 100 mg by mouth two (2) times a day. omeprazole (PRILOSEC) 40 mg capsule Take 1 Capsule by mouth daily.   Qty: 90 Capsule, Refills: 1      diclofenac EC (VOLTAREN) 50 mg EC tablet Take 50 mg by mouth three (3) times daily as needed for Pain.      metoprolol tartrate (LOPRESSOR) 25 mg tablet Take 1 Tablet by mouth every twelve (12) hours. Qty: 180 Tablet, Refills: 3      ondansetron hcl (Zofran) 4 mg tablet Take 1 Tablet by mouth every eight (8) hours as needed for Nausea. Qty: 20 Tablet, Refills: 0      Omega-3-DHA-EPA-Fish Oil 1,000 mg (120 mg-180 mg) cap Take 1 Cap by mouth three (3) times daily. STOP taking these medications       glipiZIDE (GLUCOTROL) 10 mg tablet Comments:   Reason for Stopping:         SITagliptin (Januvia) 100 mg tablet Comments:   Reason for Stopping:         metFORMIN (GLUCOPHAGE) 500 mg tablet Comments:   Reason for Stopping:         empagliflozin (Jardiance) 10 mg tablet Comments:   Reason for Stopping:             Activity: Activity as tolerated  Diet: Diabetic Diet    Follow-up with Brittney Bermudez MD     Signed:  Carlos Eduardo Rivera MD  3/12/2022  8:07 AM    Note: Greater than 30 minutes were spent on activities related to this discharge.

## 2022-03-12 NOTE — DISCHARGE INSTRUCTIONS
PATIENT DISCHARGE INSTRUCTIONS      PATIENT DISCHARGE INSTRUCTIONS    Spencer Hensley / [id number] : 1945    Admitted 3/11/2022 Discharged: 3/12/2022       · It is important that you take the medication exactly as they are prescribed. · Keep your medication in the bottles provided by the pharmacist and keep a list of the medication names, dosages, and times to be taken in your wallet. · Do not take other medications without consulting your doctor.      What to do at Home    Recommended Diet: Diabetic Diet    Recommended Activity: Activity as tolerated

## 2022-03-12 NOTE — PROGRESS NOTES
Transition of Care Plan:     RUR: N/A Observation Status   Disposition: Home with Spouse   Follow up appointments:PCP  DME needed: RW-  Order placed for RW from [facility]; RW delivered to patient bedside   Transportation at Discharge: Wife to transport  [place] or means to access home:      Wife has access  IM Medicare Letter: N/A observation status- Notice delivered 3/11  Is patient a BCPI-A Bundle:     Bundle letter will be distributed by unit CM if pt meets bundle criteria. If yes, was Bundle Letter given?:    Is patient a [place] and connected with the [place]? N/A            If yes, was [employer] transfer form completed and [facility] notified? Caregiver Contact: Wife August Jovita [phone]  Discharge Caregiver contacted prior to discharge? Unit CM to follow up before discharge  Care Conference needed?: No    8:23AM UPDATE  Discharge orders placed. Pt to discharge home today by private vehicle with spouse. RW delivered at bedside to the room yesterday ([facility]). Pt/spouse to call and schedule PCP f/u appointment as offices are closed at this time. No PT/OT consults or recommendations for d/c. Denied any HH needs at this time. Observation letters already received. All information entered into pt AVS.     Pt has no additional CM needs at this time. Care Management Interventions  PCP Verified by CM: Yes  Palliative Care Criteria Met (RRAT>21 & CHF Dx)?: No  Mode of Transport at Discharge:  Other (see comment) (Wife to transport )  Transition of Care Consult (CM Consult): Discharge Planning,DME/Supply Assistance  Discharge Durable Medical Equipment: Yes (Shannan Reil ordered from [facility] walker delivered to patient bedside )  Health Maintenance Reviewed: Yes  Physical Therapy Consult: No  Occupational Therapy Consult: No  Speech Therapy Consult: No  Support Systems: Spouse/Significant Other  Confirm Follow Up Transport: Family (Spouse to transport)  The Plan for Transition of Care is Related to the Following Treatment Goals :  Follow-up Appointments, RW  The Patient and/or Patient Representative was Provided with a Choice of Provider and Agrees with the Discharge Plan?: Yes  Name of the Patient Representative Who was Provided with a Choice of Provider and Agrees with the Discharge Plan: Sindhu Medina (pt), spouse  Discharge Location  Patient Expects to be Discharged to[de-identified] Home with family assistance    Nadeem Cottrell [facility]  [phone]

## 2022-03-12 NOTE — PROGRESS NOTES
Report received from off going RN    1000- patient received discharge instructions including follow up appointments, updated med list, and education. Patient stated understanding. Telemetry and IV removed.  Patient gathered all personal belongings and waiting on ride

## 2022-03-14 ENCOUNTER — TELEPHONE (OUTPATIENT)
Dept: INTERNAL MEDICINE CLINIC | Age: 77
End: 2022-03-14

## 2022-03-14 ENCOUNTER — PATIENT OUTREACH (OUTPATIENT)
Dept: CASE MANAGEMENT | Age: 77
End: 2022-03-14

## 2022-03-14 ENCOUNTER — OFFICE VISIT (OUTPATIENT)
Dept: INTERNAL MEDICINE CLINIC | Age: 77
End: 2022-03-14
Payer: MEDICARE

## 2022-03-14 VITALS
BODY MASS INDEX: 23.18 KG/M2 | RESPIRATION RATE: 19 BRPM | SYSTOLIC BLOOD PRESSURE: 148 MMHG | HEIGHT: 70 IN | TEMPERATURE: 98.3 F | WEIGHT: 161.9 LBS | DIASTOLIC BLOOD PRESSURE: 76 MMHG | OXYGEN SATURATION: 99 % | HEART RATE: 90 BPM

## 2022-03-14 DIAGNOSIS — I10 ESSENTIAL HYPERTENSION: ICD-10-CM

## 2022-03-14 DIAGNOSIS — E11.22 CONTROLLED TYPE 2 DIABETES MELLITUS WITH STAGE 2 CHRONIC KIDNEY DISEASE, WITHOUT LONG-TERM CURRENT USE OF INSULIN (HCC): ICD-10-CM

## 2022-03-14 DIAGNOSIS — E16.2 HYPOGLYCEMIA: ICD-10-CM

## 2022-03-14 DIAGNOSIS — I48.0 PAF (PAROXYSMAL ATRIAL FIBRILLATION) (HCC): ICD-10-CM

## 2022-03-14 DIAGNOSIS — Z09 HOSPITAL DISCHARGE FOLLOW-UP: Primary | ICD-10-CM

## 2022-03-14 DIAGNOSIS — N18.2 CONTROLLED TYPE 2 DIABETES MELLITUS WITH STAGE 2 CHRONIC KIDNEY DISEASE, WITHOUT LONG-TERM CURRENT USE OF INSULIN (HCC): ICD-10-CM

## 2022-03-14 DIAGNOSIS — I50.32 DIASTOLIC CHF, CHRONIC (HCC): ICD-10-CM

## 2022-03-14 DIAGNOSIS — I25.10 ASCVD (ARTERIOSCLEROTIC CARDIOVASCULAR DISEASE): ICD-10-CM

## 2022-03-14 DIAGNOSIS — C79.9 METASTATIC ADENOCARCINOMA (HCC): ICD-10-CM

## 2022-03-14 DIAGNOSIS — R63.4 WEIGHT LOSS: ICD-10-CM

## 2022-03-14 PROBLEM — E66.01 MORBID OBESITY (HCC): Status: RESOLVED | Noted: 2017-08-02 | Resolved: 2022-03-14

## 2022-03-14 PROCEDURE — G8427 DOCREV CUR MEDS BY ELIG CLIN: HCPCS | Performed by: INTERNAL MEDICINE

## 2022-03-14 PROCEDURE — 1111F DSCHRG MED/CURRENT MED MERGE: CPT | Performed by: INTERNAL MEDICINE

## 2022-03-14 PROCEDURE — 99495 TRANSJ CARE MGMT MOD F2F 14D: CPT | Performed by: INTERNAL MEDICINE

## 2022-03-14 NOTE — PROGRESS NOTES
Care Transitions Initial Call    Call within 2 business days of discharge: Yes     Patient: Zi Lockhart Patient : 1945 MRN: [id number]    Last Discharge [address]       Complaint Diagnosis Description Type Department Provider    3/11/22 Low Blood Sugar Hypoglycemia secondary to sulfonylurea, accidental or unintentional, initial encounter ED to Hosp-Admission (Discharged) (ADMIT) [id number] uRby Nails MD; CHRISTY Xiong Call after 1  Was this an external facility discharge? No     Challenges to be reviewed by the provider   Additional needs identified to be addressed with provider:   Monitor potassium and sodium levels- low side of normal       Method of communication with provider : none    Discussed COVID-19 related testing which was not done at this time. Advance Care Planning:   Does patient have an Advance Directive:  currently not on file; education provided  and ACP referral placed    Inpatient Readmission Risk score: No data recorded  Was this a readmission? no   Patient stated reason for the admission: AMS    Patients top risk factors for readmission: medical condition-sepsis   Interventions to address risk factors: Scheduled appointment with PCP-3/14, Scheduled appointment with Specialist-3/14 and Obtained and reviewed discharge summary and/or continuity of care documents    Care Transition Nurse (CTN) contacted the spouse, Floresita by telephone to perform post hospital discharge assessment. Verified name and  with family as identifiers. Provided introduction to self, and explanation of the CTN role. Spouse reports patient is doing ok today. Reports patient has slept well over the last couple of nights. Reports some weakness. Denies chest pain, sob, fever. Reports good appetite. No bladder, bowel concerns. States patient has lost 70 pounds. CTN reviewed discharge instructions, medical action plan and red flags with family who verbalized understanding.  Were discharge instructions available to patient? yes. Reviewed appropriate site of care based on symptoms and resources available to patient including: PCP and Specialist. Family given an opportunity to ask questions and does not have any further questions or concerns at this time. The family agrees to contact the PCP office for questions related to their healthcare. Medication reconciliation was performed with family, who verbalizes understanding of administration of home medications. Referral to Pharm D needed: no     Home Health/Outpatient orders at discharge: none    Durable Medical Equipment ordered at discharge: [address]: [facility]  Durable Medical Equipment received: yes    Was patient discharged with a pulse oximeter? No.    Discussed follow-up appointments. If no appointment was previously scheduled, appointment scheduling offered: no. Is follow up appointment scheduled within 7 days of discharge? yes. [facility] follow up appointment(s):   Future Appointments   Date Time Provider Martín Trujillo   3/14/2022  3:40 PM MD ROSHNI Valerio BS AMB   3/16/2022  9:30 AM [facility]   3/30/2022  9:20 AM MD ROSHNI Valerio BS AMB     Non-[facility] follow up appointment(s): 3/14/2022 [facility] for follow-up call in 7-10 days based on severity of symptoms and risk factors. Plan for next call: follow up appointment-pcp, oncologist and weakness improved  CTN provided contact information for future needs. Goals Addressed                 This Visit's Progress     Prevent complications post hospitalization.           03/14/22   Patient will not fall   Weakness will improve   Will attend follow up appt with PCP, oncologist scheduled 3/14   Pt will remain out of the hospital or ER for remainder of FESTUS period

## 2022-03-14 NOTE — PROGRESS NOTES
Transitions Of Care [facility], [facility] 3/11-3/12 DX:hypoglycemia)       HPI:  Chepe Arevalo is a 64y.o. year old male who is here for a follow up visit for hospitalization transition of care. He was last seen by me on 12/15/2021. Discharged on: 3/12/2022    Diagnosis in hospital:  1. Hypoglycemia  2. Metastatic lung cancer  3. Weight loss secondary to #2  4. Diabetes  5. Prior morbid obesity  6. Thrombocytopenia  7. Hypertension  8. Atherosclerotic coronary vas disease with history of stent    Complications in hospital: No complications. Presented with altered mental status secondary to hypoglycemia. Head CT was negative    Medication changes: Treated with IV dextrose overnight and blood sugars returned to normal.  He was discharged home with medicine changes as noted below. Discontinue glipizide, Januvia, Metformin, and Jardiance    Discharge Summary reviewed. Today 3/14/2022      He reports the following: Since discharge home he is noted no further confusion episodes. Appetite is good. He does not monitor his blood sugars at home. He has noted no increased urination or increased thirst.  He notes no visual symptoms. He notes no chest pain, shortness of breath or cardiorespiratory complaints. There are no GI or  complaints. He notes no headaches, dizziness or neurologic complaints. He has no change of his chronic arthritic complaints. He did not take any of his medications today before this office visit including none of his blood pressure medications.           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[facility] [address]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  finasteride (PROSCAR) 5 mg tablet Take 5 mg by mouth daily.  lisinopriL (PRINIVIL, ZESTRIL) 40 mg tablet Take 40 mg by mouth daily.  pravastatin (PRAVACHOL) 80 mg tablet Take 80 mg by mouth daily.  tamsulosin (FLOMAX) 0.4 mg capsule Take 0.8 mg by mouth daily. 2 capsules by mouth daily      osimertinib (Tagrisso) 80 mg tablet Take 80 mg by mouth daily.  doxycycline (ADOXA) 100 mg tablet Take 100 mg by mouth two (2) times a day.  omeprazole (PRILOSEC) 40 mg capsule Take 1 Capsule by mouth daily. 90 Capsule 1    diclofenac EC (VOLTAREN) 50 mg EC tablet Take 50 mg by mouth three (3) times daily as needed for Pain.  metoprolol tartrate (LOPRESSOR) 25 mg tablet Take 1 Tablet by mouth every twelve (12) hours. 180 Tablet 3    ondansetron hcl (Zofran) 4 mg tablet Take 1 Tablet by mouth every eight (8) hours as needed for Nausea. 20 Tablet 0    Omega-3-DHA-EPA-Fish Oil 1,000 mg (120 mg-180 mg) cap Take 1 Cap by mouth three (3) times daily. No facility-administered encounter medications on file as of 3/14/2022.         Allergies   Allergen Reactions    Adhesive Tape-Silicones Unknown (comments)    Chocolate Flavor Unknown (comments)        Social History     Socioeconomic History    Marital status:      Spouse name: Not on file    Number of children: Not on file    Years of education: Not on file    Highest education level: Not on file   Occupational History    Not on file   Tobacco Use    Smoking status: Never Smoker    Smokeless tobacco: Never Used   Vaping Use    Vaping Use: Never used   Substance and Sexual Activity    Alcohol use: Yes     Comment: social    Drug use: No    Sexual activity: Not on file   Other Topics Concern     Service Not Asked    Blood Transfusions Not Asked    Caffeine Concern Not Asked    Occupational Exposure Not Asked    Hobby Hazards Not Asked    Sleep Concern Not Asked    Stress Concern Not Asked    Weight Concern Not Asked    Special Diet Not Asked    Back Care Not Asked    Exercise Not Asked    Bike Helmet Not Asked   [address] Not Asked    Self-Exams Not Asked   Social History Narrative    Not on file     Social Determinants of Health     Financial Resource Strain:     Difficulty of Paying Living Expenses: Not on file   Food Insecurity:     Worried About Running Out of Food in the Last Year: Not on file    [place] of Food in the Last Year: Not on file   Transportation Needs:     Lack of Transportation (Medical): Not on file    Lack of Transportation (Non-Medical):  Not on file   Physical Activity:     Days of Exercise per Week: Not on file    Minutes of Exercise per Session: Not on file   Stress:     Feeling of Stress : Not on file   Social Connections:     Frequency of Communication with Friends and Family: Not on file    Frequency of Social Gatherings with Friends and Family: Not on file    Attends Confucianist Services: Not on file    Active Member of Clubs or Organizations: Not on file    Attends Club or Organization Meetings: Not on file    Marital Status: Not on file   Intimate Partner Violence:     Fear of Current or Ex-Partner: Not on file    Emotionally Abused: Not on file    Physically Abused: Not on file    Sexually Abused: Not on file   Housing Stability:     Unable to Pay for Housing in the Last Year: Not on file    Number of Places Lived in the Last Year: Not on file    Unstable Housing in the Last Year: Not on file      REVIEW OF SYSTEMS:  General: negative for - chills or fever  ENT: negative for - headaches, nasal congestion or tinnitus  Eyes: no blurred or visual changes  Neck: No stiffness or swollen nodes  Respiratory: negative for - cough, hemoptysis, shortness of breath or wheezing  Cardiovascular : negative for - chest pain, edema, palpitations or shortness of breath  Gastrointestinal: negative for - abdominal pain, blood in stools, heartburn or nausea/vomiting  Genito-Urinary: no dysuria, trouble voiding, or hematuria  Musculoskeletal: negative for - gait disturbance, joint pain, joint stiffness or joint swelling  Neurological: no TIA or stroke symptoms  Hematologic: no bruises, no bleeding  Lymphatic: no swollen glands  Integument: no lumps, mole changes, nail changes or rash  Endocrine:no malaise/lethargy poly uria or polydipsia or unexpected weight changes      Visit Vitals  BP (!) 148/76   Pulse 90   Temp 98.3 °F (36.8 °C) (Oral)   Resp 19   Ht 5' 10\" (1.778 m)   Wt 161 lb 14.4 oz (73.4 kg)   SpO2 99%   BMI 23.23 kg/m²     CONSTITUTIONAL: well , well nourished, appears age appropriate  HEAD: normocephalic, atraumatic  EYES: sclera anicteric, PERRL, EOMI  ENMT:moist mucous membranes, pharynx clear          Nares: w/o erythema or edema  NECK: supple. Thyroid normal, No JVD or bruits  RESPIRATORY: Chest: clear to ascultation and percussion   CARDIOVASCULAR: Heart: regular rate and rhythm no murmurs, rubs or gallops, PMI not displaced, no thrill  GASTROINTESTINAL: Abdomen: non distended, soft, non-tender, bowel sounds normal  HEMATOLOGIC: no petechiae or purpura  LYMPHATIC: no lymphadenopathy  MUSCULOSKELETAL: Extremities: no edema or active synovitis, pulse 1+   BACK; no point or CVAT  INTEGUMENT: No unusual rashes or suspicious skin lesions noted.  Nails appear normal.  NEUROLOGIC: non-focal exam   MENTAL STATUS: alert and oriented, appropriate affect   PSYCHIATRIC: normal affect    ASSESSMENT:   1. Hospital discharge follow-up    2. Hypoglycemia    3. Metastatic adenocarcinoma ([facility] [address]    4. Weight loss    5. Essential hypertension    6. ASCVD (arteriosclerotic cardiovascular disease)    7. Diastolic CHF, chronic (HCC)    8. Controlled type 2 diabetes mellitus with stage 2 chronic kidney disease, without long-term current use of insulin ([facility] [address]    9. PAF (paroxysmal atrial fibrillation) (HCC)      Impression  1. Hospital discharge follow-up accomplished today. 2.  Hypoglycemia I am not sure we need to discontinue all 4 diabetic medications but certainly off the glipizide. I will wait and see what the days [facility] looks like before decide whether to reinstitute any of his other diabetic meds. 3.  Metastatic adenocarcinoma he is due for follow-up PET scan this week and then follow-up with Dr. Anant Oropeza his oncologist  4. Massive weight loss secondary to his cancer  5. Hypertension blood pressure is little high today but has not taken any of his medicines so at this point I am going to continue his metoprolol at 25 mg twice daily, decrease his lisinopril from 40 mg daily to 20 mg daily and discontinue his amlodipine. 6.  ASCVD clinically stable  7. Diastolic CHF compensated  8. Diabetes mellitus as noted above will decide upon treatment I see today's BMP  9. Paroxysmal atrial fibrillation no recent symptoms with that  High complexity patient with high complexity decision making today. 45 minutes spent in direct care of this patient they were greater than 50% in counseling coordination of care and all of this is discussed with his wife present with him today. PLAN:  .  Orders Placed This Encounter    METABOLIC PANEL, BASIC         ATTENTION:   This medical record was transcribed using an electronic medical records system.   Although proofread, it may and can contain electronic and spelling errors. Other human spelling and other errors may be present. Corrections may be executed at a later time. Please feel free to contact us for any clarifications as needed. Follow-up and Dispositions    · Return At previously scheduled appointment which is March 30. Aparna Krause MD    Orders Placed This Encounter    METABOLIC PANEL, BASIC     Standing Status:   Future     Number of Occurrences:   1     Standing Expiration Date:   3/14/2023    ME DISCHARGE MEDS RECONCILED W/ CURRENT OUTPATIENT MED LIST          No results found for any visits on 03/14/22. I have reviewed the patient's medical history in detail and updated the computerized patient record. We had a prolonged discussion about these complex clinical issues and went over the various important aspects to consider. All questions were answered. Advised him to call back or return to office if symptoms do not improve, change in nature, or persist.    He was given an after visit summary or informed of [employer] which includes patient instructions, diagnoses, current medications, & vitals. He expressed understanding with the diagnosis and plan.

## 2022-03-14 NOTE — PROGRESS NOTES
Chief Complaint   Patient presents with   [place] 3/11-3/12 DX:hypoglycemia     Visit Vitals  BP (!) 150/70 (BP 1 Location: Left upper arm, BP Patient Position: Sitting, BP Cuff Size: Adult)   Pulse 90   Temp 98.3 °F (36.8 °C) (Oral)   Resp 19   Ht 5' 10\" (1.778 m)   Wt 161 lb 14.4 oz (73.4 kg)   SpO2 99%   BMI 23.23 kg/m²     1. Have you been to the ER, urgent care clinic since your last visit? Hospitalized since your last visit? 3/11-3/12/22 [facility] DX:Hypoglycemia    2. Have you seen or consulted any other health care providers outside of the [address] [place] [address] since your last visit? Include any pap smears or colon screening.  Dr Anant Oropeza 3/14/22  Dr. Josefina Staley

## 2022-03-14 NOTE — TELEPHONE ENCOUNTER
Left message with both patient and patient's wife regarding the need for a visit to f/up from the hospital today.

## 2022-03-14 NOTE — TELEPHONE ENCOUNTER
----- Message from Dee Vergara sent at 3/14/2022 11:24 AM EDT -----  Subject: Message to Provider    QUESTIONS  Information for Provider? Patient returned call but no one answered when I   was transferred. ---------------------------------------------------------------------------  --------------  Maryanne GOLD  What is the best way for the office to contact you? OK to leave message on   voicemail  Preferred Call Back Phone Number?  [phone]  ---------------------------------------------------------------------------  --------------  SCRIPT ANSWERS  undefined

## 2022-03-14 NOTE — TELEPHONE ENCOUNTER
Scheduled for today at 3:40 pm.  Patient also has appointment with Dr. Nicolas Jimenez this afternoon.

## 2022-03-15 LAB
ANION GAP SERPL CALC-SCNC: 6 MMOL/L (ref 5–15)
BUN SERPL-MCNC: 11 MG/DL (ref 6–20)
BUN/CREAT SERPL: 15 (ref 12–20)
CALCIUM SERPL-MCNC: 8 MG/DL (ref 8.5–10.1)
CHLORIDE SERPL-SCNC: 101 MMOL/L (ref 97–108)
CO2 SERPL-SCNC: 25 MMOL/L (ref 21–32)
CREAT SERPL-MCNC: 0.73 MG/DL (ref 0.7–1.3)
GLUCOSE SERPL-MCNC: 214 MG/DL (ref 65–100)
POTASSIUM SERPL-SCNC: 4 MMOL/L (ref 3.5–5.1)
SODIUM SERPL-SCNC: 132 MMOL/L (ref 136–145)

## 2022-03-15 NOTE — PROGRESS NOTES
so resume Metformin at 500 BID ( was on 500 AM and 1000 PM) and resume Jardiance 10 mg every day.  Continue off Januvia and Glucotrol

## 2022-03-15 NOTE — PROGRESS NOTES
so resume Metformin at 500 BID ( was on 500 AM and 1000 PM) and resume Jardiance 10 mg every day. Continue off Januvia and Glucotrol. Discussed this with patient's wife. Voiced understanding.

## 2022-03-16 ENCOUNTER — HOSPITAL ENCOUNTER (OUTPATIENT)
Dept: CT IMAGING | Age: 77
Discharge: HOME | End: 2022-03-16
Attending: INTERNAL MEDICINE
Payer: MEDICARE

## 2022-03-16 DIAGNOSIS — Z79.899 OTHER LONG TERM (CURRENT) DRUG THERAPY: ICD-10-CM

## 2022-03-16 DIAGNOSIS — C79.51 SECONDARY MALIGNANT NEOPLASM OF BONE AND BONE MARROW (HCC): ICD-10-CM

## 2022-03-16 DIAGNOSIS — C34.12 MALIGNANT NEOPLASM OF LINGULA OF LEFT LUNG (HCC): ICD-10-CM

## 2022-03-16 DIAGNOSIS — G89.3 NEOPLASM RELATED PAIN: ICD-10-CM

## 2022-03-16 DIAGNOSIS — C79.52 SECONDARY MALIGNANT NEOPLASM OF BONE AND BONE MARROW (HCC): ICD-10-CM

## 2022-03-16 PROCEDURE — 74011000636 HC RX REV CODE- 636: Performed by: INTERNAL MEDICINE

## 2022-03-16 PROCEDURE — 74177 CT ABD & PELVIS W/CONTRAST: CPT

## 2022-03-16 PROCEDURE — 74011000250 HC RX REV CODE- 250: Performed by: INTERNAL MEDICINE

## 2022-03-16 PROCEDURE — 71260 CT THORAX DX C+: CPT

## 2022-03-16 RX ORDER — BARIUM SULFATE 20 MG/ML
900 SUSPENSION ORAL
Status: COMPLETED | OUTPATIENT
Start: 2022-03-16 | End: 2022-03-16

## 2022-03-16 RX ADMIN — BARIUM SULFATE 900 ML: 21 SUSPENSION ORAL at 09:09

## 2022-03-16 RX ADMIN — IOPAMIDOL 100 ML: 755 INJECTION, SOLUTION INTRAVENOUS at 09:09

## 2022-03-18 ENCOUNTER — PATIENT OUTREACH (OUTPATIENT)
Dept: CASE MANAGEMENT | Age: 77
End: 2022-03-18

## 2022-03-18 PROBLEM — G61.0 GUILLAIN-BARRE SYNDROME (HCC): Status: ACTIVE | Noted: 2017-08-02

## 2022-03-18 PROBLEM — M15.9 PRIMARY OSTEOARTHRITIS INVOLVING MULTIPLE JOINTS: Status: ACTIVE | Noted: 2017-08-02

## 2022-03-18 PROBLEM — I50.32 DIASTOLIC CHF, CHRONIC (HCC): Status: ACTIVE | Noted: 2020-02-18

## 2022-03-18 PROBLEM — M25.559 HIP PAIN: Status: ACTIVE | Noted: 2021-09-30

## 2022-03-18 PROBLEM — N18.2 STAGE 2 CHRONIC KIDNEY DISEASE: Status: ACTIVE | Noted: 2017-08-02

## 2022-03-18 PROBLEM — C34.90 LUNG CANCER (HCC): Status: ACTIVE | Noted: 2022-03-11

## 2022-03-18 PROBLEM — M17.12 PRIMARY OSTEOARTHRITIS OF LEFT KNEE: Status: ACTIVE | Noted: 2021-09-13

## 2022-03-18 PROBLEM — I65.23 STENOSIS OF BOTH INTERNAL CAROTID ARTERIES: Status: ACTIVE | Noted: 2017-05-09

## 2022-03-18 PROBLEM — I48.0 PAF (PAROXYSMAL ATRIAL FIBRILLATION) (HCC): Status: ACTIVE | Noted: 2019-09-28

## 2022-03-18 PROBLEM — C79.51 METASTATIC CARCINOMA TO BONE (HCC): Status: ACTIVE | Noted: 2021-10-07

## 2022-03-18 PROBLEM — M54.9 BACK PAIN: Status: ACTIVE | Noted: 2017-08-02

## 2022-03-18 NOTE — ACP (ADVANCE CARE PLANNING)
Outbound call made. Spoke to patient's spouse. Requested docs be emailed to [email or web address]. follow up scheduled for 3/23/22.

## 2022-03-19 PROBLEM — L50.9 URTICARIA: Status: ACTIVE | Noted: 2017-08-02

## 2022-03-19 PROBLEM — R09.89 LEFT CAROTID BRUIT: Status: ACTIVE | Noted: 2017-08-02

## 2022-03-19 PROBLEM — R04.0 EPISTAXIS: Status: ACTIVE | Noted: 2021-09-13

## 2022-03-19 PROBLEM — I25.10 ASCVD (ARTERIOSCLEROTIC CARDIOVASCULAR DISEASE): Status: ACTIVE | Noted: 2019-09-28

## 2022-03-19 PROBLEM — R31.9 HEMATURIA: Status: ACTIVE | Noted: 2017-12-11

## 2022-03-19 PROBLEM — M54.50 CHRONIC MIDLINE LOW BACK PAIN WITHOUT SCIATICA: Status: ACTIVE | Noted: 2021-09-30

## 2022-03-19 PROBLEM — I70.90 ASVD (ARTERIOSCLEROTIC VASCULAR DISEASE): Status: ACTIVE | Noted: 2017-08-02

## 2022-03-19 PROBLEM — D48.5 NEOPLASM OF UNCERTAIN BEHAVIOR OF SKIN: Status: ACTIVE | Noted: 2017-08-02

## 2022-03-19 PROBLEM — C79.9 METASTATIC ADENOCARCINOMA (HCC): Status: ACTIVE | Noted: 2021-10-26

## 2022-03-19 PROBLEM — Z13.39 ALCOHOL SCREENING: Status: ACTIVE | Noted: 2020-08-04

## 2022-03-19 PROBLEM — M19.022 ARTHRITIS OF LEFT ELBOW: Status: ACTIVE | Noted: 2021-10-07

## 2022-03-19 PROBLEM — I21.4 NSTEMI (NON-ST ELEVATED MYOCARDIAL INFARCTION) (HCC): Status: ACTIVE | Noted: 2019-09-21

## 2022-03-19 PROBLEM — N18.2 CONTROLLED TYPE 2 DIABETES MELLITUS WITH STAGE 2 CHRONIC KIDNEY DISEASE, WITHOUT LONG-TERM CURRENT USE OF INSULIN (HCC): Status: ACTIVE | Noted: 2017-12-10

## 2022-03-19 PROBLEM — G89.29 CHRONIC MIDLINE LOW BACK PAIN WITHOUT SCIATICA: Status: ACTIVE | Noted: 2021-09-30

## 2022-03-19 PROBLEM — E78.2 MIXED HYPERLIPIDEMIA: Status: ACTIVE | Noted: 2017-08-02

## 2022-03-19 PROBLEM — E11.22 CONTROLLED TYPE 2 DIABETES MELLITUS WITH STAGE 2 CHRONIC KIDNEY DISEASE, WITHOUT LONG-TERM CURRENT USE OF INSULIN (HCC): Status: ACTIVE | Noted: 2017-12-10

## 2022-03-19 PROBLEM — I35.0 AORTIC STENOSIS: Status: ACTIVE | Noted: 2017-08-02

## 2022-03-19 PROBLEM — M25.522 LEFT ELBOW PAIN: Status: ACTIVE | Noted: 2021-09-30

## 2022-03-19 PROBLEM — R63.4 WEIGHT LOSS: Status: ACTIVE | Noted: 2021-09-13

## 2022-03-19 PROBLEM — N40.0 BPH (BENIGN PROSTATIC HYPERPLASIA): Status: ACTIVE | Noted: 2017-08-02

## 2022-03-19 PROBLEM — F51.01 PRIMARY INSOMNIA: Status: ACTIVE | Noted: 2019-12-18

## 2022-03-20 PROBLEM — N52.9 ED (ERECTILE DYSFUNCTION): Status: ACTIVE | Noted: 2017-08-02

## 2022-03-20 PROBLEM — G89.29 CHRONIC PAIN OF LEFT KNEE: Status: ACTIVE | Noted: 2021-09-30

## 2022-03-20 PROBLEM — I10 ESSENTIAL HYPERTENSION: Status: ACTIVE | Noted: 2017-08-02

## 2022-03-20 PROBLEM — E16.2 HYPOGLYCEMIA: Status: ACTIVE | Noted: 2022-03-11

## 2022-03-20 PROBLEM — M25.562 CHRONIC PAIN OF LEFT KNEE: Status: ACTIVE | Noted: 2021-09-30

## 2022-03-22 ENCOUNTER — PATIENT OUTREACH (OUTPATIENT)
Dept: CASE MANAGEMENT | Age: 77
End: 2022-03-22

## 2022-03-22 NOTE — PROGRESS NOTES
Care Transitions Follow Up Call    Challenges to be reviewed by the provider   Additional needs identified to be addressed with provider:   Per spouse,  \"Preliminary cancer report indicates that the medications are working and only the spot on patients liver is growing\"         Method of communication with provider : none    Care Transition Nurse (CTN) contacted the family by telephone to follow up after admission on 3/11/2022. Reports patient is doing ok. States BPs are \"pretty good\", but admits that she does not check his BPs all the time. States she is given the patient childrens Pedialyte. Addressed changes since last contact: none  Follow up appointment completed? yes. Was follow up appointment scheduled within 7 days of discharge? yes. Advance Care Planning:   Does patient have an Advance Directive:  patient will reach out to Juliana [place]illo[address] facilitator, at a later time to complete AMD.    CTN reviewed medical action plan and red flags with family and discussed any barriers to care and/or understanding of plan of care after discharge. Discussed appropriate site of care based on symptoms and resources available to patient including: PCP and Specialist. The family agrees to contact the PCP office for questions related to their healthcare. Patients top risk factors for readmission: medical condition-sepsis   Interventions to address risk factors: Scheduled appointment with Specialist-3/23    [facility] follow up appointment(s):   Future Appointments   Date Time Provider Martín Trujillo   3/30/2022  9:20 AM Sony Heart MD [facility]     [facility] follow up appointment(s): 3/23 Podiatrist    CTN provided contact information for future needs. Plan for follow-up call in 7-10 days based on severity of symptoms and risk factors. Plan for next call: self management-staying hydrated     Goals Addressed                 This Visit's Progress     Prevent complications post hospitalization.    On track 03/14/22   Patient will not fall   Weakness will improve   Will attend follow up appt with PCP, oncologist scheduled 3/14   Pt will remain out of the hospital or ER for remainder of FESTUS period    03/22/22   No falls   Attended follow up appt with PCP, oncology   Will continue to stay hydrated and will report great appetite

## 2022-03-23 ENCOUNTER — PATIENT OUTREACH (OUTPATIENT)
Dept: CASE MANAGEMENT | Age: 77
End: 2022-03-23

## 2022-03-23 NOTE — ACP (ADVANCE CARE PLANNING)
Patient needs to reschedule follow up. Wife wishes to reach out to [employer] via email when ready.

## 2022-03-24 PROBLEM — E66.01 MORBID OBESITY (HCC): Status: RESOLVED | Noted: 2017-08-02 | Resolved: 2022-03-14

## 2022-03-25 RX ORDER — EMPAGLIFLOZIN 10 MG/1
TABLET, FILM COATED ORAL
Qty: 30 TABLET | Refills: 5 | Status: SHIPPED
Start: 2022-03-25 | End: 2022-06-09

## 2022-03-29 ENCOUNTER — PATIENT OUTREACH (OUTPATIENT)
Dept: CASE MANAGEMENT | Age: 77
End: 2022-03-29

## 2022-03-29 NOTE — PROGRESS NOTES
Chief Complaint   Patient presents with    Hypertension     3 month    CHF    Diabetes    Chronic Kidney Disease    Osteoarthritis    Cholesterol Problem       SUBJECTIVE:    Brooks Miranda is a 64 y.o. male follow-up of his medical problems include hypertension, diabetes, hyperlipidemia, DJD, metastatic lung cancer and other multimedical problems. He is taking his medications and trying to follow his diet and try and remain physically active and he says his appetite is much better. His blood sugars have been running okay since we have cut back on his diabetic medications and there have been no more hypoglycemic episodes. He is still getting his chemotherapy. He denies any chest pain, shortness of breath, palpitations, PND, orthopnea or other cardiac or respiratory complaints. He notes no current GI or  complaints although his wife says he has nocturia about every hour at night. He has no headaches, dizziness or neurologic complaints. He does note his left knee is bothering him quite a bit to the point where he like to get a cortisone shot in today. He has had no falls or trauma. He has no other current active arthritic complaints no other complaints on complete review of systems other than week although he is improved since we have cut back on his diabetic medications. Current Outpatient Medications   Medication Sig Dispense Refill    methylPREDNISolone acetate (DEPO-MEDROL) 40 mg/mL injection 1 mL by IntraMUSCular route once for 1 dose. 1 mL 0    Jardiance 10 mg tablet TAKE ONE TABLET BY MOUTH DAILY 30 Tablet 5    amLODIPine (NORVASC) 10 mg tablet Take 10 mg by mouth daily.  finasteride (PROSCAR) 5 mg tablet Take 5 mg by mouth daily.  lisinopriL (PRINIVIL, ZESTRIL) 40 mg tablet Take 40 mg by mouth daily.  pravastatin (PRAVACHOL) 80 mg tablet Take 80 mg by mouth daily.  tamsulosin (FLOMAX) 0.4 mg capsule Take 0.8 mg by mouth daily.  2 capsules by mouth daily      osimertinib (Tagrisso) 80 mg tablet Take 80 mg by mouth daily.  doxycycline (ADOXA) 100 mg tablet Take 100 mg by mouth two (2) times a day.  omeprazole (PRILOSEC) 40 mg capsule Take 1 Capsule by mouth daily. 90 Capsule 1    diclofenac EC (VOLTAREN) 50 mg EC tablet Take 50 mg by mouth three (3) times daily as needed for Pain.  metoprolol tartrate (LOPRESSOR) 25 mg tablet Take 1 Tablet by mouth every twelve (12) hours. 180 Tablet 3    ondansetron hcl (Zofran) 4 mg tablet Take 1 Tablet by mouth every eight (8) hours as needed for Nausea. 20 Tablet 0    Omega-3-DHA-EPA-Fish Oil 1,000 mg (120 mg-180 mg) cap Take 1 Cap by mouth three (3) times daily.        Past Medical History:   Diagnosis Date    Aortic stenosis 8/2/2017    ASVD (arteriosclerotic vascular disease) 8/2/2017    Back pain 8/2/2017    BPH (benign prostatic hyperplasia) 8/2/2017    CKD (chronic kidney disease) 8/2/2017    Diabetes ([address] 8/2/2017    DJD (degenerative joint disease) 8/2/2017    ED (erectile dysfunction) 8/2/2017    Guillain-[place] syndrome ([address] 8/2/2017    Hyperlipidemia 8/2/2017    Hypertension 8/2/2017    Left carotid bruit 8/2/2017    Melanoma ([address] 2021    Morbid obesity ([address] 8/2/2017    On statin therapy 8/2/2017    Urticaria 8/2/2017     Past Surgical History:   Procedure Laterality Date    HX ENDOSCOPY  12/02/2021    [place] CARDIAC SURG PROCEDURE UNLIST  09/2019    4 stents placed     Allergies   Allergen Reactions    Adhesive Tape-Silicones Unknown (comments)    Chocolate Flavor Unknown (comments)       REVIEW OF SYSTEMS:  General: negative for - chills or fever, or weight loss or gain  ENT: negative for - headaches, nasal congestion or tinnitus  Eyes: no blurred or visual changes  Neck: No stiffness or swollen nodes  Respiratory: negative for - cough, hemoptysis, shortness of breath or wheezing  Cardiovascular : negative for - chest pain, edema, palpitations or shortness of breath  Gastrointestinal: negative for - abdominal pain, blood in stools, heartburn or nausea/vomiting  Genito-Urinary: no dysuria, trouble voiding, or hematuria however according to his wife he has nocturia about every hour at night  Musculoskeletal: negative for - gait disturbance, joint pain, joint stiffness or joint swelling except left knee pain as noted  Neurological: no TIA or stroke symptoms  Hematologic: no bruises, no bleeding  Lymphatic: no swollen glands  Integument: no lumps, mole changes, nail changes or rash  Endocrine:no malaise/lethargy poly uria or polydipsia or unexpected weight changes        Social History     Socioeconomic History    Marital status:    Tobacco Use    Smoking status: Never Smoker    Smokeless tobacco: Never Used   Vaping Use    Vaping Use: Never used   Substance and Sexual Activity    Alcohol use: Yes     Comment: social    Drug use: No     Family History   Problem Relation Age of Onset    Heart Disease Mother         murmor    Heart Disease Father        OBJECTIVE:     Visit Vitals  /68 (BP 1 Location: Left upper arm, BP Patient Position: Sitting, BP Cuff Size: Large adult)   Pulse 78   Temp 98 °F (36.7 °C) (Oral)   Resp 18   Ht 5' 10\" (1.778 m)   Wt 151 lb 4.8 oz (68.6 kg)   SpO2 98%   BMI 21.71 kg/m²     CONSTITUTIONAL:   well nourished, appears age appropriate  EYES: sclera anicteric, PERRL, EOMI  ENMT:nares clear, moist mucous membranes, pharynx clear  NECK: supple. Thyroid normal, No JVD or bruits  RESPIRATORY: Chest: clear to ascultation and percussion, normal inspiratory effort  CARDIOVASCULAR: Heart: regular rate and rhythm no murmurs, rubs or gallops, PMI not displaced, No thrills, no peripheral edema  GASTROINTESTINAL: Abdomen: non distended, soft, non tender, bowel sounds normal  HEMATOLOGIC: no purpura, petechiae or bruising  LYMPHATIC: No lymph node enlargemant  MUSCULOSKELETAL: Extremities: no active synovitis, pulse 1+.   Left knee discomfort range of motion but no joint effusion erythema increased temperature. INTEGUMENT: No unusual rashes or suspicious skin lesions noted. Nails appear normal.  PERIPHERAL VASCULAR: normal pulses femoral, PT and DP  NEUROLOGIC: non-focal exam, A & O X 3  PSYCHIATRIC:, appropriate affect     ASSESSMENT:   1. Essential hypertension    2. Controlled type 2 diabetes mellitus with stage 2 chronic kidney disease, without long-term current use of insulin ([address]    3. Mixed hyperlipidemia    4. Primary osteoarthritis involving multiple joints    5. Stage 2 chronic kidney disease    6. Diastolic CHF, chronic (HCC)    7. ASCVD (arteriosclerotic cardiovascular disease)    8. Malignant neoplasm of lung, unspecified laterality, unspecified part of lung ([address]    9. Guillain-[place] syndrome ([address]    10. Primary osteoarthritis of left knee    11. Urinary tract infection without hematuria, site unspecified      Impression  1. Hypertension that is controlled so we will continue current therapy reviewed him. 2.  Diabetes that is improved so we will continue current treatment there. 3.  Hyperlipidemia prior lab reviewed repeat status pending I will adjust if needed. 4. DJD stable except for the left knee   5. CKD stage II repeat status pending  6. Diastolic CHF compensated  7. ASCVD stable  8. Lung carcinoma metastatic still on chemotherapy he feels like he is doing okay  9. 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 DJD of the left knee we did discuss treatment options and per his request elected to go with injection. After informed consent and Betadine scrub the left knee is intermittently injected with Depo-Medrol 40 mg a cc lidocaine which he tolerated quite well. 11.  Urinary tract infection with nocturia every hour we will check a urine and a urine culture but I will reserve treatment to see what the culture shows  Follow-up with me scheduled again for 1 month regarding his weight loss and 3 months regarding his other medical problems.   I will call with today's lab results. All of the above was discussed with his wife present with him today. PLAN:  .  Orders Placed This Encounter    DRAIN/INJECT LARGE JOINT/BURSA    CULTURE, URINE    METABOLIC PANEL, COMPREHENSIVE    LIPID PANEL    CK    URINALYSIS W/MICROSCOPIC    methylPREDNISolone acetate (DEPO-MEDROL) 40 mg/mL injection         ATTENTION:   This medical record was transcribed using an electronic medical records system. Although proofread, it may and can contain electronic and spelling errors. Other human spelling and other errors may be present. Corrections may be executed at a later time. Please feel free to contact us for any clarifications as needed. Follow-up and Dispositions    · Return in about 3 months (around 6/30/2022). No results found for any visits on 03/30/22. Cecilia Romero MD    The patient verbalized understanding of the problems and plans as explained.

## 2022-03-30 ENCOUNTER — TRANSCRIBE ORDER (OUTPATIENT)
Dept: SCHEDULING | Age: 77
End: 2022-03-30

## 2022-03-30 ENCOUNTER — OFFICE VISIT (OUTPATIENT)
Dept: INTERNAL MEDICINE CLINIC | Age: 77
End: 2022-03-30
Payer: MEDICARE

## 2022-03-30 VITALS
HEART RATE: 78 BPM | HEIGHT: 70 IN | SYSTOLIC BLOOD PRESSURE: 110 MMHG | TEMPERATURE: 98 F | OXYGEN SATURATION: 98 % | WEIGHT: 151.3 LBS | RESPIRATION RATE: 18 BRPM | BODY MASS INDEX: 21.66 KG/M2 | DIASTOLIC BLOOD PRESSURE: 68 MMHG

## 2022-03-30 DIAGNOSIS — G89.3 NEOPLASM RELATED PAIN: ICD-10-CM

## 2022-03-30 DIAGNOSIS — I50.32 DIASTOLIC CHF, CHRONIC (HCC): ICD-10-CM

## 2022-03-30 DIAGNOSIS — N18.2 CONTROLLED TYPE 2 DIABETES MELLITUS WITH STAGE 2 CHRONIC KIDNEY DISEASE, WITHOUT LONG-TERM CURRENT USE OF INSULIN (HCC): ICD-10-CM

## 2022-03-30 DIAGNOSIS — C79.52 SECONDARY MALIGNANT NEOPLASM OF BONE AND BONE MARROW (HCC): ICD-10-CM

## 2022-03-30 DIAGNOSIS — C34.90 MALIGNANT NEOPLASM OF LUNG, UNSPECIFIED LATERALITY, UNSPECIFIED PART OF LUNG (HCC): ICD-10-CM

## 2022-03-30 DIAGNOSIS — N39.0 URINARY TRACT INFECTION WITHOUT HEMATURIA, SITE UNSPECIFIED: ICD-10-CM

## 2022-03-30 DIAGNOSIS — E78.2 MIXED HYPERLIPIDEMIA: ICD-10-CM

## 2022-03-30 DIAGNOSIS — N18.2 STAGE 2 CHRONIC KIDNEY DISEASE: ICD-10-CM

## 2022-03-30 DIAGNOSIS — G61.0 GUILLAIN-BARRE SYNDROME (HCC): ICD-10-CM

## 2022-03-30 DIAGNOSIS — C34.12 PRIMARY MALIGNANT NEOPLASM OF BRONCHUS OF LEFT UPPER LOBE (HCC): Primary | ICD-10-CM

## 2022-03-30 DIAGNOSIS — E11.22 CONTROLLED TYPE 2 DIABETES MELLITUS WITH STAGE 2 CHRONIC KIDNEY DISEASE, WITHOUT LONG-TERM CURRENT USE OF INSULIN (HCC): ICD-10-CM

## 2022-03-30 DIAGNOSIS — M15.9 PRIMARY OSTEOARTHRITIS INVOLVING MULTIPLE JOINTS: ICD-10-CM

## 2022-03-30 DIAGNOSIS — Z79.899 ENCOUNTER FOR LONG-TERM (CURRENT) USE OF OTHER MEDICATIONS: ICD-10-CM

## 2022-03-30 DIAGNOSIS — C79.51 SECONDARY MALIGNANT NEOPLASM OF BONE AND BONE MARROW (HCC): ICD-10-CM

## 2022-03-30 DIAGNOSIS — I10 ESSENTIAL HYPERTENSION: Primary | ICD-10-CM

## 2022-03-30 DIAGNOSIS — M17.12 PRIMARY OSTEOARTHRITIS OF LEFT KNEE: ICD-10-CM

## 2022-03-30 DIAGNOSIS — I25.10 ASCVD (ARTERIOSCLEROTIC CARDIOVASCULAR DISEASE): ICD-10-CM

## 2022-03-30 PROCEDURE — 20610 DRAIN/INJ JOINT/BURSA W/O US: CPT | Performed by: INTERNAL MEDICINE

## 2022-03-30 PROCEDURE — G8536 NO DOC ELDER MAL SCRN: HCPCS | Performed by: INTERNAL MEDICINE

## 2022-03-30 PROCEDURE — 3044F HG A1C LEVEL LT 7.0%: CPT | Performed by: INTERNAL MEDICINE

## 2022-03-30 PROCEDURE — G8752 SYS BP LESS 140: HCPCS | Performed by: INTERNAL MEDICINE

## 2022-03-30 PROCEDURE — 1101F PT FALLS ASSESS-DOCD LE1/YR: CPT | Performed by: INTERNAL MEDICINE

## 2022-03-30 PROCEDURE — G8754 DIAS BP LESS 90: HCPCS | Performed by: INTERNAL MEDICINE

## 2022-03-30 PROCEDURE — G8510 SCR DEP NEG, NO PLAN REQD: HCPCS | Performed by: INTERNAL MEDICINE

## 2022-03-30 PROCEDURE — 99214 OFFICE O/P EST MOD 30 MIN: CPT | Performed by: INTERNAL MEDICINE

## 2022-03-30 PROCEDURE — G8427 DOCREV CUR MEDS BY ELIG CLIN: HCPCS | Performed by: INTERNAL MEDICINE

## 2022-03-30 PROCEDURE — G8420 CALC BMI NORM PARAMETERS: HCPCS | Performed by: INTERNAL MEDICINE

## 2022-03-30 RX ORDER — METHYLPREDNISOLONE ACETATE 40 MG/ML
40 INJECTION, SUSPENSION INTRA-ARTICULAR; INTRALESIONAL; INTRAMUSCULAR; SOFT TISSUE ONCE
Qty: 1 ML | Refills: 0
Start: 2022-03-30 | End: 2022-03-30

## 2022-03-30 NOTE — PATIENT INSTRUCTIONS
Arthritis: Care Instructions  Overview     Arthritis, also called osteoarthritis, is a breakdown of the cartilage that cushions your joints. When the cartilage wears down, your bones rub against each other. This causes pain and stiffness. Many people have some arthritis as they age. Arthritis most often affects the joints of the spine, hands, hips, knees, or feet. Arthritis never goes away completely. But medicine and home treatment can help reduce pain and help you stay active. Follow-up care is a key part of your treatment and safety. Be sure to make and go to all appointments, and call your doctor if you are having problems. It's also a good idea to know your test results and keep a list of the medicines you take. How can you care for yourself at home? · Stay at a healthy weight. Being overweight puts extra strain on your joints. · Talk to your doctor or physical therapist about exercises that will help ease joint pain. ? Stretch. You may enjoy gentle forms of yoga to help keep your joints and muscles flexible. ? Walk instead of jog. Other types of exercise that are less stressful on the joints include riding a bike, swimming, virgilio chi, or water exercise. ? Lift weights. Strong muscles help reduce stress on your joints. Stronger thigh muscles, for example, take some of the stress off of the knees and hips. Learn the right way to lift weights so you don't make joint pain worse. · Take your medicines exactly as prescribed. Call your doctor if you think you are having a problem with your medicine. · Take pain medicines exactly as directed. ? If the doctor gave you a prescription medicine for pain, take it as prescribed. ? If you are not taking a prescription pain medicine, ask your doctor if you can take an over-the-counter medicine. · Use a cane, crutch, walker, or another device if you need help to get around. These can help rest your joints.  You also can use other things to make life easier, such as a higher toilet seat and padded handles on kitchen utensils. · Do not sit in low chairs. They can make it hard to get up. · Put heat or cold on your sore joints as needed. Use whichever helps you most. You can also switch between hot and cold packs. ? Apply heat 2 or 3 times a day for 20 to 30 minutes--using a heating pad, hot shower, or hot pack--to relieve pain and stiffness. But don't use heat on a swollen joint. ? Put ice or a cold pack on your sore joint for 10 to 20 minutes at a time. Put a thin cloth between the ice and your skin. When should you call for help? Call your doctor now or seek immediate medical care if:    · You have sudden swelling, warmth, or pain in any joint.     · You have joint pain and a fever or rash.     · You have such bad pain that you cannot use a joint. Watch closely for changes in your health, and be sure to contact your doctor if:    · You have mild joint symptoms that continue even with more than 6 weeks of care at home.     · You have stomach pain or other problems with your medicine. Where can you learn more? Go to http://www.gray.com/  Enter B256 in the search box to learn more about \"Arthritis: Care Instructions. \"  Current as of: December 20, 2021               Content Version: 13.2  © 2805-6941 [employer]. Care instructions adapted under license by [employer] (which disclaims liability or warranty for this information). If you have questions about a medical condition or this instruction, always ask your healthcare professional. [place] [address] any warranty or liability for your use of this information.

## 2022-03-30 NOTE — PROGRESS NOTES
Incoming call received from spouse, Jose David Dumont. Reports patient saw Dr. Mathew Prior today and patient has lost 10 pounds  Jose David Dumont reports patient has a great appetite, but \"just cant gain weight\"  Discuss protein consumption ie Boost,Glucerna, Ensure-drinking at least one per day  Reports patient continues to drink pedialyte, but not with every meal  States labs were drawn today  Spouse voiced her appreciation with the follow up calls  CTN plans to follow up in two weeks.

## 2022-03-30 NOTE — PROGRESS NOTES
HIPAA verified by two patient identifiers. Jesús Us is a 64 y.o. male    Chief Complaint   Patient presents with    Hypertension     3 month    CHF    Diabetes    Chronic Kidney Disease    Osteoarthritis    Cholesterol Problem       Visit Vitals  /68 (BP 1 Location: Left upper arm, BP Patient Position: Sitting, BP Cuff Size: Large adult)   Pulse 78   Temp 98 °F (36.7 °C) (Oral)   Resp 18   Ht 5' 10\" (1.778 m)   Wt 151 lb 4.8 oz (68.6 kg)   SpO2 98%   BMI 21.71 kg/m²       Pain Scale: 0 - No pain/10  Pain Location:       Health Maintenance Due   Topic Date Due    Hepatitis C Screening  Never done    DTaP/Tdap/Td series (1 - Tdap) Never done    Shingrix Vaccine Age 50> (1 of 2) Never done    Eye Exam Retinal or Dilated  06/20/2020    COVID-19 Vaccine (3 - Pfizer risk 4-dose series) 04/24/2021    Foot Exam Q1  02/09/2022         Coordination of Care Questionnaire:  :   1) Have you been to an emergency room, urgent care, or hospitalized since your last visit? If yes, where when, and reason for visit? yes hypoglycemia  3/11//22   [facility]      2. Have seen or consulted any other health care provider since your last visit? If yes, where when, and reason for visit? NO      Patient is accompanied by self I have received verbal consent from Jesús Us to discuss any/all medical information while they are present in the room.

## 2022-03-31 LAB
ALBUMIN SERPL-MCNC: 3.5 G/DL (ref 3.5–5)
ALBUMIN/GLOB SERPL: 1.3 {RATIO} (ref 1.1–2.2)
ALP SERPL-CCNC: 121 U/L (ref 45–117)
ALT SERPL-CCNC: 45 U/L (ref 12–78)
ANION GAP SERPL CALC-SCNC: 10 MMOL/L (ref 5–15)
APPEARANCE UR: CLEAR
AST SERPL-CCNC: 27 U/L (ref 15–37)
BACTERIA URNS QL MICRO: NEGATIVE /HPF
BILIRUB SERPL-MCNC: 1 MG/DL (ref 0.2–1)
BILIRUB UR QL: NEGATIVE
BUN SERPL-MCNC: 16 MG/DL (ref 6–20)
BUN/CREAT SERPL: 19 (ref 12–20)
CALCIUM SERPL-MCNC: 7.8 MG/DL (ref 8.5–10.1)
CHLORIDE SERPL-SCNC: 101 MMOL/L (ref 97–108)
CHOLEST SERPL-MCNC: 148 MG/DL
CK SERPL-CCNC: 63 U/L (ref 39–308)
CO2 SERPL-SCNC: 23 MMOL/L (ref 21–32)
COLOR UR: ABNORMAL
CREAT SERPL-MCNC: 0.85 MG/DL (ref 0.7–1.3)
EPITH CASTS URNS QL MICRO: ABNORMAL /LPF
GLOBULIN SER CALC-MCNC: 2.7 G/DL (ref 2–4)
GLUCOSE SERPL-MCNC: 139 MG/DL (ref 65–100)
GLUCOSE UR STRIP.AUTO-MCNC: >1000 MG/DL
HDLC SERPL-MCNC: 71 MG/DL
HDLC SERPL: 2.1 {RATIO} (ref 0–5)
HGB UR QL STRIP: NEGATIVE
HYALINE CASTS URNS QL MICRO: ABNORMAL /LPF (ref 0–5)
KETONES UR QL STRIP.AUTO: ABNORMAL MG/DL
LDLC SERPL CALC-MCNC: 55.6 MG/DL (ref 0–100)
LEUKOCYTE ESTERASE UR QL STRIP.AUTO: NEGATIVE
NITRITE UR QL STRIP.AUTO: NEGATIVE
PH UR STRIP: 5.5 [PH] (ref 5–8)
POTASSIUM SERPL-SCNC: 4.4 MMOL/L (ref 3.5–5.1)
PROT SERPL-MCNC: 6.2 G/DL (ref 6.4–8.2)
PROT UR STRIP-MCNC: 100 MG/DL
RBC #/AREA URNS HPF: ABNORMAL /HPF (ref 0–5)
SODIUM SERPL-SCNC: 134 MMOL/L (ref 136–145)
SP GR UR REFRACTOMETRY: 1.03 (ref 1–1.03)
TRIGL SERPL-MCNC: 107 MG/DL (ref ?–150)
UROBILINOGEN UR QL STRIP.AUTO: 1 EU/DL (ref 0.2–1)
VLDLC SERPL CALC-MCNC: 21.4 MG/DL
WBC URNS QL MICRO: ABNORMAL /HPF (ref 0–4)

## 2022-04-01 LAB
BACTERIA SPEC CULT: NORMAL
SERVICE CMNT-IMP: NORMAL

## 2022-04-06 ENCOUNTER — PATIENT OUTREACH (OUTPATIENT)
Dept: CASE MANAGEMENT | Age: 77
End: 2022-04-06

## 2022-04-06 NOTE — ACP (ADVANCE CARE PLANNING)
Outbound call made. Spoke with patient's spouse, Nannette Cardoza who states they do not have any questions regarding the acp packet emailed and will call back when ready to complete. No further outreach scheduled.

## 2022-04-12 ENCOUNTER — PATIENT OUTREACH (OUTPATIENT)
Dept: CASE MANAGEMENT | Age: 77
End: 2022-04-12

## 2022-04-12 NOTE — PROGRESS NOTES
Patient has graduated from the Transitions of Care Coordination  program on 4/12/2022. Patient/family has the ability to self-manage at this time Care management goals have been completed. Patient was not referred to the [facility] team for further management. Goals Addressed                 This Visit's Progress     COMPLETED: Prevent complications post hospitalization. 03/14/22   Patient will not fall   Weakness will improve   Will attend follow up appt with PCP, oncologist scheduled 3/14   Pt will remain out of the hospital or ER for remainder of FESTUS period    03/22/22   No falls   Attended follow up appt with PCP, oncology   Will continue to stay hydrated and will report great appetite            Patient has Care Transition Nurse's contact information for any further questions, concerns, or needs.   Patients upcoming visits:    Future Appointments   Date Time Provider Martín Trujillo   4/29/2022  2:00 PM MD ROSHNI Vick BS AMB   7/6/2022  9:20 AM MD ROSHNI Vick BS AMB

## 2022-04-20 RX ORDER — TAMSULOSIN HYDROCHLORIDE 0.4 MG/1
CAPSULE ORAL
Qty: 180 CAPSULE | Refills: 3 | Status: SHIPPED | OUTPATIENT
Start: 2022-04-20

## 2022-04-20 NOTE — TELEPHONE ENCOUNTER
RX refill request from the patient/pharmacy. Patient last seen 03- with labs, and next appt. scheduled for 04-  Requested Prescriptions     Pending Prescriptions Disp Refills    tamsulosin (FLOMAX) 0.4 mg capsule [Pharmacy Med Name: TAMSULOSIN HCL 0.4 MG CAPSULE] 180 Capsule 3     Sig: TAKE TWO CAPSULES BY MOUTH DAILY   .

## 2022-04-29 ENCOUNTER — OFFICE VISIT (OUTPATIENT)
Dept: INTERNAL MEDICINE CLINIC | Age: 77
End: 2022-04-29
Payer: MEDICARE

## 2022-04-29 VITALS
RESPIRATION RATE: 18 BRPM | HEART RATE: 85 BPM | DIASTOLIC BLOOD PRESSURE: 68 MMHG | WEIGHT: 145.5 LBS | OXYGEN SATURATION: 97 % | BODY MASS INDEX: 20.83 KG/M2 | SYSTOLIC BLOOD PRESSURE: 94 MMHG | HEIGHT: 70 IN

## 2022-04-29 DIAGNOSIS — N18.2 STAGE 2 CHRONIC KIDNEY DISEASE: ICD-10-CM

## 2022-04-29 DIAGNOSIS — C79.9 METASTATIC ADENOCARCINOMA (HCC): ICD-10-CM

## 2022-04-29 DIAGNOSIS — R63.4 WEIGHT LOSS: Primary | ICD-10-CM

## 2022-04-29 DIAGNOSIS — N18.2 CONTROLLED TYPE 2 DIABETES MELLITUS WITH STAGE 2 CHRONIC KIDNEY DISEASE, WITHOUT LONG-TERM CURRENT USE OF INSULIN (HCC): ICD-10-CM

## 2022-04-29 DIAGNOSIS — E11.22 CONTROLLED TYPE 2 DIABETES MELLITUS WITH STAGE 2 CHRONIC KIDNEY DISEASE, WITHOUT LONG-TERM CURRENT USE OF INSULIN (HCC): ICD-10-CM

## 2022-04-29 DIAGNOSIS — I10 ESSENTIAL HYPERTENSION: ICD-10-CM

## 2022-04-29 PROCEDURE — G8752 SYS BP LESS 140: HCPCS | Performed by: INTERNAL MEDICINE

## 2022-04-29 PROCEDURE — G8427 DOCREV CUR MEDS BY ELIG CLIN: HCPCS | Performed by: INTERNAL MEDICINE

## 2022-04-29 PROCEDURE — G8536 NO DOC ELDER MAL SCRN: HCPCS | Performed by: INTERNAL MEDICINE

## 2022-04-29 PROCEDURE — 3044F HG A1C LEVEL LT 7.0%: CPT | Performed by: INTERNAL MEDICINE

## 2022-04-29 PROCEDURE — G8510 SCR DEP NEG, NO PLAN REQD: HCPCS | Performed by: INTERNAL MEDICINE

## 2022-04-29 PROCEDURE — 1101F PT FALLS ASSESS-DOCD LE1/YR: CPT | Performed by: INTERNAL MEDICINE

## 2022-04-29 PROCEDURE — G8420 CALC BMI NORM PARAMETERS: HCPCS | Performed by: INTERNAL MEDICINE

## 2022-04-29 PROCEDURE — 99214 OFFICE O/P EST MOD 30 MIN: CPT | Performed by: INTERNAL MEDICINE

## 2022-04-29 PROCEDURE — G8754 DIAS BP LESS 90: HCPCS | Performed by: INTERNAL MEDICINE

## 2022-04-29 RX ORDER — METFORMIN HYDROCHLORIDE 500 MG/1
500 TABLET ORAL 2 TIMES DAILY WITH MEALS
COMMUNITY
Start: 2022-04-20 | End: 2022-06-09

## 2022-04-29 NOTE — PROGRESS NOTES
Chief Complaint   Patient presents with    Weight Loss     1 month follow up       SUBJECTIVE:    Agapito Corona is a 64 y.o. male has been diagnosed with metastatic adenocarcinoma of his lung and returns in follow-up regarding his continued fatigue and weight loss. He claims to be eating all day long yet despite that his weight is continuing to go to him. He still on the chemotherapy and being followed by Dr. Rosanna Boswell and has an appointment see her again on May 9. He denies any nausea vomiting abdominal pain. He denies any chest pain, shortness of breath or cardiorespiratory complaints except for he has coughed up a few streaks of blood. He has no other complaints on complete review of systems other than just generalized weakness and fatigue. Current Outpatient Medications   Medication Sig Dispense Refill    metFORMIN (GLUCOPHAGE) 500 mg tablet Take 500 mg by mouth two (2) times daily (with meals).  tamsulosin (FLOMAX) 0.4 mg capsule TAKE TWO CAPSULES BY MOUTH DAILY 180 Capsule 3    Jardiance 10 mg tablet TAKE ONE TABLET BY MOUTH DAILY 30 Tablet 5    amLODIPine (NORVASC) 10 mg tablet Take 10 mg by mouth daily.  lisinopriL (PRINIVIL, ZESTRIL) 40 mg tablet Take 40 mg by mouth daily. Indications: pt is taking 1/2 tablet daily      pravastatin (PRAVACHOL) 80 mg tablet Take 80 mg by mouth daily.  osimertinib (Tagrisso) 80 mg tablet Take 80 mg by mouth daily.  diclofenac EC (VOLTAREN) 50 mg EC tablet Take 50 mg by mouth three (3) times daily as needed for Pain.  metoprolol tartrate (LOPRESSOR) 25 mg tablet Take 1 Tablet by mouth every twelve (12) hours. 180 Tablet 3    Omega-3-DHA-EPA-Fish Oil 1,000 mg (120 mg-180 mg) cap Take 1 Cap by mouth three (3) times daily.  finasteride (PROSCAR) 5 mg tablet Take 5 mg by mouth daily.        Past Medical History:   Diagnosis Date    Aortic stenosis 8/2/2017    ASVD (arteriosclerotic vascular disease) 8/2/2017    Back pain 8/2/2017  BPH (benign prostatic hyperplasia) 8/2/2017    CKD (chronic kidney disease) 8/2/2017    Diabetes [address] 8/2/2017    DJD (degenerative joint disease) 8/2/2017    ED (erectile dysfunction) 8/2/2017    Guillain-[place] syndrome ([address] 8/2/2017    Hyperlipidemia 8/2/2017    Hypertension 8/2/2017    Left carotid bruit 8/2/2017    Melanoma ([facility] 2021    Morbid obesity ([facility] 8/2/2017    On statin therapy 8/2/2017    Urticaria 8/2/2017     Past Surgical History:   Procedure Laterality Date    HX ENDOSCOPY  12/02/2021    [place] CARDIAC SURG PROCEDURE UNLIST  09/2019    4 stents placed     Allergies   Allergen Reactions    Adhesive Tape-Silicones Unknown (comments)    Chocolate Flavor Unknown (comments)       REVIEW OF SYSTEMS:  General: negative for - chills or fever but positive for weight loss and generalized weakness  ENT: negative for - headaches, nasal congestion or tinnitus  Eyes: no blurred or visual changes  Neck: No stiffness or swollen nodes  Respiratory: negative for - cough, hemoptysis, shortness of breath or wheezing  Cardiovascular : negative for - chest pain, edema, palpitations or shortness of breath  Gastrointestinal: negative for - abdominal pain, blood in stools, heartburn or nausea/vomiting  Genito-Urinary: no dysuria, trouble voiding, or hematuria  Musculoskeletal: negative for - gait disturbance, joint pain, joint stiffness or joint swelling  Neurological: no TIA or stroke symptoms  Hematologic: no bruises, no bleeding  Lymphatic: no swollen glands  Integument: no lumps, mole changes, nail changes or rash  Endocrine:no malaise/lethargy poly uria or polydipsia or unexpected weight changes        Social History     Socioeconomic History    Marital status:    Tobacco Use    Smoking status: Never Smoker    Smokeless tobacco: Never Used   Vaping Use    Vaping Use: Never used   Substance and Sexual Activity    Alcohol use: Yes     Comment: social    Drug use: No     Family History Problem Relation Age of Onset    Heart Disease Mother         murmor    Heart Disease Father        OBJECTIVE:     Visit Vitals  BP 94/68 (BP 1 Location: Left upper arm, BP Patient Position: Sitting, BP Cuff Size: Adult)   Pulse 85   Resp 18   Ht 5' 10\" (1.778 m)   Wt 145 lb 8 oz (66 kg)   SpO2 97%   BMI 20.88 kg/m²     CONSTITUTIONAL:   well nourished, appears age appropriate  EYES: sclera anicteric, PERRL, EOMI  ENMT:nares clear, moist mucous membranes, pharynx clear  NECK: supple. Thyroid normal, No JVD or bruits  RESPIRATORY: Chest: clear to ascultation and percussion, normal inspiratory effort  CARDIOVASCULAR: Heart: regular rate and rhythm no murmurs, rubs or gallops, PMI not displaced, No thrills, no peripheral edema  GASTROINTESTINAL: Abdomen: non distended, soft, non tender, bowel sounds normal  HEMATOLOGIC: no purpura, petechiae or bruising  LYMPHATIC: No lymph node enlargemant  MUSCULOSKELETAL: Extremities: no active synovitis, pulse 1+   INTEGUMENT: No unusual rashes or suspicious skin lesions noted. Nails appear normal.  PERIPHERAL VASCULAR: normal pulses femoral, PT and DP  NEUROLOGIC: non-focal exam, A & O X 3  PSYCHIATRIC:, appropriate affect     ASSESSMENT:   1. Weight loss    2. Metastatic adenocarcinoma ([address]    3. Controlled type 2 diabetes mellitus with stage 2 chronic kidney disease, without long-term current use of insulin (HCC)    4. Stage 2 chronic kidney disease    5. Essential hypertension      Impression  1. Weight loss is certainly concerning that he still has significant tumor burden. Weight is down 6 pounds from his last visit a month ago. 2.  Metastatic adenocarcinoma still on chemotherapy by Dr. Angelo Mckeon  3. Diabetes on we will see what his blood sugar is because that is concerning with his significant weight loss although the only diabetic medicine he still now is Jardiance we may end up having to stop that  4. CKD stage II I certainly want make sure that has not become worse. Blood pressure is on the low side so I am going to stop his Norvasc as well as lisinopril. 5.  Hypertension as noted blood pressure on low side so discontinue Norvasc and lisinopril  He does have follow-up with me in early July; however I will see him back in 1 month regarding his hypertension since we are stopping his blood pressure medicines except for his metoprolol. I may need to see him sooner based on any symptoms or lab results. PLAN:  .  Orders Placed This Encounter    CBC WITH AUTOMATED DIFF    METABOLIC PANEL, COMPREHENSIVE    metFORMIN (GLUCOPHAGE) 500 mg tablet         ATTENTION:   This medical record was transcribed using an electronic medical records system. Although proofread, it may and can contain electronic and spelling errors. Other human spelling and other errors may be present. Corrections may be executed at a later time. Please feel free to contact us for any clarifications as needed. No results found for any visits on 04/29/22. Ty Ospina MD    The patient verbalized understanding of the problems and plans as explained.

## 2022-04-29 NOTE — PROGRESS NOTES
Chief Complaint   Patient presents with    Weight Loss     1 month follow up     Visit Vitals  BP 94/68 (BP 1 Location: Left upper arm, BP Patient Position: Sitting, BP Cuff Size: Adult)   Pulse 85   Resp 18   Ht 5' 10\" (1.778 m)   Wt 145 lb 8 oz (66 kg)   SpO2 97%   BMI 20.88 kg/m²     1. Have you been to the ER, urgent care clinic since your last visit? Hospitalized since your last visit? No    2. Have you seen or consulted any other health care providers outside of the [address] [place] [address] since your last visit? Include any pap smears or colon screening.  no

## 2022-04-29 NOTE — PATIENT INSTRUCTIONS
ACE Inhibitors: Care Instructions  Your Care Instructions     ACE (angiotensin-converting enzyme) inhibitors lower blood pressure. They also treat heart failure and prevent heart attacks and strokes. They block an enzyme that makes blood vessels narrow. As a result, the blood vessels relax and widen. This lowers blood pressure. These medicines also put more water and salt into the urine. This lowers blood pressure too. These medicines are a good choice for people with diabetes. They don't affect blood sugar levels, and they may protect the kidneys. Examples include:  · Benazepril (Lotensin). · Lisinopril (Prinivil, Zestril). · Ramipril (Altace). Before you start taking this medicine, make sure your doctor knows if you take other medicines, especially water pills (diuretics) or potassium tablets. And tell your doctor if you use a salt substitute. You should not take an ACE inhibitor if you are pregnant or planning to become pregnant. You may need regular blood tests. Follow-up care is a key part of your treatment and safety. Be sure to make and go to all appointments, and call your doctor if you are having problems. It's also a good idea to know your test results and keep a list of the medicines you take. How can you care for yourself at home? · Take your medicines exactly as prescribed. Be sure to take your medicine every day. Call your doctor if you think you are having a problem with your medicine. · ACE inhibitors can cause a dry cough. If the cough is bad, talk to your doctor. You may need to try a different medicine. · ACE inhibitors can cause swelling of your lips, tongue, or face. If the swelling is severe, you may need treatment right away. Severe swelling can make it hard to breathe, but this is very rare. · Check with your doctor or pharmacist before you use any other medicines. This includes over-the-counter medicines.  Make sure your doctor knows all of the medicines, vitamins, herbal products, and supplements you take. Taking some medicines together can cause problems. When should you call for help? Call your doctor now or seek immediate medical care if:    · You have swelling of your lips, tongue, or face.     · You think you are having problems with your medicine. Watch closely for changes in your health, and be sure to contact your doctor if you have any problems. Where can you learn more? Go to http://www.gray.com/  Enter [id number] in the search box to learn more about \"ACE Inhibitors: Care Instructions. \"  Current as of: January 10, 2022               Content Version: 13.2  © 8792-4277 [employer]. Care instructions adapted under license by [employer] (which disclaims liability or warranty for this information). If you have questions about a medical condition or this instruction, always ask your healthcare professional. [address] any warranty or liability for your use of this information.

## 2022-04-30 LAB
ALBUMIN SERPL-MCNC: 3.4 G/DL (ref 3.5–5)
ALBUMIN/GLOB SERPL: 1.3 {RATIO} (ref 1.1–2.2)
ALP SERPL-CCNC: 135 U/L (ref 45–117)
ALT SERPL-CCNC: 61 U/L (ref 12–78)
ANION GAP SERPL CALC-SCNC: 9 MMOL/L (ref 5–15)
AST SERPL-CCNC: 45 U/L (ref 15–37)
BASOPHILS # BLD: 0 K/UL (ref 0–0.1)
BASOPHILS NFR BLD: 0 % (ref 0–1)
BILIRUB SERPL-MCNC: 0.8 MG/DL (ref 0.2–1)
BUN SERPL-MCNC: 14 MG/DL (ref 6–20)
BUN/CREAT SERPL: 15 (ref 12–20)
CALCIUM SERPL-MCNC: 8.4 MG/DL (ref 8.5–10.1)
CHLORIDE SERPL-SCNC: 100 MMOL/L (ref 97–108)
CO2 SERPL-SCNC: 26 MMOL/L (ref 21–32)
CREAT SERPL-MCNC: 0.92 MG/DL (ref 0.7–1.3)
DIFFERENTIAL METHOD BLD: ABNORMAL
EOSINOPHIL # BLD: 0 K/UL (ref 0–0.4)
EOSINOPHIL NFR BLD: 0 % (ref 0–7)
ERYTHROCYTE [DISTWIDTH] IN BLOOD BY AUTOMATED COUNT: 16.3 % (ref 11.5–14.5)
GLOBULIN SER CALC-MCNC: 2.6 G/DL (ref 2–4)
GLUCOSE SERPL-MCNC: 155 MG/DL (ref 65–100)
HCT VFR BLD AUTO: 40.8 % (ref 36.6–50.3)
HGB BLD-MCNC: 12.4 G/DL (ref 12.1–17)
IMM GRANULOCYTES # BLD AUTO: 0 K/UL (ref 0–0.04)
IMM GRANULOCYTES NFR BLD AUTO: 0 % (ref 0–0.5)
LYMPHOCYTES # BLD: 0.7 K/UL (ref 0.8–3.5)
LYMPHOCYTES NFR BLD: 16 % (ref 12–49)
MCH RBC QN AUTO: 24.3 PG (ref 26–34)
MCHC RBC AUTO-ENTMCNC: 30.4 G/DL (ref 30–36.5)
MCV RBC AUTO: 80 FL (ref 80–99)
MONOCYTES # BLD: 0.3 K/UL (ref 0–1)
MONOCYTES NFR BLD: 8 % (ref 5–13)
NEUTS SEG # BLD: 3.2 K/UL (ref 1.8–8)
NEUTS SEG NFR BLD: 76 % (ref 32–75)
NRBC # BLD: 0 K/UL (ref 0–0.01)
NRBC BLD-RTO: 0 PER 100 WBC
PLATELET # BLD AUTO: 184 K/UL (ref 150–400)
PMV BLD AUTO: 11.4 FL (ref 8.9–12.9)
POTASSIUM SERPL-SCNC: 4.2 MMOL/L (ref 3.5–5.1)
PROT SERPL-MCNC: 6 G/DL (ref 6.4–8.2)
RBC # BLD AUTO: 5.1 M/UL (ref 4.1–5.7)
RBC MORPH BLD: ABNORMAL
RBC MORPH BLD: ABNORMAL
SODIUM SERPL-SCNC: 135 MMOL/L (ref 136–145)
WBC # BLD AUTO: 4.2 K/UL (ref 4.1–11.1)

## 2022-05-23 NOTE — PROCEDURES
PCI of prox RCA    95%  . 3.0 [place] MARIPOSA . 0% residual.   KIT 3 flow . Could not wire the distal SRIKANTH branch . No intervention there . No complication. Vaccine status unknown

## 2022-05-24 ENCOUNTER — TRANSCRIBE ORDER (OUTPATIENT)
Dept: SCHEDULING | Age: 77
End: 2022-05-24

## 2022-05-24 DIAGNOSIS — C79.51 SECONDARY MALIGNANT NEOPLASM OF BONE AND BONE MARROW (HCC): ICD-10-CM

## 2022-05-24 DIAGNOSIS — G89.3 NEOPLASM RELATED PAIN: ICD-10-CM

## 2022-05-24 DIAGNOSIS — Z79.899 LONG TERM USE OF DRUG: ICD-10-CM

## 2022-05-24 DIAGNOSIS — R44.2 OLFACTORY HALLUCINATION: ICD-10-CM

## 2022-05-24 DIAGNOSIS — R63.0 ANOREXIA: ICD-10-CM

## 2022-05-24 DIAGNOSIS — C34.12 PRIMARY MALIGNANT NEOPLASM OF BRONCHUS OF LEFT UPPER LOBE (HCC): Primary | ICD-10-CM

## 2022-05-24 DIAGNOSIS — C79.52 SECONDARY MALIGNANT NEOPLASM OF BONE AND BONE MARROW (HCC): ICD-10-CM

## 2022-05-29 NOTE — PROGRESS NOTES
Chief Complaint   Patient presents with    Hypertension     1 month follow up       SUBJECTIVE:    Babak Carter is a 64 y.o. male who returns today in follow-up for his weight loss, metastatic adenocarcinoma lung, diabetes, hypertension and other medical problems. On his last visit here his Norvasc and lisinopril was discontinued and since then his metoprolol is also been discontinued. He has been trying to improve by drinking couple cans of Ensure a day as well as electrolytes but is not eating anything. He is generally very weak here in office. He apparently also has been set up for repeat scans by Dr. Magana Diver his oncologist and his current chemotherapy has been stopped because it does not seem to be working. He notes no chest pain, shortness of breath but is generally weak and feels like he is going to pass out here in office at which time his blood pressure was found to be hypotensive. He denies any nausea vomiting but does have a little weakness in his stomach. Current Outpatient Medications   Medication Sig Dispense Refill    metFORMIN (GLUCOPHAGE) 500 mg tablet Take 500 mg by mouth two (2) times daily (with meals).  tamsulosin (FLOMAX) 0.4 mg capsule TAKE TWO CAPSULES BY MOUTH DAILY 180 Capsule 3    Jardiance 10 mg tablet TAKE ONE TABLET BY MOUTH DAILY 30 Tablet 5    finasteride (PROSCAR) 5 mg tablet Take 5 mg by mouth daily.  pravastatin (PRAVACHOL) 80 mg tablet Take 80 mg by mouth daily.  Omega-3-DHA-EPA-Fish Oil 1,000 mg (120 mg-180 mg) cap Take 1 Cap by mouth three (3) times daily.        Past Medical History:   Diagnosis Date    Aortic stenosis 8/2/2017    ASVD (arteriosclerotic vascular disease) 8/2/2017    Back pain 8/2/2017    BPH (benign prostatic hyperplasia) 8/2/2017    CKD (chronic kidney disease) 8/2/2017    Diabetes ([facility] [address] 8/2/2017    DJD (degenerative joint disease) 8/2/2017    ED (erectile dysfunction) 8/2/2017    Guillain-[place] syndrome ([facility] [address] 8/2/2017    Hyperlipidemia 8/2/2017    Hypertension 8/2/2017    Left carotid bruit 8/2/2017    Melanoma ([facility] [address] 2021    Morbid obesity ([facility] [address] 8/2/2017    On statin therapy 8/2/2017    Urticaria 8/2/2017     Past Surgical History:   Procedure Laterality Date    HX ENDOSCOPY  12/02/2021    PA CARDIAC SURG PROCEDURE UNLIST  09/2019    4 stents placed     Allergies   Allergen Reactions    Adhesive Tape-Silicones Unknown (comments)    Chocolate Flavor Unknown (comments)       REVIEW OF SYSTEMS:  General: negative for - chills or fever, or weight loss or gain  ENT: negative for - headaches, nasal congestion or tinnitus  Eyes: no blurred or visual changes  Neck: No stiffness or swollen nodes  Respiratory: negative for - cough, hemoptysis, shortness of breath or wheezing  Cardiovascular : negative for - chest pain, edema, palpitations or shortness of breath  Gastrointestinal: negative for - abdominal pain, blood in stools, heartburn or nausea/vomiting  Genito-Urinary: no dysuria, trouble voiding, or hematuria  Musculoskeletal: negative for - gait disturbance, joint pain, joint stiffness or joint swelling  Neurological: no TIA or stroke symptoms.   Weak and feels like he is going to pass out  Hematologic: no bruises, no bleeding  Lymphatic: no swollen glands  Integument: no lumps, mole changes, nail changes or rash  Endocrine:no malaise/lethargy poly uria or polydipsia or unexpected weight changes        Social History     Socioeconomic History    Marital status:    Tobacco Use    Smoking status: Never Smoker    Smokeless tobacco: Never Used   Vaping Use    Vaping Use: Never used   Substance and Sexual Activity    Alcohol use: Yes     Comment: social    Drug use: No     Family History   Problem Relation Age of Onset    Heart Disease Mother         murmor    Heart Disease Father        OBJECTIVE:     Visit Vitals  BP (!) 80/46   Pulse 94   Temp 98.2 °F (36.8 °C) (Oral)   Resp 16   Ht 5' 10\" (1.778 m) Wt 135 lb 9.6 oz (61.5 kg)   SpO2 96%   BMI 19.46 kg/m²     CONSTITUTIONAL:   well nourished, appears age appropriate  EYES: sclera anicteric, PERRL, EOMI  ENMT:nares clear, moist mucous membranes, pharynx clear  NECK: supple. Thyroid normal, No JVD or bruits  RESPIRATORY: Chest: clear to ascultation and percussion, normal inspiratory effort  CARDIOVASCULAR: Heart: regular rate and rhythm no murmurs, rubs or gallops, PMI not displaced, No thrills, no peripheral edema  GASTROINTESTINAL: Abdomen: non distended, soft, non tender, bowel sounds normal  HEMATOLOGIC: no purpura, petechiae or bruising  LYMPHATIC: No lymph node enlargemant  MUSCULOSKELETAL: Extremities: no active synovitis, pulse 1+   INTEGUMENT: No unusual rashes or suspicious skin lesions noted. Nails appear normal.  PERIPHERAL VASCULAR: normal pulses femoral, PT and DP  NEUROLOGIC: non-focal exam, A & O X 3  PSYCHIATRIC:, appropriate affect     ASSESSMENT:   1. Hypotension due to hypovolemia    2. Weight loss    3. Metastatic adenocarcinoma ([address]    4. Essential hypertension    5. Controlled type 2 diabetes mellitus with stage 2 chronic kidney disease, without long-term current use of insulin (HCC)      Impression  1. Hypotension due to hypovolemia his weight is actually down 10 pounds since he was seen by me last  2. Weight loss certainly concerning  3. Metastatic adenocarcinoma I think this is probably more widely metastatic and according to his wife he has been having some hallucinations which certainly raises the possibility of brain mets or hyponatremia  4. Baseline hypertension now off all hypertension medicines  5. Diabetes mellitus off all diabetic medicines  Hospitalization recommended. He is transported from here to the emergency room by rescue squad today. All of this discussed in detail with his wife present today. PLAN:  . No orders of the defined types were placed in this encounter.         ATTENTION:   This medical record was transcribed using an electronic medical records system. Although proofread, it may and can contain electronic and spelling errors. Other human spelling and other errors may be present. Corrections may be executed at a later time. Please feel free to contact us for any clarifications as needed. Follow-up and Dispositions    · Return TBD. No results found for any visits on 05/31/22. Alba Villalobos MD    The patient verbalized understanding of the problems and plans as explained.

## 2022-05-31 ENCOUNTER — HOSPITAL ENCOUNTER (INPATIENT)
Age: 77
LOS: 9 days | Discharge: HOME | DRG: 180 | End: 2022-06-09
Attending: INTERNAL MEDICINE | Admitting: INTERNAL MEDICINE
Payer: MEDICARE

## 2022-05-31 ENCOUNTER — OFFICE VISIT (OUTPATIENT)
Dept: INTERNAL MEDICINE CLINIC | Age: 77
End: 2022-05-31

## 2022-05-31 VITALS
DIASTOLIC BLOOD PRESSURE: 46 MMHG | OXYGEN SATURATION: 96 % | RESPIRATION RATE: 16 BRPM | HEIGHT: 70 IN | SYSTOLIC BLOOD PRESSURE: 80 MMHG | BODY MASS INDEX: 19.41 KG/M2 | HEART RATE: 94 BPM | WEIGHT: 135.6 LBS | TEMPERATURE: 98.2 F

## 2022-05-31 DIAGNOSIS — R63.4 WEIGHT LOSS: ICD-10-CM

## 2022-05-31 DIAGNOSIS — C79.9 METASTATIC ADENOCARCINOMA (HCC): ICD-10-CM

## 2022-05-31 DIAGNOSIS — I95.89 HYPOTENSION DUE TO HYPOVOLEMIA: Primary | ICD-10-CM

## 2022-05-31 DIAGNOSIS — I25.10 ASCVD (ARTERIOSCLEROTIC CARDIOVASCULAR DISEASE): ICD-10-CM

## 2022-05-31 DIAGNOSIS — C79.9 METASTATIC MALIGNANT NEOPLASM, UNSPECIFIED SITE (HCC): Primary | ICD-10-CM

## 2022-05-31 DIAGNOSIS — E11.22 CONTROLLED TYPE 2 DIABETES MELLITUS WITH STAGE 2 CHRONIC KIDNEY DISEASE, WITHOUT LONG-TERM CURRENT USE OF INSULIN (HCC): ICD-10-CM

## 2022-05-31 DIAGNOSIS — I95.89 HYPOTENSION DUE TO HYPOVOLEMIA: ICD-10-CM

## 2022-05-31 DIAGNOSIS — I10 ESSENTIAL HYPERTENSION: ICD-10-CM

## 2022-05-31 DIAGNOSIS — I48.0 PAF (PAROXYSMAL ATRIAL FIBRILLATION) (HCC): ICD-10-CM

## 2022-05-31 DIAGNOSIS — E86.1 HYPOTENSION DUE TO HYPOVOLEMIA: Primary | ICD-10-CM

## 2022-05-31 DIAGNOSIS — N18.2 CONTROLLED TYPE 2 DIABETES MELLITUS WITH STAGE 2 CHRONIC KIDNEY DISEASE, WITHOUT LONG-TERM CURRENT USE OF INSULIN (HCC): ICD-10-CM

## 2022-05-31 DIAGNOSIS — N18.2 STAGE 2 CHRONIC KIDNEY DISEASE: ICD-10-CM

## 2022-05-31 DIAGNOSIS — I50.32 DIASTOLIC CHF, CHRONIC (HCC): ICD-10-CM

## 2022-05-31 DIAGNOSIS — I35.0 AORTIC VALVE STENOSIS, ETIOLOGY OF CARDIAC VALVE DISEASE UNSPECIFIED: ICD-10-CM

## 2022-05-31 DIAGNOSIS — M15.9 PRIMARY OSTEOARTHRITIS INVOLVING MULTIPLE JOINTS: ICD-10-CM

## 2022-05-31 DIAGNOSIS — E86.1 HYPOTENSION DUE TO HYPOVOLEMIA: ICD-10-CM

## 2022-05-31 PROBLEM — I95.9 HYPOTENSION: Status: ACTIVE | Noted: 2022-05-31

## 2022-05-31 LAB
ALBUMIN SERPL-MCNC: 2.6 G/DL (ref 3.5–5)
ALBUMIN/GLOB SERPL: 0.8 {RATIO} (ref 1.1–2.2)
ALP SERPL-CCNC: 130 U/L (ref 45–117)
ALT SERPL-CCNC: 27 U/L (ref 12–78)
ANION GAP SERPL CALC-SCNC: 4 MMOL/L (ref 5–15)
APPEARANCE UR: CLEAR
AST SERPL-CCNC: 31 U/L (ref 15–37)
BACTERIA URNS QL MICRO: NEGATIVE /HPF
BASOPHILS # BLD: 0 K/UL (ref 0–0.1)
BASOPHILS NFR BLD: 0 % (ref 0–1)
BILIRUB SERPL-MCNC: 0.9 MG/DL (ref 0.2–1)
BILIRUB UR QL: NEGATIVE
BUN SERPL-MCNC: 26 MG/DL (ref 6–20)
BUN/CREAT SERPL: 38 (ref 12–20)
CALCIUM SERPL-MCNC: 8 MG/DL (ref 8.5–10.1)
CHLORIDE SERPL-SCNC: 105 MMOL/L (ref 97–108)
CO2 SERPL-SCNC: 29 MMOL/L (ref 21–32)
COLOR UR: ABNORMAL
CREAT SERPL-MCNC: 0.68 MG/DL (ref 0.7–1.3)
DIFFERENTIAL METHOD BLD: ABNORMAL
EOSINOPHIL # BLD: 0 K/UL (ref 0–0.4)
EOSINOPHIL NFR BLD: 0 % (ref 0–7)
EPITH CASTS URNS QL MICRO: ABNORMAL /LPF
ERYTHROCYTE [DISTWIDTH] IN BLOOD BY AUTOMATED COUNT: 16.6 % (ref 11.5–14.5)
GLOBULIN SER CALC-MCNC: 3.3 G/DL (ref 2–4)
GLUCOSE SERPL-MCNC: 192 MG/DL (ref 65–100)
GLUCOSE UR STRIP.AUTO-MCNC: >1000 MG/DL
HCT VFR BLD AUTO: 36.2 % (ref 36.6–50.3)
HGB BLD-MCNC: 10.9 G/DL (ref 12.1–17)
HGB UR QL STRIP: NEGATIVE
HYALINE CASTS URNS QL MICRO: ABNORMAL /LPF (ref 0–2)
IMM GRANULOCYTES # BLD AUTO: 0 K/UL (ref 0–0.04)
IMM GRANULOCYTES NFR BLD AUTO: 1 % (ref 0–0.5)
KETONES UR QL STRIP.AUTO: ABNORMAL MG/DL
LEUKOCYTE ESTERASE UR QL STRIP.AUTO: NEGATIVE
LYMPHOCYTES # BLD: 0.5 K/UL (ref 0.8–3.5)
LYMPHOCYTES NFR BLD: 12 % (ref 12–49)
MCH RBC QN AUTO: 23.6 PG (ref 26–34)
MCHC RBC AUTO-ENTMCNC: 30.1 G/DL (ref 30–36.5)
MCV RBC AUTO: 78.5 FL (ref 80–99)
MONOCYTES # BLD: 0.4 K/UL (ref 0–1)
MONOCYTES NFR BLD: 9 % (ref 5–13)
NEUTS SEG # BLD: 3.2 K/UL (ref 1.8–8)
NEUTS SEG NFR BLD: 78 % (ref 32–75)
NITRITE UR QL STRIP.AUTO: NEGATIVE
NRBC # BLD: 0 K/UL (ref 0–0.01)
NRBC BLD-RTO: 0 PER 100 WBC
PH UR STRIP: 5 [PH] (ref 5–8)
PLATELET # BLD AUTO: 151 K/UL (ref 150–400)
PMV BLD AUTO: 9.3 FL (ref 8.9–12.9)
POTASSIUM SERPL-SCNC: 4.4 MMOL/L (ref 3.5–5.1)
PROT SERPL-MCNC: 5.9 G/DL (ref 6.4–8.2)
PROT UR STRIP-MCNC: 30 MG/DL
RBC # BLD AUTO: 4.61 M/UL (ref 4.1–5.7)
RBC #/AREA URNS HPF: ABNORMAL /HPF (ref 0–5)
RBC MORPH BLD: ABNORMAL
SODIUM SERPL-SCNC: 138 MMOL/L (ref 136–145)
SP GR UR REFRACTOMETRY: 1.01 (ref 1–1.03)
UROBILINOGEN UR QL STRIP.AUTO: 1 EU/DL (ref 0.2–1)
WBC # BLD AUTO: 4.1 K/UL (ref 4.1–11.1)
WBC URNS QL MICRO: ABNORMAL /HPF (ref 0–4)

## 2022-05-31 PROCEDURE — 65270000029 HC RM PRIVATE

## 2022-05-31 PROCEDURE — 99223 1ST HOSP IP/OBS HIGH 75: CPT | Performed by: INTERNAL MEDICINE

## 2022-05-31 PROCEDURE — 99285 EMERGENCY DEPT VISIT HI MDM: CPT

## 2022-05-31 PROCEDURE — 74011000250 HC RX REV CODE- 250: Performed by: INTERNAL MEDICINE

## 2022-05-31 PROCEDURE — 85025 COMPLETE CBC W/AUTO DIFF WBC: CPT

## 2022-05-31 PROCEDURE — 81001 URINALYSIS AUTO W/SCOPE: CPT

## 2022-05-31 PROCEDURE — 74011250636 HC RX REV CODE- 250/636: Performed by: INTERNAL MEDICINE

## 2022-05-31 PROCEDURE — 80053 COMPREHEN METABOLIC PANEL: CPT

## 2022-05-31 PROCEDURE — 87086 URINE CULTURE/COLONY COUNT: CPT

## 2022-05-31 PROCEDURE — 36415 COLL VENOUS BLD VENIPUNCTURE: CPT

## 2022-05-31 RX ORDER — TAMSULOSIN HYDROCHLORIDE 0.4 MG/1
0.4 CAPSULE ORAL DAILY
Status: DISCONTINUED | OUTPATIENT
Start: 2022-06-01 | End: 2022-06-09 | Stop reason: HOSPADM

## 2022-05-31 RX ORDER — ENOXAPARIN SODIUM 100 MG/ML
30 INJECTION SUBCUTANEOUS EVERY 24 HOURS
Status: DISCONTINUED | OUTPATIENT
Start: 2022-05-31 | End: 2022-05-31

## 2022-05-31 RX ORDER — SODIUM CHLORIDE 0.9 % (FLUSH) 0.9 %
5-40 SYRINGE (ML) INJECTION EVERY 8 HOURS
Status: DISCONTINUED | OUTPATIENT
Start: 2022-05-31 | End: 2022-06-09 | Stop reason: HOSPADM

## 2022-05-31 RX ORDER — ENOXAPARIN SODIUM 100 MG/ML
40 INJECTION SUBCUTANEOUS EVERY 24 HOURS
Status: DISCONTINUED | OUTPATIENT
Start: 2022-06-01 | End: 2022-06-09 | Stop reason: HOSPADM

## 2022-05-31 RX ORDER — SODIUM CHLORIDE 0.9 % (FLUSH) 0.9 %
5-40 SYRINGE (ML) INJECTION AS NEEDED
Status: DISCONTINUED | OUTPATIENT
Start: 2022-05-31 | End: 2022-06-09 | Stop reason: HOSPADM

## 2022-05-31 RX ORDER — FINASTERIDE 5 MG/1
5 TABLET, FILM COATED ORAL DAILY
Status: DISCONTINUED | OUTPATIENT
Start: 2022-06-01 | End: 2022-06-09 | Stop reason: HOSPADM

## 2022-05-31 RX ORDER — SODIUM CHLORIDE 9 MG/ML
50 INJECTION, SOLUTION INTRAVENOUS CONTINUOUS
Status: DISCONTINUED | OUTPATIENT
Start: 2022-05-31 | End: 2022-06-07

## 2022-05-31 RX ADMIN — SODIUM CHLORIDE 100 ML/HR: 9 INJECTION, SOLUTION INTRAVENOUS at 17:43

## 2022-05-31 RX ADMIN — SODIUM CHLORIDE, PRESERVATIVE FREE 10 ML: 5 INJECTION INTRAVENOUS at 21:57

## 2022-05-31 NOTE — PROGRESS NOTES
Chief Complaint   Patient presents with    Hypertension     1 month follow up     Visit Vitals  /68 (BP 1 Location: Left upper arm, BP Patient Position: Sitting, BP Cuff Size: Adult)   Pulse 94   Temp 98.2 °F (36.8 °C) (Oral)   Resp 16   Ht 5' 10\" (1.778 m)   Wt 135 lb 9.6 oz (61.5 kg)   SpO2 96%   BMI 19.46 kg/m²     1. Have you been to the ER, urgent care clinic since your last visit? Hospitalized since your last visit? No    2. Have you seen or consulted any other health care providers outside of the [address] [place] [address] since your last visit? Include any pap smears or colon screening.  Dr. Carlyle Walls

## 2022-05-31 NOTE — PROGRESS NOTES
P&T-Approved DVT Prophylaxis Dosing    Per P&T Committee-approved protocol enoxaparin 30mg daily has been adjusted to enoxaparin 40mg dailyl based on weight and renal function as shown in the table below.          ALIS De La Torre

## 2022-05-31 NOTE — ED PROVIDER NOTES
EMERGENCY DEPARTMENT HISTORY AND PHYSICAL EXAM      Date: 5/31/2022  Patient Name: Luigi Shin    History of Presenting Illness     Chief Complaint   Patient presents with    Hypotension     Pt arrives via EMS with report of hypotension, picked up at PCP, BP 80s/50s. EMS admin IV fluid. Pt hx of of DM, weight loss, lung cancer. History Provided By: Patient    HPI: Luigi Shin, 64 y.o. male with PMHx significant for metastatic cancer who presents from the primary care office due to concerns for hypotension. Has had ongoing issues with weight loss as well. Poor appetite. Was seen by his primary care today where he was noted to have a blood pressure of 80 systolic. He was therefore sent to the emergency department by his primary care for admission. His oncologist is Dr. Yuly Davis. Not currently on any chemotherapy. To me he denies any chest pain, shortness of breath, lightheadedness, palpitations, abdominal pain, nausea, vomiting. His only complaint to me is feeling hungry. Patient received IV fluids from EMS on presentation the emergency department his systolic blood pressure was 120. PCP: Remigio Rivas MD    There are no other complaints, changes, or physical findings at this time.     Current Facility-Administered Medications   Medication Dose Route Frequency Provider Last Rate Last Admin    finasteride (PROSCAR) tablet 5 mg  5 mg Oral DAILY Remigio Rivas MD        tamsulosin [facility]) capsule 0.4 mg  0.4 mg Oral DAILY Remigio Rivas MD        sodium chloride (NS) flush 5-40 mL  5-40 mL IntraVENous Q8H Remigio Rivas MD   10 mL at 05/31/22 2157    sodium chloride (NS) flush 5-40 mL  5-40 mL IntraVENous PRN Remigio Rivas MD        0.9% sodium chloride infusion  100 mL/hr IntraVENous CONTINUOUS Remigio Rivas  mL/hr at 05/31/22 1743 100 mL/hr at 05/31/22 1743    enoxaparin (LOVENOX) injection 40 mg  40 mg SubCUTAneous Q24H Remigio Rivas MD Past History     Past Medical History:  Past Medical History:   Diagnosis Date    Aortic stenosis 8/2/2017    ASVD (arteriosclerotic vascular disease) 8/2/2017    Back pain 8/2/2017    BPH (benign prostatic hyperplasia) 8/2/2017    CKD (chronic kidney disease) 8/2/2017    Diabetes ([address] 8/2/2017    DJD (degenerative joint disease) 8/2/2017    ED (erectile dysfunction) 8/2/2017    Guillain-[place] syndrome ([address] 8/2/2017    Hyperlipidemia 8/2/2017    Hypertension 8/2/2017    Left carotid bruit 8/2/2017    Melanoma ([address] 2021    Morbid obesity ([address] 8/2/2017    On statin therapy 8/2/2017    Urticaria 8/2/2017     Past Surgical History:  Past Surgical History:   Procedure Laterality Date    HX ENDOSCOPY  12/02/2021    AL CARDIAC SURG PROCEDURE UNLIST  09/2019    4 stents placed     Family History:  Family History   Problem Relation Age of Onset    Heart Disease Mother         murmor    Heart Disease Father      Social History:  Social History     Tobacco Use    Smoking status: Never Smoker    Smokeless tobacco: Never Used   Vaping Use    Vaping Use: Never used   Substance Use Topics    Alcohol use: Yes     Comment: social    Drug use: No     Allergies: Allergies   Allergen Reactions    Adhesive Tape-Silicones Unknown (comments)    Chocolate Flavor Unknown (comments)     Review of Systems   Review of Systems   Constitutional: Negative for chills and fever. HENT: Negative for congestion, rhinorrhea and sore throat. Respiratory: Negative for cough and shortness of breath. Cardiovascular: Negative for chest pain. Gastrointestinal: Negative for abdominal pain, nausea and vomiting. Genitourinary: Negative for dysuria and urgency. Skin: Negative for rash. Neurological: Negative for dizziness, light-headedness and headaches. All other systems reviewed and are negative. Physical Exam   Physical Exam  Vitals and nursing note reviewed.    Constitutional:       General: He is not in acute distress. Appearance: He is well-developed. Comments: Chronically ill appearing   HENT:      Head: Normocephalic and atraumatic. Eyes:      Conjunctiva/sclera: Conjunctivae normal.      Pupils: Pupils are equal, round, and reactive to light. Cardiovascular:      Rate and Rhythm: Normal rate and regular rhythm. Pulmonary:      Effort: Pulmonary effort is normal. No respiratory distress. Breath sounds: Normal breath sounds. No stridor. Abdominal:      General: There is no distension. Palpations: Abdomen is soft. Tenderness: There is no abdominal tenderness. Musculoskeletal:         General: Normal range of motion. Cervical back: Normal range of motion. Skin:     General: Skin is warm and dry. Neurological:      Mental Status: He is alert and oriented to person, place, and time. Diagnostic Study Results   Labs -     Recent Results (from the past 12 hour(s))   CBC WITH AUTOMATED DIFF    Collection Time: 05/31/22  5:31 PM   Result Value Ref Range    WBC 4.1 4.1 - 11.1 K/uL    RBC 4.61 4.10 - 5.70 M/uL    HGB 10.9 (L) 12.1 - 17.0 g/dL    HCT 36.2 (L) 36.6 - 50.3 %    MCV 78.5 (L) 80.0 - 99.0 FL    MCH 23.6 (L) 26.0 - 34.0 PG    MCHC 30.1 30.0 - 36.5 g/dL    RDW 16.6 (H) 11.5 - 14.5 %    PLATELET 944 917 - 795 K/uL    MPV 9.3 8.9 - 12.9 FL    NRBC 0.0 0  WBC    ABSOLUTE NRBC 0.00 0.00 - 0.01 K/uL    NEUTROPHILS 78 (H) 32 - 75 %    LYMPHOCYTES 12 12 - 49 %    MONOCYTES 9 5 - 13 %    EOSINOPHILS 0 0 - 7 %    BASOPHILS 0 0 - 1 %    IMMATURE GRANULOCYTES 1 (H) 0.0 - 0.5 %    ABS. NEUTROPHILS 3.2 1.8 - 8.0 K/UL    ABS. LYMPHOCYTES 0.5 (L) 0.8 - 3.5 K/UL    ABS. MONOCYTES 0.4 0.0 - 1.0 K/UL    ABS. EOSINOPHILS 0.0 0.0 - 0.4 K/UL    ABS. BASOPHILS 0.0 0.0 - 0.1 K/UL    ABS. IMM.  GRANS. 0.0 0.00 - 0.04 K/UL    DF SMEAR SCANNED      RBC COMMENTS ANISOCYTOSIS  1+        RBC COMMENTS MICROCYTOSIS  1+        RBC COMMENTS HYPOCHROMIA  1+       METABOLIC PANEL, COMPREHENSIVE    Collection Time: 05/31/22  5:31 PM   Result Value Ref Range    Sodium 138 136 - 145 mmol/L    Potassium 4.4 3.5 - 5.1 mmol/L    Chloride 105 97 - 108 mmol/L    CO2 29 21 - 32 mmol/L    Anion gap 4 (L) 5 - 15 mmol/L    Glucose 192 (H) 65 - 100 mg/dL    BUN 26 (H) 6 - 20 MG/DL    Creatinine 0.68 (L) 0.70 - 1.30 MG/DL    BUN/Creatinine ratio 38 (H) 12 - 20      GFR est AA >60 >60 ml/min/1.73m2    GFR est non-AA >60 >60 ml/min/1.73m2    Calcium 8.0 (L) 8.5 - 10.1 MG/DL    Bilirubin, total 0.9 0.2 - 1.0 MG/DL    ALT (SGPT) 27 12 - 78 U/L    AST (SGOT) 31 15 - 37 U/L    Alk. phosphatase 130 (H) 45 - 117 U/L    Protein, total 5.9 (L) 6.4 - 8.2 g/dL    Albumin 2.6 (L) 3.5 - 5.0 g/dL    Globulin 3.3 2.0 - 4.0 g/dL    A-G Ratio 0.8 (L) 1.1 - 2.2     URINALYSIS W/MICROSCOPIC    Collection Time: 05/31/22 10:41 PM   Result Value Ref Range    Color YELLOW/STRAW      Appearance CLEAR CLEAR      Specific gravity 1.010 1.003 - 1.030      pH (UA) 5.0 5.0 - 8.0      Protein 30 (A) NEG mg/dL    Glucose >1,000 (A) NEG mg/dL    Ketone TRACE (A) NEG mg/dL    Bilirubin Negative NEG      Blood Negative NEG      Urobilinogen 1.0 0.2 - 1.0 EU/dL    Nitrites Negative NEG      Leukocyte Esterase Negative NEG      WBC 0-4 0 - 4 /hpf    RBC 0-5 0 - 5 /hpf    Epithelial cells FEW FEW /lpf    Bacteria Negative NEG /hpf    Hyaline cast 0-2 0 - 2 /lpf       Radiologic Studies -   No orders to display     No results found. Medical Decision Making   I am the first provider for this patient. I reviewed the vital signs, available nursing notes, past medical history, past surgical history, family history and social history. Vital Signs-Reviewed the patient's vital signs.   Patient Vitals for the past 12 hrs:   Temp Pulse Resp BP SpO2   05/31/22 1918 97.3 °F (36.3 °C) 88 22 (!) 142/64 99 %   05/31/22 1830 97.6 °F (36.4 °C) 86 24 (!) 153/72 98 %   05/31/22 1800 -- 80 24 135/72 98 %   05/31/22 1730 -- 80 25 130/70 98 %   05/31/22 1728 -- 80 21 130/71 98 %   05/31/22 1656 -- 80 14 116/67 100 %       Pulse Oximetry Analysis - 98% on ra      Records Reviewed: Nursing Notes and Old Medical Records    Provider Notes (Medical Decision Making):   Patient presents with concerns for low blood pressure from PCP office. His PCP did speak with us prior to sending the patient with plans for admission. Blood pressure is improved on presentation. Suspect likely related to poor p.o. intake. We will send basic lab work. Dr. Sun Likes to admit. ED Course:   Initial assessment performed. The patients presenting problems have been discussed, and they are in agreement with the care plan formulated and outlined with them. I have encouraged them to ask questions as they arise throughout their visit. Procedures:  Procedures    Critical Care:  none    Disposition:  Admit      Diagnosis     Clinical Impression:   1. Metastatic malignant neoplasm, unspecified site [facility])            Please note that this dictation was completed with [employer], the computer voice recognition software. Quite often unanticipated grammatical, syntax, homophones, and other interpretive errors are inadvertently transcribed by the computer software. Please disregard these errors.   Please excuse any errors that have escaped final proofreading

## 2022-05-31 NOTE — PATIENT INSTRUCTIONS
Noninsulin Medicines for Type 2 Diabetes: Care Instructions  Overview     There are different types of noninsulin medicines for diabetes. Each works in a different way. But they all help you control your blood sugar. Some types help your body make insulin to lower your blood sugar. Others lower how much insulin your body needs. Some can slow how fast your body digests sugars. And some can remove extra glucose through your urine. You may need to take more than one medicine for diabetes. Two or more medicines may work better to lower your blood sugar level than just one does. · Metformin. This lowers how much glucose your liver makes. And it helps you respond better to insulin. It also lowers the amount of stored sugar that your liver releases when you are not eating. · Sulfonylureas. These help your body release more insulin. Some work for many hours. They can cause low blood sugar if you don't eat as you planned. An example is glipizide. · Thiazolidinediones. These reduce the amount of blood glucose. They also help you respond better to insulin. An example is pioglitazone. · SGLT2 inhibitors. These help to remove extra glucose through your urine. They may also help some people lose weight. An example is ertugliflozin. · DPP-4 inhibitors. These help your body raise the level of insulin after you eat. They also help your body make less of a hormone that raises blood sugar. An example is alogliptin. · GLP-1 receptor agonists. These help your body make a protein that can raise your insulin level and make you less hungry. They're given as shots or pills. An example is semaglutide. · Meglitinides. These help your body release insulin. They also help slow how your body digests sugars. So they can keep your blood sugar from rising too fast after you eat. · Alpha-glucosidase inhibitors. These keep starches from breaking down. This means that they lower the amount of glucose absorbed when you eat.  They don't help your body make more insulin. So they will not cause low blood sugar unless you use them with other medicines for diabetes. Follow-up care is a key part of your treatment and safety. Be sure to make and go to all appointments, and call your doctor if you are having problems. It's also a good idea to know your test results and keep a list of the medicines you take. How can you care for yourself at home? · Eat a healthy diet. Get some exercise each day. This may help you to reduce how much medicine you need. · Do not take other prescription or over-the-counter medicines, vitamins, herbal products, or supplements without talking to your doctor first. Some medicines for type 2 diabetes can cause problems with other medicines or supplements. · Tell your doctor if you plan to get pregnant. Some of these drugs are not safe for pregnant women. · Be safe with medicines. Take your medicines exactly as prescribed. Meglitinides and sulfonylureas can cause your blood sugar to drop very low. Call your doctor if you think you are having a problem with your medicine. · Check your blood sugar often. You can use a glucose monitor. Keeping track can help you know how certain foods, activities, and medicines affect your blood sugar. And it can help you keep your blood sugar from getting so low that it's not safe. When should you call for help? Call 911 anytime you think you may need emergency care. For example, call if:    · You passed out (lost consciousness).     · You are confused or cannot think clearly.     · Your blood sugar is very high or very low. Watch closely for changes in your health, and be sure to contact your doctor if:    · Your blood sugar stays outside the level your doctor set for you.     · You have any problems. Where can you learn more?   Go to http://www.gray.com/  Enter H153 in the search box to learn more about \"Noninsulin Medicines for Type 2 Diabetes: Care Instructions. \"  Current as of: July 28, 2021               Content Version: 13.2  © 0842-7845 [employer], [facility]. Care instructions adapted under license by [employer] (which disclaims liability or warranty for this information). If you have questions about a medical condition or this instruction, always ask your healthcare professional. [place] [address] any warranty or liability for your use of this information.

## 2022-05-31 NOTE — H&P
INTERNAL MEDICINE ADMISSION NOTE    NAME:  Ken Stephenson   :   1945   MRN:   [id number]     Date/Time:  2022 4:50 PM  Subjective:   CHIEF COMPLAINT: Weakness    HISTORY OF PRESENT ILLNESS:     Tushar Oro is a 64 y.o.  white male who presents with generalized increased fatigue and weakness. He is followed regularly by me for hypertension, diabetes, hyperlipidemia, ASCVD status post prior MI, paroxysmal atrial fibrillation, diastolic CHF compensated, DJD, and now metastatic adenocarcinoma of lung with resultant marked weight loss. He presented to the office today for evaluation was noted to be extremely weak feeling he would pass out at which time blood pressure obtained was 80 systolic currently on no hypertensive medications. He denied associated chest pain, shortness of breath, palpitations, PND, orthopnea or other cardiac or respiratory complaints. He notes an extremely poor appetite and has been drinking a couple of cans of Ensure and some electrolytes daily but really taken very little otherwise and orally. There is no nausea vomiting and no other GI complaints. He notes no  complaints. He notes no fevers or chills. He was recently evaluated by Dr. Dee Goodwin his oncologist and is set up for repeat scans to see if different therapy may be beneficial for his lung carcinoma. It was felt prudent based upon his hypotension and generalized lethargic condition that hospitalization hydration was warranted.         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    Hyperlipidemia 2017    Hypertension 2017    Left carotid bruit 2017    Melanoma ([facility] 2021    Morbid obesity ([facility] 8/2/2017    On statin therapy 8/2/2017    Urticaria 8/2/2017        Past Surgical History:   Procedure Laterality Date    HX ENDOSCOPY  12/02/2021    [place] CARDIAC SURG PROCEDURE UNLIST  09/2019    4 stents placed       Social History     Tobacco Use    Smoking status: Never Smoker    Smokeless tobacco: Never Used   Substance Use Topics    Alcohol use: Yes     Comment: social        Family History   Problem Relation Age of Onset    Heart Disease Mother         murmor    Heart Disease Father         Allergies   Allergen Reactions    Adhesive Tape-Silicones Unknown (comments)    Chocolate Flavor Unknown (comments)        Prior to Admission medications    Medication Sig Start Date End Date Taking? Authorizing Provider   metFORMIN (GLUCOPHAGE) 500 mg tablet Take 500 mg by mouth two (2) times daily (with meals). 4/20/22   Provider, Historical   tamsulosin (FLOMAX) 0.4 mg capsule TAKE TWO CAPSULES BY MOUTH DAILY 4/20/22   Kary Desir MD   Jardiance 10 mg tablet TAKE ONE TABLET BY MOUTH DAILY 3/25/22   Kary Desir MD   finasteride (PROSCAR) 5 mg tablet Take 5 mg by mouth daily. Provider, Historical   pravastatin (PRAVACHOL) 80 mg tablet Take 80 mg by mouth daily. Provider, Historical   Omega-3-DHA-EPA-Fish Oil 1,000 mg (120 mg-180 mg) cap Take 1 Cap by mouth three (3) times daily.     Provider, Historical       REVIEW OF SYSTEMS:       Constitutional:  negative for fevers, chills, but positive for anorexia, weight loss and generalized weakness  Eyes:   negative for visual disturbance and irritation  ENT:   negative for hearing loss, tinnitus, nasal congestion, epistaxis, sore throat  Neck:              negative for stiffness or swollen glands  Respiratory:  negative for cough, hemoptysis, pleurisy/chest pain, wheezing or dyspnea on exertion  Cards:   negative for chest pain, palpitations, orthopnea, PND, lower extremity edema  GI:   negative for dysphagia, nausea, vomiting, diarrhea, constipation and abdominal pain  Genitourinary: negative for frequency, dysuria and hematuria  Integument:  negative for rash and pruritus  Hematologic:  negative for easy bruising and bleeding  Lymphatic:      negative for swollen lymph nodes or night sweats  Musculoskel: negative for myalgias, arthralgias, back pain but positive for marked generalized muscle weakness  Neurological:  negative for headaches, dizziness, vertigo, memory problems and gait problems  Behavl/Psych:  negative for anxiety, depression and illegal drug usage      Objective:   VITALS:    Visit Vitals  /67 (BP 1 Location: Left upper arm, BP Patient Position: At rest)   Pulse 80   Resp 14   SpO2 100%       PHYSICAL EXAM:   General:    Alert, cooperative although markedly lethargic and pale in appearance, with moderate distress, appears stated age. Head:   Normocephalic, without obvious abnormality, atraumatic. Eyes:   Conjunctivae/corneas clear. PERRL  Nose:  Nares normal. No drainage or sinus tenderness. Throat:    Lips, mucosa, and tongue normal.  No Thrush  Neck:  Supple, symmetrical,  no adenopathy, thyroid: non tender    no carotid bruit and no JVD. Back:    Symmetric,  No CVA tenderness. Lungs:   Clear to auscultation bilaterally. No Wheezing or Rhonchi. No rales. Chest wall:  No tenderness or deformity. No Accessory muscle use. Heart:   Regular rate and rhythm,  no murmur, rub or gallop. Abdomen:   Soft, non-tender. Not distended. Bowel sounds normal. No masses  Extremities: Extremities normal, atraumatic, No cyanosis. No edema. No clubbing  Skin:     Texture, turgor normal. No rashes or lesions. Not Jaundiced  Lymph nodes: Cervical, supraclavicular normal.  Psych:  Good insight. Not depressed. Not anxious or agitated. Neurologic: EOMs intact. No facial asymmetry. No aphasia or slurred speech. Normal   strength, Alert and oriented X 3.        LAB DATA REVIEWED:    No results found for this or any previous visit (from the past 24 hour(s)). Assessment/Plan:      Active Problems:    Essential hypertension (8/2/2017)      Stage 2 chronic kidney disease (8/2/2017)      Controlled type 2 diabetes mellitus with stage 2 chronic kidney disease, without long-term current use of insulin ([facility] (12/10/2017)      ASCVD (arteriosclerotic cardiovascular disease) (9/28/2019)      Overview: 80% LAD, 80% CX, 80% RCA all treated with sten A0/8618      Diastolic CHF, chronic (HCC) (2/18/2020)      PAF (paroxysmal atrial fibrillation) ([facility] (9/28/2019)      Overview: 9/2019 at time of MI      Weight loss (9/13/2021)      Metastatic adenocarcinoma ([facility] (10/26/2021)      Hypotension (5/31/2022)       ___________________________________________________  PLAN:    Risk of deterioration:  []Low    []Moderate  [x]High    1. Start IV hydration with normal saline  2. Stat electrolytes to check reticulated status of sodium with slight confusion  3. Stat CBC in light of generalized pale appearance and hypotension  4. Continue off all home blood pressure medications  5. Discontinue all diabetic medications on Jardiance and metformin up until this point previously on additional medicines which have previously been discontinued  6. Follow blood sugar and treat with sliding scale insulin if needed  7. Consult Dr. Rita Means to get her input  8.   Physical therapy and Occupational Therapy evaluation    Prophylaxis:  [x]Lovenox  []Coumadin  []Hep SQ  []SCDs  []H2B/PPI    Disposition:  [x]Home w/ Family   []HH PT,OT,RN   []SNF/LTC   []SAH/Rehab    Discussed Code Status:    []Full Code      [x]DNR   discussed with he and his wife although I do not have a formal DNR signed that is his wish  ___________________________________________________    Care Plan discussed with:    [x]Patient   [x]Family    []ED Care Manager  [x]ED Doc   []Specialist :      ___________________________________________________  Admitting Physician: Peter Gatica MD

## 2022-05-31 NOTE — ED NOTES
TRANSITION OF CARE - SBAR OUT    Patient is being transferred to [facility] 3 Neurology Tele, Room# 3110. Report GIVEN TO Soy Evans RN on [facility] for routine progression of care. Report is consisted of the following information SBAR. Patient transferred to receiving unit by: transport (RN or Tech Name)     Called outstanding consults: Yes   Collected routine labs: Yes     All current orders reviewed with accepting nurse: Yes    The following personal items will be sent with the patient during transfer to the floor:   All valuables:      Visual Aid: None                   CARDIAC MONITORING ORDERED: Yes     The following CURRENT information were reported to the receiving RN:    CODE STATUS: DNR    NIH SCORE:    VASQUEZ SCREENING:      NEURO ASSESSMENT:        RESTRAINTS IN USE: No      IS DOCUMENTATION COMPLETE: Yes      Vital Signs  Level of Consciousness: Alert (0) (05/31/22 1830)  Pulse (Heart Rate): 86 (05/31/22 1830)  Heart Rate Source: Monitor (05/31/22 1830)  Resp Rate: 24 (05/31/22 1830)  BP: (!) 153/72 (05/31/22 1830)  MAP (Monitor): 93 (05/31/22 1830)  MAP (Calculated): 99 (05/31/22 1830)  BP 1 Location: Right arm (05/31/22 1830)  BP 1 Method: Automatic (05/31/22 1830)  BP Patient Position: At rest (05/31/22 1830)  Pain 1  Pain Scale 1: Numeric (0 - 10) (05/31/22 1656)  Pain Intensity 1: 0 (05/31/22 1656)      OXYGEN: Oxygen Therapy  O2 Device: None (Room air) (05/31/22 1728)    SHERRI FALL RISK: Patricio  Fall Risk  Mobility: Ambulates or transfers with assist devices or assistance (05/31/22 1744)  Mentation: Alert, oriented x 3 (05/31/22 1744)  Medication: Patient receiving anticonvulsants, sedatives(tranquilizers), psychotropics or hypnotics, hypoglycemics, narcotics, sleep aids, antihypertensives, laxatives, or diuretics (05/31/22 1744)  Elimination: Independent in elimination (05/31/22 1744)  Prior Fall History: Before admission in past 12 months _home or previous inpatient care) (05/31/22 1744)  Total Score: 3 (05/31/22 1744)  Standard Fall Precautions: Yes (05/31/22 1744)  High Fall Risk: Yes (05/31/22 1744)      WOUNDS: No      URINARY CATHETER: voiding    LINE ACCESS:   Peripheral IV 05/31/22 Left Antecubital (Active)   Site Assessment Clean, dry, & intact 05/31/22 1725   Phlebitis Assessment 0 05/31/22 1725   Infiltration Assessment 0 05/31/22 1725   Dressing Status Clean, dry, & intact 05/31/22 1725   Dressing Type Transparent 05/31/22 1725   Hub Color/Line Status Pink 05/31/22 1725   Action Taken Blood drawn 05/31/22 1725        Opportunity for questions and clarification were provided.   Jarett Ledbetter RN

## 2022-06-01 ENCOUNTER — APPOINTMENT (OUTPATIENT)
Dept: CT IMAGING | Age: 77
DRG: 180 | End: 2022-06-01
Attending: INTERNAL MEDICINE
Payer: MEDICARE

## 2022-06-01 LAB
ANION GAP SERPL CALC-SCNC: 6 MMOL/L (ref 5–15)
BUN SERPL-MCNC: 22 MG/DL (ref 6–20)
BUN/CREAT SERPL: 50 (ref 12–20)
CALCIUM SERPL-MCNC: 7.7 MG/DL (ref 8.5–10.1)
CHLORIDE SERPL-SCNC: 107 MMOL/L (ref 97–108)
CO2 SERPL-SCNC: 26 MMOL/L (ref 21–32)
CREAT SERPL-MCNC: 0.44 MG/DL (ref 0.7–1.3)
ERYTHROCYTE [DISTWIDTH] IN BLOOD BY AUTOMATED COUNT: 16.3 % (ref 11.5–14.5)
GLUCOSE SERPL-MCNC: 148 MG/DL (ref 65–100)
HCT VFR BLD AUTO: 31.3 % (ref 36.6–50.3)
HGB BLD-MCNC: 9.9 G/DL (ref 12.1–17)
MCH RBC QN AUTO: 24.5 PG (ref 26–34)
MCHC RBC AUTO-ENTMCNC: 31.6 G/DL (ref 30–36.5)
MCV RBC AUTO: 77.5 FL (ref 80–99)
NRBC # BLD: 0 K/UL (ref 0–0.01)
NRBC BLD-RTO: 0 PER 100 WBC
PLATELET # BLD AUTO: 147 K/UL (ref 150–400)
PMV BLD AUTO: 10.8 FL (ref 8.9–12.9)
POTASSIUM SERPL-SCNC: 3.7 MMOL/L (ref 3.5–5.1)
RBC # BLD AUTO: 4.04 M/UL (ref 4.1–5.7)
SODIUM SERPL-SCNC: 139 MMOL/L (ref 136–145)
WBC # BLD AUTO: 4 K/UL (ref 4.1–11.1)

## 2022-06-01 PROCEDURE — 70470 CT HEAD/BRAIN W/O & W/DYE: CPT

## 2022-06-01 PROCEDURE — 74177 CT ABD & PELVIS W/CONTRAST: CPT

## 2022-06-01 PROCEDURE — 36415 COLL VENOUS BLD VENIPUNCTURE: CPT

## 2022-06-01 PROCEDURE — 71260 CT THORAX DX C+: CPT

## 2022-06-01 PROCEDURE — 97116 GAIT TRAINING THERAPY: CPT

## 2022-06-01 PROCEDURE — 74011250636 HC RX REV CODE- 250/636: Performed by: INTERNAL MEDICINE

## 2022-06-01 PROCEDURE — 65270000029 HC RM PRIVATE

## 2022-06-01 PROCEDURE — 74011250637 HC RX REV CODE- 250/637: Performed by: INTERNAL MEDICINE

## 2022-06-01 PROCEDURE — 80048 BASIC METABOLIC PNL TOTAL CA: CPT

## 2022-06-01 PROCEDURE — 97535 SELF CARE MNGMENT TRAINING: CPT

## 2022-06-01 PROCEDURE — 97161 PT EVAL LOW COMPLEX 20 MIN: CPT

## 2022-06-01 PROCEDURE — 97165 OT EVAL LOW COMPLEX 30 MIN: CPT

## 2022-06-01 PROCEDURE — 99233 SBSQ HOSP IP/OBS HIGH 50: CPT | Performed by: INTERNAL MEDICINE

## 2022-06-01 PROCEDURE — 85027 COMPLETE CBC AUTOMATED: CPT

## 2022-06-01 PROCEDURE — 97530 THERAPEUTIC ACTIVITIES: CPT

## 2022-06-01 PROCEDURE — 74011000636 HC RX REV CODE- 636: Performed by: INTERNAL MEDICINE

## 2022-06-01 PROCEDURE — 74011000250 HC RX REV CODE- 250: Performed by: INTERNAL MEDICINE

## 2022-06-01 RX ORDER — BARIUM SULFATE 20 MG/ML
900 SUSPENSION ORAL
Status: COMPLETED | OUTPATIENT
Start: 2022-06-01 | End: 2022-06-01

## 2022-06-01 RX ORDER — BARIUM SULFATE 20 MG/ML
450 SUSPENSION ORAL
Status: DISPENSED | OUTPATIENT
Start: 2022-06-01 | End: 2022-06-02

## 2022-06-01 RX ADMIN — BARIUM SULFATE 900 ML: 20 SUSPENSION ORAL at 12:01

## 2022-06-01 RX ADMIN — IOPAMIDOL 100 ML: 755 INJECTION, SOLUTION INTRAVENOUS at 14:27

## 2022-06-01 RX ADMIN — ENOXAPARIN SODIUM 40 MG: 100 INJECTION SUBCUTANEOUS at 11:59

## 2022-06-01 RX ADMIN — TAMSULOSIN HYDROCHLORIDE 0.4 MG: 0.4 CAPSULE ORAL at 11:59

## 2022-06-01 RX ADMIN — FINASTERIDE 5 MG: 5 TABLET, FILM COATED ORAL at 12:00

## 2022-06-01 RX ADMIN — SODIUM CHLORIDE, PRESERVATIVE FREE 10 ML: 5 INJECTION INTRAVENOUS at 21:31

## 2022-06-01 RX ADMIN — SODIUM CHLORIDE, PRESERVATIVE FREE 10 ML: 5 INJECTION INTRAVENOUS at 05:31

## 2022-06-01 NOTE — PROGRESS NOTES
Called to verify MD does not want this patient on telemetry monitoring. MD states telemetry not needed.

## 2022-06-01 NOTE — CONSULTS
Hematology Oncology Consultation      Reason for consult: metastatic lung cancer    Admitting Diagnosis: Hypotension [I95.9]    Impression: metastatic lung cancer - his March CT scans showed a very definite mixed response with mild progression in the liver but significant improvement in the lung with decreased JOHANNA carcinoma, decreased intrapulm mets, resolved mediastinal frank mets, decreased right adrenal met and \"iffy\" bony disease (the increased sclerosis could have reflected healing response) and given that he was feeling really good and the lack of a non-chemotherapy option for his liver disease, he was continued on the osimertinib in hopes that the disease control at his other sites would give him further quality time. When he came to the office in late May, however, he was clinically deteriorating and the osimertinib was discontinued, he was given IV hydration and antiemetics, and scans ordered to restage him. I will see about getting those now so we can determine if there is a suitable option. Thanks for the consult. It is much appreciated. Sacha Jorgensen MD FACP      Discussion: Daniel Szymanski is a  64y.o. year old seen in consultation at the request of Dr. Erin Damon for metastatic lung cancer. He presented to Dr. Armin Perez  office 5/31 for evaluation and was noted to be extremely weak and feeling he would pass out. His blood pressure  was 80 systolic despite being on no BP medications.  He denied associated chest pain, shortness of breath, palpitations, PND, orthopnea or other cardiac or respiratory complaints.  He had a poor appetite and has been drinking a couple of cans of Ensure  but taking in very little otherwise. From my office records:   Mr. Antwon Segovia initially reported that he started to feel poorly roughly in August 2021 while he was in [place]. He  [place] weak and was sleeping more.  He was having more pain in his left knee (same knee that 20 years ago he had surgery for a dislocated knee cap), his left elbow and low back. He attributed the pain in his elbow and low back to arthritis and indicates his hip has been deteriorating. On September 13, 2021, he saw Dr. Sole Swain and reported that his left knee and elbow were painful and got a steroid injection into both sites. An MRI of the lumbar spine was obtained and reported diffuse osseous metastases with near complete replacement of L2 and L3 vertebral bodies. Canal stenoses noted  moderate to severe L23, and mild to moderate at L34 and L45. Neural foramina stenoses  severe right L45, bilateral L23 and L34. Right adrenal nodule (21 x 31 mm). Prostatomegaly. CT chest, abd and pelvis on 10/7/21 reported a 3.0 x 3.5 cm JOHANNA mass, mediastinal adenopathy (1.7 cm right paratracheal LN, 1.2 cm subcarinal LN), left hilar adenopathy with narrowing of the JOHANNA bronchus, multiple nodules scattered throughout the lungs (1.2 cm LLL, 0.7 cm RLL), 4.5 x 3.1 cm ill defined lobulated hypodensity near dome of the liver, small hypodensity in left lobe of liver likely metastatic and right hepatic lobe lesion nonspecific, 2.1 cm right adrenal mass, scattered lucencies throughout the spine. Core biopsy of the left lung mass 10/14/21 yielded a diagnosis of poorly differentiated carcinoma, favor adenocarcinoma of lung origin. He came to the office with his wife for his original visit on 10/21/21. He reported that he was not eating well. He had lost at least 25 pounds  and his pants no longer fit. His wife had bought two new pairs of pants and he was using suspenders to hold them up already. He tossed and turned all night. He burped a great deal, which bothered his wife. They set up an appt with a GI MD in November. 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 PDL1 0%. He was approved for free osimertinib. MRI of the brain was SAHIL on 11/5/21.  Bone scan unfortunately reported bone mets in the thoracic and lumbar spine, left scapula, ribs, pelvis and proximal left femur. Plain films 11/17/21 showed no acute abnormality of the left femur and mild sclerosis of the left proximal femur corresponding with bone scan. He was referred to Dr. Amado Rubio for palliative XRT and in his consult note, Dr. Amado Rubio indicated he anticipated giving 30 Gy in 10 fractions to L1-5. He returned to the office 1/17/21 and reported that he was mildly winded. He felt weak today and noted diarrhea and a skin rash. He had intermittent mild back pain. CT chest, abd and pelvis on 3/16/22 reported decreased JOHANNA lung carcinoma, decreased intrapulmonary metastases, resolved mediastinal frank metastases, significantly decreased right adrenal metastasis, stable dominant hepatic metastasis. Increased number of smaller hepatic oligometastases (segment 2 and 4). Increased size and number of sclerotic osseous metastases (this could represent sclerotic response to treatment/healing rather than progression of osseous metastases). New small left pleural effusion. New trace ascites. Carlos Beltran He came back to the office 3/28/22 and reported that he was feeling much better. His appetite was coming back. He was no longer in pain. He had noticed much less rash and his skin was healing as well. There was no evidence of a different mutation on Guardant 360 such as a resistance mutation. He returned to the office 5/23/22 and reported that he did not feel well. His RUQ bothered him. He stayed in bed all day getting up only to eat and when he did , he was only able to take a few bites at a time. He started having hallucinations earlier this month. He is fatigued and has diarrhea. He did not beleive he would be strong enough to take chemotherapy. His wife wondered if he might have dementia. Imaging:    Date back to March 2022 and November 2021 and reviewed earlier.      Labs:    Recent Results (from the past 24 hour(s))   CBC WITH AUTOMATED DIFF    Collection Time: 05/31/22  5:31 PM   Result Value Ref Range    WBC 4.1 4.1 - 11.1 K/uL    RBC 4.61 4.10 - 5.70 M/uL    HGB 10.9 (L) 12.1 - 17.0 g/dL    HCT 36.2 (L) 36.6 - 50.3 %    MCV 78.5 (L) 80.0 - 99.0 FL    MCH 23.6 (L) 26.0 - 34.0 PG    MCHC 30.1 30.0 - 36.5 g/dL    RDW 16.6 (H) 11.5 - 14.5 %    PLATELET 883 297 - 338 K/uL    MPV 9.3 8.9 - 12.9 FL    NRBC 0.0 0  WBC    ABSOLUTE NRBC 0.00 0.00 - 0.01 K/uL    NEUTROPHILS 78 (H) 32 - 75 %    LYMPHOCYTES 12 12 - 49 %    MONOCYTES 9 5 - 13 %    EOSINOPHILS 0 0 - 7 %    BASOPHILS 0 0 - 1 %    IMMATURE GRANULOCYTES 1 (H) 0.0 - 0.5 %    ABS. NEUTROPHILS 3.2 1.8 - 8.0 K/UL    ABS. LYMPHOCYTES 0.5 (L) 0.8 - 3.5 K/UL    ABS. MONOCYTES 0.4 0.0 - 1.0 K/UL    ABS. EOSINOPHILS 0.0 0.0 - 0.4 K/UL    ABS. BASOPHILS 0.0 0.0 - 0.1 K/UL    ABS. IMM. GRANS. 0.0 0.00 - 0.04 K/UL    DF SMEAR SCANNED      RBC COMMENTS ANISOCYTOSIS  1+        RBC COMMENTS MICROCYTOSIS  1+        RBC COMMENTS HYPOCHROMIA  1+       METABOLIC PANEL, COMPREHENSIVE    Collection Time: 05/31/22  5:31 PM   Result Value Ref Range    Sodium 138 136 - 145 mmol/L    Potassium 4.4 3.5 - 5.1 mmol/L    Chloride 105 97 - 108 mmol/L    CO2 29 21 - 32 mmol/L    Anion gap 4 (L) 5 - 15 mmol/L    Glucose 192 (H) 65 - 100 mg/dL    BUN 26 (H) 6 - 20 MG/DL    Creatinine 0.68 (L) 0.70 - 1.30 MG/DL    BUN/Creatinine ratio 38 (H) 12 - 20      GFR est AA >60 >60 ml/min/1.73m2    GFR est non-AA >60 >60 ml/min/1.73m2    Calcium 8.0 (L) 8.5 - 10.1 MG/DL    Bilirubin, total 0.9 0.2 - 1.0 MG/DL    ALT (SGPT) 27 12 - 78 U/L    AST (SGOT) 31 15 - 37 U/L    Alk.  phosphatase 130 (H) 45 - 117 U/L    Protein, total 5.9 (L) 6.4 - 8.2 g/dL    Albumin 2.6 (L) 3.5 - 5.0 g/dL    Globulin 3.3 2.0 - 4.0 g/dL    A-G Ratio 0.8 (L) 1.1 - 2.2     URINALYSIS W/MICROSCOPIC    Collection Time: 05/31/22 10:41 PM   Result Value Ref Range    Color YELLOW/STRAW      Appearance CLEAR CLEAR      Specific gravity 1.010 1.003 - 1.030      pH (UA) 5.0 5.0 - 8.0      Protein 30 (A) NEG mg/dL    Glucose >1,000 (A) NEG mg/dL    Ketone TRACE (A) NEG mg/dL    Bilirubin Negative NEG      Blood Negative NEG      Urobilinogen 1.0 0.2 - 1.0 EU/dL    Nitrites Negative NEG      Leukocyte Esterase Negative NEG      WBC 0-4 0 - 4 /hpf    RBC 0-5 0 - 5 /hpf    Epithelial cells FEW FEW /lpf    Bacteria Negative NEG /hpf    Hyaline cast 0-2 0 - 2 /lpf   METABOLIC PANEL, BASIC    Collection Time: 06/01/22  2:43 AM   Result Value Ref Range    Sodium 139 136 - 145 mmol/L    Potassium 3.7 3.5 - 5.1 mmol/L    Chloride 107 97 - 108 mmol/L    CO2 26 21 - 32 mmol/L    Anion gap 6 5 - 15 mmol/L    Glucose 148 (H) 65 - 100 mg/dL    BUN 22 (H) 6 - 20 MG/DL    Creatinine 0.44 (L) 0.70 - 1.30 MG/DL    BUN/Creatinine ratio 50 (H) 12 - 20      GFR est AA >60 >60 ml/min/1.73m2    GFR est non-AA >60 >60 ml/min/1.73m2    Calcium 7.7 (L) 8.5 - 10.1 MG/DL   CBC W/O DIFF    Collection Time: 06/01/22  2:43 AM   Result Value Ref Range    WBC 4.0 (L) 4.1 - 11.1 K/uL    RBC 4.04 (L) 4.10 - 5.70 M/uL    HGB 9.9 (L) 12.1 - 17.0 g/dL    HCT 31.3 (L) 36.6 - 50.3 %    MCV 77.5 (L) 80.0 - 99.0 FL    MCH 24.5 (L) 26.0 - 34.0 PG    MCHC 31.6 30.0 - 36.5 g/dL    RDW 16.3 (H) 11.5 - 14.5 %    PLATELET 647 (L) 940 - 400 K/uL    MPV 10.8 8.9 - 12.9 FL    NRBC 0.0 0  WBC    ABSOLUTE NRBC 0.00 0.00 - 0.01 K/uL       History:  Past Medical History:   Diagnosis Date    Aortic stenosis 8/2/2017    ASVD (arteriosclerotic vascular disease) 8/2/2017    Back pain 8/2/2017    BPH (benign prostatic hyperplasia) 8/2/2017    CKD (chronic kidney disease) 8/2/2017    Diabetes ([address] 8/2/2017    DJD (degenerative joint disease) 8/2/2017    ED (erectile dysfunction) 8/2/2017    Guillain-[place] syndrome ([address] 8/2/2017    Hyperlipidemia 8/2/2017    Hypertension 8/2/2017    Left carotid bruit 8/2/2017    Melanoma ([facility] 2021    Morbid obesity ([facility] 8/2/2017    On statin therapy 8/2/2017    Urticaria 8/2/2017      Past Surgical History:   Procedure Laterality Date    HX ENDOSCOPY 12/02/2021    [place] CARDIAC SURG PROCEDURE UNLIST  09/2019    4 stents placed      Prior to Admission medications    Medication Sig Start Date End Date Taking? Authorizing Provider   metFORMIN (GLUCOPHAGE) 500 mg tablet Take 500 mg by mouth two (2) times daily (with meals). 4/20/22   Provider, Historical   tamsulosin (FLOMAX) 0.4 mg capsule TAKE TWO CAPSULES BY MOUTH DAILY 4/20/22   Suhas Crowe MD   Jardiance 10 mg tablet TAKE ONE TABLET BY MOUTH DAILY 3/25/22   Suhas Crowe MD   finasteride (PROSCAR) 5 mg tablet Take 5 mg by mouth daily. Provider, Historical   pravastatin (PRAVACHOL) 80 mg tablet Take 80 mg by mouth daily. Provider, Historical   Omega-3-DHA-EPA-Fish Oil 1,000 mg (120 mg-180 mg) cap Take 1 Cap by mouth three (3) times daily. Provider, Historical     Allergies   Allergen Reactions    Adhesive Tape-Silicones Unknown (comments)    Chocolate Flavor Unknown (comments)      Social History     Tobacco Use    Smoking status: Never Smoker    Smokeless tobacco: Never Used   Substance Use Topics    Alcohol use: Yes     Comment: social      Family History   Problem Relation Age of Onset    Heart Disease Mother         murmor    Heart Disease Father         Current Medications:  Current Facility-Administered Medications   Medication Dose Route Frequency    finasteride (PROSCAR) tablet 5 mg  5 mg Oral DAILY    tamsulosin (FLOMAX) capsule 0.4 mg  0.4 mg Oral DAILY    sodium chloride (NS) flush 5-40 mL  5-40 mL IntraVENous Q8H    sodium chloride (NS) flush 5-40 mL  5-40 mL IntraVENous PRN    0.9% sodium chloride infusion  100 mL/hr IntraVENous CONTINUOUS    enoxaparin (LOVENOX) injection 40 mg  40 mg SubCUTAneous Q24H     Review of Systems: negative for 11 organ systems\ except as noted above. Physical Exam:  Constitutional Alert, cooperative, oriented. Mood and affect appropriate. Appears close to chronological age. Well nourished. Well developed.    Head Normocephalic; no scars   Eyes Conjunctivae and sclerae are clear and without icterus. Pupils are round   ENMT Sinuses are nontender. No oral exudates, ulcers, masses, thrush or mucositis. Oropharynx clear. Tongue normal.   Neck Supple without masses or thyromegaly. No jugular venous distension. Hematologic/Lymphatic No petechiae or purpura. No tender or palpable lymph nodes noted   Respiratory Lungs are clear to auscultation without rhonchi or wheezing. Cardiovascular Regular rate and rhythm of heart without murmurs, gallops or rubs. Chest / Line Site Chest is symmetric with no chest wall deformities. Abdomen Non-tender, non-distended, no masses, ascites or hepatosplenomegaly. Good bowel sounds. Musculoskeletal No tenderness or swelling, normal range of motion without obvious weakness. Extremities No visible deformities, no cyanosis, clubbing or edema. Skin No rashes, scars, or lesions suggestive of malignancy. No petechiae, purpura, or ecchymoses. No excoriations. Neurologic No sensory or motor deficits noted but not specifically tested. Psychiatric Alert and oriented. . Coherent speech. Verbalizes understanding of our discussions today.        Leverette Homans MD [facility] office  [address]  [place], [facility] [address]  Phone [phone]  Fax [phone]

## 2022-06-01 NOTE — PROGRESS NOTES
Transition of Care Plan:    RUR: 20% - \"high risk\"  Disposition: TBD pending PT/OT evals - anticipate home with HH, family assistance, & f/u apts  Follow up appointments: PCP & specialist as indicated  DME needed: No current DME needs identified for d/c  Transportation at Discharge: Pt's wife will provide transportation at d/c  [place] or means to access home: Pt's wife has access to the home        IM Medicare Letter: 2nd IM needed prior to d/c  Is patient a BCPI-A Bundle: N/A         Is patient a [place] and connected with the [place]? N/A              Caregiver Contact: Pt's wife Kp Mendez: [phone])  Discharge Caregiver contacted prior to discharge? To be contacted prior to d/c  Care Conference needed?: N/A                  Initial note: Chart reviewed. PT/OT evals pending; CM will continue to follow to determine direction of the d/c plan. CM met with pt and wife Kp Mendez) at bedside, introduced role as d/c planner, and confirmed demographic information is up-to-date in the chart. Pt's wife will provide transportation at d/c. No immediate questions/concerns identified. CM will continue to follow & remain accessible for d/c planning. Full assessment detailed below:    Reason for Admission: Hypotension                RUR Score: 20% - \"high risk\"        PCP: First and Last name:  Lauren Benavides MD   Name of Practice: [facility]    Are you a current patient: Yes/No: Yes   Approximate date of last visit: 5/31/22   Can you do a virtual visit with your PCP:  In-person visits preferred             Resources/supports as identified by patient/family: Pt has access to family support between wife and son. Wife reported son is going to install grab bars in their bathroom to assist pt with showering. Top Challenges facing patient (as identified by patient/family and CM):                        Finances/Medication cost? No barriers related to accessing/paying for medication                 Transportation? Family              Support system or lack thereof? Familial support                      Living arrangements? Pt lives with his wife in a single story home with 2 MARILOU               Self-care/ADLs/Cognition? Pt reported being \"mostly independent\" with ADL's at baseline. Wife specified pt requires assistance/supervision while showering, as he gets tired throughout the process. Current Advanced Directive/Advance Care Plan:  DNR - no AMD on file. Both pt & wife requested copy of blank AMD for review; CM to provide prior to d/c    Healthcare Decision Maker:       Primary Decision Maker: Pinky Daley - Spouse - [phone]    Payor Source Payor: [facility] / Plan: [employer] / Product Type: Medicare /                           Plan for utilizing home health: PT/OT consulted; recommendations for d/c pending. Pt declined prior hx of utilizing HH/SNF/IPR interventions. Pt reported having access to DME in the form of a cane, walker, and shower chair       Care Management Interventions  PCP Verified by CM: Yes  Last Visit to PCP: 05/31/22  Mode of Transport at Discharge:  Other (see comment) (Wife will transport at d/c)  Transition of Care Consult (CM Consult): Discharge Planning  Discharge Durable Medical Equipment: No  Physical Therapy Consult: No  Occupational Therapy Consult: Yes  Speech Therapy Consult: No  Support Systems: Child(srikanth),Spouse/Significant Other (Pt lives with his wife in a single story home with 2 MARILOU)  Confirm Follow Up Transport: Family  [employer] Information Provided?: No  Discharge Location  Patient Expects to be Discharged to[de-identified] Home with home health    Elsy Matute, [address], ED [facility]  [phone]

## 2022-06-01 NOTE — PROGRESS NOTES
Problem: Mobility Impaired (Adult and Pediatric)  Goal: *Acute Goals and Plan of Care (Insert Text)  Description: FUNCTIONAL STATUS PRIOR TO ADMISSION: Patient was modified independent using a rolling walker for functional mobility. Spouse reports patient typically does not use RW in household despite being encouraged to do so. HOME SUPPORT PRIOR TO ADMISSION: The patient lived with spouse and grandson who are able to assist as needed. Physical Therapy Goals  Initiated 6/1/2022  1. Patient will move from supine to sit and sit to supine , scoot up and down, and roll side to side in bed with independence within 7 day(s). 2.  Patient will transfer from bed to chair and chair to bed with modified independence using the least restrictive device within 7 day(s). 3.  Patient will perform sit to stand with modified independence within 7 day(s). 4.  Patient will ambulate with modified independence for 100 feet with the least restrictive device within 7 day(s). 5.  Patient will ascend/descend 2 stairs with single handrail(s) with supervision/set-up within 7 day(s). Outcome: Not Met   PHYSICAL THERAPY EVALUATION  Patient: Josué Green (62 y.o. male)  Date: 6/1/2022  Primary Diagnosis: Hypotension [I95.9]        Precautions: Fall,DNR    ASSESSMENT  Based on the objective data described below, the patient presents with generalized weakness, impaired balance, decreased activity tolerance/endurance and slightly decreased insight into deficits all limiting patients functional mobility. He tolerated in room ambulation with support of RW but requires rest breaks and limited distance. He requires encouragement to remain up in chair post session. Time spent educating on the importance of increasing time up OOB her at the hospital and at home. Patients BP stable and no c/o dizziness. Weakness and fatigue were greatest limiting factors. Will benefit from continued skilled therapy and recommending HH.     Current Level of Function Impacting Discharge (mobility/balance): Min A-CGA    Functional Outcome Measure: The patient scored 55/100 on the Barthel Index outcome measure which is indicative of partially dependent. Other factors to consider for discharge: Fall risk, Cancer     Patient will benefit from skilled therapy intervention to address the above noted impairments. PLAN :  Recommendations and Planned Interventions: bed mobility training, transfer training, gait training, therapeutic exercises, patient and family training/education, and therapeutic activities      Frequency/Duration: Patient will be followed by physical therapy:  4 times a week to address goals. Recommendation for discharge: (in order for the patient to meet his/her long term goals)  Physical therapy at least 2 days/week in the home     This discharge recommendation:  Has not yet been discussed the attending provider and/or case management    IF patient discharges home will need the following DME: patient owns DME required for discharge       SUBJECTIVE:   Patient stated I'm just not interested in it anymore.   Referring to activity at home    OBJECTIVE DATA SUMMARY:   HISTORY:    Past Medical History:   Diagnosis Date    Aortic stenosis 8/2/2017    ASVD (arteriosclerotic vascular disease) 8/2/2017    Back pain 8/2/2017    BPH (benign prostatic hyperplasia) 8/2/2017    CKD (chronic kidney disease) 8/2/2017    Diabetes ([address] 8/2/2017    DJD (degenerative joint disease) 8/2/2017    ED (erectile dysfunction) 8/2/2017    Guillain-Bath syndrome ([address] 8/2/2017    Hyperlipidemia 8/2/2017    Hypertension 8/2/2017    Left carotid bruit 8/2/2017    Melanoma ([address] 2021    Morbid obesity ([address] 8/2/2017    On statin therapy 8/2/2017    Urticaria 8/2/2017     Past Surgical History:   Procedure Laterality Date    HX ENDOSCOPY  12/02/2021    [facility] CARDIAC SURG PROCEDURE UNLIST  09/2019    4 stents placed       Personal factors and/or comorbidities impacting plan of care:     Home Situation  Home Environment: Private residence  # Steps to Enter: 2  Rails to Enter: Yes  Hand Rails :  (Rail at top of steps)  One/Two Story Residence: One story  Living Alone: No  Support Systems: Child(srikanth),Spouse/Significant Other (Pt lives with his wife in a single story home with 2 MARILOU)  Patient Expects to be Discharged to[de-identified] Home with home health  Current DME Used/Available at Home: Betsey Castellani, rolling,Cane, straight,Shower chair  Tub or Shower Type: Tub/Shower combination    EXAMINATION/PRESENTATION/DECISION MAKING:   Critical Behavior:  Neurologic State: Alert  Orientation Level: Oriented X4  Cognition: Follows commands     Hearing: Auditory  Auditory Impairment: None    Range Of Motion:  AROM: Generally decreased, functional                       Strength:    Strength: Generally decreased, functional                    Tone & Sensation:   Tone: Normal              Sensation: Intact               Coordination:  Coordination: Within functional limits    Functional Mobility:  Bed Mobility:  Rolling: Minimum assistance; Additional time  Supine to Sit: Minimum assistance; Additional time     Scooting: Contact guard assistance; Additional time  Transfers:  Sit to Stand: Minimum assistance; Additional time  Stand to Sit: Minimum assistance; Additional time        Bed to Chair: Minimum assistance;Contact guard assistance; Additional time (CGA with use of RW)              Balance:   Sitting: Intact  Standing: Impaired  Standing - Static: Fair  Standing - Dynamic : Fair  Ambulation/Gait Training:  Distance (ft): 12 Feet (ft) (x 2 reps, to/from bathroom commode)  Assistive Device: Gait belt;Walker, rolling (vs HHA)  Ambulation - Level of Assistance: Minimal assistance;Contact guard assistance (Min A HHA, CGA RW)        Gait Abnormalities: Decreased step clearance        Base of Support: Narrowed     Speed/Elsy: Pace decreased (<100 feet/min); Slow  Step Length: Right shortened;Left shortened                  Slow. Requires support of RW. Fatigues quickly    Stairs:   Not addressed at this time          Functional Measure:  Barthel Index:    Bathin  Bladder: 10  Bowels: 10  Groomin  Dressin  Feeding: 10  Mobility: 0 (can't tolerate distance)  Stairs: 0  Toilet Use: 5  Transfer (Bed to Chair and Back): 10  Total: 55/100     The Barthel ADL Index: Guidelines  1. The index should be used as a record of what a patient does, not as a record of what a patient could do. 2. The main aim is to establish degree of independence from any help, physical or verbal, however minor and for whatever reason. 3. The need for supervision renders the patient not independent. 4. A patient's performance should be established using the best available evidence. Asking the patient, friends/relatives and nurses are the usual sources, but direct observation and common sense are also important. However direct testing is not needed. 5. Usually the patient's performance over the preceding 24-48 hours is important, but occasionally longer periods will be relevant. 6. Middle categories imply that the patient supplies over 50 per cent of the effort. 7. Use of aids to be independent is allowed. Score Interpretation (from [address] [address])    Independent   60-79 Minimally independent   40-59 Partially dependent   20-39 Very dependent   <20 Totally dependent     -Dae Farris., Barthel, D.W. (1965). Functional evaluation: the Barthel Index. [facility] ([address] (1997). The Barthel activities of daily living index: self-reporting versus actual performance in the old (> or = 75 years). Journal of [address] [place] [address] 45(7), [facility], JLoraineJLoraineMLoraineF, Mark Anthony., Vivian Figueroa. (1999). Measuring the change in disability after inpatient rehabilitation; comparison of the responsiveness of the Barthel Index and Functional [place] Measure.  Journal of Neurology, Neurosurgery, and Psychiatry, 664), 238-212. FROYLAN Meyers, GURPREET Bermudez, & Kimberly Connolly M.A. (2004) Assessment of post-stroke quality of life in cost-effectiveness studies: The usefulness of the Barthel Index and the EuroQoL-5D. Quality of Life Research, 15, 467-37           Physical Therapy Evaluation Charge Determination   History Examination Presentation Decision-Making   MEDIUM  Complexity : 1-2 comorbidities / personal factors will impact the outcome/ POC  MEDIUM Complexity : 3 Standardized tests and measures addressing body structure, function, activity limitation and / or participation in recreation  LOW Complexity : Stable, uncomplicated  Other outcome measures Barthel Index  MEDIUM      Based on the above components, the patient evaluation is determined to be of the following complexity level: LOW     Pain Rating:  No c/o pain     Activity Tolerance:   Fair    After treatment patient left in no apparent distress:   Sitting in chair, Call bell within reach, and Caregiver / family present    COMMUNICATION/EDUCATION:   The patients plan of care was discussed with: Occupational therapist and Registered nurse. Fall prevention education was provided and the patient/caregiver indicated understanding., Patient/family have participated as able in goal setting and plan of care. , and Patient/family agree to work toward stated goals and plan of care.     Thank you for this referral.  Eligio Hale, PT, DPT   Time Calculation: 30 mins

## 2022-06-01 NOTE — PROGRESS NOTES
Problem: Hypotension  Goal: *Blood pressure within specified parameters  Outcome: Progressing Towards Goal  Goal: *Fluid volume balance  Outcome: Progressing Towards Goal  Goal: *Labs within defined limits  Outcome: Progressing Towards Goal     Problem: Patient Education: Go to Patient Education Activity  Goal: Patient/Family Education  Outcome: Progressing Towards Goal     Problem: Falls - Risk of  Goal: *Absence of Falls  Description: Document Larry Fall Risk and appropriate interventions in the flowsheet.   Outcome: Progressing Towards Goal  Note: Fall Risk Interventions:  Mobility Interventions: Bed/chair exit alarm         Medication Interventions: Bed/chair exit alarm                   Problem: Patient Education: Go to Patient Education Activity  Goal: Patient/Family Education  Outcome: Progressing Towards Goal

## 2022-06-01 NOTE — PROGRESS NOTES
INTERNAL MEDICINE PROGRESS NOTE    NAME:  Judie Salvador   :   1945   MRN:   [id number]     Date/Time:  2022 5:27 AM  Subjective:   History:  Chart reviewed and patient seen and examined and D/W his nurse this AM and all events noted. He is followed regularly by me for hypertension, diabetes, hyperlipidemia, ASCVD status post prior MI, paroxysmal atrial fibrillation, diastolic CHF compensated, DJD, and now metastatic adenocarcinoma of lung with resultant marked weight loss. He presented to the office  for evaluation and was noted to be extremely weak and feeling he would pass out at which time blood pressure obtained was 80 systolic currently on no hypertensive medications. He denied associated chest pain, shortness of breath, palpitations, PND, orthopnea or other cardiac or respiratory complaints. He noted an extremely poor appetite and has been drinking a couple of cans of Ensure and some electrolytes daily but really taking in very little otherwise. There was no nausea vomiting and no other GI complaints. He notes no  complaints. He noted no fevers or chills. He was recently evaluated by Dr. Janae Arriola his oncologist and is set up for repeat scans to see if different therapy may be beneficial for his lung carcinoma. It was felt prudent based upon his hypotension and generalized lethargic condition that hospitalization hydration was warranted. This AM he is much more alert and no new c/o noted on complete ROS.       Medications reviewed:  Current Facility-Administered Medications   Medication Dose Route Frequency    finasteride (PROSCAR) tablet 5 mg  5 mg Oral DAILY    tamsulosin (FLOMAX) capsule 0.4 mg  0.4 mg Oral DAILY    sodium chloride (NS) flush 5-40 mL  5-40 mL IntraVENous Q8H    sodium chloride (NS) flush 5-40 mL  5-40 mL IntraVENous PRN    0.9% sodium chloride infusion  100 mL/hr IntraVENous CONTINUOUS    enoxaparin (LOVENOX) injection 40 mg  40 mg SubCUTAneous Q24H Objective:   Vitals:  Visit Vitals  BP (!) 144/78 (BP 1 Location: Left upper arm, BP Patient Position: At rest)   Pulse 78   Temp 98.9 °F (37.2 °C)   Resp 20   SpO2 97%      O2 Device: None (Room air) Temp (24hrs), Av °F (36.7 °C), Min:97.3 °F (36.3 °C), Max:98.9 °F (37.2 °C)      Last 24hr Input/Output:  No intake or output data in the 24 hours ending 22 0750     PHYSICAL EXAM:  General:     Alert, cooperative, very frail, no distress, appears stated age. Head:    Normocephalic, without obvious abnormality, atraumatic. Eyes:    Conjunctivae/corneas clear. PERRLA  Nose:   Nares normal. No drainage or sinus tenderness. Throat:     Lips, mucosa, and tongue normal.  No Thrush  Neck:   Supple, symmetrical,  no adenopathy, thyroid: non tender     no carotid bruit and no JVD. Back:     Symmetric,  No CVA tenderness. Lungs:    Clear to auscultation bilaterally. No Wheezing or Rhonchi. No rales. Heart:    Regular rate and rhythm,  no murmur, rub or gallop. Abdomen:    Soft, non-tender. Not distended. Bowel sounds normal. No masses  Extremities:  Extremities normal, atraumatic, No cyanosis. No edema. No clubbing  Lymph nodes:  Cervical, supraclavicular normal.  Neurologic:  Normal strength, Alert and oriented X 3.    Skin:                 No rash      Lab Data Reviewed:    Recent Results (from the past 24 hour(s))   CBC WITH AUTOMATED DIFF    Collection Time: 22  5:31 PM   Result Value Ref Range    WBC 4.1 4.1 - 11.1 K/uL    RBC 4.61 4.10 - 5.70 M/uL    HGB 10.9 (L) 12.1 - 17.0 g/dL    HCT 36.2 (L) 36.6 - 50.3 %    MCV 78.5 (L) 80.0 - 99.0 FL    MCH 23.6 (L) 26.0 - 34.0 PG    MCHC 30.1 30.0 - 36.5 g/dL    RDW 16.6 (H) 11.5 - 14.5 %    PLATELET 042 591 - 623 K/uL    MPV 9.3 8.9 - 12.9 FL    NRBC 0.0 0  WBC    ABSOLUTE NRBC 0.00 0.00 - 0.01 K/uL    NEUTROPHILS 78 (H) 32 - 75 %    LYMPHOCYTES 12 12 - 49 %    MONOCYTES 9 5 - 13 %    EOSINOPHILS 0 0 - 7 %    BASOPHILS 0 0 - 1 %    IMMATURE GRANULOCYTES 1 (H) 0.0 - 0.5 %    ABS. NEUTROPHILS 3.2 1.8 - 8.0 K/UL    ABS. LYMPHOCYTES 0.5 (L) 0.8 - 3.5 K/UL    ABS. MONOCYTES 0.4 0.0 - 1.0 K/UL    ABS. EOSINOPHILS 0.0 0.0 - 0.4 K/UL    ABS. BASOPHILS 0.0 0.0 - 0.1 K/UL    ABS. IMM. GRANS. 0.0 0.00 - 0.04 K/UL    DF SMEAR SCANNED      RBC COMMENTS ANISOCYTOSIS  1+        RBC COMMENTS MICROCYTOSIS  1+        RBC COMMENTS HYPOCHROMIA  1+       METABOLIC PANEL, COMPREHENSIVE    Collection Time: 05/31/22  5:31 PM   Result Value Ref Range    Sodium 138 136 - 145 mmol/L    Potassium 4.4 3.5 - 5.1 mmol/L    Chloride 105 97 - 108 mmol/L    CO2 29 21 - 32 mmol/L    Anion gap 4 (L) 5 - 15 mmol/L    Glucose 192 (H) 65 - 100 mg/dL    BUN 26 (H) 6 - 20 MG/DL    Creatinine 0.68 (L) 0.70 - 1.30 MG/DL    BUN/Creatinine ratio 38 (H) 12 - 20      GFR est AA >60 >60 ml/min/1.73m2    GFR est non-AA >60 >60 ml/min/1.73m2    Calcium 8.0 (L) 8.5 - 10.1 MG/DL    Bilirubin, total 0.9 0.2 - 1.0 MG/DL    ALT (SGPT) 27 12 - 78 U/L    AST (SGOT) 31 15 - 37 U/L    Alk.  phosphatase 130 (H) 45 - 117 U/L    Protein, total 5.9 (L) 6.4 - 8.2 g/dL    Albumin 2.6 (L) 3.5 - 5.0 g/dL    Globulin 3.3 2.0 - 4.0 g/dL    A-G Ratio 0.8 (L) 1.1 - 2.2     URINALYSIS W/MICROSCOPIC    Collection Time: 05/31/22 10:41 PM   Result Value Ref Range    Color YELLOW/STRAW      Appearance CLEAR CLEAR      Specific gravity 1.010 1.003 - 1.030      pH (UA) 5.0 5.0 - 8.0      Protein 30 (A) NEG mg/dL    Glucose >1,000 (A) NEG mg/dL    Ketone TRACE (A) NEG mg/dL    Bilirubin Negative NEG      Blood Negative NEG      Urobilinogen 1.0 0.2 - 1.0 EU/dL    Nitrites Negative NEG      Leukocyte Esterase Negative NEG      WBC 0-4 0 - 4 /hpf    RBC 0-5 0 - 5 /hpf    Epithelial cells FEW FEW /lpf    Bacteria Negative NEG /hpf    Hyaline cast 0-2 0 - 2 /lpf   METABOLIC PANEL, BASIC    Collection Time: 06/01/22  2:43 AM   Result Value Ref Range    Sodium 139 136 - 145 mmol/L    Potassium 3.7 3.5 - 5.1 mmol/L    Chloride 107 97 - 108 mmol/L    CO2 26 21 - 32 mmol/L    Anion gap 6 5 - 15 mmol/L    Glucose 148 (H) 65 - 100 mg/dL    BUN 22 (H) 6 - 20 MG/DL    Creatinine 0.44 (L) 0.70 - 1.30 MG/DL    BUN/Creatinine ratio 50 (H) 12 - 20      GFR est AA >60 >60 ml/min/1.73m2    GFR est non-AA >60 >60 ml/min/1.73m2    Calcium 7.7 (L) 8.5 - 10.1 MG/DL   CBC W/O DIFF    Collection Time: 06/01/22  2:43 AM   Result Value Ref Range    WBC 4.0 (L) 4.1 - 11.1 K/uL    RBC 4.04 (L) 4.10 - 5.70 M/uL    HGB 9.9 (L) 12.1 - 17.0 g/dL    HCT 31.3 (L) 36.6 - 50.3 %    MCV 77.5 (L) 80.0 - 99.0 FL    MCH 24.5 (L) 26.0 - 34.0 PG    MCHC 31.6 30.0 - 36.5 g/dL    RDW 16.3 (H) 11.5 - 14.5 %    PLATELET 132 (L) 142 - 400 K/uL    MPV 10.8 8.9 - 12.9 FL    NRBC 0.0 0  WBC    ABSOLUTE NRBC 0.00 0.00 - 0.01 K/uL         Assessment/Plan:     Principal Problem:    Hypotension (5/31/2022)    Active Problems:    Essential hypertension (8/2/2017)      Stage 2 chronic kidney disease (8/2/2017)      Controlled type 2 diabetes mellitus with stage 2 chronic kidney disease, without long-term current use of insulin ([facility] (12/10/2017)      ASCVD (arteriosclerotic cardiovascular disease) (9/28/2019)      Overview: 80% LAD, 80% CX, 80% RCA all treated with sten Y8/4291      Diastolic CHF, chronic (HCC) (2/18/2020)      PAF (paroxysmal atrial fibrillation) ([facility] (9/28/2019)      Overview: 9/2019 at time of MI      Weight loss (9/13/2021)      Metastatic adenocarcinoma ([facility] (10/26/2021)       ___________________________________________________  PLAN:    1. Continue IV hydration with normal saline  2. Follow electrolytes to check status of sodium with slight confusion, OK on admission  3. CBC in light of generalized pale appearance and hypotension, Hgb 10.9 adm to 9.9 now with hydration  4. Continue off all home blood pressure medications  5.   Discontinue all diabetic medications (on Jardiance and metformin up until this point) previously on additional medicines which have previously been discontinued  6. Follow blood sugar and treat with sliding scale insulin if needed, so far no treatment needed  7. Consult Dr. Noreen Vergara to get her input  8. Physical therapy and Occupational Therapy evaluation      40 Minutes spent today in direct care of this high complexity patient with greater than 50% in counseling and coordination of care.     If need to contact me use hospital  [phone], DO NOT USE PERFECT SERVE    ___________________________________________________    Attending Physician: Julissa Hewitt MD

## 2022-06-01 NOTE — ROUTINE PROCESS
End of Shift Note    Bedside shift change report given to Saadia  (oncoming nurse) by Farhad Pemberton RN (offgoing nurse). Report included the following information    Shift worked: NIGHTS     Shift summary and any significant changes:    No changes     Concerns for physician to address:  none     Zone phone for oncoming shift:   [phone]       Activity:  Activity Level: Up with Assistance  Number times ambulated in hallways past shift:   Number of times OOB to chair past shift:   Cardiac:   Cardiac Monitoring:NO         Access:   Current line(s): piv    Genitourinary:   Urinary status:continent    Respiratory:   O2 Device: None (Room air)  Chronic home O2 use?: no  Incentive spirometer at bedside: no       GI:     Current diet:  ADULT DIET Regular  Passing flatus:yes  Tolerating current diet:yes    Pain Management:   Patient states pain is manageable on current regimen: yes    Skin:  Calderon Score: 18  Interventions: turning  Patient Safety:  Fall Score:  Total Score: 3  Interventions:High Fall Risk: Yes    Length of Stay:  Expected LOS: - - -  Actual LOS: [address], RN

## 2022-06-01 NOTE — PROGRESS NOTES
Problem: Self Care Deficits Care Plan (Adult)  Goal: *Acute Goals and Plan of Care (Insert Text)  Description: FUNCTIONAL STATUS PRIOR TO ADMISSION: Patient was modified independent using a rolling walker for functional mobility, although wife reports pt typically does not use RW in the home    HOME SUPPORT: The patient lived with his wife and grandson who are able to assist.    Occupational Therapy Goals  Initiated 6/1/2022  1. Patient will perform grooming with modified independence in standing within 7 day(s). 2.  Patient will perform lower body dressing with minimal assistance within 7 day(s). 3.  Patient will perform toilet transfers with modified independence within 7 day(s). 4.  Patient will perform all aspects of toileting with modified independence within 7 day(s). 5.  Patient will participate in upper extremity therapeutic exercise/activities with independence for 5 minutes within 7 day(s). 6.  Patient will utilize energy conservation techniques during functional activities with verbal cues within 7 day(s). Outcome: Progressing Towards Goal   OCCUPATIONAL THERAPY EVALUATION  Patient: Christo Liu (70 y.o. male)  Date: 6/1/2022  Primary Diagnosis: Hypotension [I95.9]        Precautions:  Fall,DNR    ASSESSMENT  Based on the objective data described below, the patient presents with generalized weakness, decreased activity tolerance and impaired functional mobility following admission for hypotension. At baseline pt lives with his wife and grandson, is typically independent-mod I for ADLs and functional mobility. He was received supine in bed, agreeable to participate. Pt transferred supine>sit with min A, demo'd good sitting balance EOB. He ambulated to bathroom with min A and transferred to toilet. He returned to room to transfer to chair using RW with CGA, demo'd improved balance. Pt mobility distance limited by fatigue and required frequent seated rest breaks.  He declined standing grooming ADLs due to fatigue and washed hands in sitting with setup. Vitals monitored and stable, pt with no c/o dizziness. At this time pt is functioning below his baseline and will benefit from skilled therapy intervention to address the above noted impairments. Recommend HH therapy and family assistance at discharge. Current Level of Function Impacting Discharge (ADLs/self-care): CGA-min A transfers, setup-max A ADLs    Functional Outcome Measure: The patient scored 55/100 on the Barthel Index outcome measure. Other factors to consider for discharge: supportive family, fall risk, cancer       PLAN :  Recommendations and Planned Interventions: self care training, functional mobility training, therapeutic exercise, balance training, therapeutic activities, endurance activities, patient education, home safety training, and family training/education    Frequency/Duration: Patient will be followed by occupational therapy 4 times a week to address goals. Recommendation for discharge: (in order for the patient to meet his/her long term goals)  Occupational therapy at least 2 days/week in the home AND ensure assist and/or supervision for safety with ADLs and mobility    This discharge recommendation:  Has been made in collaboration with the attending provider and/or case management    IF patient discharges home will need the following DME: patient owns DME required for discharge       SUBJECTIVE:   Patient stated I'm ready to go sit down.     OBJECTIVE DATA SUMMARY:   HISTORY:   Past Medical History:   Diagnosis Date    Aortic stenosis 8/2/2017    ASVD (arteriosclerotic vascular disease) 8/2/2017    Back pain 8/2/2017    BPH (benign prostatic hyperplasia) 8/2/2017    CKD (chronic kidney disease) 8/2/2017    Diabetes ([facility] [address] 8/2/2017    DJD (degenerative joint disease) 8/2/2017    ED (erectile dysfunction) 8/2/2017    Guillain-[place] syndrome ([facility] [address] 8/2/2017    Hyperlipidemia 8/2/2017    Hypertension 8/2/2017  Left carotid bruit 8/2/2017    Melanoma ([facility] [address] 2021    Morbid obesity ([facility] [address] 8/2/2017    On statin therapy 8/2/2017    Urticaria 8/2/2017     Past Surgical History:   Procedure Laterality Date    HX ENDOSCOPY  12/02/2021    NE CARDIAC SURG PROCEDURE UNLIST  09/2019    4 stents placed       Expanded or extensive additional review of patient history:     Home Situation  Home Environment: Private residence  # Steps to Enter: 2  Rails to Enter: Yes  Hand Rails :  (Rail at top of steps)  One/Two Story Residence: One story  Living Alone: No  Support Systems: Child(srikanth),Spouse/Significant Other (Pt lives with his wife in a single story home with 2 MARILOU)  Patient Expects to be Discharged to[de-identified] Home with home health  Current DME Used/Available at Home: Velta Aver, rolling,Cane, straight,Shower chair  Tub or Shower Type: Tub/Shower combination    Hand dominance: Right    EXAMINATION OF PERFORMANCE DEFICITS:  Cognitive/Behavioral Status:   Alert  Oriented x4      Hearing: Auditory  Auditory Impairment: None    Vision/Perceptual:              Acuity: Within Defined Limits         Range of Motion:  AROM: Generally decreased, functional          Strength:  Strength: Generally decreased, functional          Coordination:  Coordination: Within functional limits  Fine Motor Skills-Upper: Left Intact; Right Intact    Gross Motor Skills-Upper: Left Intact; Right Intact    Tone & Sensation:  Tone: Normal  Sensation: Intact       Balance:  Sitting: Intact  Standing: Impaired  Standing - Static: Fair  Standing - Dynamic : Fair    Functional Mobility and Transfers for ADLs:  Bed Mobility:  Rolling: Minimum assistance; Additional time  Supine to Sit: Minimum assistance; Additional time  Scooting: Contact guard assistance; Additional time    Transfers:  Sit to Stand: Minimum assistance; Additional time  Stand to Sit: Minimum assistance; Additional time  Bed to Chair: Minimum assistance;Contact guard assistance; Additional time (CGA with use of RW)  Toilet Transfer : Contact guard assistance; Adaptive equipment; Additional time    ADL Assessment:  Feeding: Independent    Oral Facial Hygiene/Grooming: Setup (seated)    Bathing: Moderate assistance    Upper Body Dressing: Setup    Lower Body Dressing: Maximum assistance    Toileting: Minimum assistance        ADL Intervention and task modifications:  Feeding  Drink to Mouth: Independent     Grooming   Hand washing: Setup, seated in chair     Educated patient on energy conservation and strategies to maximize quality of life  1. Deep breathing  2. Educated on pacing and making sure he/she takes short frequent breaks (e.g. In the shower wash the upper body, rest for 1 minute, then wash the lower body, etc)  3. Educated on re-arranging his/her routine to allow for rest breaks in the morning routine  4. Educated on looking at the consequences of his/her actions before deciding he/she needs to take on a task (e.g not getting down on one's hands and knees to wash floors because it will take all of one's energy for the day and result in exhaustion). 5. Educated on DME used to help conserve energy, such as a shower seat, a stool or chair in the kitchen, and pushing or pulling items instead of carrying them      Functional Measure:    Barthel Index:  Bathin  Bladder: 10  Bowels: 10  Groomin  Dressin  Feeding: 10  Mobility: 0 (can't tolerate distance)  Stairs: 0  Toilet Use: 5  Transfer (Bed to Chair and Back): 10  Total: 55/100      The Barthel ADL Index: Guidelines  1. The index should be used as a record of what a patient does, not as a record of what a patient could do. 2. The main aim is to establish degree of independence from any help, physical or verbal, however minor and for whatever reason. 3. The need for supervision renders the patient not independent. 4. A patient's performance should be established using the best available evidence.  Asking the patient, friends/relatives and nurses are the usual sources, but direct observation and common sense are also important. However direct testing is not needed. 5. Usually the patient's performance over the preceding 24-48 hours is important, but occasionally longer periods will be relevant. 6. Middle categories imply that the patient supplies over 50 per cent of the effort. 7. Use of aids to be independent is allowed. Score Interpretation (from [address])    Independent   60-79 Minimally independent   40-59 Partially dependent   20-39 Very dependent   <20 Totally dependent     -Dae Farris., Barthel, D.W. (1965). Functional evaluation: the Barthel Index. [address] ([address] (1997). The Barthel activities of daily living index: self-reporting versus actual performance in the old (> or = 75 years). Journal [address] 45(7), [address] [facility], COURTNEY, Matt Suarez., Kristy Bee. (1999). Measuring the change in disability after inpatient rehabilitation; comparison of the responsiveness of the Barthel Index and Functional Long Measure. Journal of Neurology, Neurosurgery, and Psychiatry, 66(4), 510-132. Rayo Malcolm, N.J.A, GURPREET Bermudez, & Emilee Rowan, M.A. (2004) Assessment of post-stroke quality of life in cost-effectiveness studies: The usefulness of the Barthel Index and the EuroQoL-5D. Quality of Life Research, 15, 720-71        Occupational Therapy Evaluation Charge Determination   History Examination Decision-Making   LOW Complexity : Brief history review  MEDIUM Complexity : 3-5 performance deficits relating to physical, cognitive , or psychosocial skils that result in activity limitations and / or participation restrictions MEDIUM Complexity : Patient may present with comorbidities that affect occupational performnce.  Miniml to moderate modification of tasks or assistance (eg, physical or verbal ) with assesment(s) is necessary to enable patient to complete evaluation       Based on the above components, the patient evaluation is determined to be of the following complexity level: LOW   Pain Rating:  Pt reported no pain during session    Activity Tolerance:   Fair and requires frequent rest breaks    After treatment patient left in no apparent distress:    Sitting in chair, Call bell within reach, and Caregiver / family present    COMMUNICATION/EDUCATION:   The patients plan of care was discussed with: Physical therapist and Registered nurse. Home safety education was provided and the patient/caregiver indicated understanding., Patient/family have participated as able in goal setting and plan of care. , and Patient/family agree to work toward stated goals and plan of care. This patients plan of care is appropriate for delegation to [facility].     Thank you for this referral.  Deisi Warner OT  Time Calculation: 31 mins

## 2022-06-02 ENCOUNTER — HOSPICE ADMISSION (OUTPATIENT)
Dept: HOSPICE | Facility: HOSPICE | Age: 77
End: 2022-06-02

## 2022-06-02 LAB
BACTERIA SPEC CULT: NORMAL
SERVICE CMNT-IMP: NORMAL

## 2022-06-02 PROCEDURE — 74011250637 HC RX REV CODE- 250/637: Performed by: INTERNAL MEDICINE

## 2022-06-02 PROCEDURE — 74011000250 HC RX REV CODE- 250: Performed by: INTERNAL MEDICINE

## 2022-06-02 PROCEDURE — 97535 SELF CARE MNGMENT TRAINING: CPT

## 2022-06-02 PROCEDURE — 97530 THERAPEUTIC ACTIVITIES: CPT | Performed by: PHYSICAL THERAPIST

## 2022-06-02 PROCEDURE — 97530 THERAPEUTIC ACTIVITIES: CPT

## 2022-06-02 PROCEDURE — 74011250636 HC RX REV CODE- 250/636: Performed by: INTERNAL MEDICINE

## 2022-06-02 PROCEDURE — 65270000029 HC RM PRIVATE

## 2022-06-02 PROCEDURE — 99233 SBSQ HOSP IP/OBS HIGH 50: CPT | Performed by: INTERNAL MEDICINE

## 2022-06-02 PROCEDURE — 97116 GAIT TRAINING THERAPY: CPT | Performed by: PHYSICAL THERAPIST

## 2022-06-02 RX ADMIN — FINASTERIDE 5 MG: 5 TABLET, FILM COATED ORAL at 08:36

## 2022-06-02 RX ADMIN — SODIUM CHLORIDE, PRESERVATIVE FREE 10 ML: 5 INJECTION INTRAVENOUS at 22:02

## 2022-06-02 RX ADMIN — SODIUM CHLORIDE, PRESERVATIVE FREE 10 ML: 5 INJECTION INTRAVENOUS at 05:58

## 2022-06-02 RX ADMIN — TAMSULOSIN HYDROCHLORIDE 0.4 MG: 0.4 CAPSULE ORAL at 08:36

## 2022-06-02 RX ADMIN — SODIUM CHLORIDE 100 ML/HR: 9 INJECTION, SOLUTION INTRAVENOUS at 02:59

## 2022-06-02 RX ADMIN — ENOXAPARIN SODIUM 40 MG: 100 INJECTION SUBCUTANEOUS at 08:36

## 2022-06-02 NOTE — PROGRESS NOTES
Hematology Oncology Progress Note           Follow up for: metastatic lung cancer    Chart notes reviewed since last visit. Case discussed with following: Dr. Sera Baker. Wife at bedside.     Patient complains of the following: feeling better today. Wondered if he could go home soon. Additional concerns noted by the staff:     Patient Vitals for the past 24 hrs:   BP Temp Pulse Resp SpO2   06/02/22 0838 136/75 97.4 °F (36.3 °C) 79 15 97 %   06/02/22 0353 139/74 97.5 °F (36.4 °C) 86 18 95 %   06/01/22 2349 135/72 97.7 °F (36.5 °C) 82 18 97 %   06/01/22 1900 137/76 98.3 °F (36.8 °C) 92 18 94 %   06/01/22 1639 (!) 150/80 98.2 °F (36.8 °C) 90 22 96 %   06/01/22 1245 (!) 158/71 97.4 °F (36.3 °C) 81 18 98 %   06/01/22 0918 (!) 144/72 97.9 °F (36.6 °C) 87 20 97 %       Review of Systems: negative for 11 organ systems except as noted above. Physical Examination:  Constitutional Alert, cooperative, oriented. Mood and affect appropriate. Appears close to chronological age. Well nourished. Well developed. Head Normocephalic; no scars   Eyes Conjunctivae and sclerae are clear and without icterus. Pupils are round   ENMT Sinuses are nontender. No oral exudates, ulcers, masses, thrush or mucositis. Oropharynx clear. Tongue normal.   Neck Supple without masses or thyromegaly. No jugular venous distension. Hematologic/Lymphatic No petechiae or purpura. No tender or palpable lymph nodes noted   Respiratory Lungs are clear to auscultation without rhonchi or wheezing. Cardiovascular Regular rate and rhythm of heart without murmurs, gallops or rubs. Chest / Line Site Chest is symmetric with no chest wall deformities. Abdomen Non-tender, non-distended, no masses, ascites or hepatosplenomegaly. Good bowel sounds. Musculoskeletal No tenderness or swelling, normal range of motion without obvious weakness. Extremities No visible deformities, no cyanosis, clubbing or edema.     Skin No rashes, scars, or lesions suggestive of malignancy. No petechiae, purpura, or ecchymoses. No excoriations. Neurologic No sensory or motor deficits noted but not specifically tested. Psychiatric Alert and oriented. . Coherent speech. Verbalizes understanding of our discussions today. Labs:  No results found for this or any previous visit (from the past 24 hour(s)). Imaging:  CT chest, abd and pelvis on 6/1/22  FINDINGS:      SUPRACLAVICULAR REGION: Major cervical vasculature within normal limits. No  supraclavicular adenopathy. VASCULATURE:   No aortic aneurysm or dissection. No proximal pulmonary embolism is identified. MEDIASTINUM: Aortic atherosclerotic change. Coronary vascular calcifications. Trace pericardial effusion. No hilar or mediastinal lymphadenopathy. No  esophageal mass. No endotracheal or endobronchial mass. PLEURA/LUNGS[de-identified] Small left and minimal right-sided pleural effusion increased. Left upper lobe mass lesion 5-28 27 x 25 mm. Slightly increased previously 23 x 18 mm.     Additional spiculated nodular mass in the left upper lobe 17 x 14 mm on the  current examination previously 12 x 11 mm. There are new right-sided pulmonary  nodules i.e. 5-29 right lung 5 mm. SOFT TISSUE/ AXILLA:  No mass or lymphadenopathy. LIVER: Segment 4A hepatic hypodensity is increased in size currently 48 x 14 mm  previously 39 x 30 mm. Additional small nodular densities in the left hepatic  lobe are increased in size and number. There is no intrahepatic duct dilatation. Portal vein is patent. GALLBLADDER:  No dilatation or wall thickening. SPLEEN/PANCREAS: Cystic uncinate lesion is unchanged. Splenic hypodensities are  increased. There is no pancreatic duct dilatation.      ADRENALS/KIDNEYS: Renal lesion is slightly increased compared to the prior  examination 17 x 24 mm previously 11 x 14 mm. There is no hydronephrosis. There  is no renal mass. There is no perinephric mass.   STOMACH: No dilatation or wall thickening. COLON AND SMALL BOWEL: Fecal stasis. There is no free intraperitoneal air. There  is no evidence of incarceration or obstruction. No mesenteric adenopathy. PERITONEUM: Ascites is minimal/increased. APPENDIX: Unremarkable. BLADDER/REPRODUCTIVE ORGANS: No mass or calculus. RETROPERITONEUM: Unremarkable. The abdominal aorta is normal in caliber. No  aneurysm. No retroperitoneal adenopathy. OSSEOUS STRUCTURES: Osseous metastatic disease is diffuse, not significantly  changed. No associated acute fracture.     IMPRESSION  Imaging findings are consistent with interval progression of disease. And/or enlarged pulmonary masses and nodules. Increased likely malignant  bilateral pleural effusions. New splenic hypodensities may represent metastases. Increased size and number of hepatic metastases .     Osseous metastatic disease is stable. No acute intraperitoneal/intrathoracic process is identified. Please see above for additional nonemergent incidental findings. CT of head 6/1/22  FINDINGS:   There is sulcal and ventricular prominence. Confluent periventricular and  scattered foci of hypodensity in the cerebral white matter. There is no evidence  of an acute infarction, hemorrhage, or mass-effect. There is no evidence of  midline shift or hydrocephalus. Posterior fossa structures are unremarkable. No  extra-axial collections are seen. Mastoid air cells are well pneumatized and clear. There is no evidence of depressed skull fractures of soft tissue swelling.     IMPRESSION  No acute intracranial process.     Imaging findings consistent with minimal/mild chronic microvascular ischemic  change. There is a minimal degree of cerebral atrophy.       Assessment and Plan:   Metastatic lung cancer - he had indicated to me in the office at our last meeting that he did not believe he was strong enough for chemotherapy and I tend to agree with him.  The likelihood of a sustained meaningful response is low and likelihood of significant side effects is unfortunately high in this rather frail gentleman. I have reviewed the findings on the scans and talked to him and his wife today about hospice and they are both agreeable. Have asked  to place consult for informational session and to coordinate these services.         Early Apley MD [facility] office  [address] [facility], [facility] [address]  Phone [phone]  Fax [phone]

## 2022-06-02 NOTE — PROGRESS NOTES
End of Shift Note    Bedside shift change report given to Emmanuel Duque RN (oncoming nurse) by Susie Flowers RN (offgoing nurse). Report included the following information SBAR, Kardex, Intake/Output, MAR and Recent Results    Shift worked:  7p-7a     Shift summary and any significant changes:     pt is alert and orientation. vital sign stable. No any pain complained during my shift. pt up x 1 assist to the   Bathroom. Concerns for physician to address:       Zone phone for oncoming shift:   [phone]       Activity:  Activity Level: Up with Assistance  Number times ambulated in hallways past shift: 0  Number of times OOB to chair past shift: 0    Cardiac:   Cardiac Monitoring: No           Access:   Current line(s): PIV     Genitourinary:   Urinary status: voiding    Respiratory:   O2 Device: None (Room air)  Chronic home O2 use?: NO  Incentive spirometer at bedside: YES       GI:     Current diet:  ADULT DIET Regular  Passing flatus: YES  Tolerating current diet: YES       Pain Management:   Patient states pain is manageable on current regimen: YES    Skin:  Calderon Score: 18  Interventions: float heels, increase time out of bed and PT/OT consult    Patient Safety:  Fall Score:  Total Score: 4  Interventions: bed/chair alarm, assistive device (walker, cane, etc), gripper socks, pt to call before getting OOB and stay with me (per policy)  High Fall Risk: Yes    Length of Stay:  Expected LOS: 3d 7h  Actual LOS: 2      Myles Harrell RN

## 2022-06-02 NOTE — HOSPICE
[address] [facility] RN note:  Consult noted. Reviewing chart. Discussed pt with Dr Tapan Prajapati. Will address shortly. Thank you for the opportunity to care for this pt and family. Please contact hospice at [phone] with any questions or concerns.

## 2022-06-02 NOTE — PROGRESS NOTES
INTERNAL MEDICINE PROGRESS NOTE    NAME:  Edison Roldan   :   1945   MRN:   [id number]     Date/Time:  2022 5:46 AM  Subjective:   History:  Chart reviewed and patient seen and examined and D/W his nurse this AM and all events noted. He is followed regularly by me for hypertension, diabetes, hyperlipidemia, ASCVD status post prior MI, paroxysmal atrial fibrillation, diastolic CHF compensated, DJD, and now metastatic adenocarcinoma of lung with resultant marked weight loss. He presented to the office  for evaluation and was noted to be extremely weak and feeling he would pass out at which time blood pressure obtained was 80 systolic currently on no hypertensive medications. He denied associated chest pain, shortness of breath, palpitations, PND, orthopnea or other cardiac or respiratory complaints. He noted an extremely poor appetite and has been drinking a couple of cans of Ensure and some electrolytes daily but really taking in very little otherwise. There was no nausea vomiting and no other GI complaints. He notes no  complaints. He noted no fevers or chills. He was recently evaluated by Dr. Clinton Jimenez his oncologist and is set up for repeat scans to see if different therapy may be beneficial for his lung carcinoma. It was felt prudent based upon his hypotension and generalized lethargic condition that hospitalization hydration was warranted. This AM he remains much more alert and no new c/o noted on complete ROS. He said that he was able to get up some with assistance in the room yesterday. He still has no appetite although no nausea or vomiting. He notes no shortness of breath, chest pain or cardiac or respiratory complaints at the present time.       Medications reviewed:  Current Facility-Administered Medications   Medication Dose Route Frequency    finasteride (PROSCAR) tablet 5 mg  5 mg Oral DAILY    tamsulosin (FLOMAX) capsule 0.4 mg  0.4 mg Oral DAILY    sodium chloride (NS) flush 5-40 mL  5-40 mL IntraVENous Q8H    sodium chloride (NS) flush 5-40 mL  5-40 mL IntraVENous PRN    0.9% sodium chloride infusion  100 mL/hr IntraVENous CONTINUOUS    enoxaparin (LOVENOX) injection 40 mg  40 mg SubCUTAneous Q24H        Objective:   Vitals:  Visit Vitals  /74   Pulse 86   Temp 97.5 °F (36.4 °C)   Resp 18   SpO2 95%      O2 Device: None (Room air) Temp (24hrs), Av.8 °F (36.6 °C), Min:97.4 °F (36.3 °C), Max:98.3 °F (36.8 °C)      Last 24hr Input/Output:  No intake or output data in the 24 hours ending 22 0546     PHYSICAL EXAM:  General:     Alert, cooperative, very frail, no distress, appears stated age. Head:    Normocephalic, without obvious abnormality, atraumatic. Eyes:    Conjunctivae/corneas clear. PERRLA  Nose:   Nares normal. No drainage or sinus tenderness. Throat:     Lips, mucosa, and tongue normal.  No Thrush  Neck:   Supple, symmetrical,  no adenopathy, thyroid: non tender     no carotid bruit and no JVD. Back:     Symmetric,  No CVA tenderness. Lungs:    Clear to auscultation bilaterally. No Wheezing or Rhonchi. No rales. Heart:    Regular rate and rhythm,  no murmur, rub or gallop. Abdomen:    Soft, non-tender. Not distended. Bowel sounds normal. No masses  Extremities:  Extremities normal, atraumatic, No cyanosis. No edema. No clubbing  Lymph nodes:  Cervical, supraclavicular normal.  Neurologic: Mild generalized decreased strength, Alert and oriented X 3. Skin:                 No rash      Lab Data Reviewed:    No results found for this or any previous visit (from the past 24 hour(s)).       Assessment/Plan:     Principal Problem:    Hypotension (2022)    Active Problems:    Essential hypertension (2017)      Stage 2 chronic kidney disease (2017)      Controlled type 2 diabetes mellitus with stage 2 chronic kidney disease, without long-term current use of insulin ([facility] (12/10/2017)      ASCVD (arteriosclerotic cardiovascular disease) (9/28/2019)      Overview: 80% LAD, 80% CX, 80% RCA all treated with sten Y1/6957      Diastolic CHF, chronic (HCC) (2/18/2020)      PAF (paroxysmal atrial fibrillation) ([facility] (9/28/2019)      Overview: 9/2019 at time of MI      Weight loss (9/13/2021)      Metastatic adenocarcinoma ([facility] (10/26/2021)       ___________________________________________________  PLAN:    1. Continue IV hydration with normal saline and will decrease rate a little now  2. Follow electrolytes to check status of sodium with slight confusion, OK on admission as well as on follow-up so far  3. CBC in light of generalized pale appearance and hypotension, Hgb 10.9 adm to 9.9 now with hydration  4. Continue off all home blood pressure medications  5. Discontinue all diabetic medications (on Jardiance and metformin up until this point) previously on additional medicines which have already been discontinued  6. Follow blood sugar and treat with sliding scale insulin if needed, so far no treatment needed  7. Consult Dr. Ryley Arriaga and I discussed with her. Further work-up done yesterday to include CT head, chest, abdomen and pelvis which shows increased disease in his lung as well as his liver. I agree with Dr. Lizzy Parish recommendation of hospice consideration  8. Physical therapy and Occupational Therapy evaluation and attempt to mobilize      40 Minutes spent today in direct care of this high complexity patient with greater than 50% in counseling and coordination of care.     If need to contact me use hospital  [phone], DO NOT USE PERFECT SERVE    ___________________________________________________    Attending Physician: Bobby Dunbar MD

## 2022-06-02 NOTE — PROGRESS NOTES
Problem: Mobility Impaired (Adult and Pediatric)  Goal: *Acute Goals and Plan of Care (Insert Text)  Description: FUNCTIONAL STATUS PRIOR TO ADMISSION: Patient was modified independent using a rolling walker for functional mobility. Spouse reports patient typically does not use RW in household despite being encouraged to do so. HOME SUPPORT PRIOR TO ADMISSION: The patient lived with spouse and grandson who are able to assist as needed. Physical Therapy Goals  Initiated 6/1/2022  1. Patient will move from supine to sit and sit to supine , scoot up and down, and roll side to side in bed with independence within 7 day(s). 2.  Patient will transfer from bed to chair and chair to bed with modified independence using the least restrictive device within 7 day(s). 3.  Patient will perform sit to stand with modified independence within 7 day(s). 4.  Patient will ambulate with modified independence for 100 feet with the least restrictive device within 7 day(s). 5.  Patient will ascend/descend 2 stairs with single handrail(s) with supervision/set-up within 7 day(s). Outcome: Progressing Towards Goal   PHYSICAL THERAPY TREATMENT  Patient: Merrick Arias (54 y.o. male)  Date: 6/2/2022  Diagnosis: Hypotension [I95.9] Hypotension       Precautions: Fall,DNR  Chart, physical therapy assessment, plan of care and goals were reviewed. ASSESSMENT  Patient continues with skilled PT services and is progressing towards goals. Patient overall limited by generalized weakness, gait instability, impaired balance and decreased activity tolerance. Currently needing Maryam to come to EOB and CGA x 2 to come to standing. Able to amb approx 15 feet with RW with seated rest break then able to amb back to chair. Reports some nausea in bathroom and suspect low BP but BP is stable up on return to chair. Patient appears very SOB with activity but does not report any dizziness.   Patient continues below his baseline and may benefit from [facility] PT vs SNF rehab pending progress and family's ability to provide level of care he needs. Will continue to follow. Other factors to consider for discharge: at risk for falls, below baseline         PLAN :  Patient continues to benefit from skilled intervention to address the above impairments. Continue treatment per established plan of care. to address goals. Recommendation for discharge: (in order for the patient to meet his/her long term goals)  Physical therapy at least 2 days/week in the home vs SNF rehab if family unable to provide level of care he needs. Noted that there is hospice info session pending. IF patient discharges home will need the following DME: to be determined (TBD)       SUBJECTIVE:   Patient stated I was feeling nauseous.     OBJECTIVE DATA SUMMARY:   Critical Behavior:  Neurologic State: Alert  Orientation Level: Oriented X4  Cognition: Follows commands     Functional Mobility Training:  Bed Mobility:  Rolling: Minimum assistance  Supine to Sit: Minimum assistance     Scooting: Contact guard assistance        Transfers:  Sit to Stand: Contact guard assistance  Stand to Sit: Contact guard assistance                             Balance:  Sitting: Intact  Standing: Impaired  Standing - Static: Constant support; Fair  Standing - Dynamic : Constant support; Fair  Ambulation/Gait Training:  Distance (ft): 15 Feet (ft) (x 2 with seated rest break)  Assistive Device: Gait belt;Walker, rolling  Ambulation - Level of Assistance: Contact guard assistance        Gait Abnormalities: Decreased step clearance        Base of Support: Center of gravity altered;Narrowed     Speed/Elsy: Pace decreased (<100 feet/min); Slow  Step Length: Left shortened;Right shortened          Pain Rating:  No c/o pain    Activity Tolerance:   Fair and requires rest breaks    After treatment patient left in no apparent distress:   Sitting in chair, Call bell within reach, and Caregiver / family present    COMMUNICATION/COLLABORATION:   The patients plan of care was discussed with: Physical therapist, Occupational therapist, and Registered nurse.      Amna Hagan PT, DPT   Time Calculation: 25 mins

## 2022-06-02 NOTE — PROGRESS NOTES
Transition of Care Plan:     RUR: 20% - \"high risk\"  Disposition: Plan A) Home with hospice & family support vs Plan B) Home with HH, family support, & f/u apts  Follow up appointments: PCP & specialist as indicated  DME needed: No current DME needs identified for d/c  Transportation at Discharge: Pt's wife will provide transportation at d/c  [place] or means to access home: Pt's wife has access to the home        IM Medicare Letter: 2nd IM needed prior to d/c  Is patient a BCPI-A Bundle: N/A         Is patient a [place] and connected with the [place]? N/A              Caregiver Contact: Pt's wife Ashley Cool: [phone])  Discharge Caregiver contacted prior to discharge? To be contacted prior to d/c  Care Conference needed?: N/A    Update - 1:42 PM: CM reviewed [facility] L[facility] IZABELA Nielsen's note & acknowledged request for list of hospice agencies to be provided to pt/wife for review; CM provided list of providers to wife at bedside. Wife to review list, consider options, and report out to CM once preference is confirmed. Initial note: Chart reviewed. CM consulted with oncologist, Dr. Claudeen Gibson regarding consult placed for hospice. Dr. Claudeen Gibson coordinated with pt & wife Ashley Curtis) at bedside who are agreeable to hospice/requesting to receive additional information on services. Referral sent via [facility] to [facility] requesting f/u with pt and wife. CM will continue to follow & remain accessible for d/c planning.     DIANA Mayes  Care Manager, [facility]

## 2022-06-02 NOTE — PROGRESS NOTES
Problem: Self Care Deficits Care Plan (Adult)  Goal: *Acute Goals and Plan of Care (Insert Text)  Description: FUNCTIONAL STATUS PRIOR TO ADMISSION: Patient was modified independent using a rolling walker for functional mobility, although wife reports pt typically does not use RW in the home    HOME SUPPORT: The patient lived with his wife and grandson who are able to assist.    Occupational Therapy Goals  Initiated 6/1/2022  1. Patient will perform grooming with modified independence in standing within 7 day(s). 2.  Patient will perform lower body dressing with minimal assistance within 7 day(s). 3.  Patient will perform toilet transfers with modified independence within 7 day(s). 4.  Patient will perform all aspects of toileting with modified independence within 7 day(s). 5.  Patient will participate in upper extremity therapeutic exercise/activities with independence for 5 minutes within 7 day(s). 6.  Patient will utilize energy conservation techniques during functional activities with verbal cues within 7 day(s). Outcome: Progressing Towards Goal     OCCUPATIONAL THERAPY TREATMENT  Patient: Saintclair Reins (54 y.o. male)  Date: 6/2/2022  Diagnosis: Hypotension [I95.9] Hypotension       Precautions: Fall,DNR  Chart, occupational therapy assessment, plan of care, and goals were reviewed. ASSESSMENT  Patient continues with skilled OT services and is progressing towards goals. Patient received supine in bed, agreeable to OT session. Patient with poor safety awareness when managing RW often with poor understanding of positioning during functional transfers. Patient able to complete toileting on this date, attempted while standing and sitting. Upon transition onto toilet, pt with very poor awareness requiring min A to facilitate safe transfer. Patient required mod A to don clean gown while seated. Patient endorsing increased nausea during activity, promptly returned to chair.  BP obtained and WNL however increased time provided while getting BP . Patient remained seated in recliner with wife at bedside. Patient continues below his baseline and [facility] vs SNF rehab pending progress and family support. Current Level of Function Impacting Discharge (ADLs): min A - max A     Other factors to consider for discharge: below baseline, poor safety awareness         PLAN :  Patient continues to benefit from skilled intervention to address the above impairments. Continue treatment per established plan of care to address goals. Recommendation for discharge: (in order for the patient to meet his/her long term goals)  Therapy up to 5 days/week in SNF setting vs SNF rehab if patient does not have adequate support     This discharge recommendation:  Has been made in collaboration with the attending provider and/or case management    IF patient discharges home will need the following DME: TBD       SUBJECTIVE:   Patient stated I feel a little nauseous.     OBJECTIVE DATA SUMMARY:   Cognitive/Behavioral Status:  Neurologic State: Alert  Orientation Level: Oriented X4                Functional Mobility and Transfers for ADLs:  Bed Mobility:  Rolling: Minimum assistance  Supine to Sit: Minimum assistance  Scooting: Contact guard assistance    Transfers:  Sit to Stand: Contact guard assistance  Functional Transfers  Bathroom Mobility: Minimum assistance (for RW mgmt)  Toilet Transfer : Minimum assistance (poor safety awareness)  Bed to Chair: Contact guard assistance    Balance:  Sitting: Intact  Standing: Impaired  Standing - Static: Constant support; Fair  Standing - Dynamic : Constant support; Fair    ADL Intervention:     Toileting  Toileting Assistance:  Moderate assistance  Bladder Hygiene: Minimum assistance  Clothing Management: Moderate assistance      Pain:  Patient did not report pain during session     Activity Tolerance:   Fair    After treatment patient left in no apparent distress:   Sitting in chair, Call bell within reach, and Caregiver / family present    COMMUNICATION/COLLABORATION:   The patients plan of care was discussed with: Physical therapist, Occupational therapist, and Registered nurse.      Antonio Hussein OT  Time Calculation: 25 mins

## 2022-06-02 NOTE — PROGRESS NOTES
Problem: Hypotension  Goal: *Blood pressure within specified parameters  Outcome: Progressing Towards Goal  Goal: *Fluid volume balance  Outcome: Progressing Towards Goal  Goal: *Labs within defined limits  Outcome: Progressing Towards Goal     Problem: Patient Education: Go to Patient Education Activity  Goal: Patient/Family Education  Outcome: Progressing Towards Goal     Problem: Falls - Risk of  Goal: *Absence of Falls  Description: Document Larry Fall Risk and appropriate interventions in the flowsheet.   Outcome: Progressing Towards Goal  Note: Fall Risk Interventions:  Mobility Interventions: Bed/chair exit alarm    Mentation Interventions: Bed/chair exit alarm,Adequate sleep, hydration, pain control    Medication Interventions: Bed/chair exit alarm    Elimination Interventions: Bed/chair exit alarm    History of Falls Interventions: Bed/chair exit alarm,Evaluate medications/consider consulting pharmacy         Problem: Patient Education: Go to Patient Education Activity  Goal: Patient/Family Education  Outcome: Progressing Towards Goal     Problem: Patient Education: Go to Patient Education Activity  Goal: Patient/Family Education  Outcome: Progressing Towards Goal     Problem: Patient Education: Go to Patient Education Activity  Goal: Patient/Family Education  Outcome: Progressing Towards Goal

## 2022-06-02 NOTE — HOSPICE
This LMSW met with pt and wife Delilah Layman to provide general home hospice information. Pt currently able to ambulate with walker. Wife Delilah Ware works a few hours a day Monday, Tuesday, Thursday, and Friday. Pt and Pinky appreciative of information. Pt and Delilah Layman requested a list of hospice agencies in the area. LMSW left voicemail for [facility].       Epifania Snellen, [address] [place] [address]    [phone]

## 2022-06-03 LAB
ALBUMIN SERPL-MCNC: 2.2 G/DL (ref 3.5–5)
ALBUMIN/GLOB SERPL: 0.7 {RATIO} (ref 1.1–2.2)
ALP SERPL-CCNC: 128 U/L (ref 45–117)
ALT SERPL-CCNC: 35 U/L (ref 12–78)
ANION GAP SERPL CALC-SCNC: 7 MMOL/L (ref 5–15)
AST SERPL-CCNC: 41 U/L (ref 15–37)
BILIRUB SERPL-MCNC: 1.1 MG/DL (ref 0.2–1)
BUN SERPL-MCNC: 15 MG/DL (ref 6–20)
BUN/CREAT SERPL: 38 (ref 12–20)
CALCIUM SERPL-MCNC: 7.5 MG/DL (ref 8.5–10.1)
CHLORIDE SERPL-SCNC: 103 MMOL/L (ref 97–108)
CO2 SERPL-SCNC: 24 MMOL/L (ref 21–32)
CREAT SERPL-MCNC: 0.4 MG/DL (ref 0.7–1.3)
ERYTHROCYTE [DISTWIDTH] IN BLOOD BY AUTOMATED COUNT: 15.9 % (ref 11.5–14.5)
GLOBULIN SER CALC-MCNC: 3 G/DL (ref 2–4)
GLUCOSE BLD STRIP.AUTO-MCNC: 138 MG/DL (ref 65–117)
GLUCOSE BLD STRIP.AUTO-MCNC: 171 MG/DL (ref 65–117)
GLUCOSE BLD STRIP.AUTO-MCNC: 218 MG/DL (ref 65–117)
GLUCOSE SERPL-MCNC: 149 MG/DL (ref 65–100)
HCT VFR BLD AUTO: 32.6 % (ref 36.6–50.3)
HGB BLD-MCNC: 10.5 G/DL (ref 12.1–17)
MCH RBC QN AUTO: 24.4 PG (ref 26–34)
MCHC RBC AUTO-ENTMCNC: 32.2 G/DL (ref 30–36.5)
MCV RBC AUTO: 75.6 FL (ref 80–99)
NRBC # BLD: 0 K/UL (ref 0–0.01)
NRBC BLD-RTO: 0 PER 100 WBC
PLATELET # BLD AUTO: 150 K/UL (ref 150–400)
PMV BLD AUTO: 9.8 FL (ref 8.9–12.9)
POTASSIUM SERPL-SCNC: 4 MMOL/L (ref 3.5–5.1)
PROT SERPL-MCNC: 5.2 G/DL (ref 6.4–8.2)
RBC # BLD AUTO: 4.31 M/UL (ref 4.1–5.7)
SERVICE CMNT-IMP: ABNORMAL
SODIUM SERPL-SCNC: 134 MMOL/L (ref 136–145)
WBC # BLD AUTO: 3.6 K/UL (ref 4.1–11.1)

## 2022-06-03 PROCEDURE — 80053 COMPREHEN METABOLIC PANEL: CPT

## 2022-06-03 PROCEDURE — 99233 SBSQ HOSP IP/OBS HIGH 50: CPT | Performed by: INTERNAL MEDICINE

## 2022-06-03 PROCEDURE — 85027 COMPLETE CBC AUTOMATED: CPT

## 2022-06-03 PROCEDURE — 74011250636 HC RX REV CODE- 250/636: Performed by: INTERNAL MEDICINE

## 2022-06-03 PROCEDURE — 97530 THERAPEUTIC ACTIVITIES: CPT

## 2022-06-03 PROCEDURE — 74011250637 HC RX REV CODE- 250/637: Performed by: INTERNAL MEDICINE

## 2022-06-03 PROCEDURE — 36415 COLL VENOUS BLD VENIPUNCTURE: CPT

## 2022-06-03 PROCEDURE — 65270000029 HC RM PRIVATE

## 2022-06-03 PROCEDURE — 74011000250 HC RX REV CODE- 250: Performed by: INTERNAL MEDICINE

## 2022-06-03 PROCEDURE — 82962 GLUCOSE BLOOD TEST: CPT

## 2022-06-03 RX ADMIN — SODIUM CHLORIDE, PRESERVATIVE FREE 10 ML: 5 INJECTION INTRAVENOUS at 06:00

## 2022-06-03 RX ADMIN — TAMSULOSIN HYDROCHLORIDE 0.4 MG: 0.4 CAPSULE ORAL at 09:28

## 2022-06-03 RX ADMIN — ENOXAPARIN SODIUM 40 MG: 100 INJECTION SUBCUTANEOUS at 09:28

## 2022-06-03 RX ADMIN — FINASTERIDE 5 MG: 5 TABLET, FILM COATED ORAL at 09:28

## 2022-06-03 RX ADMIN — SODIUM CHLORIDE, PRESERVATIVE FREE 10 ML: 5 INJECTION INTRAVENOUS at 22:45

## 2022-06-03 NOTE — PROGRESS NOTES
Hematology Oncology Progress Note           Follow up for: metastatic lung cancer    Chart notes reviewed since last visit. Case discussed with following: patient    Patient complains of the following: feeling better today. Sitting up in bed preparing to eat breakfast. Uncertain what his plans are as of yet. Additional concerns noted by the staff:     Patient Vitals for the past 24 hrs:   BP Temp Pulse Resp SpO2   06/03/22 0755 136/75 97.5 °F (36.4 °C) 91 20 95 %   06/03/22 0314 133/71 98 °F (36.7 °C) 97 18 97 %   06/02/22 1914 (!) 167/84 98.6 °F (37 °C) 96 16 95 %   06/02/22 1614 137/73 98.5 °F (36.9 °C) 87 16 100 %       Review of Systems: negative for 11 organ systems except as noted above. Physical Examination:  Constitutional Alert, cooperative, oriented. Mood and affect appropriate. Appears close to chronological age. Well nourished. Well developed. Head Normocephalic; no scars   Eyes Conjunctivae and sclerae are clear and without icterus. Pupils are round   ENMT Sinuses are nontender. No oral exudates, ulcers, masses, thrush or mucositis. Oropharynx clear. Tongue normal.   Neck Supple without masses or thyromegaly. No jugular venous distension. Hematologic/Lymphatic No petechiae or purpura. No tender or palpable lymph nodes noted   Respiratory Lungs are clear to auscultation without rhonchi or wheezing. Cardiovascular Regular rate and rhythm of heart without murmurs, gallops or rubs. Chest / Line Site Chest is symmetric with no chest wall deformities. Abdomen Non-tender, non-distended, no masses, ascites or hepatosplenomegaly. Good bowel sounds. Musculoskeletal No tenderness or swelling, normal range of motion without obvious weakness. Extremities No visible deformities, no cyanosis, clubbing or edema. Skin No rashes, scars, or lesions suggestive of malignancy. No petechiae, purpura, or ecchymoses. No excoriations.     Neurologic No sensory or motor deficits noted but not specifically tested. Psychiatric Alert and oriented. . Coherent speech. Verbalizes understanding of our discussions today. Labs:  Recent Results (from the past 24 hour(s))   METABOLIC PANEL, COMPREHENSIVE    Collection Time: 06/03/22  2:22 AM   Result Value Ref Range    Sodium 134 (L) 136 - 145 mmol/L    Potassium 4.0 3.5 - 5.1 mmol/L    Chloride 103 97 - 108 mmol/L    CO2 24 21 - 32 mmol/L    Anion gap 7 5 - 15 mmol/L    Glucose 149 (H) 65 - 100 mg/dL    BUN 15 6 - 20 MG/DL    Creatinine 0.40 (L) 0.70 - 1.30 MG/DL    BUN/Creatinine ratio 38 (H) 12 - 20      GFR est AA >60 >60 ml/min/1.73m2    GFR est non-AA >60 >60 ml/min/1.73m2    Calcium 7.5 (L) 8.5 - 10.1 MG/DL    Bilirubin, total 1.1 (H) 0.2 - 1.0 MG/DL    ALT (SGPT) 35 12 - 78 U/L    AST (SGOT) 41 (H) 15 - 37 U/L    Alk. phosphatase 128 (H) 45 - 117 U/L    Protein, total 5.2 (L) 6.4 - 8.2 g/dL    Albumin 2.2 (L) 3.5 - 5.0 g/dL    Globulin 3.0 2.0 - 4.0 g/dL    A-G Ratio 0.7 (L) 1.1 - 2.2     CBC W/O DIFF    Collection Time: 06/03/22  2:22 AM   Result Value Ref Range    WBC 3.6 (L) 4.1 - 11.1 K/uL    RBC 4.31 4.10 - 5.70 M/uL    HGB 10.5 (L) 12.1 - 17.0 g/dL    HCT 32.6 (L) 36.6 - 50.3 %    MCV 75.6 (L) 80.0 - 99.0 FL    MCH 24.4 (L) 26.0 - 34.0 PG    MCHC 32.2 30.0 - 36.5 g/dL    RDW 15.9 (H) 11.5 - 14.5 %    PLATELET 753 120 - 281 K/uL    MPV 9.8 8.9 - 12.9 FL    NRBC 0.0 0  WBC    ABSOLUTE NRBC 0.00 0.00 - 0.01 K/uL   GLUCOSE, POC    Collection Time: 06/03/22  8:34 AM   Result Value Ref Range    Glucose (POC) 138 (H) 65 - 117 mg/dL    Performed by [facility] PCT        Imaging:  CT chest, abd and pelvis on 6/1/22  FINDINGS:      SUPRACLAVICULAR REGION: Major cervical vasculature within normal limits. No  supraclavicular adenopathy. VASCULATURE:   No aortic aneurysm or dissection. No proximal pulmonary embolism is identified. MEDIASTINUM: Aortic atherosclerotic change. Coronary vascular calcifications. Trace pericardial effusion.   No hilar or mediastinal lymphadenopathy. No  esophageal mass. No endotracheal or endobronchial mass. PLEURA/LUNGS[de-identified] Small left and minimal right-sided pleural effusion increased. Left upper lobe mass lesion 5-28 27 x 25 mm. Slightly increased previously 23 x 18 mm.     Additional spiculated nodular mass in the left upper lobe 17 x 14 mm on the  current examination previously 12 x 11 mm. There are new right-sided pulmonary  nodules i.e. 5-29 right lung 5 mm. SOFT TISSUE/ AXILLA:  No mass or lymphadenopathy. LIVER: Segment 4A hepatic hypodensity is increased in size currently 48 x 14 mm  previously 39 x 30 mm. Additional small nodular densities in the left hepatic  lobe are increased in size and number. There is no intrahepatic duct dilatation. Portal vein is patent. GALLBLADDER:  No dilatation or wall thickening. SPLEEN/PANCREAS: Cystic uncinate lesion is unchanged. Splenic hypodensities are  increased. There is no pancreatic duct dilatation.      ADRENALS/KIDNEYS: Renal lesion is slightly increased compared to the prior  examination 17 x 24 mm previously 11 x 14 mm. There is no hydronephrosis. There  is no renal mass. There is no perinephric mass. STOMACH: No dilatation or wall thickening. COLON AND SMALL BOWEL: Fecal stasis. There is no free intraperitoneal air. There  is no evidence of incarceration or obstruction. No mesenteric adenopathy. PERITONEUM: Ascites is minimal/increased. APPENDIX: Unremarkable. BLADDER/REPRODUCTIVE ORGANS: No mass or calculus. RETROPERITONEUM: Unremarkable. The abdominal aorta is normal in caliber. No  aneurysm. No retroperitoneal adenopathy. OSSEOUS STRUCTURES: Osseous metastatic disease is diffuse, not significantly  changed. No associated acute fracture.     IMPRESSION  Imaging findings are consistent with interval progression of disease. And/or enlarged pulmonary masses and nodules. Increased likely malignant  bilateral pleural effusions.   New splenic hypodensities may represent metastases. Increased size and number of hepatic metastases .     Osseous metastatic disease is stable. No acute intraperitoneal/intrathoracic process is identified. Please see above for additional nonemergent incidental findings. CT of head 6/1/22  FINDINGS:   There is sulcal and ventricular prominence. Confluent periventricular and  scattered foci of hypodensity in the cerebral white matter. There is no evidence  of an acute infarction, hemorrhage, or mass-effect. There is no evidence of  midline shift or hydrocephalus. Posterior fossa structures are unremarkable. No  extra-axial collections are seen. Mastoid air cells are well pneumatized and clear. There is no evidence of depressed skull fractures of soft tissue swelling.     IMPRESSION  No acute intracranial process.     Imaging findings consistent with minimal/mild chronic microvascular ischemic  change. There is a minimal degree of cerebral atrophy.       Assessment and Plan:   Metastatic lung cancer - he had indicated to me in the office at our last meeting that he did not believe he was strong enough for chemotherapy and I tend to agree with him. The likelihood of a sustained meaningful response is low and likelihood of significant side effects is unfortunately high in this rather frail gentleman. I have reviewed the findings on the scans and talked to him and his wife Thursday about hospice and they were both agreeable. Wife has been given a list of hospice agencies and hopefully will be making decision soon as to which agency she would like to work with.         Claudia Montgomery MD [facility] office  [address] [facility], [facility] [address]  Phone [phone]  Fax [phone]

## 2022-06-03 NOTE — PROGRESS NOTES
Problem: Mobility Impaired (Adult and Pediatric)  Goal: *Acute Goals and Plan of Care (Insert Text)  Description: FUNCTIONAL STATUS PRIOR TO ADMISSION: Patient was modified independent using a rolling walker for functional mobility. Spouse reports patient typically does not use RW in household despite being encouraged to do so. HOME SUPPORT PRIOR TO ADMISSION: The patient lived with spouse and grandson who are able to assist as needed. Physical Therapy Goals  Initiated 6/1/2022  1. Patient will move from supine to sit and sit to supine , scoot up and down, and roll side to side in bed with independence within 7 day(s). 2.  Patient will transfer from bed to chair and chair to bed with modified independence using the least restrictive device within 7 day(s). 3.  Patient will perform sit to stand with modified independence within 7 day(s). 4.  Patient will ambulate with modified independence for 100 feet with the least restrictive device within 7 day(s). 5.  Patient will ascend/descend 2 stairs with single handrail(s) with supervision/set-up within 7 day(s). Outcome: Progressing Towards Goal     PHYSICAL THERAPY TREATMENT  Patient: Tomer Doll (34 y.o. male)  Date: 6/3/2022  Diagnosis: Hypotension [I95.9] Hypotension       Precautions: Fall,DNR  Chart, physical therapy assessment, plan of care and goals were reviewed. ASSESSMENT  Patient continues with skilled PT services and is slowly progressing towards goals. Concerned by lack of safety awareness and insight into his deficits following admission. Patient received in bed and asking to transfer to the bathroom. Encourage to take the longer path around the bed with adamant deferral. He removed his underwear to have a BM while standing and facing the commode with unilateral support while briefly standing on a single limb. Noted a posterior LOB and recovery with CGA.  Discussed safety concerns with this method and the patient politely asked this writer to leave the bathroom. The patient independently returned himself to the bed without DME use while this writer was acquiring a replacement estevan for his bed. Noted patient reluctance to use DME previously. He remains at elevated risk for falls based on observed mobility and has limited reception to safety interventions from therapy. Recommend discharge home with 24 hour supervision and he would benefit from [facility] PT if agreeable. PLAN :  Patient continues to benefit from skilled intervention to address the above impairments. Continue treatment per established plan of care. to address goals. Recommendation for discharge: (in order for the patient to meet his/her long term goals)  Physical therapy at least 2 days/week in the home AND ensure assist and/or supervision for safety with functional mobility    This discharge recommendation:  Has been made in collaboration with the attending provider and/or case management    IF patient discharges home will need the following DME: none       SUBJECTIVE:   Patient stated I have no trouble walking.     OBJECTIVE DATA SUMMARY:   Critical Behavior:  Neurologic State: Alert  Orientation Level: Oriented X4  Cognition: Follows commands     Functional Mobility Training:  Bed Mobility:     Supine to Sit: Stand-by assistance     Scooting: Contact guard assistance        Transfers:  Sit to Stand: Minimum assistance  Stand to Sit: Contact guard assistance                             Balance:  Sitting: Intact  Standing: Impaired  Standing - Static: Constant support; Fair  Standing - Dynamic : Constant support; Fair  Ambulation/Gait Training:  Distance (ft): 15 Feet (ft)  Assistive Device: Gait belt;Walker, rolling  Ambulation - Level of Assistance: Contact guard assistance        Gait Abnormalities: Decreased step clearance        Base of Support: Center of gravity altered     Speed/Elsy: Pace decreased (<100 feet/min)  Step Length: Left shortened;Right shortened                Activity Tolerance:   Fair    After treatment patient left in no apparent distress:   Supine in bed and Call bell within reach    COMMUNICATION/COLLABORATION:   The patients plan of care was discussed with: Registered nurse.      Anita Chou PT, DPT   Time Calculation: 12 mins

## 2022-06-03 NOTE — PROGRESS NOTES
End of Shift Note     Bedside shift change report given to Miguel Villalobos RN (oncoming nurse) by Kati Chou RN (offgoing nurse). Report included the following information SBAR, Kardex, Intake/Output, MAR and Recent Results     Shift worked: days      Shift summary and any significant changes:      pt is alert and orientation. vital sign stable. No any pain complained during my shift. pt up x 1 assist to the   Bathroom.      Concerns for physician to address:  none      Zone phone for oncoming shift:   [phone]         Activity:  Activity Level: Up with Assistance  Number times ambulated in hallways past shift: 0  Number of times OOB to chair past shift: 0     Cardiac:   Cardiac Monitoring: No         Access:   Current line(s): PIV      Genitourinary:   Urinary status: voiding     Respiratory:   O2 Device: None (Room air)  Chronic home O2 use?: NO  Incentive spirometer at bedside: YES     GI:  Current diet:  ADULT DIET Regular  Passing flatus: YES  Tolerating current diet: YES     Pain Management:   Patient states pain is manageable on current regimen: YES     Skin:  Calderon Score: 18  Interventions: float heels, increase time out of bed and PT/OT consult    Patient Safety:  Fall Score: Total Score: 4  Interventions: bed/chair alarm, assistive device (walker, cane, etc), gripper socks, pt to call before getting OOB and stay with me (per policy)  High Fall Risk:  Yes     Length of Stay:  Expected LOS: 3d 7h  Actual LOS: 3  Discharge planning:          Kati Chou RN

## 2022-06-03 NOTE — PROGRESS NOTES
INTERNAL MEDICINE PROGRESS NOTE    NAME:  Saintclair Reins   :   1945   MRN:   [id number]     Date/Time:  6/3/2022 5:36 AM  Subjective:   History:  Chart reviewed and patient seen and examined and D/W his nurse this AM and all events noted. He is followed regularly by me for hypertension, diabetes, hyperlipidemia, ASCVD status post prior MI, paroxysmal atrial fibrillation, diastolic CHF compensated, DJD, and now metastatic adenocarcinoma of lung with resultant marked weight loss. He presented to the office  for evaluation and was noted to be extremely weak and feeling he would pass out at which time blood pressure obtained was 80 systolic currently on no hypertensive medications. He denied associated chest pain, shortness of breath, palpitations, PND, orthopnea or other cardiac or respiratory complaints. He noted an extremely poor appetite and has been drinking a couple of cans of Ensure and some electrolytes daily but really taking in very little otherwise. There was no nausea vomiting and no other GI complaints. He notes no  complaints. He noted no fevers or chills. He was recently evaluated by Dr. Artis Lamar his oncologist and is set up for repeat scans to see if different therapy may be beneficial for his lung carcinoma. It was felt prudent based upon his hypotension and generalized lethargic condition that hospitalization hydration was warranted. This AM he remains much more alert and no new c/o noted on complete ROS. He said that he was able to get up some with assistance in the room. He still has no appetite although no nausea or vomiting. He notes no shortness of breath, chest pain or cardiac or respiratory complaints at the present time.       Medications reviewed:  Current Facility-Administered Medications   Medication Dose Route Frequency    finasteride (PROSCAR) tablet 5 mg  5 mg Oral DAILY    tamsulosin (FLOMAX) capsule 0.4 mg  0.4 mg Oral DAILY    sodium chloride (NS) flush 5-40 mL  5-40 mL IntraVENous Q8H    sodium chloride (NS) flush 5-40 mL  5-40 mL IntraVENous PRN    0.9% sodium chloride infusion  50 mL/hr IntraVENous CONTINUOUS    enoxaparin (LOVENOX) injection 40 mg  40 mg SubCUTAneous Q24H        Objective:   Vitals:  Visit Vitals  BP (!) 167/84   Pulse 96   Temp 98.6 °F (37 °C)   Resp 16   SpO2 95%      O2 Device: None (Room air) Temp (24hrs), Av.2 °F (36.8 °C), Min:97.4 °F (36.3 °C), Max:98.6 °F (37 °C)      Last 24hr Input/Output:  No intake or output data in the 24 hours ending 22 0536     PHYSICAL EXAM:  General:     Alert, cooperative, very frail, no distress, appears stated age. Head:    Normocephalic, without obvious abnormality, atraumatic. Eyes:    Conjunctivae/corneas clear. PERRLA  Nose:   Nares normal. No drainage or sinus tenderness. Throat:     Lips, mucosa, and tongue normal.  No Thrush  Neck:   Supple, symmetrical,  no adenopathy, thyroid: non tender     no carotid bruit and no JVD. Back:     Symmetric,  No CVA tenderness. Lungs:    Clear to auscultation bilaterally. No Wheezing or Rhonchi. No rales. Heart:    Regular rate and rhythm,  no murmur, rub or gallop. Abdomen:    Soft, non-tender. Not distended. Bowel sounds normal. No masses  Extremities:  Extremities normal, atraumatic, No cyanosis. No edema. No clubbing  Lymph nodes:  Cervical, supraclavicular normal.  Neurologic: Mild generalized decreased strength, Alert and oriented X 3.    Skin:                 No rash      Lab Data Reviewed:    Recent Results (from the past 24 hour(s))   CBC W/O DIFF    Collection Time: 22  2:22 AM   Result Value Ref Range    WBC 3.6 (L) 4.1 - 11.1 K/uL    RBC 4.31 4.10 - 5.70 M/uL    HGB 10.5 (L) 12.1 - 17.0 g/dL    HCT 32.6 (L) 36.6 - 50.3 %    MCV 75.6 (L) 80.0 - 99.0 FL    MCH 24.4 (L) 26.0 - 34.0 PG    MCHC 32.2 30.0 - 36.5 g/dL    RDW 15.9 (H) 11.5 - 14.5 %    PLATELET 570 013 - 547 K/uL    MPV 9.8 8.9 - 12.9 FL    NRBC 0.0 0  WBC ABSOLUTE NRBC 0.00 0.00 - 0.01 K/uL         Assessment/Plan:     Principal Problem:    Hypotension (5/31/2022)    Active Problems:    Essential hypertension (8/2/2017)      Stage 2 chronic kidney disease (8/2/2017)      Controlled type 2 diabetes mellitus with stage 2 chronic kidney disease, without long-term current use of insulin ([facility] (12/10/2017)      ASCVD (arteriosclerotic cardiovascular disease) (9/28/2019)      Overview: 80% LAD, 80% CX, 80% RCA all treated with sten R1/3517      Diastolic CHF, chronic (HCC) (2/18/2020)      PAF (paroxysmal atrial fibrillation) ([facility] (9/28/2019)      Overview: 9/2019 at time of MI      Weight loss (9/13/2021)      Metastatic adenocarcinoma ([facility] (10/26/2021)       ___________________________________________________  PLAN:    1. Continue IV hydration with normal saline, decreased rate a little yesterday  2. Follow electrolytes to check status of sodium with slight confusion, OK on admission as well as on follow-up so far  3. CBC in light of generalized pale appearance and hypotension, Hgb 10.9 adm to 9.9 now 10.5 with hydration  4. Continue off all home blood pressure medications  5. Discontinue all diabetic medications (on Jardiance and metformin up until this point) previously on additional medicines which have already been discontinued  6. Follow blood sugar and treat with sliding scale insulin if needed, so far no treatment needed  7. Dr. Harlene Seip help appreciated and I discussed with her. Further work-up done on 6/1 to include CT head, chest, abdomen and pelvis which shows increased disease in his lung as well as his liver. I agree with Dr. Harlene Seip  recommendation of hospice consideration and family looking into this  6. Physical therapy and Occupational Therapy evaluation and attempt to mobilize      35 Minutes spent today in direct care of this high complexity patient with greater than 50% in counseling and coordination of care.     If need to contact me use hospital  [phone], DO NOT USE PERFECT SERVE    ___________________________________________________    Attending Physician: Ricardo Sutton MD

## 2022-06-03 NOTE — PROGRESS NOTES
Bedside shift change report given to Ligia (oncoming nurse) by Piper Guzman RN (offgoing nurse).   Report included the following information SBAR, Kardex, Intake/Output and MAR

## 2022-06-04 LAB
GLUCOSE BLD STRIP.AUTO-MCNC: 138 MG/DL (ref 65–117)
GLUCOSE BLD STRIP.AUTO-MCNC: 139 MG/DL (ref 65–117)
GLUCOSE BLD STRIP.AUTO-MCNC: 167 MG/DL (ref 65–117)
GLUCOSE BLD STRIP.AUTO-MCNC: 173 MG/DL (ref 65–117)
GLUCOSE BLD STRIP.AUTO-MCNC: 178 MG/DL (ref 65–117)
SERVICE CMNT-IMP: ABNORMAL

## 2022-06-04 PROCEDURE — 82962 GLUCOSE BLOOD TEST: CPT

## 2022-06-04 PROCEDURE — 65270000029 HC RM PRIVATE

## 2022-06-04 PROCEDURE — 99233 SBSQ HOSP IP/OBS HIGH 50: CPT | Performed by: INTERNAL MEDICINE

## 2022-06-04 PROCEDURE — 74011250637 HC RX REV CODE- 250/637: Performed by: INTERNAL MEDICINE

## 2022-06-04 PROCEDURE — 74011250636 HC RX REV CODE- 250/636: Performed by: INTERNAL MEDICINE

## 2022-06-04 PROCEDURE — 74011000250 HC RX REV CODE- 250: Performed by: INTERNAL MEDICINE

## 2022-06-04 RX ADMIN — SODIUM CHLORIDE, PRESERVATIVE FREE 10 ML: 5 INJECTION INTRAVENOUS at 22:00

## 2022-06-04 RX ADMIN — FINASTERIDE 5 MG: 5 TABLET, FILM COATED ORAL at 10:48

## 2022-06-04 RX ADMIN — TAMSULOSIN HYDROCHLORIDE 0.4 MG: 0.4 CAPSULE ORAL at 10:49

## 2022-06-04 RX ADMIN — SODIUM CHLORIDE, PRESERVATIVE FREE 10 ML: 5 INJECTION INTRAVENOUS at 17:24

## 2022-06-04 RX ADMIN — ENOXAPARIN SODIUM 40 MG: 100 INJECTION SUBCUTANEOUS at 10:48

## 2022-06-04 RX ADMIN — SODIUM CHLORIDE, PRESERVATIVE FREE 10 ML: 5 INJECTION INTRAVENOUS at 06:09

## 2022-06-04 NOTE — PROGRESS NOTES
INTERNAL MEDICINE PROGRESS NOTE    NAME:  Kellen Manning   :   1945   MRN:   [id number]     Date/Time:  2022 7:27 AM  Subjective:   History:  Chart reviewed and patient seen and examined and D/W his nurse this AM and all events noted. He is followed regularly by me for hypertension, diabetes, hyperlipidemia, ASCVD status post prior MI, paroxysmal atrial fibrillation, diastolic CHF compensated, DJD, and now metastatic adenocarcinoma of lung with resultant marked weight loss. He presented to the office  for evaluation and was noted to be extremely weak and feeling he would pass out at which time blood pressure obtained was 80 systolic currently on no hypertensive medications. He denied associated chest pain, shortness of breath, palpitations, PND, orthopnea or other cardiac or respiratory complaints. He noted an extremely poor appetite and has been drinking a couple of cans of Ensure and some electrolytes daily but really taking in very little otherwise. There was no nausea vomiting and no other GI complaints. He notes no  complaints. He noted no fevers or chills. He was recently evaluated by Dr. Boo New his oncologist and is set up for repeat scans to see if different therapy may be beneficial for his lung carcinoma. It was felt prudent based upon his hypotension and generalized lethargic condition that hospitalization hydration was warranted. This AM he remains more alert although still confusedmand no new c/o noted on complete ROS. He said that he was able to get up some with assistance in the room but review of PT note reveals significant fall risk. He still has no appetite although no nausea or vomiting. He notes no shortness of breath, chest pain or cardiac or respiratory complaints at the present time.  He has no neurologic c/o except weakness but obviously has significant confusion      Medications reviewed:  Current Facility-Administered Medications   Medication Dose Route Frequency    finasteride (PROSCAR) tablet 5 mg  5 mg Oral DAILY    tamsulosin (FLOMAX) capsule 0.4 mg  0.4 mg Oral DAILY    sodium chloride (NS) flush 5-40 mL  5-40 mL IntraVENous Q8H    sodium chloride (NS) flush 5-40 mL  5-40 mL IntraVENous PRN    0.9% sodium chloride infusion  50 mL/hr IntraVENous CONTINUOUS    enoxaparin (LOVENOX) injection 40 mg  40 mg SubCUTAneous Q24H        Objective:   Vitals:  Visit Vitals  /84 (BP Patient Position: At rest;Supine)   Pulse 88   Temp 97.4 °F (36.3 °C)   Resp 16   SpO2 94%      O2 Device: None (Room air) Temp (24hrs), Av.9 °F (36.6 °C), Min:97.4 °F (36.3 °C), Max:98.2 °F (36.8 °C)      Last 24hr Input/Output:  No intake or output data in the 24 hours ending 22 1212     PHYSICAL EXAM:  General:     Alert, cooperative, very frail, no distress, appears stated age. Head:    Normocephalic, without obvious abnormality, atraumatic. Eyes:    Conjunctivae/corneas clear. PERRLA  Nose:   Nares normal. No drainage or sinus tenderness. Throat:     Lips, mucosa, and tongue normal.  No Thrush  Neck:   Supple, symmetrical,  no adenopathy, thyroid: non tender     no carotid bruit and no JVD. Back:     Symmetric,  No CVA tenderness. Lungs:    Clear to auscultation bilaterally. No Wheezing or Rhonchi. No rales. Heart:    Regular rate and rhythm,  no murmur, rub or gallop. Abdomen:    Soft, non-tender. Not distended. Bowel sounds normal. No masses  Extremities:  Extremities normal, atraumatic, No cyanosis. No edema. No clubbing  Lymph nodes:  Cervical, supraclavicular normal.  Neurologic: Mild generalized decreased strength, Alert and oriented X 3 although some obvious confusion.    Skin:                 No rash      Lab Data Reviewed:    Recent Results (from the past 24 hour(s))   GLUCOSE, POC    Collection Time: 22  4:00 PM   Result Value Ref Range    Glucose (POC) 218 (H) 65 - 117 mg/dL    Performed by Mervin Rose PCT    GLUCOSE, POC    Collection Time: 06/04/22  8:19 AM   Result Value Ref Range    Glucose (POC) 167 (H) 65 - 117 mg/dL    Performed by Justin Tello RN    GLUCOSE, POC    Collection Time: 06/04/22 11:24 AM   Result Value Ref Range    Glucose (POC) 178 (H) 65 - 117 mg/dL    Performed by Briseida Hodge RN          Assessment/Plan:     Principal Problem:    Hypotension (5/31/2022)    Active Problems:    Essential hypertension (8/2/2017)      Stage 2 chronic kidney disease (8/2/2017)      Controlled type 2 diabetes mellitus with stage 2 chronic kidney disease, without long-term current use of insulin ([facility] (12/10/2017)      ASCVD (arteriosclerotic cardiovascular disease) (9/28/2019)      Overview: 80% LAD, 80% CX, 80% RCA all treated with sten J3/7587      Diastolic CHF, chronic (HCC) (2/18/2020)      PAF [facility]paroxysmal [facility] fibrillation) [facility] (9/28/2019)      Overview: 9/2019 at time of MI      Weight loss (9/13/2021)      Metastatic adenocarcinoma ([facility] (10/26/2021)       ___________________________________________________  PLAN:    1. Continue IV hydration with normal saline, decreased rate a little to 50/ hr on 6/2  2. Follow electrolytes to check status of sodium with slight confusion, OK on admission as well as on follow-up so far  3. CBC in light of generalized pale appearance and hypotension, Hgb 10.9 adm to 10.5 now with hydration  4. Continue off all home blood pressure medications and BP up a little with hydration  5. Discontinued all diabetic medications (on Jardiance and metformin up until admission) previously on additional medicines which had already been discontinued  6. Follow blood sugar and treat with sliding scale insulin if needed, so far no treatment needed, Max yesterday 218 once  7. Dr. Christina Samson help appreciated and I discussed with her. Further work-up done on 6/1 to include CT head, chest, abdomen and pelvis which shows increased disease in his lung as well as his liver.   I agree with Dr. Christina Samson  recommendation of hospice consideration and family still looking into this  8. Physical therapy and Occupational Therapy evaluation and attempt to mobilize, notes from yesterday reviewed      35 Minutes spent today in direct care of this high complexity patient with greater than 50% in counseling and coordination of care.     If need to contact me use hospital  [phone], DO NOT USE PERFECT SERVE    ___________________________________________________    Attending Physician: Sky Antonio MD

## 2022-06-04 NOTE — PROGRESS NOTES
End of Shift Note     Bedside shift change report given to Joycelyn Mohan RN (oncoming nurse) by Joyce Garcia RN (offgoing nurse). Report included the following information SBAR, Kardex, Intake/Output, MAR and Recent Results     Shift worked: night      Shift summary and any significant changes:     Uneventful night .      Concerns for physician to address:  none      Zone phone for oncoming shift:   [phone]         Activity:  Activity Level: Up with Assistance  Number times ambulated in hallways past shift: 0  Number of times OOB to chair past shift: 0     Cardiac:   Cardiac Monitoring: No         Access:   Current line(s): PIV      Genitourinary:   Urinary status: voiding     Respiratory:   O2 Device: None (Room air)  Chronic home O2 use?: NO  Incentive spirometer at bedside: YES     GI:  Current diet:  ADULT DIET Regular  Passing flatus: YES  Tolerating current diet: YES     Pain Management:   Patient states pain is manageable on current regimen: YES     Skin:  Calderon Score: 18  Interventions: float heels, increase time out of bed and PT/OT consult    Patient Safety:  Fall Score: Total Score: 4  Interventions: bed/chair alarm, assistive device (walker, cane, etc), gripper socks, pt to call before getting OOB and stay with me (per policy)  High Fall Risk:  Yes     Length of Stay:  Expected LOS: 3d 7h  Actual LOS: 3  Discharge planning:  Jihan Buck RN

## 2022-06-05 LAB
ANION GAP SERPL CALC-SCNC: 8 MMOL/L (ref 5–15)
BUN SERPL-MCNC: 12 MG/DL (ref 6–20)
BUN/CREAT SERPL: 32 (ref 12–20)
CALCIUM SERPL-MCNC: 7.5 MG/DL (ref 8.5–10.1)
CHLORIDE SERPL-SCNC: 100 MMOL/L (ref 97–108)
CO2 SERPL-SCNC: 25 MMOL/L (ref 21–32)
CREAT SERPL-MCNC: 0.38 MG/DL (ref 0.7–1.3)
GLUCOSE BLD STRIP.AUTO-MCNC: 134 MG/DL (ref 65–117)
GLUCOSE BLD STRIP.AUTO-MCNC: 158 MG/DL (ref 65–117)
GLUCOSE BLD STRIP.AUTO-MCNC: 175 MG/DL (ref 65–117)
GLUCOSE BLD STRIP.AUTO-MCNC: 201 MG/DL (ref 65–117)
GLUCOSE SERPL-MCNC: 153 MG/DL (ref 65–100)
POTASSIUM SERPL-SCNC: 4.2 MMOL/L (ref 3.5–5.1)
SERVICE CMNT-IMP: ABNORMAL
SODIUM SERPL-SCNC: 133 MMOL/L (ref 136–145)

## 2022-06-05 PROCEDURE — 99233 SBSQ HOSP IP/OBS HIGH 50: CPT | Performed by: INTERNAL MEDICINE

## 2022-06-05 PROCEDURE — 65270000029 HC RM PRIVATE

## 2022-06-05 PROCEDURE — 82962 GLUCOSE BLOOD TEST: CPT

## 2022-06-05 PROCEDURE — 74011250636 HC RX REV CODE- 250/636: Performed by: INTERNAL MEDICINE

## 2022-06-05 PROCEDURE — 80048 BASIC METABOLIC PNL TOTAL CA: CPT

## 2022-06-05 PROCEDURE — 36415 COLL VENOUS BLD VENIPUNCTURE: CPT

## 2022-06-05 PROCEDURE — 74011000250 HC RX REV CODE- 250: Performed by: INTERNAL MEDICINE

## 2022-06-05 PROCEDURE — 74011250637 HC RX REV CODE- 250/637: Performed by: INTERNAL MEDICINE

## 2022-06-05 RX ADMIN — SODIUM CHLORIDE, PRESERVATIVE FREE 10 ML: 5 INJECTION INTRAVENOUS at 23:19

## 2022-06-05 RX ADMIN — ENOXAPARIN SODIUM 40 MG: 100 INJECTION SUBCUTANEOUS at 10:48

## 2022-06-05 RX ADMIN — SODIUM CHLORIDE, PRESERVATIVE FREE 10 ML: 5 INJECTION INTRAVENOUS at 17:53

## 2022-06-05 RX ADMIN — FINASTERIDE 5 MG: 5 TABLET, FILM COATED ORAL at 10:48

## 2022-06-05 RX ADMIN — SODIUM CHLORIDE, PRESERVATIVE FREE 10 ML: 5 INJECTION INTRAVENOUS at 06:53

## 2022-06-05 RX ADMIN — TAMSULOSIN HYDROCHLORIDE 0.4 MG: 0.4 CAPSULE ORAL at 10:48

## 2022-06-05 NOTE — PROGRESS NOTES
INTERNAL MEDICINE PROGRESS NOTE    NAME:  Jorge Dodd   :   1945   MRN:   [id number]     Date/Time:  2022 9:43 AM  Subjective:   History:  Chart reviewed and patient seen and examined and D/W his nurse and his wife this AM and all events noted. He is followed regularly by me for hypertension, diabetes, hyperlipidemia, ASCVD status post prior MI, paroxysmal atrial fibrillation, diastolic CHF compensated, DJD, and now metastatic adenocarcinoma of lung with resultant marked weight loss. He presented to the office  for evaluation and was noted to be extremely weak and feeling he would pass out at which time blood pressure obtained was 80 systolic and on no hypertensive medications. He denied associated chest pain, shortness of breath, palpitations, PND, orthopnea or other cardiac or respiratory complaints. He noted an extremely poor appetite and has been drinking a couple of cans of Ensure and some electrolytes daily but really taking in very little otherwise. There was no nausea vomiting and no other GI complaints. He notes no  complaints. He noted no fevers or chills. He was recently evaluated by Dr. Gomez Fail his oncologist and is set up for repeat scans to see if different therapy may be beneficial for his lung carcinoma. It was felt prudent based upon his hypotension and generalized lethargic condition that hospitalization hydration was warranted. This AM he remains more alert although still confused and no new c/o noted on complete ROS. He said that he was able to get up some with assistance in the room but review of PT note reveals significant fall risk. He still has no appetite although no nausea or vomiting. He notes no shortness of breath, chest pain or cardiac or respiratory complaints at the present time. He has no neurologic c/o except weakness but obviously has significant confusion.       Medications reviewed:  Current Facility-Administered Medications   Medication Dose Route Frequency    finasteride (PROSCAR) tablet 5 mg  5 mg Oral DAILY    tamsulosin (FLOMAX) capsule 0.4 mg  0.4 mg Oral DAILY    sodium chloride (NS) flush 5-40 mL  5-40 mL IntraVENous Q8H    sodium chloride (NS) flush 5-40 mL  5-40 mL IntraVENous PRN    0.9% sodium chloride infusion  50 mL/hr IntraVENous CONTINUOUS    enoxaparin (LOVENOX) injection 40 mg  40 mg SubCUTAneous Q24H        Objective:   Vitals:  Visit Vitals  /72 (BP 1 Location: Left upper arm)   Pulse 91   Temp 98.4 °F (36.9 °C)   Resp 18   SpO2 95%      O2 Device: None (Room air) Temp (24hrs), Av.7 °F (36.5 °C), Min:97.3 °F (36.3 °C), Max:98.4 °F (36.9 °C)      Last 24hr Input/Output:  No intake or output data in the 24 hours ending 22 0943     PHYSICAL EXAM:  General:     Alert, cooperative, very frail, no distress, appears stated age. Head:    Normocephalic, without obvious abnormality, atraumatic. Eyes:    Conjunctivae/corneas clear. PERRLA  Nose:   Nares normal. No drainage or sinus tenderness. Throat:     Lips, mucosa, and tongue normal.  No Thrush  Neck:   Supple, symmetrical,  no adenopathy, thyroid: non tender     no carotid bruit and no JVD. Back:     Symmetric,  No CVA tenderness. Lungs:    Clear to auscultation bilaterally. No Wheezing or Rhonchi. No rales. Heart:    Regular rate and rhythm,  no murmur, rub or gallop. Abdomen:    Soft, non-tender. Not distended. Bowel sounds normal. No masses  Extremities:  Extremities normal, atraumatic, No cyanosis. No edema. No clubbing  Lymph nodes:  Cervical, supraclavicular normal.  Neurologic: Mild generalized decreased strength, Alert and oriented X 3 although some obvious confusion.    Skin:                 No rash      Lab Data Reviewed:    Recent Results (from the past 24 hour(s))   GLUCOSE, POC    Collection Time: 22 11:24 AM   Result Value Ref Range    Glucose (POC) 178 (H) 65 - 117 mg/dL    Performed by Willy Littlejohn RN    GLUCOSE, POC    Collection Time: 06/04/22  4:27 PM   Result Value Ref Range    Glucose (POC) 173 (H) 65 - 117 mg/dL    Performed by Lucille Lance RN    GLUCOSE, POC    Collection Time: 06/04/22  8:37 PM   Result Value Ref Range    Glucose (POC) 139 (H) 65 - 117 mg/dL    Performed by [facility] Kendall ROUSEN    GLUCOSE, POC    Collection Time: 06/04/22  9:34 PM   Result Value Ref Range    Glucose (POC) 138 (H) 65 - 117 mg/dL    Performed by Wayne Foley RN    METABOLIC PANEL, BASIC    Collection Time: 06/05/22  4:02 AM   Result Value Ref Range    Sodium 133 (L) 136 - 145 mmol/L    Potassium 4.2 3.5 - 5.1 mmol/L    Chloride 100 97 - 108 mmol/L    CO2 25 21 - 32 mmol/L    Anion gap 8 5 - 15 mmol/L    Glucose 153 (H) 65 - 100 mg/dL    BUN 12 6 - 20 MG/DL    Creatinine 0.38 (L) 0.70 - 1.30 MG/DL    BUN/Creatinine ratio 32 (H) 12 - 20      GFR est AA >60 >60 ml/min/1.73m2    GFR est non-AA >60 >60 ml/min/1.73m2    Calcium 7.5 (L) 8.5 - 10.1 MG/DL   GLUCOSE, POC    Collection Time: 06/05/22  7:24 AM   Result Value Ref Range    Glucose (POC) 134 (H) 65 - 117 mg/dL    Performed by Gila Hernandez          Assessment/Plan:     Principal Problem:    Hypotension (5/31/2022)    Active Problems:    Essential hypertension (8/2/2017)      Stage 2 chronic kidney disease (8/2/2017)      Controlled type 2 diabetes mellitus with stage 2 chronic kidney disease, without long-term current use of insulin ([facility] [address] (12/10/2017)      ASCVD (arteriosclerotic cardiovascular disease) (9/28/2019)      Overview: 80% LAD, 80% CX, 80% RCA all treated with sten Q1/3983      Diastolic CHF, chronic (HCC) (2/18/2020)      PAF (paroxysmal atrial fibrillation) ([address] (9/28/2019)      Overview: 9/2019 at time of MI      Weight loss (9/13/2021)      Metastatic adenocarcinoma ([facility] [address] (10/26/2021)       ___________________________________________________  PLAN:    1. Continue IV hydration with normal saline, decreased rate a little to 50/ hr on 6/2  2.   Follow electrolytes to check status of sodium with slight confusion, OK on admission as well as on follow-up so far 133  3. CBC in light of generalized pale appearance and hypotension, Hgb 10.9 adm to 10.5 now with hydration  4. Continue off all home blood pressure medications and BP up a little with hydration  5. Discontinued all diabetic medications (on Jardiance and metformin up until admission) previously on additional medicines which had already been discontinued  6. Follow blood sugar and treat with sliding scale insulin if needed, so far no treatment needed, Max yesterday 178 once  7. Dr. Katy Perdomo help appreciated and I discussed with her. Further work-up done on 6/1 to include CT head, chest, abdomen and pelvis which shows increased disease in his lung as well as his liver. I agree with Dr. Katy Perdomo  recommendation of hospice consideration and family still looking into this. I D/W his wife this AM and she has decided to go with Hospice through [facility]  8. Physical therapy and Occupational Therapy evaluation and attempt to mobilize, notes from 6.3 reviewed      35 Minutes spent today in direct care of this high complexity patient with greater than 50% in counseling and coordination of care.     If need to contact me use hospital  [phone], DO NOT USE PERFECT SERVE    ___________________________________________________    Attending Physician: Joslyn Ramirez MD

## 2022-06-05 NOTE — PROGRESS NOTES
End of Shift Note  Bedside shift change report given to Cristina Baptiste RN (oncoming nurse) by Dylon Rivera RN (offgoing nurse). Report included the following information SBAR, Kardex, Intake/Output, MAR and Recent Results     Shift worked: nights      Shift summary and any significant changes:     fluids reconnected, NS @ 50ml,       Concerns for physician to address:  none      Zone phone for oncoming shift:   [phone]         Activity:  Activity Level: Up with Assistance  Number times ambulated in hallways past shift: 0  Number of times OOB to chair past shift: 0     Cardiac:   Cardiac Monitoring: No         Access:   Current line(s): PIV      Genitourinary:   Urinary status: continent     Respiratory:   O2 Device: None (Room air)  Chronic home O2 use?: NO  Incentive spirometer at bedside: No     GI:  Current diet:  ADULT DIET Regular  Passing flatus: YES  Tolerating current diet: YES     Pain Management:   Patient states pain is manageable on current regimen: YES     Skin:  Calderon Score: 18  Interventions: float heels, increase time out of bed and PT/OT consult    Patient Safety:  Fall Score: Total Score: 4  Interventions: bed/chair alarm, assistive device (walker, cane, etc), gripper socks, pt to call before getting OOB and stay with me (per policy)  High Fall Risk:  Yes     Length of Stay:  Expected LOS: 3d 7h  Actual LOS: 3  Discharge planning:  Joanne Solis RN

## 2022-06-06 LAB
GLUCOSE BLD STRIP.AUTO-MCNC: 143 MG/DL (ref 65–117)
GLUCOSE BLD STRIP.AUTO-MCNC: 164 MG/DL (ref 65–117)
GLUCOSE BLD STRIP.AUTO-MCNC: 165 MG/DL (ref 65–117)
SERVICE CMNT-IMP: ABNORMAL

## 2022-06-06 PROCEDURE — 99232 SBSQ HOSP IP/OBS MODERATE 35: CPT | Performed by: INTERNAL MEDICINE

## 2022-06-06 PROCEDURE — 97535 SELF CARE MNGMENT TRAINING: CPT

## 2022-06-06 PROCEDURE — 97116 GAIT TRAINING THERAPY: CPT

## 2022-06-06 PROCEDURE — 82962 GLUCOSE BLOOD TEST: CPT

## 2022-06-06 PROCEDURE — 65270000029 HC RM PRIVATE

## 2022-06-06 PROCEDURE — 74011250636 HC RX REV CODE- 250/636: Performed by: INTERNAL MEDICINE

## 2022-06-06 PROCEDURE — 74011250637 HC RX REV CODE- 250/637: Performed by: INTERNAL MEDICINE

## 2022-06-06 PROCEDURE — 74011000250 HC RX REV CODE- 250: Performed by: INTERNAL MEDICINE

## 2022-06-06 RX ADMIN — FINASTERIDE 5 MG: 5 TABLET, FILM COATED ORAL at 09:22

## 2022-06-06 RX ADMIN — ENOXAPARIN SODIUM 40 MG: 100 INJECTION SUBCUTANEOUS at 09:22

## 2022-06-06 RX ADMIN — TAMSULOSIN HYDROCHLORIDE 0.4 MG: 0.4 CAPSULE ORAL at 09:22

## 2022-06-06 RX ADMIN — SODIUM CHLORIDE, PRESERVATIVE FREE 10 ML: 5 INJECTION INTRAVENOUS at 14:17

## 2022-06-06 RX ADMIN — SODIUM CHLORIDE, PRESERVATIVE FREE 10 ML: 5 INJECTION INTRAVENOUS at 06:14

## 2022-06-06 RX ADMIN — SODIUM CHLORIDE 50 ML/HR: 9 INJECTION, SOLUTION INTRAVENOUS at 03:48

## 2022-06-06 NOTE — PROGRESS NOTES
INTERNAL MEDICINE PROGRESS NOTE    NAME:  Sunil Jenkins   :   1945   MRN:   [id number]     Date/Time:  2022 5:57 AM  Subjective:   History:  Chart reviewed and patient seen and examined and D/W his nurse and his wife this AM and all events noted. He is followed regularly by me for hypertension, diabetes, hyperlipidemia, ASCVD status post prior MI, paroxysmal atrial fibrillation, diastolic CHF compensated, DJD, and now metastatic adenocarcinoma of lung with resultant marked weight loss. He presented to the office  for evaluation and was noted to be extremely weak and feeling he would pass out at which time blood pressure obtained was 80 systolic and on no hypertensive medications. He denied associated chest pain, shortness of breath, palpitations, PND, orthopnea or other cardiac or respiratory complaints. He noted an extremely poor appetite and has been drinking a couple of cans of Ensure and some electrolytes daily but really taking in very little otherwise. There was no nausea vomiting and no other GI complaints. He notes no  complaints. He noted no fevers or chills. He was recently evaluated by Dr. Ryley Arriaga his oncologist and is set up for repeat scans to see if different therapy may be beneficial for his lung carcinoma. It was felt prudent based upon his hypotension and generalized lethargic condition that hospitalization hydration was warranted. This AM he remains more alert but still confused and no new c/o noted on complete ROS. He says that he has been able to get up some with assistance in the room but review of PT note reveals significant fall risk. He still has no appetite although no nausea or vomiting. He notes no shortness of breath, chest pain or cardiac or respiratory complaints at the present time. He has no neurologic c/o except weakness but obviously has significant confusion.       Medications reviewed:  Current Facility-Administered Medications   Medication Dose Route Frequency    finasteride (PROSCAR) tablet 5 mg  5 mg Oral DAILY    tamsulosin (FLOMAX) capsule 0.4 mg  0.4 mg Oral DAILY    sodium chloride (NS) flush 5-40 mL  5-40 mL IntraVENous Q8H    sodium chloride (NS) flush 5-40 mL  5-40 mL IntraVENous PRN    0.9% sodium chloride infusion  50 mL/hr IntraVENous CONTINUOUS    enoxaparin (LOVENOX) injection 40 mg  40 mg SubCUTAneous Q24H        Objective:   Vitals:  Visit Vitals  BP (!) 153/95   Pulse 95   Temp 98.4 °F (36.9 °C)   Resp 20   SpO2 96%      O2 Device: None (Room air) Temp (24hrs), Av °F (36.7 °C), Min:97.4 °F (36.3 °C), Max:98.4 °F (36.9 °C)      Last 24hr Input/Output:  No intake or output data in the 24 hours ending 22 0557     PHYSICAL EXAM:  General:     Alert, cooperative, very frail, no distress, appears stated age. Head:    Normocephalic, without obvious abnormality, atraumatic. Eyes:    Conjunctivae/corneas clear. PERRLA  Nose:   Nares normal. No drainage or sinus tenderness. Throat:     Lips, mucosa, and tongue normal.  No Thrush  Neck:   Supple, symmetrical,  no adenopathy, thyroid: non tender     no carotid bruit and no JVD. Back:     Symmetric,  No CVA tenderness. Lungs:    Clear to auscultation bilaterally. No Wheezing or Rhonchi. No rales. Heart:    Regular rate and rhythm,  no murmur, rub or gallop. Abdomen:    Soft, non-tender. Not distended. Bowel sounds normal. No masses  Extremities:  Extremities normal, atraumatic, No cyanosis. No edema. No clubbing  Lymph nodes:  Cervical, supraclavicular normal.  Neurologic: Mild generalized decreased strength, Alert and oriented X 3 although some obvious confusion.    Skin:                 No rash      Lab Data Reviewed:    Recent Results (from the past 24 hour(s))   GLUCOSE, POC    Collection Time: 22  7:24 AM   Result Value Ref Range    Glucose (POC) 134 (H) 65 - 117 mg/dL    Performed by [place], POC    Collection Time: 22 11:09 AM   Result Value Ref Range    Glucose (POC) 158 (H) 65 - 117 mg/dL    Performed by Lizette Vy Marek, POC    Collection Time: 06/05/22  4:42 PM   Result Value Ref Range    Glucose (POC) 175 (H) 65 - 117 mg/dL    Performed by Denys Burton RN    GLUCOSE, POC    Collection Time: 06/05/22 11:21 PM   Result Value Ref Range    Glucose (POC) 201 (H) 65 - 117 mg/dL    Performed by Puma Escoto (JOSE RN)          Assessment/Plan:     Principal Problem:    Hypotension (5/31/2022)    Active Problems:    Essential hypertension (8/2/2017)      Stage 2 chronic kidney disease (8/2/2017)      Controlled type 2 diabetes mellitus with stage 2 chronic kidney disease, without long-term current use of insulin ([facility] [address] (12/10/2017)      ASCVD (arteriosclerotic cardiovascular disease) (9/28/2019)      Overview: 80% LAD, 80% CX, 80% RCA all treated with sten W3/3315      Diastolic CHF, chronic (HCC) (2/18/2020)      PAF (paroxysmal atrial fibrillation) ([facility] [address] (9/28/2019)      Overview: 9/2019 at time of MI      Weight loss (9/13/2021)      Metastatic adenocarcinoma ([facility] [address] (10/26/2021)       ___________________________________________________  PLAN:    1. Continue IV hydration with normal saline, decreased rate a little to 50/ hr on 6/2  2. Follow electrolytes to check status of sodium (133 yesterday) with slight confusion, OK on admission as well as on follow-up so far 133  3. CBC in light of generalized pale appearance and hypotension, Hgb 10.9 adm to 10.5 now with hydration  4. Continue off all home blood pressure medications and BP up a little with hydration  5. Discontinued all diabetic medications (on Jardiance and metformin up until admission) previously on additional medicines which had already been discontinued  6. Follow blood sugar and treat with sliding scale insulin if needed, so far no treatment needed, Max yesterday 178 once  7. Dr. Abarca Arn help appreciated and I discussed with her.   Further work-up done on 6/1 to include CT head, chest, abdomen and pelvis which shows increased disease in his lung as well as his liver. I agree with Dr. Amarjit Hussein  recommendation of hospice consideration and family still looking into this. I D/W his wife yesterday AM and she has decided to go with Hospice through [facility] [address]  8. Physical therapy and Occupational Therapy evaluation and attempt to mobilize, notes from 6.3 reviewed      30 Minutes spent today in direct care of this high complexity patient with greater than 50% in counseling and coordination of care.     If need to contact me use hospital  [phone], DO NOT USE PERFECT SERVE    ___________________________________________________    Attending Physician: Alcides Israel MD

## 2022-06-06 NOTE — PROGRESS NOTES
Problem: Falls - Risk of  Goal: *Absence of Falls  Description: Document Jany Wright Fall Risk and appropriate interventions in the flowsheet.   Outcome: Progressing Towards Goal  Note: Fall Risk Interventions:  Mobility Interventions: Communicate number of staff needed for ambulation/transfer,Patient to call before getting OOB,Utilize walker, cane, or other assistive device,Utilize gait belt for transfers/ambulation    Mentation Interventions: Adequate sleep, hydration, pain control    Medication Interventions: Teach patient to arise slowly,Patient to call before getting OOB    Elimination Interventions: Call light in reach    History of Falls Interventions: Bed/chair exit alarm,Investigate reason for fall

## 2022-06-06 NOTE — PROGRESS NOTES
End of Shift Note    Bedside shift change report given to [facility] (oncoming nurse) by Shanthi Bond RN (offgoing nurse). Report included the following information SBAR, Kardex, Intake/Output and MAR    Shift worked:  7p-7a     Shift summary and any significant changes:     No issues noted     Concerns for physician to address:  none     [id number]       Activity:  Activity Level: Up with Assistance  Number times ambulated in hallways past shift: 0  Number of times OOB to chair past shift: 0    Cardiac:   Cardiac Monitoring: Yes      Cardiac Rhythm: Sinus Rhythm    Access:   Current line(s): PIV     Genitourinary:   Urinary status: voiding    Respiratory:   O2 Device: None (Room air)  Chronic home O2 use?: NO  Incentive spirometer at bedside: YES       GI:  Last Bowel Movement Date: 06/05/22  Current diet:  ADULT DIET Regular  ADULT ORAL NUTRITION SUPPLEMENT Breakfast, Lunch, Dinner; Diabetic Supplement  Passing flatus: NO  Tolerating current diet: YES       Pain Management:   Patient states pain is manageable on current regimen: YES    Skin:  Calderon Score: 20  Interventions: increase time out of bed    Patient Safety:  Fall Score:  Total Score: 4  Interventions: gripper socks  High Fall Risk: Yes    Length of Stay:  Expected LOS: 3d 7h  Actual LOS: [id number] [facility] P[address]59, RN

## 2022-06-06 NOTE — PROGRESS NOTES
Transition of Care Plan:     RUR: 13% - \"low risk\"  Disposition: Home with AT [address] [place] [address] & family support  Follow up appointments: PCP & specialist as indicated  DME needed: [facility] to coordinate the delivery of any DME necessary for d/c  Transportation at Discharge: BLS transport   Melody Fly or means to access home: Pt's wife has access to the home        IM Medicare Letter: 2nd IM needed prior to d/c  Is patient a BCPI-A Bundle: N/A         Is patient a  and connected with the [facility]? N/A              Caregiver Contact: Pt's wife Kasandra Stefani: [phone])  Discharge Caregiver contacted prior to discharge? To be contacted prior to d/c  Care Conference needed?: N/A    Update - 4:31 PM: FYI placed to MD requesting hospice order & completion of DDNR. CM will f/u 6/7/22 to continue working on disposition. Initial note: Chart reviewed. CM met with pt and wife Kasandra Stefani: [phone]) at bedside to f/u on direction of disposition. Wife reported she's decided to proceed with hospice care; AT [address] [place] [address] identified as preference for services. FOC offered, signed copy to be placed on chart. Wife inquired about DDNR; wife informed attending MD would have to complete DDNR. No immediate questions/concerns identified. Referral to be sent via Spock to AT [address] [place] [address] for review. CM will request attending MD to place order for hospice; once order is entered in chart, CM will upload it via Spock to AT [address] [place] [address] for reference. CM will continue to follow & remain accessible for d/c planning.     Jim Root, MSW  Care Manager, [address]

## 2022-06-06 NOTE — PROGRESS NOTES
Problem: Falls - Risk of  Goal: *Absence of Falls  Description: Document Jany Wright Fall Risk and appropriate interventions in the flowsheet.   6/6/2022 0247 by Bebo Gracia RN  Outcome: Progressing Towards Goal  Note: Fall Risk Interventions:  Mobility Interventions: Communicate number of staff needed for ambulation/transfer,Patient to call before getting OOB,Utilize walker, cane, or other assistive device,Utilize gait belt for transfers/ambulation    Mentation Interventions: Adequate sleep, hydration, pain control    Medication Interventions: Teach patient to arise slowly,Patient to call before getting OOB    Elimination Interventions: Call light in reach    History of Falls Interventions: Bed/chair exit alarm,Investigate reason for fall      6/6/2022 0244 by Bebo Gracia RN  Outcome: Progressing Towards Goal  Note: Fall Risk Interventions:  Mobility Interventions: Communicate number of staff needed for ambulation/transfer,Patient to call before getting OOB,Utilize walker, cane, or other assistive device,Utilize gait belt for transfers/ambulation    Mentation Interventions: Adequate sleep, hydration, pain control    Medication Interventions: Teach patient to arise slowly,Patient to call before getting OOB    Elimination Interventions: Call light in reach    History of Falls Interventions: Bed/chair exit alarm,Investigate reason for fall

## 2022-06-06 NOTE — PROGRESS NOTES
Hematology Oncology Progress Note           Follow up for: metastatic lung cancer    Chart notes reviewed since last visit. Case discussed with following: patient    Patient complains of the following: feeling better today. NO complaints, denies pain. Additional concerns noted by the staff:     Patient Vitals for the past 24 hrs:   BP Temp Pulse Resp SpO2   06/06/22 0720 (!) 146/87 98 °F (36.7 °C) 97 22 92 %   06/06/22 0353 (!) 153/95 98.4 °F (36.9 °C) 95 20 --   06/06/22 0015 (!) 142/88 98.1 °F (36.7 °C) 92 20 96 %   06/05/22 1952 130/88 97.6 °F (36.4 °C) 95 20 95 %   06/05/22 1453 136/72 97.4 °F (36.3 °C) 95 20 95 %   06/05/22 1228 126/72 98 °F (36.7 °C) 84 16 96 %       Review of Systems: negative for 11 organ systems except as noted above. Physical Examination:  Constitutional Alert, cooperative, oriented. Mood and affect appropriate. Appears close to chronological age. Well nourished. Well developed. Head Normocephalic; no scars   Eyes Conjunctivae and sclerae are clear and without icterus. Pupils are round   ENMT Sinuses are nontender. No oral exudates, ulcers, masses, thrush or mucositis. Oropharynx clear. Tongue normal.   Neck Supple without masses or thyromegaly. No jugular venous distension. Hematologic/Lymphatic No petechiae or purpura. No tender or palpable lymph nodes noted   Respiratory Lungs are clear to auscultation without rhonchi or wheezing. Cardiovascular Regular rate and rhythm of heart without murmurs, gallops or rubs. Chest / Line Site Chest is symmetric with no chest wall deformities. Abdomen Non-tender, non-distended, no masses, ascites or hepatosplenomegaly. Good bowel sounds. Musculoskeletal No tenderness or swelling, normal range of motion without obvious weakness. Extremities No visible deformities, no cyanosis, clubbing or edema. Skin No rashes, scars, or lesions suggestive of malignancy. No petechiae, purpura, or ecchymoses. No excoriations. Neurologic No sensory or motor deficits noted but not specifically tested. Psychiatric Alert and oriented. . Coherent speech. Verbalizes understanding of our discussions today. Labs:  Recent Results (from the past 24 hour(s))   GLUCOSE, POC    Collection Time: 06/05/22 11:09 AM   Result Value Ref Range    Glucose (POC) 158 (H) 65 - 117 mg/dL    Performed by [address], POC    Collection Time: 06/05/22  4:42 PM   Result Value Ref Range    Glucose (POC) 175 (H) 65 - 117 mg/dL    Performed by Natanael Easley RN    GLUCOSE, POC    Collection Time: 06/05/22 11:21 PM   Result Value Ref Range    Glucose (POC) 201 (H) 65 - 117 mg/dL    Performed by Vira Thomas (JOSE RN)    GLUCOSE, POC    Collection Time: 06/06/22 10:16 AM   Result Value Ref Range    Glucose (POC) 143 (H) 65 - 117 mg/dL    Performed by Vi Dunlap PCT        Imaging:  CT chest, abd and pelvis on 6/1/22  FINDINGS:      SUPRACLAVICULAR REGION: Major cervical vasculature within normal limits. No  supraclavicular adenopathy. VASCULATURE:   No aortic aneurysm or dissection. No proximal pulmonary embolism is identified. MEDIASTINUM: Aortic atherosclerotic change. Coronary vascular calcifications. Trace pericardial effusion. No hilar or mediastinal lymphadenopathy. No  esophageal mass. No endotracheal or endobronchial mass. PLEURA/LUNGS[de-identified] Small left and minimal right-sided pleural effusion increased. Left upper lobe mass lesion 5-28 27 x 25 mm. Slightly increased previously 23 x 18 mm.     Additional spiculated nodular mass in the left upper lobe 17 x 14 mm on the  current examination previously 12 x 11 mm. There are new right-sided pulmonary  nodules i.e. 5-29 right lung 5 mm. SOFT TISSUE/ AXILLA:  No mass or lymphadenopathy. LIVER: Segment 4A hepatic hypodensity is increased in size currently 48 x 14 mm  previously 39 x 30 mm. Additional small nodular densities in the left hepatic  lobe are increased in size and number.  There is no intrahepatic duct dilatation. Portal vein is patent. GALLBLADDER:  No dilatation or wall thickening. SPLEEN/PANCREAS: Cystic uncinate lesion is unchanged. Splenic hypodensities are  increased. There is no pancreatic duct dilatation.      ADRENALS/KIDNEYS: Renal lesion is slightly increased compared to the prior  examination 17 x 24 mm previously 11 x 14 mm. There is no hydronephrosis. There  is no renal mass. There is no perinephric mass. STOMACH: No dilatation or wall thickening. COLON AND SMALL BOWEL: Fecal stasis. There is no free intraperitoneal air. There  is no evidence of incarceration or obstruction. No mesenteric adenopathy. PERITONEUM: Ascites is minimal/increased. APPENDIX: Unremarkable. BLADDER/REPRODUCTIVE ORGANS: No mass or calculus. RETROPERITONEUM: Unremarkable. The abdominal aorta is normal in caliber. No  aneurysm. No retroperitoneal adenopathy. OSSEOUS STRUCTURES: Osseous metastatic disease is diffuse, not significantly  changed. No associated acute fracture.     IMPRESSION  Imaging findings are consistent with interval progression of disease. And/or enlarged pulmonary masses and nodules. Increased likely malignant  bilateral pleural effusions. New splenic hypodensities may represent metastases. Increased size and number of hepatic metastases .     Osseous metastatic disease is stable. No acute intraperitoneal/intrathoracic process is identified. Please see above for additional nonemergent incidental findings. CT of head 6/1/22  FINDINGS:   There is sulcal and ventricular prominence. Confluent periventricular and  scattered foci of hypodensity in the cerebral white matter. There is no evidence  of an acute infarction, hemorrhage, or mass-effect. There is no evidence of  midline shift or hydrocephalus. Posterior fossa structures are unremarkable. No  extra-axial collections are seen. Mastoid air cells are well pneumatized and clear.     There is no evidence of depressed skull fractures of soft tissue swelling.     IMPRESSION  No acute intracranial process.     Imaging findings consistent with minimal/mild chronic microvascular ischemic  change. There is a minimal degree of cerebral atrophy.       Assessment and Plan:   Metastatic lung cancer -  - Followed by my partner, Dr. Bernie Gilman who discussed hospice last week with patient and wife, he agreed. - Planning to leave for home with [facility].

## 2022-06-06 NOTE — PROGRESS NOTES
0700: Bedside and Verbal shift change report given to Corey Ruff RN (oncoming nurse) by Iris Valles (offgoing nurse). Report included the following information SBAR, Kardex, Intake/Output, MAR and Recent Results.

## 2022-06-06 NOTE — PROGRESS NOTES
Physical Therapy Note    Patient declines mobility stating that he is waiting for lunch. Refilled patients water pitcher, and offered to set patient up for lunch in recliner seat, or ambulate to the restroom before lunch arrived. Patient declined. Will re-attempt as able.

## 2022-06-06 NOTE — PROGRESS NOTES
Spiritual Care Assessment/Progress Note  [facility]      NAME: Daniel Szymanski      MRN: [id number]  AGE: 64 y.o. SEX: male  Sikh Affiliation: Religion   Language: English     6/6/2022     Total Time (in minutes): 7     Spiritual Assessment begun in [facility]OSCI[facility] TELEMET[facility] through conversation with:         []Patient        [] Family    [] Friend(s)        Reason for Consult: Initial/Spiritual assessment, patient floor     Spiritual beliefs: (Please include comment if needed)     [] Identifies with a yandel tradition:         [] Supported by a yandel community:            [] Claims no spiritual orientation:           [] Seeking spiritual identity:                [] Adheres to an individual form of spirituality:           [x] Not able to assess:                           Identified resources for coping:      [] Prayer                               [] Music                  [] Guided Imagery     [] Family/friends                 [] Pet visits     [] Devotional reading                         [x] Unknown     [] Other:                                               Interventions offered during this visit: (See comments for more details)    Patient Interventions: Other (comment) (Pt was seemed aleep)           Plan of Care:     [] Support spiritual and/or cultural needs    [] Support AMD and/or advance care planning process      [] Support grieving process   [] Coordinate Rites and/or Rituals    [] Coordination with community clergy   [] No spiritual needs identified at this time   [] Detailed Plan of Care below (See Comments)  [] Make referral to Music Therapy  [] Make referral to Pet Therapy     [] Make referral to Addiction services  [] Make referral to [facility]  [] Make referral to Spiritual Care Partner  [] No future visits requested        [x] Contact Spiritual Care for further referrals     Comments:     Initial Spiritual Care Assessment attempt for Mr. Antwon Segovia in 21 .  Performed chart review before the visit. Upon arrival, patient was appeared sleeping. There was a female visitor in the room but  respected patient's privacy.     [address] Nanda Hayes,[place], [place] [address] Provider   Paging Service [phone])

## 2022-06-06 NOTE — PROGRESS NOTES
Problem: Self Care Deficits Care Plan (Adult)  Goal: *Acute Goals and Plan of Care (Insert Text)  Description: FUNCTIONAL STATUS PRIOR TO ADMISSION: Patient was modified independent using a rolling walker for functional mobility, although wife reports pt typically does not use RW in the home    HOME SUPPORT: The patient lived with his wife and grandson who are able to assist.    Occupational Therapy Goals  Initiated 6/1/2022  1. Patient will perform grooming with modified independence in standing within 7 day(s). 2.  Patient will perform lower body dressing with minimal assistance within 7 day(s). 3.  Patient will perform toilet transfers with modified independence within 7 day(s). 4.  Patient will perform all aspects of toileting with modified independence within 7 day(s). 5.  Patient will participate in upper extremity therapeutic exercise/activities with independence for 5 minutes within 7 day(s). 6.  Patient will utilize energy conservation techniques during functional activities with verbal cues within 7 day(s). Outcome: Not Progressing Towards Goal    OCCUPATIONAL THERAPY TREATMENT  Patient: Merrick Arias (11 y.o. male)  Date: 6/6/2022  Diagnosis: Hypotension [I95.9] Hypotension       Precautions: Fall,DNR  Chart, occupational therapy assessment, plan of care, and goals were reviewed. ASSESSMENT  Patient continues with skilled OT services and is not progressing towards goals yet is cooperative with c/o fatigue today with activities as stated below. Patient min A of 1-2 staff due to impulsivity, poor safety awareness and impulsivity with poor safety with RW use and hand placement with sit to stand to sit, turning self around in bathroom where RW was behind patient. He c/o fatigue after toileting and too fatigued to effectively wash hands at sink. He was left in room with tray setup and food cut for lunch.  HE will benefit from SNF for rehab versus home with 24 hour hands on A and S at d/c pending patient/family preference. He remains a high fall risk due to his poor safety awareness. Current Level of Function Impacting Discharge (ADLs): min-mod A, impaired [address]    Other factors to consider for discharge: home with [place] and 24 hour A/S of family vs SNF, safety awareness issues. PLAN :  Patient continues to benefit from skilled intervention to address the above impairments. Continue treatment per established plan of care to address goals. Recommend with staff: up to chair for meals    Recommend next OT session: per POC    Recommendation for discharge: (in order for the patient to meet his/her long term goals)  Therapy up to 5 days/week in SNF setting    This discharge recommendation:  Has not yet been discussed the attending provider and/or case management    IF patient discharges home will need the following DME: need to verify with family, defer to SNF       SUBJECTIVE:   Patient stated I need help to cut up my food.     OBJECTIVE DATA SUMMARY:   Cognitive/Behavioral Status:                      Functional Mobility and Transfers for ADLs:  Bed Mobility:  Supine to Sit: Additional time;Assist x1;Bed Modified;Minimum assistance (HOB elevated)    Transfers:  Sit to Stand: Minimum assistance;Contact guard assistance;Assist x1;Additional time;Assist x2 (2nd person for safety, impulsive and h/o orthostatic hypoten)  Functional Transfers  Bathroom Mobility: Minimum assistance (poor RW safety with task max cues)  Toilet Transfer : Minimum assistance; Additional time; Adaptive equipment  Bed to Chair: Contact guard assistance;Minimum assistance;Assist x1;Additional time    Balance:  Sitting: Intact  Standing: Impaired; With support  Standing - Static: Constant support; Fair  Standing - Dynamic : Constant support; Fair    ADL Intervention:       Grooming  Position Performed: Standing  Washing Hands: Minimum assistance (standing, max cues, poor effectiveness, fatigue)  Cues: Visual cues provided; Tactile cues provided;Physical assistance;Verbal cues provided  Adaptive Equipment:  (walker, flexed posture at sink)  A to pump soap dispenser and balance with task, impaired yet grossly functional [facility]: Minimum assistance  Bladder Hygiene: Stand-by assistance  Clothing Management: Minimum assistance  Cues: Verbal cues provided;Physical assistance for pants up;Physical assistance for pants down  Adaptive Equipment: Grab bars; Walker         Therapeutic Exercises:       Pain:  Did not c/o pain    Activity Tolerance:   Fair, Poor, requires frequent rest breaks, and labile BP with drop in bp yet asymptomatic with session, left with feet elevated to compensate for softish BP    After treatment patient left in no apparent distress:   Sitting in chair, Heels elevated for pressure relief, Call bell within reach, Bed / chair alarm activated, and Caregiver / family present    COMMUNICATION/COLLABORATION:   The patients plan of care was discussed with: Physical therapist and Registered nurse.      ZHANG Bravo/L  Time Calculation: 15 mins

## 2022-06-06 NOTE — PROGRESS NOTES
Comprehensive Nutrition Assessment    Type and Reason for Visit: Initial,Positive nutrition screen    Nutrition Recommendations/Plan:   1. Continue regular diet  2. Continue Glucerna TID     Malnutrition Assessment:  Malnutrition Status:  Severe malnutrition (06/06/22 1641)    Context:  Chronic illness     Findings of the 6 clinical characteristics of malnutrition:   Energy Intake:  75% or less est energy requirements for 1 month or longer  Weight Loss:  Greater than 10% over 6 months     Body Fat Loss:  Severe body fat loss, Buccal region,Triceps   Muscle Mass Loss:  Severe muscle mass loss, Clavicles (pectoralis &deltoids)  Fluid Accumulation:  No significant fluid accumulation,     Strength:  Not performed     Nutrition Assessment:   Patient medically noted for hypotension. PMH HTN, CKD, DM, AFIB, CHF, and metastatic lung cancer. Noted plans for hospice at discharge. Patient reports a poor appetite currently and PTA; drinking supplements at home and currently receiving Glucerna TID in the hospital. Significant weight loss noted over the last 6 months as well as severe muscle and fat wasting noted on NFPE. Patient meets criteria for chronic severe malnutrition. Continue liberalized diet + ONS. Encouraged intake of meals/snacks. Nutrition Related Findings:    Na 133, [phone]  BM 6/5  Medications reviewed    Current Nutrition Intake & Therapies:  Average Meal Intake: 26-50%     ADULT DIET Regular  ADULT ORAL NUTRITION SUPPLEMENT Breakfast, Lunch, Dinner; Diabetic Supplement    Anthropometric Measures:  Height: 5' 10\" (177.8 cm)  Ideal Body Weight (IBW): 166 lbs (75 kg)     Current Body Wt:  61.5 kg (135 lb 9.3 oz), 81.7 % IBW. Current BMI (kg/m2): 19.5                          BMI Category: Normal weight (BMI 18.5-24. 9)    Estimated Daily Nutrient Needs:  Energy Requirements Based On: Formula  Weight Used for Energy Requirements: Current  Energy (kcal/day): 2037 kcal (BMR 1336 x 1.3AF +300kcal)  Weight Used for Protein Requirements: Current  Protein (g/day): 92g (1.5 g/kg bw)  Method Used for Fluid Requirements: 1 ml/kcal  Fluid (ml/day): 2000 mL    Nutrition Diagnosis:   · Severe malnutrition related to inadequate protein-energy intake,catabolic illness as evidenced by weight loss greater than or equal to 10% in 6 months,severe loss of subcutaneous fat,severe muscle loss    Nutrition Interventions:   Food and/or Nutrient Delivery: Continue current diet,Continue oral nutrition supplement  Nutrition Education/Counseling: No recommendations at this time  Coordination of Nutrition Care: Continue to monitor while inpatient       Goals:     Goals:  (PO intake >50% of meals/ONS next 5-7 days)       Nutrition Monitoring and Evaluation:   Behavioral-Environmental Outcomes: None identified  Food/Nutrient Intake Outcomes: Food and nutrient intake,Supplement intake  Physical Signs/Symptoms Outcomes: Biochemical data,Weight    Discharge Planning:    Continue current diet,Continue oral nutrition supplement    Diana Reynolds RD  Contact: ext [phone]

## 2022-06-06 NOTE — PROGRESS NOTES
Problem: Mobility Impaired (Adult and Pediatric)  Goal: *Acute Goals and Plan of Care (Insert Text)  Description: FUNCTIONAL STATUS PRIOR TO ADMISSION: Patient was modified independent using a rolling walker for functional mobility. Spouse reports patient typically does not use RW in household despite being encouraged to do so. HOME SUPPORT PRIOR TO ADMISSION: The patient lived with spouse and grandson who are able to assist as needed. Physical Therapy Goals  Initiated 6/1/2022  1. Patient will move from supine to sit and sit to supine , scoot up and down, and roll side to side in bed with independence within 7 day(s). 2.  Patient will transfer from bed to chair and chair to bed with modified independence using the least restrictive device within 7 day(s). 3.  Patient will perform sit to stand with modified independence within 7 day(s). 4.  Patient will ambulate with modified independence for 100 feet with the least restrictive device within 7 day(s). 5.  Patient will ascend/descend 2 stairs with single handrail(s) with supervision/set-up within 7 day(s). Outcome: Not Progressing Towards Goal   PHYSICAL THERAPY TREATMENT  Patient: Colen Shone (62 y.o. male)  Date: 6/6/2022  Diagnosis: Hypotension [I95.9] Hypotension       Precautions: Fall,DNR  Chart, physical therapy assessment, plan of care and goals were reviewed. ASSESSMENT  Patient continues with skilled PT services and is not progressing towards goals. Provided increased time he is able to transition supine to sit with HOB elevated. He continues to require maximal verbal and tactile cues for safety with use of RW. When ambulating to the restroom patient requires Min A for safety including removing RW as an obstacle and maintaining balance for safe sit<>stand. Despite repeated VC to push from a stable surface, patient requires tactile cues to adhere to safe transfer.  Patient is provided Min A for keeping the RW with him throughout transfers and then additional VC and tactile cues to reach back before sitting down. Current Level of Function Impacting Discharge (mobility/balance): CGA-Min A     Other factors to consider for discharge: medical stability         PLAN :  Patient continues to benefit from skilled intervention to address the above impairments. Continue treatment per established plan of care. to address goals. Recommendation for discharge: (in order for the patient to meet his/her long term goals)  Therapy up to 5 days/week in SNF setting v.  with 24/7 assistance for functional mobility     This discharge recommendation:  Has not yet been discussed the attending provider and/or case management    IF patient discharges home will need the following DME: patient owns DME required for discharge       SUBJECTIVE:   Patient stated I will use my walker when I go home.     OBJECTIVE DATA SUMMARY:   Critical Behavior:  Neurologic State: Alert  Orientation Level: Oriented X4  Cognition: Appropriate safety awareness,Follows commands     Functional Mobility Training:  Bed Mobility:     Supine to Sit: Additional time;Assist x1;Bed Modified;Minimum assistance (HOB elevated)              Transfers:  Sit to Stand: Minimum assistance;Contact guard assistance;Assist x1;Additional time;Assist x2 (2nd person for safety, impulsive and h/o orthostatic hypoten)  Stand to Sit: Contact guard assistance;Minimum assistance;Assist x1 (poor hand placement)        Bed to Chair: Contact guard assistance;Minimum assistance;Assist x1;Additional time                    Balance:  Sitting: Intact  Standing: Impaired; With support  Standing - Static: Constant support; Fair  Standing - Dynamic : Constant support; Fair  Ambulation/Gait Training:  Distance (ft): 15 Feet (ft)  Assistive Device: Walker, rolling;Gait belt  Ambulation - Level of Assistance: Contact guard assistance        Gait Abnormalities: Decreased step clearance;Trunk sway increased        Base of Support: Narrowed     Speed/Elsy: Slow  Step Length: Right shortened;Left shortened                    Stairs: Therapeutic Exercises:     Pain Rating:      Activity Tolerance:   Fair    After treatment patient left in no apparent distress:   Sitting in chair, Heels elevated for pressure relief, Call bell within reach, and Bed / chair alarm activated    COMMUNICATION/COLLABORATION:   The patients plan of care was discussed with: Occupational therapist and Registered nurse.      Samantha Elias, PT   Time Calculation: 10 mins

## 2022-06-07 LAB
COVID-19 RAPID TEST, COVR: NOT DETECTED
GLUCOSE BLD STRIP.AUTO-MCNC: 142 MG/DL (ref 65–117)
GLUCOSE BLD STRIP.AUTO-MCNC: 152 MG/DL (ref 65–117)
GLUCOSE BLD STRIP.AUTO-MCNC: 202 MG/DL (ref 65–117)
GLUCOSE BLD STRIP.AUTO-MCNC: 237 MG/DL (ref 65–117)
SERVICE CMNT-IMP: ABNORMAL
SOURCE, COVRS: NORMAL

## 2022-06-07 PROCEDURE — 74011250637 HC RX REV CODE- 250/637: Performed by: INTERNAL MEDICINE

## 2022-06-07 PROCEDURE — 82962 GLUCOSE BLOOD TEST: CPT

## 2022-06-07 PROCEDURE — 65270000029 HC RM PRIVATE

## 2022-06-07 PROCEDURE — 87635 SARS-COV-2 COVID-19 AMP PRB: CPT

## 2022-06-07 PROCEDURE — 74011636637 HC RX REV CODE- 636/637: Performed by: NURSE PRACTITIONER

## 2022-06-07 PROCEDURE — 99232 SBSQ HOSP IP/OBS MODERATE 35: CPT | Performed by: INTERNAL MEDICINE

## 2022-06-07 PROCEDURE — 74011000250 HC RX REV CODE- 250: Performed by: INTERNAL MEDICINE

## 2022-06-07 PROCEDURE — 74011250636 HC RX REV CODE- 250/636: Performed by: INTERNAL MEDICINE

## 2022-06-07 PROCEDURE — 74011250637 HC RX REV CODE- 250/637: Performed by: NURSE PRACTITIONER

## 2022-06-07 RX ORDER — HYDROXYZINE 25 MG/1
25 TABLET, FILM COATED ORAL ONCE
Status: COMPLETED | OUTPATIENT
Start: 2022-06-08 | End: 2022-06-07

## 2022-06-07 RX ORDER — MAGNESIUM SULFATE 100 %
4 CRYSTALS MISCELLANEOUS AS NEEDED
Status: DISCONTINUED | OUTPATIENT
Start: 2022-06-07 | End: 2022-06-09 | Stop reason: HOSPADM

## 2022-06-07 RX ORDER — FUROSEMIDE 10 MG/ML
40 INJECTION INTRAMUSCULAR; INTRAVENOUS ONCE
Status: COMPLETED | OUTPATIENT
Start: 2022-06-07 | End: 2022-06-07

## 2022-06-07 RX ORDER — INSULIN LISPRO 100 [IU]/ML
INJECTION, SOLUTION INTRAVENOUS; SUBCUTANEOUS
Status: DISCONTINUED | OUTPATIENT
Start: 2022-06-07 | End: 2022-06-09 | Stop reason: HOSPADM

## 2022-06-07 RX ORDER — DEXTROSE MONOHYDRATE 100 MG/ML
0-250 INJECTION, SOLUTION INTRAVENOUS AS NEEDED
Status: DISCONTINUED | OUTPATIENT
Start: 2022-06-07 | End: 2022-06-09 | Stop reason: HOSPADM

## 2022-06-07 RX ADMIN — HYDROXYZINE HYDROCHLORIDE 25 MG: 25 TABLET, FILM COATED ORAL at 23:31

## 2022-06-07 RX ADMIN — FINASTERIDE 5 MG: 5 TABLET, FILM COATED ORAL at 09:55

## 2022-06-07 RX ADMIN — FUROSEMIDE 40 MG: 10 INJECTION, SOLUTION INTRAMUSCULAR; INTRAVENOUS at 17:03

## 2022-06-07 RX ADMIN — Medication 2 UNITS: at 22:09

## 2022-06-07 RX ADMIN — SODIUM CHLORIDE, PRESERVATIVE FREE 10 ML: 5 INJECTION INTRAVENOUS at 16:52

## 2022-06-07 RX ADMIN — SODIUM CHLORIDE, PRESERVATIVE FREE 10 ML: 5 INJECTION INTRAVENOUS at 22:09

## 2022-06-07 RX ADMIN — TAMSULOSIN HYDROCHLORIDE 0.4 MG: 0.4 CAPSULE ORAL at 09:55

## 2022-06-07 RX ADMIN — ENOXAPARIN SODIUM 40 MG: 100 INJECTION SUBCUTANEOUS at 09:55

## 2022-06-07 NOTE — PROGRESS NOTES
Occupational Therapy    Discussed with CM. Family and patient going forward with hospice care. Will sign off at this time.      Ligia Reed, SAL, OTR/L

## 2022-06-07 NOTE — PROGRESS NOTES
Hematology Oncology Progress Note           Follow up for: metastatic lung cancer    Chart notes reviewed since last visit. Case discussed with following: patient    Patient complains of the following:NO complaints, denies pain. Additional concerns noted by the staff:     Patient Vitals for the past 24 hrs:   BP Temp Pulse Resp SpO2 Height   06/07/22 0839 (!) 155/81 97.3 °F (36.3 °C) 91 16 91 % --   06/07/22 0258 132/76 97.8 °F (36.6 °C) 89 16 95 % --   06/06/22 2333 135/74 97.6 °F (36.4 °C) 88 16 96 % --   06/06/22 2032 139/79 97.7 °F (36.5 °C) 96 16 95 % --   06/06/22 1638 -- -- -- -- -- 5' 10\" (1.778 m)   06/06/22 1540 132/72 98 °F (36.7 °C) 95 16 95 % --   06/06/22 1231 115/68 -- 96 -- -- --   06/06/22 1229 103/67 -- 93 -- -- --   06/06/22 1219 139/84 -- 93 -- -- --   06/06/22 1115 136/89 97.6 °F (36.4 °C) 85 20 97 % --       Review of Systems: negative for 11 organ systems except as noted above. Physical Examination:  Constitutional Alert, cooperative, oriented. Mood and affect appropriate. Appears close to chronological age. Well nourished. Well developed. Head Normocephalic; no scars   Eyes Conjunctivae and sclerae are clear and without icterus. Pupils are round   ENMT Sinuses are nontender. No oral exudates, ulcers, masses, thrush or mucositis. Oropharynx clear. Tongue normal.   Neck Supple without masses or thyromegaly. No jugular venous distension. Hematologic/Lymphatic No petechiae or purpura. No tender or palpable lymph nodes noted   Respiratory Lungs are clear to auscultation without rhonchi or wheezing. Cardiovascular Regular rate and rhythm of heart without murmurs, gallops or rubs. Chest / Line Site Chest is symmetric with no chest wall deformities. Abdomen Non-tender, non-distended, no masses, ascites or hepatosplenomegaly. Good bowel sounds. Musculoskeletal No tenderness or swelling, normal range of motion without obvious weakness.    Extremities No visible deformities, no cyanosis, clubbing or edema. Skin No rashes, scars, or lesions suggestive of malignancy. No petechiae, purpura, or ecchymoses. No excoriations. Neurologic No sensory or motor deficits noted but not specifically tested. Psychiatric Alert and oriented. . Coherent speech. Verbalizes understanding of our discussions today. Labs:  Recent Results (from the past 24 hour(s))   GLUCOSE, POC    Collection Time: 06/06/22 10:16 AM   Result Value Ref Range    Glucose (POC) 143 (H) 65 - 117 mg/dL    Performed by Gonzalo Chime PCT    GLUCOSE, POC    Collection Time: 06/06/22  1:16 PM   Result Value Ref Range    Glucose (POC) 164 (H) 65 - 117 mg/dL    Performed by Gonzalo Chime PCT    GLUCOSE, POC    Collection Time: 06/06/22  4:52 PM   Result Value Ref Range    Glucose (POC) 165 (H) 65 - 117 mg/dL    Performed by Gonzalo Chime PCT    GLUCOSE, POC    Collection Time: 06/07/22  7:48 AM   Result Value Ref Range    Glucose (POC) 142 (H) 65 - 117 mg/dL    Performed by Gonzalo Chime PCT        Imaging:  CT chest, abd and pelvis on 6/1/22  FINDINGS:      SUPRACLAVICULAR REGION: Major cervical vasculature within normal limits. No  supraclavicular adenopathy. VASCULATURE:   No aortic aneurysm or dissection. No proximal pulmonary embolism is identified. MEDIASTINUM: Aortic atherosclerotic change. Coronary vascular calcifications. Trace pericardial effusion. No hilar or mediastinal lymphadenopathy. No  esophageal mass. No endotracheal or endobronchial mass. PLEURA/LUNGS[de-identified] Small left and minimal right-sided pleural effusion increased. Left upper lobe mass lesion 5-28 27 x 25 mm. Slightly increased previously 23 x 18 mm.     Additional spiculated nodular mass in the left upper lobe 17 x 14 mm on the  current examination previously 12 x 11 mm. There are new right-sided pulmonary  nodules i.e. 5-29 right lung 5 mm. SOFT TISSUE/ AXILLA:  No mass or lymphadenopathy.     LIVER: Segment 4A hepatic hypodensity is increased in size currently 48 x 14 mm  previously 39 x 30 mm. Additional small nodular densities in the left hepatic  lobe are increased in size and number. There is no intrahepatic duct dilatation. Portal vein is patent. GALLBLADDER:  No dilatation or wall thickening. SPLEEN/PANCREAS: Cystic uncinate lesion is unchanged. Splenic hypodensities are  increased. There is no pancreatic duct dilatation.      ADRENALS/KIDNEYS: Renal lesion is slightly increased compared to the prior  examination 17 x 24 mm previously 11 x 14 mm. There is no hydronephrosis. There  is no renal mass. There is no perinephric mass. STOMACH: No dilatation or wall thickening. COLON AND SMALL BOWEL: Fecal stasis. There is no free intraperitoneal air. There  is no evidence of incarceration or obstruction. No mesenteric adenopathy. PERITONEUM: Ascites is minimal/increased. APPENDIX: Unremarkable. BLADDER/REPRODUCTIVE ORGANS: No mass or calculus. RETROPERITONEUM: Unremarkable. The abdominal aorta is normal in caliber. No  aneurysm. No retroperitoneal adenopathy. OSSEOUS STRUCTURES: Osseous metastatic disease is diffuse, not significantly  changed. No associated acute fracture.     IMPRESSION  Imaging findings are consistent with interval progression of disease. And/or enlarged pulmonary masses and nodules. Increased likely malignant  bilateral pleural effusions. New splenic hypodensities may represent metastases. Increased size and number of hepatic metastases .     Osseous metastatic disease is stable. No acute intraperitoneal/intrathoracic process is identified. Please see above for additional nonemergent incidental findings. CT of head 6/1/22  FINDINGS:   There is sulcal and ventricular prominence. Confluent periventricular and  scattered foci of hypodensity in the cerebral white matter. There is no evidence  of an acute infarction, hemorrhage, or mass-effect.  There is no evidence of  midline shift or hydrocephalus. Posterior fossa structures are unremarkable. No  extra-axial collections are seen. Mastoid air cells are well pneumatized and clear. There is no evidence of depressed skull fractures of soft tissue swelling.     IMPRESSION  No acute intracranial process.     Imaging findings consistent with minimal/mild chronic microvascular ischemic  change. There is a minimal degree of cerebral atrophy.       Assessment and Plan:   Metastatic lung cancer -  - Followed by my partner, Dr. Patricia Hermosillo who discussed hospice last week with patient and wife, he agreed. - Planning to leave for home with [facility]. I placed hospice hospice order and ordered DNR/DNI today.  Signed DDNR placed on chart

## 2022-06-07 NOTE — ROUTINE PROCESS
End of Shift Note    Bedside shift change report given to Ruiz  (oncoming nurse) by Jose Tovar RN (offgoing nurse).   Report included the following information SBAR    Shift worked:  nights   Shift summary and any significant changes:     none       Concerns for physician to address:  none   Zone phone for oncoming shift:   [phone]     Patient Information  Peter White  64 y.o.  5/31/2022  5:20 PM by Claudine Casillas MD. Peter White was admitted from Home    Problem List  Patient Active Problem List    Diagnosis Date Noted    Hypotension due to hypovolemia 05/31/2022    Hypotension 05/31/2022    Hypoglycemia 03/11/2022    Lung cancer ([address] 03/11/2022    Metastatic adenocarcinoma ([address] 10/26/2021    Metastatic carcinoma to bone ([address] 10/07/2021    Arthritis of left elbow 10/07/2021    Chronic pain of left knee 09/30/2021    Hip pain 09/30/2021    Chronic midline low back pain without sciatica 09/30/2021    Left elbow pain 09/30/2021    Primary osteoarthritis of left knee 09/13/2021    Weight loss 09/13/2021    Epistaxis 09/13/2021    Alcohol screening 86/52/3387    Diastolic CHF, chronic ([address] 02/18/2020    Primary insomnia 12/18/2019    ASCVD (arteriosclerotic cardiovascular disease) 09/28/2019    PAF (paroxysmal atrial fibrillation) ([address] 09/28/2019    NSTEMI (non-ST elevated myocardial infarction) ([address] 09/21/2019    Hematuria 12/11/2017    Controlled type 2 diabetes mellitus with stage 2 chronic kidney disease, without long-term current use of insulin ([address] 12/10/2017    ED (erectile dysfunction) 08/02/2017    Guillain-[place] syndrome ([address] 08/02/2017    BPH (benign prostatic hyperplasia) 08/02/2017    ASVD (arteriosclerotic vascular disease) 08/02/2017    Urticaria 08/02/2017    Aortic stenosis 08/02/2017    Primary osteoarthritis involving multiple joints 08/02/2017    Essential hypertension 08/02/2017    Stage 2 chronic kidney disease 08/02/2017    Left carotid bruit 08/02/2017    Mixed hyperlipidemia 08/02/2017    Back pain 08/02/2017    Neoplasm of uncertain behavior of skin 08/02/2017    Stenosis of both internal carotid arteries 05/09/2017     Past Medical History:   Diagnosis Date    Aortic stenosis 8/2/2017    ASVD (arteriosclerotic vascular disease) 8/2/2017    Back pain 8/2/2017    BPH (benign prostatic hyperplasia) 8/2/2017    CKD (chronic kidney disease) 8/2/2017    Diabetes ([facility] 8/2/2017    DJD (degenerative joint disease) 8/2/2017    ED (erectile dysfunction) 8/2/2017    Guillain-[place] syndrome ([address] 8/2/2017    Hyperlipidemia 8/2/2017    Hypertension 8/2/2017    Left carotid bruit 8/2/2017    Melanoma ([facility] 2021    Morbid obesity ([facility] 8/2/2017    On statin therapy 8/2/2017    Urticaria 8/2/2017       Core Measures:  CVA: No No  CHF:No No  PNA:No No    Activity:  Activity Level: Up with Assistance  Number times ambulated in hallways past shift: 0  Number of times OOB to chair past shift: 0    Cardiac:   Cardiac Monitoring: No      Cardiac Rhythm: Sinus Rhythm    Access:   Current line(s): PIV   Central Line? No Placement date  Reason Medically Necessary   PICC LINE? No Placement date Reason Medically Necessary     Genitourinary:   Urinary status: voiding  Urinary Catheter? No Placement Date  Reason Medically Necessary     Respiratory:   O2 Device: None (Room air)  Chronic home O2 use?: NO  Incentive spirometer at bedside: NO       GI:  Last Bowel Movement Date: 06/05/22  Current diet:  ADULT DIET Regular  ADULT ORAL NUTRITION SUPPLEMENT Breakfast, Lunch, Dinner; Diabetic Supplement  Passing flatus: YES  Tolerating current diet: YES       Pain Management:   Patient states pain is manageable on current regimen: YES    Skin:  Calderon Score: 20  Interventions: increase time out of bed    Patient Safety:  Fall Score:  Total Score: 4  Interventions: pt to call before getting OOB  High Fall Risk: Yes  @Rollbelt  @dexterity to release roll belt Yes/No ( must document dexterity  here by stating Yes or No here, otherwise this is a restraint and must follow restraint documentation policy.)    DVT prophylaxis:  DVT prophylaxis Med- Yes  DVT prophylaxis SCD or STEPHON- No     Wounds: (If Applicable)  Wounds- No  Location     Active Consults:  IP CONSULT TO HEMATOLOGY    Length of Stay:  Expected LOS: 3d 7h  Actual LOS: 7  Discharge Plan: Yes home with hospice      Danni Cheng RN

## 2022-06-07 NOTE — PROGRESS NOTES
INTERNAL MEDICINE PROGRESS NOTE    NAME:  Magaly Joshi   :   1945   MRN:   [id number]     Date/Time:  2022 6:00 AM  Subjective:   History:  Chart reviewed and patient seen and examined and D/W his nurse and his wife this AM and all events noted. He is followed regularly by me for hypertension, diabetes, hyperlipidemia, ASCVD status post prior MI, paroxysmal atrial fibrillation, diastolic CHF compensated, DJD, and now metastatic adenocarcinoma of lung with resultant marked weight loss. He presented to the office  for evaluation and was noted to be extremely weak and feeling he would pass out at which time blood pressure obtained was 80 systolic and on no hypertensive medications. He denied associated chest pain, shortness of breath, palpitations, PND, orthopnea or other cardiac or respiratory complaints. He noted an extremely poor appetite and has been drinking a couple of cans of Ensure and some electrolytes daily but really taking in very little otherwise. There was no nausea vomiting and no other GI complaints. He notes no  complaints. He noted no fevers or chills. He was recently evaluated by Dr. Noreen Vergara his oncologist and is set up for repeat scans to see if different therapy may be beneficial for his lung carcinoma. It was felt prudent based upon his hypotension and generalized lethargic condition that hospitalization hydration was warranted. This AM he remains alert but still confused and no new c/o noted on complete ROS. He says that he has been able to get up some with assistance in the room but review of PT note reveals significant fall risk. He still has no appetite although no nausea or vomiting. He notes no shortness of breath, chest pain or cardiac or respiratory complaints at the present time. He has no neurologic c/o except weakness but obviously has significant confusion.       Medications reviewed:  Current Facility-Administered Medications   Medication Dose Route Frequency    finasteride (PROSCAR) tablet 5 mg  5 mg Oral DAILY    tamsulosin (FLOMAX) capsule 0.4 mg  0.4 mg Oral DAILY    sodium chloride (NS) flush 5-40 mL  5-40 mL IntraVENous Q8H    sodium chloride (NS) flush 5-40 mL  5-40 mL IntraVENous PRN    0.9% sodium chloride infusion  50 mL/hr IntraVENous CONTINUOUS    enoxaparin (LOVENOX) injection 40 mg  40 mg SubCUTAneous Q24H        Objective:   Vitals:  Visit Vitals  /76   Pulse 89   Temp 97.8 °F (36.6 °C)   Resp 16   Ht 5' 10\" (1.778 m)   SpO2 95%   BMI 19.46 kg/m²      O2 Device: None (Room air) Temp (24hrs), Av.8 °F (36.6 °C), Min:97.6 °F (36.4 °C), Max:98 °F (36.7 °C)      Last 24hr Input/Output:    Intake/Output Summary (Last 24 hours) at 2022 0600  Last data filed at 2022 0720  Gross per 24 hour   Intake 200 ml   Output 150 ml   Net 50 ml        PHYSICAL EXAM:  General:     Alert, cooperative, very frail, no distress, appears stated age. Head:    Normocephalic, without obvious abnormality, atraumatic. Eyes:    Conjunctivae/corneas clear. PERRLA  Nose:   Nares normal. No drainage or sinus tenderness. Throat:     Lips, mucosa, and tongue normal.  No Thrush  Neck:   Supple, symmetrical,  no adenopathy, thyroid: non tender     no carotid bruit and no JVD. Back:     Symmetric,  No CVA tenderness. Lungs:    Clear to auscultation bilaterally. No Wheezing or Rhonchi. No rales. Heart:    Regular rate and rhythm,  no murmur, rub or gallop. Abdomen:    Soft, non-tender. Not distended. Bowel sounds normal. No masses  Extremities:  Extremities normal, atraumatic, No cyanosis. No edema. No clubbing  Lymph nodes:  Cervical, supraclavicular normal.  Neurologic: Mild generalized decreased strength, Alert and oriented X 3 although some obvious confusion.    Skin:                 No rash      Lab Data Reviewed:    Recent Results (from the past 24 hour(s))   GLUCOSE, POC    Collection Time: 22 10:16 AM   Result Value Ref Range    Glucose (POC) 143 (H) 65 - 117 mg/dL    Performed by Moss Lias PCT    GLUCOSE, POC    Collection Time: 06/06/22  1:16 PM   Result Value Ref Range    Glucose (POC) 164 (H) 65 - 117 mg/dL    Performed by Moss Lias PCT    GLUCOSE, POC    Collection Time: 06/06/22  4:52 PM   Result Value Ref Range    Glucose (POC) 165 (H) 65 - 117 mg/dL    Performed by Moss Lias PCT          Assessment/Plan:     Principal Problem:    Hypotension (5/31/2022)    Active Problems:    Essential hypertension (8/2/2017)      Stage 2 chronic kidney disease (8/2/2017)      Controlled type 2 diabetes mellitus with stage 2 chronic kidney disease, without long-term current use of insulin ([facility] (12/10/2017)      ASCVD (arteriosclerotic cardiovascular disease) (9/28/2019)      Overview: 80% LAD, 80% CX, 80% RCA all treated with sten T7/5397      Diastolic CHF, chronic (HCC) (2/18/2020)      PAF (paroxysmal atrial fibrillation) ([facility] (9/28/2019)      Overview: 9/2019 at time of MI      Weight loss (9/13/2021)      Metastatic adenocarcinoma ([facility] (10/26/2021)       ___________________________________________________  PLAN:    1. Discontinue IV hydration with normal saline, decreased rate a little to 50/ hr on 6/2  2. Follow electrolytes to check status of sodium (133 on 6/5) with slight confusion, OK on admission as well as on follow-up so far 133  3. CBC in light of generalized pale appearance and hypotension, Hgb 10.9 adm to 10.5 now with hydration  4. Continue off all home blood pressure medications and BP up a little with hydration  5. Discontinued all diabetic medications (on Jardiance and metformin up until admission) previously on additional medicines which had already been discontinued  6. Follow blood sugar and treat with sliding scale insulin if needed, so far no treatment needed, Max yesterday 201 once  7. Dr. Katy Perdomo help appreciated and I discussed with her.   Further work-up done on 6/1 to include CT head, chest, abdomen and pelvis which shows increased disease in his lung as well as his liver. I agree with Dr. Caryl Leggett  recommendation of hospice consideration and family still looking into this. I D/W his wife 6/5 AM and she has decided to go with Hospice through [facility]  8. Physical therapy and Occupational Therapy evaluation and attempt to mobilize, notes from 6/6 reviewed      30 Minutes spent today in direct care of this high complexity patient with greater than 50% in counseling and coordination of care.     If need to contact me use hospital  [phone], DO NOT USE PERFECT SERVE    ___________________________________________________    Attending Physician: Rasheeda Alvarez MD

## 2022-06-07 NOTE — PROGRESS NOTES
Transition of Care Plan:     RUR: 13% - \"low risk\"  Disposition: Home with [facility] [address] [place] [address] & family support  Follow up appointments: PCP & specialist as indicated  DME needed: [facility] to coordinate the delivery of any DME necessary for d/c  Transportation at [address] transport needed for d/c  Keys or means to access home: Pt's wife has access to the home        IM Medicare Letter: 2nd IM needed prior to d/c  Is patient a BCPI-A Bundle: N/A         Is patient a [place] and connected with the [facility]? N/A              Caregiver Contact: Pt's wife Fili Stone: [phone])  Discharge Caregiver contacted prior to discharge? To be contacted prior to d/c  Care Conference needed?: N/A    Initial note: Chart reviewed. CM consulted with hematologist/oncologist, Dr. Agustín Beaulieu regarding disposition. Dr. Agustín Beaulieu completed DDNR & placed order for hospice with qualifying dx in chart. CM uploaded hospice order via [employer] to [facility] [address] [place] [address] for reference; agency to begin coordinating services/DME for d/c. CM anticipates d/c tomorrow (6/8/22) vs Thursday (6/9/22) pending services are confirmed/DME is delivered. CM will continue to follow & remain accessible for d/c planning.     DIANA Strong  Care Manager, [id number] [address] [facility]  [phone]

## 2022-06-07 NOTE — PROGRESS NOTES
Physician Progress Note      PATIENT:               Sonu Santana  CSN #:                  [id number]  :                       1945  ADMIT DATE:       2022 5:20 PM  [address] [place] DATE:  RESPONDING  PROVIDER #:        Geneva Bajwa MD          QUERY TEXT:    Patient admitted with metastatic lung ca. If possible, please document in progress notes and discharge summary if you are evaluating and /or treating any of the following: The medical record reflects the following:  Risk Factors: metastatic lung ca  Clinical Indicators: -Malnutrition Status:  Severe malnutrition (22 1641)  Context:  Chronic illness  Findings of the 6 clinical characteristics of malnutrition:  Energy Intake:  75% or less est energy requirements for 1 month or longer  Weight Loss:  Greater than 10% over 6 months  Body Fat Loss:  Severe body fat loss, Buccal region,Triceps  Muscle Mass Loss:  Severe muscle mass loss, Clavicles (pectoralis &deltoids)  Treatment: RD cx, GlucerJuan Antonio Felipe RN/HIGINIO     ASPEN Criteria:  https://aspenjournals. onlinelibrary. nolan. com/doi/full/10.1177/5988546371379840  Options provided:  -- Protein calorie malnutrition severe  -- Other - I will add my own diagnosis  -- Disagree - Not applicable / Not valid  -- Disagree - Clinically unable to determine / Unknown  -- Refer to Clinical Documentation Reviewer    PROVIDER RESPONSE TEXT:    This patient has severe protein calorie malnutrition.     Query created by: Octavia Zamora on 2022 5:05 PM      Electronically signed by:  Geneva Bajwa MD 2022 12:31 PM

## 2022-06-07 NOTE — PROGRESS NOTES
Physical therapy:    Chart reviewed and spoke with CM. Pt is going home with hospice and no longer will benefit from acute PT. Will complete PT orders. Thank you.     Saleem Otero, PT, DPT

## 2022-06-08 LAB
GLUCOSE BLD STRIP.AUTO-MCNC: 132 MG/DL (ref 65–117)
GLUCOSE BLD STRIP.AUTO-MCNC: 144 MG/DL (ref 65–117)
GLUCOSE BLD STRIP.AUTO-MCNC: 157 MG/DL (ref 65–117)
GLUCOSE BLD STRIP.AUTO-MCNC: 201 MG/DL (ref 65–117)
SERVICE CMNT-IMP: ABNORMAL

## 2022-06-08 PROCEDURE — 74011250636 HC RX REV CODE- 250/636: Performed by: INTERNAL MEDICINE

## 2022-06-08 PROCEDURE — 65270000029 HC RM PRIVATE

## 2022-06-08 PROCEDURE — 74011000250 HC RX REV CODE- 250: Performed by: INTERNAL MEDICINE

## 2022-06-08 PROCEDURE — 74011636637 HC RX REV CODE- 636/637: Performed by: NURSE PRACTITIONER

## 2022-06-08 PROCEDURE — 99233 SBSQ HOSP IP/OBS HIGH 50: CPT | Performed by: INTERNAL MEDICINE

## 2022-06-08 PROCEDURE — 82962 GLUCOSE BLOOD TEST: CPT

## 2022-06-08 PROCEDURE — 74011250637 HC RX REV CODE- 250/637: Performed by: INTERNAL MEDICINE

## 2022-06-08 RX ADMIN — ENOXAPARIN SODIUM 40 MG: 100 INJECTION SUBCUTANEOUS at 10:18

## 2022-06-08 RX ADMIN — TAMSULOSIN HYDROCHLORIDE 0.4 MG: 0.4 CAPSULE ORAL at 10:18

## 2022-06-08 RX ADMIN — Medication 3 UNITS: at 12:16

## 2022-06-08 RX ADMIN — Medication 2 UNITS: at 10:17

## 2022-06-08 RX ADMIN — SODIUM CHLORIDE, PRESERVATIVE FREE 10 ML: 5 INJECTION INTRAVENOUS at 06:19

## 2022-06-08 RX ADMIN — SODIUM CHLORIDE, PRESERVATIVE FREE 10 ML: 5 INJECTION INTRAVENOUS at 17:07

## 2022-06-08 RX ADMIN — SODIUM CHLORIDE, PRESERVATIVE FREE 10 ML: 5 INJECTION INTRAVENOUS at 21:17

## 2022-06-08 RX ADMIN — FINASTERIDE 5 MG: 5 TABLET, FILM COATED ORAL at 10:18

## 2022-06-08 RX ADMIN — Medication 2 UNITS: at 17:07

## 2022-06-08 NOTE — PROGRESS NOTES
Received notification from bedside RN about patient with regards to: needs order for Lispro sliding scale    Intervention given: Lispro SSI ac/hs ordered    2326: Notified of increasing restlessness, frequent attempts to get out of bed.  Needs medication to help with sleep and restlessness  VS: /71, HR 89, RR 18, O2 sat 94% on RA    - Atarax 25 mg PO x 1 dose ordered

## 2022-06-08 NOTE — ROUTINE PROCESS
End of Shift Note    Bedside shift change report given to Marilin Salguero (oncoming nurse) by Ely Liang RN (offgoing nurse). Report included the following information    Shift worked: NIGHTS     Shift summary and any significant changes:    No changes     Concerns for physician to address:  none     Zone phone for oncoming shift:   [phone]       Activity:  Activity Level: Up with Assistance  Number times ambulated in hallways past shift:   Number of times OOB to chair past shift:   Cardiac:   Cardiac Monitoring:NO    Cardiac Rhythm: Sinus Rhythm    Access:   Current line(s): piv    Genitourinary:   Urinary status:continent    Respiratory:   O2 Device: None (Room air)  Chronic home O2 use?: no  Incentive spirometer at bedside: no       GI:  Last Bowel Movement Date: 06/05/22  Current diet:  ADULT DIET Regular  ADULT ORAL NUTRITION SUPPLEMENT Breakfast, Lunch, Dinner; Diabetic Supplement  Passing flatus:yes  Tolerating current diet:yes    Pain Management:   Patient states pain is manageable on current regimen: yes    Skin:  Calderon Score: 20  Interventions: turning  Patient Safety:  Fall Score:  Total Score: 4  Interventions:High Fall Risk: Yes    Length of Stay:  Expected LOS: 3d 7h  Actual LOS: [id number] [place] [address] GUNNAR Garza

## 2022-06-08 NOTE — PROGRESS NOTES
End of Shift Note     Bedside shift change report given to Diego Viera (oncoming nurse) by Liam Stanley RN (offgoing nurse). Report included the following information     Shift worked: Days       Shift summary and any significant changes:     No complaints of pain or significant changes to condition       Concerns for physician to address: None       Zone phone for oncoming shift:  [phone]         Activity:  Activity Level: Up with Assistance  Number times ambulated in hallways past shift:   Number of times OOB to chair past shift:   Cardiac:   Cardiac Monitoring:NO    Cardiac Rhythm: Sinus Rhythm     Access:   Current line(s): piv     Genitourinary:   Urinary status:continent     Respiratory:   O2 Device: None (Room air)  Chronic home O2 use?: no  Incentive spirometer at bedside: no     GI:  Last Bowel Movement Date: 06/05/22  Current diet:  ADULT DIET Regular  ADULT ORAL NUTRITION SUPPLEMENT Breakfast, Lunch, Dinner; Diabetic Supplement  Passing flatus:yes  Tolerating current diet:yes     Pain Management:   Patient states pain is manageable on current regimen: yes     Skin:  Calderon Score: 20  Interventions: turning  Patient Safety:  Fall Score: Total Score: 4  Interventions:High Fall Risk:  Yes     Length of Stay:  Expected LOS: 3d 7h  Actual LOS: 8        Liam Stanley, RN

## 2022-06-08 NOTE — ROUTINE PROCESS
End of Shift Note    Bedside shift change report given to Mariann Dimas  (oncoming nurse) by Cherry Junior RN (offgoing nurse). Report included the following information    Shift worked: NIGHTS     Shift summary and any significant changes:    Pt continuously tried to get out of bed to [employer] patient has condom catheter on but doesn't seem to understand or remembers. Atarax was ordered and given     Concerns for physician to address:  none     Zone phone for oncoming shift:   [phone]       Activity:  Activity Level: Up with Assistance  Number times ambulated in hallways past shift:   Number of times OOB to chair past shift:   Cardiac:   Cardiac Monitoring:NO    Cardiac Rhythm: Sinus Rhythm    Access:   Current line(s): piv    Genitourinary:   Urinary status:continent    Respiratory:   O2 Device: None (Room air)  Chronic home O2 use?: no  Incentive spirometer at bedside: no       GI:  Last Bowel Movement Date: 06/05/22  Current diet:  ADULT DIET Regular  ADULT ORAL NUTRITION SUPPLEMENT Breakfast, Lunch, Dinner; Diabetic Supplement  Passing flatus:yes  Tolerating current diet:yes    Pain Management:   Patient states pain is manageable on current regimen: yes    Skin:  Calderon Score: 20  Interventions: turning  Patient Safety:  Fall Score:  Total Score: 4  Interventions:High Fall Risk: Yes    Length of Stay:  Expected LOS: 3d 7h  Actual LOS: [address] [place] [address] GUNNAR Garza

## 2022-06-08 NOTE — PROGRESS NOTES
Problem: Hypotension  Goal: *Blood pressure within specified parameters  Outcome: Progressing Towards Goal  Goal: *Fluid volume balance  Outcome: Progressing Towards Goal  Goal: *Labs within defined limits  Outcome: Progressing Towards Goal     Problem: Patient Education: Go to Patient Education Activity  Goal: Patient/Family Education  Outcome: Progressing Towards Goal     Problem: Falls - Risk of  Goal: *Absence of Falls  Description: Document Larry Fall Risk and appropriate interventions in the flowsheet. Outcome: Progressing Towards Goal  Note: Fall Risk Interventions:  Mobility Interventions: Bed/chair exit alarm    Mentation Interventions: Adequate sleep, hydration, pain control,Bed/chair exit alarm    Medication Interventions: Bed/chair exit alarm    Elimination Interventions: Bed/chair exit alarm,Call light in reach    History of Falls Interventions: Bed/chair exit alarm,Investigate reason for fall,Room close to nurse's station         Problem: Patient Education: Go to Patient Education Activity  Goal: Patient/Family Education  Outcome: Progressing Towards Goal     Problem: Patient Education: Go to Patient Education Activity  Goal: Patient/Family Education  Outcome: Progressing Towards Goal     Problem: Patient Education: Go to Patient Education Activity  Goal: Patient/Family Education  Outcome: Progressing Towards Goal     Problem: Patient Education: Go to Patient Education Activity  Goal: Patient/Family Education  Outcome: Progressing Towards Goal     Problem: Pressure Injury - Risk of  Goal: *Prevention of pressure injury  Description: Document Calderon Scale and appropriate interventions in the flowsheet.   Outcome: Progressing Towards Goal  Note: Pressure Injury Interventions:  Sensory Interventions: Assess changes in LOC    Moisture Interventions: Absorbent underpads,Apply protective barrier, creams and emollients    Activity Interventions: Increase time out of bed    Mobility Interventions: Float heels,HOB 30 degrees or less,PT/OT evaluation    Nutrition Interventions: Document food/fluid/supplement intake                     Problem: Patient Education: Go to Patient Education Activity  Goal: Patient/Family Education  Outcome: Progressing Towards Goal

## 2022-06-08 NOTE — PROGRESS NOTES
INTERNAL MEDICINE PROGRESS NOTE    NAME:  Zheng Adrian   :   1945   MRN:   [id number]     Date/Time:  2022 6:06 AM  Subjective:   History:  Chart reviewed and patient seen and examined and D/W his nurse and his wife this AM and all events noted. He is followed regularly by me for hypertension, diabetes, hyperlipidemia, ASCVD status post prior MI, paroxysmal atrial fibrillation, diastolic CHF compensated, DJD, and now metastatic adenocarcinoma of lung with resultant marked weight loss. He presented to the office  for evaluation and was noted to be extremely weak and feeling he would pass out at which time blood pressure obtained was 80 systolic and on no hypertensive medications. He denied associated chest pain, shortness of breath, palpitations, PND, orthopnea or other cardiac or respiratory complaints. He noted an extremely poor appetite and has been drinking a couple of cans of Ensure and some electrolytes daily but really taking in very little otherwise. There was no nausea vomiting and no other GI complaints. He notes no  complaints. He noted no fevers or chills. He was recently evaluated by Dr. Lorna Doll his oncologist and is set up for repeat scans to see if different therapy may be beneficial for his lung carcinoma. It was felt prudent based upon his hypotension and generalized lethargic condition that hospitalization hydration was warranted. This AM he remains alert but still confused and no new c/o noted on complete ROS. He says that he has been able to get up some with assistance in the room but review of PT note reveals significant fall risk and PT signed off yesterday due to plans for Hospice. He still has no appetite although no nausea or vomiting. He notes no shortness of breath, chest pain or cardiac or respiratory complaints at the present time. He has no neurologic c/o except weakness but obviously has significant confusion.       Medications reviewed:  Current Facility-Administered Medications   Medication Dose Route Frequency    insulin lispro (HUMALOG) injection   SubCUTAneous AC&HS    glucose chewable tablet 16 g  4 Tablet Oral PRN    glucagon (GLUCAGEN) injection 1 mg  1 mg IntraMUSCular PRN    dextrose 10% infusion 0-250 mL  0-250 mL IntraVENous PRN    finasteride (PROSCAR) tablet 5 mg  5 mg Oral DAILY    tamsulosin (FLOMAX) capsule 0.4 mg  0.4 mg Oral DAILY    sodium chloride (NS) flush 5-40 mL  5-40 mL IntraVENous Q8H    sodium chloride (NS) flush 5-40 mL  5-40 mL IntraVENous PRN    enoxaparin (LOVENOX) injection 40 mg  40 mg SubCUTAneous Q24H        Objective:   Vitals:  Visit Vitals  /75 (BP 1 Location: Left upper arm, BP Patient Position: At rest)   Pulse 87   Temp 97.8 °F (36.6 °C)   Resp 18   Ht 5' 10\" (1.778 m)   SpO2 97%   BMI 19.46 kg/m²      O2 Device: None (Room air) Temp (24hrs), Av.2 °F (36.8 °C), Min:97.3 °F (36.3 °C), Max:98.6 °F (37 °C)      Last 24hr Input/Output:  No intake or output data in the 24 hours ending 22 06     PHYSICAL EXAM:  General:     Alert, cooperative, very frail, no distress, appears stated age. Head:    Normocephalic, without obvious abnormality, atraumatic. Eyes:    Conjunctivae/corneas clear. PERRLA  Nose:   Nares normal. No drainage or sinus tenderness. Throat:     Lips, mucosa, and tongue normal.  No Thrush  Neck:   Supple, symmetrical,  no adenopathy, thyroid: non tender     no carotid bruit and no JVD. Back:     Symmetric,  No CVA tenderness. Lungs:    Clear to auscultation bilaterally. No Wheezing or Rhonchi. No rales. Heart:    Regular rate and rhythm,  no murmur, rub or gallop. Abdomen:    Soft, non-tender. Not distended. Bowel sounds normal. No masses  Extremities:  Extremities normal, atraumatic, No cyanosis. No edema.  No clubbing  Lymph nodes:  Cervical, supraclavicular normal.  Neurologic: Mild generalized decreased strength, Alert and oriented X 3 although some obvious confusion. Skin:                 No rash      Lab Data Reviewed:    Recent Results (from the past 24 hour(s))   GLUCOSE, POC    Collection Time: 06/07/22  7:48 AM   Result Value Ref Range    Glucose (POC) 142 (H) 65 - 117 mg/dL    Performed by Dorothy Stephanie PCT    GLUCOSE, POC    Collection Time: 06/07/22 10:59 AM   Result Value Ref Range    Glucose (POC) 237 (H) 65 - 117 mg/dL    Performed by Karma Stephanie PCT    COVID-19 RAPID TEST    Collection Time: 06/07/22  1:39 PM   Result Value Ref Range    Specimen source Nasopharyngeal      COVID-19 rapid test Not detected NOTD     GLUCOSE, POC    Collection Time: 06/07/22  5:04 PM   Result Value Ref Range    Glucose (POC) 152 (H) 65 - 117 mg/dL    Performed by [facility] PCT    GLUCOSE, POC    Collection Time: 06/07/22  9:31 PM   Result Value Ref Range    Glucose (POC) 202 (H) 65 - 117 mg/dL    Performed by Alexus Benavides PCT          Assessment/Plan:     Principal Problem:    Hypotension (5/31/2022)    Active Problems:    Essential hypertension (8/2/2017)      Stage 2 chronic kidney disease (8/2/2017)      Controlled type 2 diabetes mellitus with stage 2 chronic kidney disease, without long-term current use of insulin ([facility] (12/10/2017)      ASCVD (arteriosclerotic cardiovascular disease) (9/28/2019)      Overview: 80% LAD, 80% CX, 80% RCA all treated with sten R8/7678      Diastolic CHF, chronic (HCC) (2/18/2020)      PAF (paroxysmal atrial fibrillation) ([facility] (9/28/2019)      Overview: 9/2019 at time of MI      Weight loss (9/13/2021)      Metastatic adenocarcinoma ([facility] (10/26/2021)       ___________________________________________________  PLAN:    1. Discontinued IV hydration with normal saline yesterday, had decreased rate a little to 50/ hr on 6/2. IV Lasix X 1 yesterday as oxygen sat dropped. Covid test done at nurse request is neg  2.   Follow electrolytes to check status of sodium (133 on 6/5) with slight confusion, OK on admission as well as on follow-up so far 133  3. CBC in light of generalized pale appearance and hypotension, Hgb 10.9 adm to 10.5 now with hydration  4. Continue off all home blood pressure medications and BP up a little with hydration  5. Discontinued all diabetic medications (on Jardiance and metformin up until admission) previously on additional medicines which had already been discontinued  6. Follow blood sugar and treat with sliding scale insulin if needed, so far no treatment needed, Max yesterday 237 once  7. Dr. Torres Mort help appreciated. Further work-up done on 6/1 to include CT head, chest, abdomen and pelvis which shows increased disease in his lung as well as his liver. I agree with Dr. Felix Swenson  recommendation of hospice consideration and family still looking into this. I D/W his wife 6/5 AM and she has decided to go with Hospice through [facility]. Apparently D/C in order for tomorrow 6/9, D/W his wife  8. Physical therapy and Occupational Therapy evaluation and attempt to mobilize, but now signed off with Hospice choice. n\Notes from 6/6 reviewed      35 Minutes spent today in direct care of this high complexity patient with greater than 50% in counseling and coordination of care.     If need to contact me use hospital  [phone], DO NOT USE PERFECT SERVE    ___________________________________________________    Attending Physician: Claudine Casillas MD

## 2022-06-08 NOTE — PROGRESS NOTES
Hematology Oncology Progress Note           Follow up for: metastatic lung cancer    Chart notes reviewed since last visit. Case discussed with following: patient    Patient complains of the following: No pain reported, states he slept well last night, planning to go home with hospice [facility]. Additional concerns noted by the staff:     Patient Vitals for the past 24 hrs:   BP Temp Pulse Resp SpO2   06/08/22 0736 136/75 97.5 °F (36.4 °C) 90 18 91 %   06/08/22 0231 139/75 97.8 °F (36.6 °C) 87 18 97 %   06/07/22 2339 138/76 98.6 °F (37 °C) 88 16 99 %   06/07/22 1954 (!) 140/71 98.4 °F (36.9 °C) 89 18 94 %   06/07/22 1715 (!) 145/68 98.5 °F (36.9 °C) 89 16 95 %   06/07/22 1159 -- 98.6 °F (37 °C) 90 16 96 %       Review of Systems: negative for 11 organ systems except as noted above. Physical Examination:  Constitutional Alert, cooperative, oriented. Mood and affect appropriate. Appears close to chronological age. Well nourished. Well developed. Head Normocephalic; no scars   Eyes Conjunctivae and sclerae are clear and without icterus. Pupils are round   ENMT Sinuses are nontender. No oral exudates, ulcers, masses, thrush or mucositis. Oropharynx clear. Tongue normal.   Neck Supple without masses or thyromegaly. No jugular venous distension. Hematologic/Lymphatic No petechiae or purpura. No tender or palpable lymph nodes noted   Respiratory Lungs are clear to auscultation without rhonchi or wheezing. Cardiovascular Regular rate and rhythm of heart without murmurs, gallops or rubs. Chest / Line Site Chest is symmetric with no chest wall deformities. Abdomen Non-tender, non-distended, no masses, ascites or hepatosplenomegaly. Good bowel sounds. Musculoskeletal No tenderness or swelling, normal range of motion without obvious weakness. Extremities No visible deformities, no cyanosis, clubbing or edema. Skin No rashes, scars, or lesions suggestive of malignancy.  No petechiae, purpura, or ecchymoses. No excoriations. Neurologic No sensory or motor deficits noted but not specifically tested. Psychiatric Alert and oriented. . Coherent speech. Verbalizes understanding of our discussions today. Labs:  Recent Results (from the past 24 hour(s))   GLUCOSE, POC    Collection Time: 06/07/22 10:59 AM   Result Value Ref Range    Glucose (POC) 237 (H) 65 - 117 mg/dL    Performed by [facility] PCT    COVID-19 RAPID TEST    Collection Time: 06/07/22  1:39 PM   Result Value Ref Range    Specimen source Nasopharyngeal      COVID-19 rapid test Not detected NOTD     GLUCOSE, POC    Collection Time: 06/07/22  5:04 PM   Result Value Ref Range    Glucose (POC) 152 (H) 65 - 117 mg/dL    Performed by Alysa Freeder PCT    GLUCOSE, POC    Collection Time: 06/07/22  9:31 PM   Result Value Ref Range    Glucose (POC) 202 (H) 65 - 117 mg/dL    Performed by Salina Bridges PCT    GLUCOSE, POC    Collection Time: 06/08/22  7:25 AM   Result Value Ref Range    Glucose (POC) 157 (H) 65 - 117 mg/dL    Performed by Cedric Christianson        Imaging:  CT chest, abd and pelvis on 6/1/22  FINDINGS:      SUPRACLAVICULAR REGION: Major cervical vasculature within normal limits. No  supraclavicular adenopathy. VASCULATURE:   No aortic aneurysm or dissection. No proximal pulmonary embolism is identified. MEDIASTINUM: Aortic atherosclerotic change. Coronary vascular calcifications. Trace pericardial effusion. No hilar or mediastinal lymphadenopathy. No  esophageal mass. No endotracheal or endobronchial mass. PLEURA/LUNGS[de-identified] Small left and minimal right-sided pleural effusion increased. Left upper lobe mass lesion 5-28 27 x 25 mm. Slightly increased previously 23 x 18 mm.     Additional spiculated nodular mass in the left upper lobe 17 x 14 mm on the  current examination previously 12 x 11 mm. There are new right-sided pulmonary  nodules i.e. 5-29 right lung 5 mm. SOFT TISSUE/ AXILLA:  No mass or lymphadenopathy.     LIVER: Segment 4A hepatic hypodensity is increased in size currently 48 x 14 mm  previously 39 x 30 mm. Additional small nodular densities in the left hepatic  lobe are increased in size and number. There is no intrahepatic duct dilatation. Portal vein is patent. GALLBLADDER:  No dilatation or wall thickening. SPLEEN/PANCREAS: Cystic uncinate lesion is unchanged. Splenic hypodensities are  increased. There is no pancreatic duct dilatation.      ADRENALS/KIDNEYS: Renal lesion is slightly increased compared to the prior  examination 17 x 24 mm previously 11 x 14 mm. There is no hydronephrosis. There  is no renal mass. There is no perinephric mass. STOMACH: No dilatation or wall thickening. COLON AND SMALL BOWEL: Fecal stasis. There is no free intraperitoneal air. There  is no evidence of incarceration or obstruction. No mesenteric adenopathy. PERITONEUM: Ascites is minimal/increased. APPENDIX: Unremarkable. BLADDER/REPRODUCTIVE ORGANS: No mass or calculus. RETROPERITONEUM: Unremarkable. The abdominal aorta is normal in caliber. No  aneurysm. No retroperitoneal adenopathy. OSSEOUS STRUCTURES: Osseous metastatic disease is diffuse, not significantly  changed. No associated acute fracture.     IMPRESSION  Imaging findings are consistent with interval progression of disease. And/or enlarged pulmonary masses and nodules. Increased likely malignant  bilateral pleural effusions. New splenic hypodensities may represent metastases. Increased size and number of hepatic metastases .     Osseous metastatic disease is stable. No acute intraperitoneal/intrathoracic process is identified. Please see above for additional nonemergent incidental findings. CT of head 6/1/22  FINDINGS:   There is sulcal and ventricular prominence. Confluent periventricular and  scattered foci of hypodensity in the cerebral white matter. There is no evidence  of an acute infarction, hemorrhage, or mass-effect.  There is no evidence of  midline shift or hydrocephalus. Posterior fossa structures are unremarkable. No  extra-axial collections are seen. Mastoid air cells are well pneumatized and clear. There is no evidence of depressed skull fractures of soft tissue swelling.     IMPRESSION  No acute intracranial process.     Imaging findings consistent with minimal/mild chronic microvascular ischemic  change. There is a minimal degree of cerebral atrophy.       Assessment and Plan:   Metastatic lung cancer -  - Followed by my partner, Dr. Claudeen Gibson who discussed hospice last week with patient and wife, he agrees. .  - Planning to leave for home with [facility], likely tomorrow.   - Pain controlled  - Signed DDNR placed on chart

## 2022-06-09 VITALS
HEART RATE: 95 BPM | WEIGHT: 140.65 LBS | BODY MASS INDEX: 20.14 KG/M2 | TEMPERATURE: 97.7 F | SYSTOLIC BLOOD PRESSURE: 131 MMHG | OXYGEN SATURATION: 94 % | HEIGHT: 70 IN | RESPIRATION RATE: 20 BRPM | DIASTOLIC BLOOD PRESSURE: 80 MMHG

## 2022-06-09 LAB
GLUCOSE BLD STRIP.AUTO-MCNC: 128 MG/DL (ref 65–117)
GLUCOSE BLD STRIP.AUTO-MCNC: 176 MG/DL (ref 65–117)
SERVICE CMNT-IMP: ABNORMAL
SERVICE CMNT-IMP: ABNORMAL

## 2022-06-09 PROCEDURE — 99239 HOSP IP/OBS DSCHRG MGMT >30: CPT | Performed by: INTERNAL MEDICINE

## 2022-06-09 PROCEDURE — 74011250637 HC RX REV CODE- 250/637: Performed by: INTERNAL MEDICINE

## 2022-06-09 PROCEDURE — 82962 GLUCOSE BLOOD TEST: CPT

## 2022-06-09 PROCEDURE — 74011636637 HC RX REV CODE- 636/637: Performed by: NURSE PRACTITIONER

## 2022-06-09 PROCEDURE — 74011000250 HC RX REV CODE- 250: Performed by: INTERNAL MEDICINE

## 2022-06-09 PROCEDURE — 74011250636 HC RX REV CODE- 250/636: Performed by: INTERNAL MEDICINE

## 2022-06-09 RX ADMIN — Medication 2 UNITS: at 13:02

## 2022-06-09 RX ADMIN — SODIUM CHLORIDE, PRESERVATIVE FREE 10 ML: 5 INJECTION INTRAVENOUS at 06:00

## 2022-06-09 RX ADMIN — FINASTERIDE 5 MG: 5 TABLET, FILM COATED ORAL at 09:02

## 2022-06-09 RX ADMIN — ENOXAPARIN SODIUM 40 MG: 100 INJECTION SUBCUTANEOUS at 09:02

## 2022-06-09 RX ADMIN — TAMSULOSIN HYDROCHLORIDE 0.4 MG: 0.4 CAPSULE ORAL at 09:02

## 2022-06-09 NOTE — PROGRESS NOTES
Problem: Hypotension  Goal: *Blood pressure within specified parameters  Outcome: Progressing Towards Goal  Goal: *Fluid volume balance  Outcome: Progressing Towards Goal  Goal: *Labs within defined limits  Outcome: Progressing Towards Goal     Problem: Patient Education: Go to Patient Education Activity  Goal: Patient/Family Education  Outcome: Progressing Towards Goal     Problem: Falls - Risk of  Goal: *Absence of Falls  Description: Document Larry Fall Risk and appropriate interventions in the flowsheet. Outcome: Progressing Towards Goal  Note: Fall Risk Interventions:  Mobility Interventions: Bed/chair exit alarm    Mentation Interventions: Adequate sleep, hydration, pain control,Bed/chair exit alarm    Medication Interventions: Bed/chair exit alarm    Elimination Interventions: Bed/chair exit alarm,Call light in reach    History of Falls Interventions: Bed/chair exit alarm         Problem: Patient Education: Go to Patient Education Activity  Goal: Patient/Family Education  Outcome: Progressing Towards Goal     Problem: Patient Education: Go to Patient Education Activity  Goal: Patient/Family Education  Outcome: Progressing Towards Goal     Problem: Patient Education: Go to Patient Education Activity  Goal: Patient/Family Education  Outcome: Progressing Towards Goal     Problem: Pressure Injury - Risk of  Goal: *Prevention of pressure injury  Description: Document Calderon Scale and appropriate interventions in the flowsheet.   Outcome: Progressing Towards Goal  Note: Pressure Injury Interventions:  Sensory Interventions: Assess changes in LOC    Moisture Interventions: Absorbent underpads,Apply protective barrier, creams and emollients    Activity Interventions: Increase time out of bed    Mobility Interventions: Chair cushion,Float heels,HOB 30 degrees or less    Nutrition Interventions: Document food/fluid/supplement intake                     Problem: Patient Education: Go to Patient Education Activity  Goal: Patient/Family Education  Outcome: Progressing Towards Goal

## 2022-06-09 NOTE — PROGRESS NOTES
Problem: Hypotension  Goal: *Blood pressure within specified parameters  Outcome: Progressing Towards Goal  Goal: *Fluid volume balance  Outcome: Progressing Towards Goal  Goal: *Labs within defined limits  Outcome: Progressing Towards Goal

## 2022-06-09 NOTE — PROGRESS NOTES
End of Shift Note     Bedside shift change report given to Gunnar Brennan (oncoming nurse) by Paco Diaz RN (offgoing nurse). Report included the following information     Shift worked: nights      Shift summary and any significant changes:     Pt continues to try to get out of bed forgets he has a con dom catheter on      Concerns for physician to address: None       Zone phone for oncoming shift:  [phone]         Activity:  Activity Level: Up with Assistance  Number times ambulated in hallways past shift:   Number of times OOB to chair past shift:   Cardiac:   Cardiac Monitoring:NO    Cardiac Rhythm: Sinus Rhythm     Access:   Current line(s): piv     Genitourinary:   Urinary status:continent     Respiratory:   O2 Device: None (Room air)  Chronic home O2 use?: no  Incentive spirometer at bedside: no     GI:  Last Bowel Movement Date: 06/05/22  Current diet:  ADULT DIET Regular  ADULT ORAL NUTRITION SUPPLEMENT Breakfast, Lunch, Dinner; Diabetic Supplement  Passing flatus:yes  Tolerating current diet:yes     Pain Management:   Patient states pain is manageable on current regimen: yes     Skin:  Calderon Score: 20  Interventions: turning  Patient Safety:  Fall Score: Total Score: 4  Interventions:High Fall Risk:  Yes     Length of Stay:  Expected LOS: 3d 7h  Actual LOS: 8        Gui Chiang RN

## 2022-06-09 NOTE — DISCHARGE INSTRUCTIONS
Doctor Maurisio [address]  [phone]      Patient Discharge Instructions     Celsa / [id number] : 1945    Admitted 2022 Discharged: 2022     Principal Problem:    Hypotension (2022)    Active Problems:    Essential hypertension (2017)      Stage 2 chronic kidney disease (2017)      Controlled type 2 diabetes mellitus with stage 2 chronic kidney disease, without long-term current use of insulin ([facility] (12/10/2017)      ASCVD (arteriosclerotic cardiovascular disease) (2019)      Overview: 80% LAD, 80% CX, 80% RCA all treated with sten Z0/5046      Diastolic CHF, chronic (HCC) (2020)      PAF (paroxysmal atrial fibrillation) ([facility] (2019)      Overview: 2019 at time of MI      Weight loss (2021)      Metastatic adenocarcinoma ([facility] (10/26/2021)          Allergies   Allergen Reactions    Adhesive Tape-Silicones Unknown (comments)    Chocolate Flavor Unknown (comments)       · It is important that you take the medication exactly as they are prescribed. · Do not take other medications without consulting your doctor. What to do at Next Level of Care    Disposition:  Home with At [facility] [facility]: As tolerated    Recommended activity: As tolerated    Oxygen:  2 L PRN          Information obtained by :  I understand that if any problems occur once I am at home I am to contact my physician. I understand and acknowledge receipt of the instructions indicated above.                                                                                                                                            Physician's or R.N.'s Signature                                                                  Date/Time                                                                                                                                              Patient or Representative Signature                                                          Date/Time

## 2022-06-09 NOTE — PROGRESS NOTES
D/C to Home with AT [facility]. D/W his wife and case management today. Note dictated.

## 2022-06-09 NOTE — PROGRESS NOTES
Hematology Oncology Progress Note           Follow up for: metastatic lung cancer    Chart notes reviewed since last visit. Case discussed with following: patient    Patient complains of the following: No complaints reported today    Additional concerns noted by the staff:     Patient Vitals for the past 24 hrs:   BP Temp Pulse Resp SpO2   06/09/22 0909 134/80 97.4 °F (36.3 °C) 96 18 92 %   06/09/22 0320 (!) 143/77 97.6 °F (36.4 °C) 92 18 98 %   06/08/22 2315 123/60 97.7 °F (36.5 °C) 93 16 99 %   06/08/22 1935 133/60 99.1 °F (37.3 °C) 87 18 97 %   06/08/22 1559 137/79 97.9 °F (36.6 °C) 99 18 94 %       Review of Systems: negative for 11 organ systems except as noted above. Physical Examination:  Constitutional Alert, cooperative, oriented. Mood and affect appropriate. Appears close to chronological age. Well nourished. Well developed. Head Normocephalic; no scars   Eyes Conjunctivae and sclerae are clear and without icterus. Pupils are round   ENMT Sinuses are nontender. No oral exudates, ulcers, masses, thrush or mucositis. Oropharynx clear. Tongue normal.   Neck Supple without masses or thyromegaly. No jugular venous distension. Hematologic/Lymphatic No petechiae or purpura. No tender or palpable lymph nodes noted   Respiratory Lungs are clear to auscultation without rhonchi or wheezing. Cardiovascular Regular rate and rhythm of heart without murmurs, gallops or rubs. Chest / Line Site Chest is symmetric with no chest wall deformities. Abdomen Non-tender, non-distended, no masses, ascites or hepatosplenomegaly. Good bowel sounds. Musculoskeletal No tenderness or swelling, normal range of motion without obvious weakness. Extremities No visible deformities, no cyanosis, clubbing or edema. Skin No rashes, scars, or lesions suggestive of malignancy. No petechiae, purpura, or ecchymoses. No excoriations. Neurologic No sensory or motor deficits noted but not specifically tested. Psychiatric Alert and oriented. . Coherent speech. Verbalizes understanding of our discussions today. Labs:  Recent Results (from the past 24 hour(s))   GLUCOSE, POC    Collection Time: 06/08/22 11:52 AM   Result Value Ref Range    Glucose (POC) 201 (H) 65 - 117 mg/dL    Performed by [facility], POC    Collection Time: 06/08/22  4:11 PM   Result Value Ref Range    Glucose (POC) 144 (H) 65 - 117 mg/dL    Performed by [facility], POC    Collection Time: 06/08/22  9:16 PM   Result Value Ref Range    Glucose (POC) 132 (H) 65 - 117 mg/dL    Performed by Mary Alice Nuñez RN    GLUCOSE, POC    Collection Time: 06/09/22  7:34 AM   Result Value Ref Range    Glucose (POC) 128 (H) 65 - 117 mg/dL    Performed by Mary Alice Nuñez RN        Imaging:  CT chest, abd and pelvis on 6/1/22  FINDINGS:      SUPRACLAVICULAR REGION: Major cervical vasculature within normal limits. No  supraclavicular adenopathy. VASCULATURE:   No aortic aneurysm or dissection. No proximal pulmonary embolism is identified. MEDIASTINUM: Aortic atherosclerotic change. Coronary vascular calcifications. Trace pericardial effusion. No hilar or mediastinal lymphadenopathy. No  esophageal mass. No endotracheal or endobronchial mass. PLEURA/LUNGS[de-identified] Small left and minimal right-sided pleural effusion increased. Left upper lobe mass lesion 5-28 27 x 25 mm. Slightly increased previously 23 x 18 mm.     Additional spiculated nodular mass in the left upper lobe 17 x 14 mm on the  current examination previously 12 x 11 mm. There are new right-sided pulmonary  nodules i.e. 5-29 right lung 5 mm. SOFT TISSUE/ AXILLA:  No mass or lymphadenopathy. LIVER: Segment 4A hepatic hypodensity is increased in size currently 48 x 14 mm  previously 39 x 30 mm. Additional small nodular densities in the left hepatic  lobe are increased in size and number. There is no intrahepatic duct dilatation. Portal vein is patent.   GALLBLADDER:  No dilatation or wall thickening. SPLEEN/PANCREAS: Cystic uncinate lesion is unchanged. Splenic hypodensities are  increased. There is no pancreatic duct dilatation.      ADRENALS/KIDNEYS: Renal lesion is slightly increased compared to the prior  examination 17 x 24 mm previously 11 x 14 mm. There is no hydronephrosis. There  is no renal mass. There is no perinephric mass. STOMACH: No dilatation or wall thickening. COLON AND SMALL BOWEL: Fecal stasis. There is no free intraperitoneal air. There  is no evidence of incarceration or obstruction. No mesenteric adenopathy. PERITONEUM: Ascites is minimal/increased. APPENDIX: Unremarkable. BLADDER/REPRODUCTIVE ORGANS: No mass or calculus. RETROPERITONEUM: Unremarkable. The abdominal aorta is normal in caliber. No  aneurysm. No retroperitoneal adenopathy. OSSEOUS STRUCTURES: Osseous metastatic disease is diffuse, not significantly  changed. No associated acute fracture.     IMPRESSION  Imaging findings are consistent with interval progression of disease. And/or enlarged pulmonary masses and nodules. Increased likely malignant  bilateral pleural effusions. New splenic hypodensities may represent metastases. Increased size and number of hepatic metastases .     Osseous metastatic disease is stable. No acute intraperitoneal/intrathoracic process is identified. Please see above for additional nonemergent incidental findings. CT of head 6/1/22  FINDINGS:   There is sulcal and ventricular prominence. Confluent periventricular and  scattered foci of hypodensity in the cerebral white matter. There is no evidence  of an acute infarction, hemorrhage, or mass-effect. There is no evidence of  midline shift or hydrocephalus. Posterior fossa structures are unremarkable. No  extra-axial collections are seen. Mastoid air cells are well pneumatized and clear.     There is no evidence of depressed skull fractures of soft tissue swelling.     IMPRESSION  No acute intracranial process.     Imaging findings consistent with minimal/mild chronic microvascular ischemic  change. There is a minimal degree of cerebral atrophy.       Assessment and Plan:   Metastatic lung cancer -  - Followed by my partner, Dr. Tapan Prajapati who discussed hospice last week with patient and wife, he agrees. .  - Planning to leave for home with At [facility] today ~3:30pm  - Pain controlled  - Signed DDNR on chart

## 2022-06-09 NOTE — PROGRESS NOTES
No further CM needs identified. Transition of Care Plan:     RUR: 12% - \"low risk\"  Disposition: Home with AT [facility]  Follow up appointments: PCP & specialist as indicated  DME needed: [facility] to coordinate the delivery of any DME necessary for d/c  Transportation at Discharge: [facility] transport secured for 3:30 PM; PCS completed, copy on chart  Keys or means to access home: Pt's wife has access to the home        IM Medicare Letter: 2nd IM reviewed/signed 6/9/22; copy on chart  Is patient a BCPI-A Bundle: N/A         Is patient a [place] and connected with the [facility]? N/A              Caregiver Contact: Pt's wife Chayo Moots: [phone])  Discharge Caregiver contacted prior to discharge? Wife present at bedside the day of d/c (6/9/22); wife agreeable to the d/c Vivek Amezcua [address] needed?: N/A    Initial note: Chart reviewed. CM spoke with attending MD, Dr. Lynn who confirmed CHELY for today. MD to place d/c order mid-day. Pt will d/c home with AT [address] [place] [address]; agency informed of d/c today. [facility]'s information reflected in AVS for reference. [facility] transport secured for 3:30 PM; PCS completed, copy on chart. [facility] informed of [facility] transport time. CM met with pt's wife Chayo Moralesots: [phone] to review plan for d/c; plan, including [facility] transport secured for 3:30 PM reviewed, wife agreeable. Wife has been in contact with [facility] team & confirmed plans for admission today. Wife reiterated need for medical transport. No additional questions/concerns identified. 2nd IM reviewed/signed by wife; copy on chart. RN informed of d/c plan/[facility] transport time. CM will remain accessible for consult if additional needs arise prior to d/c. Care Management Interventions  PCP Verified by CM:  Yes  Last Visit to PCP: 05/31/22  Palliative Care Criteria Met (RRAT>21 & CHF Dx)?: Yes (CHF dx)  Palliative Consult Recommended?: No  Reason Palliative Care Not Recommended?: Palliative Care not utilized by provider  Mode of Transport at Discharge: [address]  [address] Time of Discharge: [address] (CM Consult): Quentin: Arlene Coffey Signup:  (AT [facility] to coordinate the delivery of DME needed for d/c)  Discharge Durable Medical Equipment: No  Physical Therapy Consult: Yes  Occupational Therapy Consult: Yes  Speech Therapy Consult: No  Support Systems: Spouse/Significant Other,Child(srikanth)  Confirm Follow Up Transport: Family  The Plan for Transition of Care is Related to the Following Treatment Goals : hospice  The Patient and/or Patient Representative was Provided with a Choice of Provider and Agrees with the Discharge Plan?: Yes  Name of the Patient Representative Who was Provided with a Choice of Provider and Agrees with the Discharge Plan: patient's wife Esau Reyes  [place] of Choice List was Provided with Basic Dialogue that Supports the Patient's Individualized Plan of Care/Goals, Treatment Preferences and Shares the Quality Data Associated with the Providers?: Yes   Resource Information Provided?: No  Discharge Location  Patient Expects to be Discharged to[de-identified] Home with hospice (AT [facility] family support)    DIANA Diaz  Care Manager, [facility]  [phone]

## 2022-06-09 NOTE — PROGRESS NOTES
Problem: Hypotension  Goal: *Blood pressure within specified parameters  6/9/2022 1600 by Rosita Klein RN  Outcome: Resolved/Met  6/9/2022 1600 by Rosita Klein RN  Outcome: Resolved/Met  6/9/2022 1205 by Rosita Klein RN  Outcome: Progressing Towards Goal  Goal: *Fluid volume balance  6/9/2022 1600 by Rosita Klein RN  Outcome: Resolved/Met  6/9/2022 1600 by Rosita Klein RN  Outcome: Resolved/Met  6/9/2022 1205 by Rosita Klein RN  Outcome: Progressing Towards Goal  Goal: *Labs within defined limits  6/9/2022 1600 by Rosita Klein RN  Outcome: Resolved/Met  6/9/2022 1600 by Rosita Klein RN  Outcome: Resolved/Met  6/9/2022 1205 by Rosita Klein RN  Outcome: Progressing Towards Goal

## 2022-06-10 ENCOUNTER — PATIENT OUTREACH (OUTPATIENT)
Dept: CASE MANAGEMENT | Age: 77
End: 2022-06-10

## 2022-06-10 NOTE — DISCHARGE SUMMARY
[address] SUMMARY    Name:  Jody Pennington  MR#:  [id number]  :  1945  ACCOUNT #:  [de-identified]  ADMIT DATE:  2022  DISCHARGE DATE:  2022    FINAL DIAGNOSES:  1. Hypotension. 2.  Mildly metastatic adenocarcinoma of lung. 3.  Diabetes mellitus. 4.  Baseline hypertension. 5.  Chronic kidney disease, stage II. 6.  Atherosclerotic cardiovascular disease. 7.  Chronic diastolic congestive heart failure. 8.  Paroxysmal atrial fibrillation. 9.  Weight loss secondary to adenocarcinoma. CONSULTATIONS:  Hematology/Oncology, Dr. Girish Hernandez. PROCEDURES:  None. For details of admission history and physical, please see admit note. HOSPITAL COURSE:  Briefly, the patient is a 68-year-old white male who was diagnosed with adenocarcinoma of the lung that had metastasized to several parts of the lung as well as mediastinum and liver and presented to the office on the day of admission with marked hypotension and near syncope. He was admitted to the hospital through the emergency room, started on IV hydration and his hypertension medications had previously been discontinued and upon admission all diabetic medications were discontinued. His blood sugars were monitored and remained below 200 during the hospitalization with IV hydration, his overall status improved. He had workup after consultation with Dr. Roshan Neves to include repeat CT scan showing no evidence of brain mets, he did have evidence of increased tumor burden in his lung as well as increased size of the liver lesions and it was felt at this point that he was too weak to be able to tolerate any further chemotherapy and hospice was recommended. His wife and he considered that and then decided to go with hospice with at [facility]. They were contacted and after reviewing he was accepting their hospice program, he will be discharged home today.   He, at this point, is eating fairly well although marked diminished and overall has loss significant amount of weight with protein calorie malnutrition and he will be supported at home as tolerated. Discharge med reconciliation is remarkable for continuin. Flomax 0.4 daily. 2.  Proscar 5 mg daily. Discontinuing all other medications which consist of:  1. Diclofenac. 2.  Jardiance. 3.  Metformin  4. Lopressor. 5.  Omega-3 fish oil. 6.  Pravastatin. 7.  Vikki Schaumann, which is his chemotherapeutic agent. He will have further followup at home by hospice care. 35 minutes spent in direct care of this patient today at the time of discharge.       Debby Sandhoff, MD      [id number]  D:  2022 12:57  T:  06/10/2022 1:41  JOB #:  [id number]  CC:  Ignacia Yanez MD

## 2022-12-22 NOTE — PROGRESS NOTES
Ok per to discharge per Dr. Lucinda Jeans. No pneumothorax on CXR. Spontaneous, unlabored and symmetrical

## 2023-03-24 NOTE — PROGRESS NOTES
Is This A New Presentation, Or A Follow-Up?: Skin Lesion Lab letter was sent. How Severe Is Your Skin Lesion?: mild Have Your Skin Lesions Been Treated?: not been treated

## 2023-04-26 NOTE — ROUTINE PROCESS
The following appointments have been successfully scheduled: 
 
Date/time Monday, September 30, 2019 11:20 AM 
Patient  Octavio Paula 1945 ([address] [place] [address] ) #[id number] #[id number] Department [facility]-MAIN OFFICE Appointment type Transitional Care Provider [facility] Delayed oral transit time/Reduced anterior - posterior transport/Uncontrolled bolus / spillover in jammie-pharynx/Uncontrolled bolus / spillover in hypopharynx Delayed oral transit time/Reduced anterior - posterior transport/Uncontrolled bolus / spillover in jammie-pharynx/Uncontrolled bolus / spillover in hypopharynx

## 2024-12-04 NOTE — PATIENT INSTRUCTIONS
Arthritis: Care Instructions Your Care Instructions Arthritis, also called osteoarthritis, is a breakdown of the cartilage that cushions your joints. When the cartilage wears down, your bones rub against each other. This causes pain and stiffness. Many people have some arthritis as they age. Arthritis most often affects the joints of the spine, hands, hips, knees, or feet. You can take simple measures to protect your joints, ease your pain, and help you stay active. Follow-up care is a key part of your treatment and safety. Be sure to make and go to all appointments, and call your doctor if you are having problems. It's also a good idea to know your test results and keep a list of the medicines you take. How can you care for yourself at home? · Stay at a healthy weight. Being overweight puts extra strain on your joints. · Talk to your doctor or physical therapist about exercises that will help ease joint pain. ? Stretch. You may enjoy gentle forms of yoga to help keep your joints and muscles flexible. ? Walk instead of jog. Other types of exercise that are less stressful on the joints include riding a bicycle, swimming, virgilio chi, or water exercise. ? Lift weights. Strong muscles help reduce stress on your joints. Stronger thigh muscles, for example, take some of the stress off of the knees and hips. Learn the right way to lift weights so you do not make joint pain worse. · Take your medicines exactly as prescribed. Call your doctor if you think you are having a problem with your medicine. · Take pain medicines exactly as directed. ? If the doctor gave you a prescription medicine for pain, take it as prescribed. ? If you are not taking a prescription pain medicine, ask your doctor if you can take an over-the-counter medicine. · Use a cane, crutch, walker, or another device if you need help to get around. These can help rest your joints.  You also can use other things to make life easier, such as a higher toilet seat and padded handles on kitchen utensils. · Do not sit in low chairs, which can make it hard to get up. · Put heat or cold on your sore joints as needed. Use whichever helps you most. You also can take turns with hot and cold packs. ? Apply heat 2 or 3 times a day for 20 to 30 minutesusing a heating pad, hot shower, or hot packto relieve pain and stiffness. ? Put ice or a cold pack on your sore joint for 10 to 20 minutes at a time. Put a thin cloth between the ice and your skin. When should you call for help? Call your doctor now or seek immediate medical care if: 
  · You have sudden swelling, warmth, or pain in any joint.  
  · You have joint pain and a fever or rash.  
  · You have such bad pain that you cannot use a joint.  
 Watch closely for changes in your health, and be sure to contact your doctor if: 
  · You have mild joint symptoms that continue even with more than 6 weeks of care at home.  
  · You have stomach pain or other problems with your medicine. Where can you learn more? Go to http://matilde-girish.info/. Enter R322 in the search box to learn more about \"Arthritis: Care Instructions. \" Current as of: April 1, 2019 Content Version: 12.2 © 5702-6704 [employer]. Care instructions adapted under license by [employer] (which disclaims liability or warranty for this information). If you have questions about a medical condition or this instruction, always ask your healthcare professional. [place] [address] any warranty or liability for your use of this information. 08-Aug-2024 10:48
